# Patient Record
Sex: FEMALE | Race: WHITE | NOT HISPANIC OR LATINO | Employment: OTHER | URBAN - METROPOLITAN AREA
[De-identification: names, ages, dates, MRNs, and addresses within clinical notes are randomized per-mention and may not be internally consistent; named-entity substitution may affect disease eponyms.]

---

## 2017-07-17 ENCOUNTER — HOSPITAL ENCOUNTER (EMERGENCY)
Facility: HOSPITAL | Age: 47
Discharge: HOME | End: 2017-07-17
Attending: SPECIALIST
Payer: MEDICARE

## 2017-07-17 ENCOUNTER — TELEPHONE (OUTPATIENT)
Dept: INTERNAL MEDICINE | Facility: CLINIC | Age: 47
End: 2017-07-17

## 2017-07-17 VITALS
DIASTOLIC BLOOD PRESSURE: 56 MMHG | WEIGHT: 160 LBS | RESPIRATION RATE: 16 BRPM | TEMPERATURE: 98 F | BODY MASS INDEX: 28.34 KG/M2 | OXYGEN SATURATION: 100 % | SYSTOLIC BLOOD PRESSURE: 102 MMHG | HEART RATE: 83 BPM

## 2017-07-17 DIAGNOSIS — F44.5 PSEUDOSEIZURE: Primary | ICD-10-CM

## 2017-07-17 LAB
ALBUMIN SERPL BCP-MCNC: 3.4 G/DL
ALP SERPL-CCNC: 102 U/L
ALT SERPL W/O P-5'-P-CCNC: 67 U/L
AMPHET+METHAMPHET UR QL: NEGATIVE
ANION GAP SERPL CALC-SCNC: 13 MMOL/L
APTT BLDCRRT: 24 SEC
AST SERPL-CCNC: 55 U/L
BARBITURATES UR QL SCN>200 NG/ML: NEGATIVE
BASOPHILS # BLD AUTO: 0.04 K/UL
BASOPHILS NFR BLD: 0.6 %
BENZODIAZ UR QL SCN>200 NG/ML: NORMAL
BILIRUB SERPL-MCNC: 0.7 MG/DL
BILIRUB UR QL STRIP: NEGATIVE
BUN SERPL-MCNC: 15 MG/DL
BZE UR QL SCN: NEGATIVE
CALCIUM SERPL-MCNC: 8.7 MG/DL
CANNABINOIDS UR QL SCN: NEGATIVE
CHLORIDE SERPL-SCNC: 108 MMOL/L
CLARITY UR: CLEAR
CO2 SERPL-SCNC: 21 MMOL/L
COLOR UR: YELLOW
CREAT SERPL-MCNC: 0.8 MG/DL
CREAT UR-MCNC: 39.6 MG/DL
DIFFERENTIAL METHOD: ABNORMAL
EOSINOPHIL # BLD AUTO: 0.3 K/UL
EOSINOPHIL NFR BLD: 5.1 %
ERYTHROCYTE [DISTWIDTH] IN BLOOD BY AUTOMATED COUNT: 15.8 %
EST. GFR  (AFRICAN AMERICAN): >60 ML/MIN/1.73 M^2
EST. GFR  (NON AFRICAN AMERICAN): >60 ML/MIN/1.73 M^2
ETHANOL SERPL-MCNC: <10 MG/DL
GLUCOSE SERPL-MCNC: 146 MG/DL
GLUCOSE UR QL STRIP: NEGATIVE
HCT VFR BLD AUTO: 34.8 %
HGB BLD-MCNC: 11.2 G/DL
HGB UR QL STRIP: NEGATIVE
INR PPP: 1
KETONES UR QL STRIP: NEGATIVE
LEUKOCYTE ESTERASE UR QL STRIP: NEGATIVE
LYMPHOCYTES # BLD AUTO: 1.9 K/UL
LYMPHOCYTES NFR BLD: 29.7 %
MAGNESIUM SERPL-MCNC: 1.4 MG/DL
MCH RBC QN AUTO: 27.7 PG
MCHC RBC AUTO-ENTMCNC: 32.2 %
MCV RBC AUTO: 86 FL
METHADONE UR QL SCN>300 NG/ML: NEGATIVE
MONOCYTES # BLD AUTO: 0.5 K/UL
MONOCYTES NFR BLD: 8 %
NEUTROPHILS # BLD AUTO: 3.7 K/UL
NEUTROPHILS NFR BLD: 56.6 %
NITRITE UR QL STRIP: NEGATIVE
OPIATES UR QL SCN: NORMAL
PCP UR QL SCN>25 NG/ML: NEGATIVE
PH UR STRIP: 6 [PH] (ref 5–8)
PLATELET # BLD AUTO: 243 K/UL
PMV BLD AUTO: 11 FL
POCT GLUCOSE: 148 MG/DL (ref 70–110)
POTASSIUM SERPL-SCNC: 4 MMOL/L
PROT SERPL-MCNC: 7.1 G/DL
PROT UR QL STRIP: NEGATIVE
PROTHROMBIN TIME: 10.2 SEC
RBC # BLD AUTO: 4.04 M/UL
SODIUM SERPL-SCNC: 142 MMOL/L
SP GR UR STRIP: 1.02 (ref 1–1.03)
TOXICOLOGY INFORMATION: NORMAL
URN SPEC COLLECT METH UR: NORMAL
UROBILINOGEN UR STRIP-ACNC: NEGATIVE EU/DL
WBC # BLD AUTO: 6.46 K/UL

## 2017-07-17 PROCEDURE — 80053 COMPREHEN METABOLIC PANEL: CPT

## 2017-07-17 PROCEDURE — 82962 GLUCOSE BLOOD TEST: CPT

## 2017-07-17 PROCEDURE — 85025 COMPLETE CBC W/AUTO DIFF WBC: CPT

## 2017-07-17 PROCEDURE — 25000003 PHARM REV CODE 250: Performed by: SPECIALIST

## 2017-07-17 PROCEDURE — 96375 TX/PRO/DX INJ NEW DRUG ADDON: CPT

## 2017-07-17 PROCEDURE — 95816 EEG AWAKE AND DROWSY: CPT | Mod: 26,,, | Performed by: PSYCHIATRY & NEUROLOGY

## 2017-07-17 PROCEDURE — 80320 DRUG SCREEN QUANTALCOHOLS: CPT

## 2017-07-17 PROCEDURE — 96365 THER/PROPH/DIAG IV INF INIT: CPT

## 2017-07-17 PROCEDURE — 99285 EMERGENCY DEPT VISIT HI MDM: CPT | Mod: 25

## 2017-07-17 PROCEDURE — 85610 PROTHROMBIN TIME: CPT

## 2017-07-17 PROCEDURE — 63600175 PHARM REV CODE 636 W HCPCS: Performed by: SPECIALIST

## 2017-07-17 PROCEDURE — 96361 HYDRATE IV INFUSION ADD-ON: CPT

## 2017-07-17 PROCEDURE — 83735 ASSAY OF MAGNESIUM: CPT

## 2017-07-17 PROCEDURE — 80307 DRUG TEST PRSMV CHEM ANLYZR: CPT

## 2017-07-17 PROCEDURE — 81003 URINALYSIS AUTO W/O SCOPE: CPT

## 2017-07-17 PROCEDURE — 95816 EEG AWAKE AND DROWSY: CPT

## 2017-07-17 PROCEDURE — 80177 DRUG SCRN QUAN LEVETIRACETAM: CPT

## 2017-07-17 PROCEDURE — 99285 EMERGENCY DEPT VISIT HI MDM: CPT | Mod: ,,, | Performed by: PSYCHIATRY & NEUROLOGY

## 2017-07-17 PROCEDURE — 85730 THROMBOPLASTIN TIME PARTIAL: CPT

## 2017-07-17 RX ORDER — ACETAMINOPHEN 10 MG/ML
1000 INJECTION, SOLUTION INTRAVENOUS ONCE
Status: COMPLETED | OUTPATIENT
Start: 2017-07-17 | End: 2017-07-17

## 2017-07-17 RX ORDER — CLONAZEPAM 2 MG/1
TABLET ORAL
Refills: 0 | COMMUNITY
Start: 2017-04-25 | End: 2018-09-24 | Stop reason: SDUPTHER

## 2017-07-17 RX ORDER — DIAZEPAM 10 MG/1
10 TABLET ORAL 3 TIMES DAILY
COMMUNITY
Start: 2017-06-30 | End: 2018-03-21

## 2017-07-17 RX ORDER — NAPROXEN 500 MG/1
500 TABLET ORAL 2 TIMES DAILY PRN
Qty: 10 TABLET | Refills: 0 | Status: SHIPPED | OUTPATIENT
Start: 2017-07-17 | End: 2018-03-21

## 2017-07-17 RX ORDER — LEVETIRACETAM 10 MG/ML
1000 INJECTION INTRAVASCULAR
Status: COMPLETED | OUTPATIENT
Start: 2017-07-17 | End: 2017-07-17

## 2017-07-17 RX ORDER — PREGABALIN 150 MG/1
150 CAPSULE ORAL 2 TIMES DAILY
COMMUNITY
Start: 2017-06-30 | End: 2018-03-21

## 2017-07-17 RX ORDER — DIPHENHYDRAMINE HCL 12.5MG/5ML
12.5 ELIXIR ORAL
Status: COMPLETED | OUTPATIENT
Start: 2017-07-17 | End: 2017-07-17

## 2017-07-17 RX ORDER — BLOOD SUGAR DIAGNOSTIC
STRIP MISCELLANEOUS
COMMUNITY
Start: 2017-05-31 | End: 2018-06-18

## 2017-07-17 RX ORDER — CITALOPRAM 40 MG/1
TABLET, FILM COATED ORAL
COMMUNITY
Start: 2017-06-30 | End: 2018-03-21

## 2017-07-17 RX ORDER — ONDANSETRON 2 MG/ML
4 INJECTION INTRAMUSCULAR; INTRAVENOUS
Status: COMPLETED | OUTPATIENT
Start: 2017-07-17 | End: 2017-07-17

## 2017-07-17 RX ORDER — DIVALPROEX SODIUM 500 MG/1
1000 TABLET, DELAYED RELEASE ORAL EVERY 12 HOURS
Qty: 30 TABLET | Refills: 11 | Status: SHIPPED | OUTPATIENT
Start: 2017-07-17 | End: 2018-03-21

## 2017-07-17 RX ORDER — ISOPROPYL ALCOHOL 0.75 G/1
SWAB TOPICAL 3 TIMES DAILY
COMMUNITY
Start: 2017-06-30

## 2017-07-17 RX ORDER — LEVETIRACETAM 500 MG/1
500 TABLET ORAL 2 TIMES DAILY
COMMUNITY
Start: 2017-07-13 | End: 2017-07-17 | Stop reason: ALTCHOICE

## 2017-07-17 RX ORDER — DIAZEPAM 5 MG/1
TABLET ORAL
COMMUNITY
Start: 2017-05-19 | End: 2018-03-21

## 2017-07-17 RX ORDER — DOXEPIN HYDROCHLORIDE 150 MG/1
150 CAPSULE ORAL NIGHTLY
COMMUNITY
Start: 2017-06-20 | End: 2018-09-24 | Stop reason: SDUPTHER

## 2017-07-17 RX ORDER — OMEPRAZOLE 40 MG/1
40 CAPSULE, DELAYED RELEASE ORAL DAILY
COMMUNITY
Start: 2017-06-13 | End: 2018-03-21

## 2017-07-17 RX ORDER — SITAGLIPTIN AND METFORMIN HYDROCHLORIDE 1000; 50 MG/1; MG/1
TABLET, FILM COATED, EXTENDED RELEASE ORAL
COMMUNITY
Start: 2017-06-08 | End: 2018-06-18

## 2017-07-17 RX ORDER — SITAGLIPTIN AND METFORMIN HYDROCHLORIDE 500; 50 MG/1; MG/1
TABLET, FILM COATED ORAL
COMMUNITY
Start: 2017-06-30 | End: 2018-03-21

## 2017-07-17 RX ADMIN — DEXTROSE 725 MG PE: 50 INJECTION, SOLUTION INTRAVENOUS at 09:07

## 2017-07-17 RX ADMIN — LEVETIRACETAM 1000 MG: 1000 INJECTION, SOLUTION INTRAVENOUS at 10:07

## 2017-07-17 RX ADMIN — SODIUM CHLORIDE 1000 ML: 0.9 INJECTION, SOLUTION INTRAVENOUS at 09:07

## 2017-07-17 RX ADMIN — DIPHENHYDRAMINE HYDROCHLORIDE 12.5 MG: 12.5 SOLUTION ORAL at 01:07

## 2017-07-17 RX ADMIN — ACETAMINOPHEN 1000 MG: 10 INJECTION, SOLUTION INTRAVENOUS at 01:07

## 2017-07-17 RX ADMIN — ONDANSETRON 4 MG: 2 INJECTION INTRAMUSCULAR; INTRAVENOUS at 12:07

## 2017-07-17 NOTE — PROCEDURES
Jadyn Chance is a 46 y.o. female patient.    Temp: 98 °F (36.7 °C) (07/17/17 0839)  Pulse: 83 (07/17/17 1450)  Resp: 16 (07/17/17 1450)  BP: (!) 102/56 (07/17/17 1415)  SpO2: 100 % (07/17/17 1450)  Weight: 72.6 kg (160 lb) (07/17/17 0839)            Referring physician: Dr. Laboy     Technician : Harshad Mcgee     Recording time:               20 minutes    Artifacts:   Eye movements , muscle .    Age:              47                                    Sex: F      History:   Multi[ple seizures     Technique : Electrodes are placed according to International 10-20 system . Bipolar and referential montages are used. Hyperventilation was  Not done   . Photic was not done.      Findings:  This is a multichannel digital EEG recording using the international 10-20 placement     system. The resting record is fairly well organized and symmetric. A dominant posterior     rhythm is seen. It consists of a  9  hertz 20-70 microvolt alpha rhythm. This attenuates     with eye opening. During drowsiness, there is mild attenuation and slowing of the     background rhythm. Stage II sleep was not achieved. Hyperventilation was not     performed. Photic stimulation was not  done .There is no change in the back ground .There is no spikes or spike waves activities in this EEG. No seizure or epileptiform discharge are appreciated.  There is no focal attenuation or side to side variation. No clinical seizure activity was noted by the EEG technician. There were some muscle artifacts .     IMPRESSION:  This is a normal awake and drowsy EEG study. Clinical correlation appreciated.          Procedures    Harsha Laboy  7/17/2017

## 2017-07-17 NOTE — ED NOTES
Pt awake and alert at this time, VSS.  Ppt's  number placed on pt chart, pt's  leaving hospital, took pt's medications, necklace, and wedding rings.

## 2017-07-17 NOTE — ED NOTES
Nurse called to pt bedside for family report of seizure activity.  Pt IV flushed with NS, seizure activity stopped.  Dr. Padgett notified.

## 2017-07-17 NOTE — CONSULTS
Consults   Consult Requested By: Christin Padgett MD  Reason for Consult:  Seizure     SUBJECTIVE:       HPI:   Jadyn Chance is a 46 y.o. female patient who presents to the Emergency Department for seizures. She had 2 seizures last night and 3 seizures this morning, the last while waiting in the ED lobby. Symptoms are episodic and moderate in severity.  states that patient has been having seizures for several years and is currently on Keppra; she was on Klonopin and Depakote in the past. She said that she was doing very good with Depakote 1000 mg bid but her Neurologist has changed it to Keppra Which is causing headache. She is under a lot of stress , does not sleep enough and there is some deaths in family.   She used to have convulsive seizure without tongue biting or incontinence . Usually it lasts 10-15 minutes. She feels when it is coming.    Past Medical History:   Diagnosis Date    Abnormal Pap smear     bx was negative, per pt    Anemia     Anxiety     B12 deficiency     Blood transfusion     multiple     Chronic LBP     Degenerative disc disease     l spine    Diabetes mellitus     Diabetic neuropathy     General anesthetics causing adverse effect in therapeutic use     delayed emergence    Mixed hyperlipidemia 2013    RLS (restless legs syndrome)     Vitamin D deficiency disease      Past Surgical History:   Procedure Laterality Date    ANUS SURGERY      BELT ABDOMINOPLASTY      tummy tuck    BREAST SURGERY      breast augmentation     SECTION      x2    CHOLECYSTECTOMY      mikel      EXCISIONAL HEMORRHOIDECTOMY      GASTRIC BYPASS      HIP FRACTURE SURGERY      left hip ORIF    MOUTH SURGERY      revision of JJ anastomosis  2/27/15    small bowel resection      TUBAL LIGATION       Family History   Problem Relation Age of Onset    Diabetes Mother     Cataracts Mother     Glaucoma Maternal Aunt     Blindness Maternal Aunt     Ovarian  cancer Sister 30    Breast cancer Neg Hx     Colon cancer Neg Hx     Thrombophilia Neg Hx      Social History   Substance Use Topics    Smoking status: Never Smoker    Smokeless tobacco: Never Used    Alcohol use No     Review of patient's allergies indicates:   Allergen Reactions    Penicillins Other (See Comments)     Patient cannot recall specific reaction, reports she has been listing this as an allergy since childhood.    Acetaminophen Itching     Pt states she can tolerate this medication with benadryl.    Ciprofloxacin (bulk)     Codeine Nausea And Vomiting    Dilaudid [hydromorphone (bulk)] Itching     Pt states she can tolerate this medication with benadryl.    Hydrocodone Itching     Pt states she can tolerate this medication with benadryl.    Lortab [hydrocodone-acetaminophen] Nausea And Vomiting       Review of Systems   Constitutional: Negative for fever and weight loss.   HENT: Negative for hearing loss.    Eyes: Negative for blurred vision, double vision, photophobia and pain.   Respiratory: Negative for cough.    Cardiovascular: Negative for chest pain.   Gastrointestinal: Negative for abdominal pain, nausea and vomiting.   Genitourinary: Negative for dysuria, frequency and urgency.   Musculoskeletal: Negative.  Negative for back pain, falls, joint pain, myalgias and neck pain.   Skin: Negative for itching and rash.   Neurological: Positive for seizures. Negative for tingling and headaches.   Psychiatric/Behavioral: Positive for depression. Negative for memory loss. The patient is nervous/anxious and has insomnia.        OBJECTIVE:     Vital Signs (Most Recent)  Temp: 98 °F (36.7 °C) (07/17/17 0839)  Pulse: 80 (07/17/17 1415)  Resp: 15 (07/17/17 1415)  BP: (!) 102/56 (07/17/17 1415)  SpO2: 98 % (07/17/17 1415)    Physical Exam   Constitutional: She is oriented to person, place, and time. She appears well-developed and well-nourished.   HENT:   Head: Normocephalic and atraumatic.   Eyes:  Conjunctivae and EOM are normal. Pupils are equal, round, and reactive to light.   Neck: Normal range of motion. Neck supple. No JVD present. No tracheal deviation present. No thyromegaly present.   Cardiovascular: Normal rate, regular rhythm and normal heart sounds.    Pulmonary/Chest: Effort normal and breath sounds normal.   Abdominal: She exhibits no distension. There is no tenderness.   Musculoskeletal: Normal range of motion. She exhibits no edema or tenderness.   Neurological: She is alert and oriented to person, place, and time. She has normal strength and normal reflexes. She displays normal reflexes. No cranial nerve deficit or sensory deficit. She exhibits normal muscle tone. She displays a negative Romberg sign. Coordination and gait normal.   Reflex Scores:       Tricep reflexes are 2+ on the right side and 2+ on the left side.       Bicep reflexes are 2+ on the right side and 2+ on the left side.       Brachioradialis reflexes are 2+ on the right side and 2+ on the left side.       Patellar reflexes are 2+ on the right side and 2+ on the left side.       Achilles reflexes are 2+ on the right side and 2+ on the left side.  Skin: Skin is warm and dry. No rash noted.   Psychiatric: She has a normal mood and affect. Her behavior is normal. Judgment and thought content normal.       Strength  Deltoids Triceps Biceps Wrist Extension Wrist Flexion Hand    Upper: R 5/5 5/5 5/5 5/5 5/5 5/5    L 5/5 5/5 5/5 5/5 5/5 5/5     Iliopsoas Quadriceps Knee  Flexion Tibialis  anterior Gastro- cnemius EHL   Lower: R 5/5 5/5 5/5 5/5 5/5 5/5    L 5/5 5/5 5/5 5/5 5/5 5/5     Laboratory:  Lab Results   Component Value Date    WBC 6.46 07/17/2017    HGB 11.2 (L) 07/17/2017    HCT 34.8 (L) 07/17/2017     07/17/2017    CHOL 151 08/11/2014    TRIG 118 08/11/2014    HDL 65 08/11/2014    ALT 67 (H) 07/17/2017    AST 55 (H) 07/17/2017     07/17/2017    K 4.0 07/17/2017     07/17/2017    CREATININE 0.8  07/17/2017    BUN 15 07/17/2017    CO2 21 (L) 07/17/2017    TSH 2.163 08/24/2015    INR 1.0 07/17/2017    HGBA1C 5.4 08/24/2015           Imaging Results          CT Head Without Contrast (Final result)  Result time 07/17/17 09:18:26    Final result by Harshad Joseph MD (07/17/17 09:18:26)                 Impression:       No acute abnormality identified.      Electronically signed by: HARSHAD JOSEPH MD  Date:     07/17/17  Time:    09:18              Narrative:    Indication: Seizures.    Axial CT images of the head were obtained without  contrast.    Comparison: 2/16/15    Findings:    Ventricles, sulci, and cisterns are symmetric without evidence of mass effect.  Brain volume and white matter are normal for age.  No intra or extraaxial masses, hemorrhages, abnormal fluid collections or abnormal calcifications. The cranium and extracranial structures are unremarkable.  Visualized sinuses and mastoid air cells are clear.                          EEG : done today and no seizure but a lot of movement artifacts.    ASSESSMENT/PLAN:     Primary Diagnoses:  1. History of seizures .  2. Multiple seizures in a row but no seizure captured in the EEG, non-epileptic seizures.    Patient Active Problem List   Diagnosis    Anxiety    Type II or unspecified type diabetes mellitus without mention of complication, not stated as uncontrolled    Vitamin B12 deficiency    History of Kayleen-en-Y gastric bypass    Orthostatic hypotension    Iron deficiency    Mixed hyperlipidemia    Hypermenorrhea    Dysmenorrhea    Pelvic pain in female    Fibroids    Vitamin D deficiency    Insomnia    Degenerative lumbar spinal stenosis    Thoracic or lumbosacral neuritis or radiculitis, unspecified    Diarrhea    Acute gastroenteritis    Dehydration    Acute renal failure (ARF)    Abdominal pain    Marginal ulcer    Jejunal ulcer        Plan:  Discussed with ED , doctor: for    1. Depakote 1000 mg bid instaed of Keppra.  2. No   Driving for 6 months.   will see in the clinic

## 2017-07-17 NOTE — ED PROVIDER NOTES
SCRIBE #1 NOTE: I, Trista Escobar, am scribing for, and in the presence of, Christin Padgett MD. I have scribed the entire note.      History      Chief Complaint   Patient presents with    Seizures     pt with history of seizures on Keppra had 3 seizures this morning and per family has been having them more frequently       Review of patient's allergies indicates:   Allergen Reactions    Penicillins Other (See Comments)     Patient cannot recall specific reaction, reports she has been listing this as an allergy since childhood.    Ciprofloxacin (bulk)     Dilaudid [hydromorphone (bulk)] Itching     Adverse reactions    Hydrocodone Itching     Adverse reactions        HPI   HPI    7/17/2017, 8:46 AM   History obtained from the  and patient      History of Present Illness: Jadyn Chance is a 46 y.o. female patient who presents to the Emergency Department for seizures.  at bedside states that patient had 2 seizures last night and 3 seizures this morning, the last while waiting in the ED lobby. Symptoms are episodic and moderate in severity.  states that patient has been having seizures for several years and is currently on Keppra; she was on Klonopin and Depakote in the past. Pt reports that she is usually given Dilantin and Ativan in the hospital which works. No exacerbating factors reported.  reports that patient is sometimes able to talk to him while having a seizure. Patient and  deny bladder/bowel incontinence, tongue biting, drooling, falls, dizziness, HA and all other sxs at this time. Pt was last evaluated by Neurology 3 days ago. No further complaints or concerns at this time.     Arrival mode: Personal vehicle    PCP: Mikayla Myles MD       Past Medical History:  Past Medical History:   Diagnosis Date    Abnormal Pap smear 1991    bx was negative, per pt    Anemia     Anxiety     B12 deficiency     Blood transfusion     multiple     Chronic LBP      Degenerative disc disease     l spine    Diabetes mellitus     Diabetic neuropathy     General anesthetics causing adverse effect in therapeutic use     delayed emergence    Mixed hyperlipidemia 2013    RLS (restless legs syndrome)     Vitamin D deficiency disease        Past Surgical History:  Past Surgical History:   Procedure Laterality Date    ANUS SURGERY      BELT ABDOMINOPLASTY      tummy tuck    BREAST SURGERY      breast augmentation     SECTION      x2    CHOLECYSTECTOMY      mikel      EXCISIONAL HEMORRHOIDECTOMY      GASTRIC BYPASS      HIP FRACTURE SURGERY      left hip ORIF    MOUTH SURGERY      revision of JJ anastomosis  2/27/15    small bowel resection      TUBAL LIGATION           Family History:  Family History   Problem Relation Age of Onset    Diabetes Mother     Cataracts Mother     Glaucoma Maternal Aunt     Blindness Maternal Aunt     Ovarian cancer Sister 30    Breast cancer Neg Hx     Colon cancer Neg Hx     Thrombophilia Neg Hx        Social History:  Social History     Social History Main Topics    Smoking status: Never Smoker    Smokeless tobacco: Never Used    Alcohol use No    Drug use: No    Sexual activity: Yes     Partners: Male     Birth control/ protection: Surgical      Comment: BTL; mut monog       ROS   Review of Systems   Constitutional: Negative for chills and fever.        (-) falls   HENT: Negative for drooling and sore throat.         (-) tongue biting   Respiratory: Negative for shortness of breath.    Cardiovascular: Negative for chest pain and palpitations.   Gastrointestinal: Negative for abdominal pain, diarrhea, nausea and vomiting.   Genitourinary: Negative for dysuria.   Musculoskeletal: Negative for back pain.   Skin: Negative for rash.   Neurological: Positive for seizures. Negative for dizziness, weakness, numbness and headaches.        (-) bladder/bowel incontinence   Hematological: Does not bruise/bleed easily.    All other systems reviewed and are negative.      Physical Exam      Initial Vitals [07/17/17 0839]   BP Pulse Resp Temp SpO2   137/85 104 20 98 °F (36.7 °C) 97 %      MAP       102.33          Physical Exam  Nursing Notes and Vital Signs Reviewed.  Constitutional: Patient is in no acute distress. She is tearful. Awake and alert, not in post-ictal state. Well-developed and well-nourished.  Head: Atraumatic. Normocephalic.  Eyes: PERRL. EOM intact. Conjunctivae are not pale. No scleral icterus.  ENT: Mucous membranes are moist. Oropharynx is clear and symmetric.    Neck: Supple. Full ROM.   Cardiovascular: Regular rate. Regular rhythm. No murmurs, rubs, or gallops. Distal pulses are 2+ and symmetric.  Pulmonary/Chest: No respiratory distress. Clear to auscultation bilaterally. No wheezing, rales, or rhonchi.  Abdominal: Soft and non-distended.  There is no tenderness.  No rebound, guarding, or rigidity.  Good bowel sounds.    Musculoskeletal: Moves all extremities. No obvious deformities. No edema. No calf tenderness.  Skin: Warm and dry.  Neurological:  Alert, awake, and appropriate. Is not in post-ictal state. Normal speech. No acute focal neurological deficits are appreciated.  Psychiatric: Anxious. Good eye contact. Appropriate in content.    ED Course    Procedures  ED Vital Signs:  Vitals:    07/17/17 0839 07/17/17 0930 07/17/17 0935 07/17/17 0945   BP: 137/85 107/64 128/79    Pulse: 104 90 107 98   Resp: 20 16 20    Temp: 98 °F (36.7 °C)      TempSrc: Oral      SpO2: 97% 97% 97%    Weight: 72.6 kg (160 lb)       07/17/17 1040 07/17/17 1100 07/17/17 1200 07/17/17 1415   BP: 105/68 108/64 111/70 (!) 102/56   Pulse: 86 82 81 80   Resp: 12 13 11 15   Temp:       TempSrc:       SpO2: 97% 99% 100% 98%   Weight:        07/17/17 1450   BP:    Pulse: 83   Resp: 16   Temp:    TempSrc:    SpO2: 100%   Weight:        Abnormal Lab Results:  Labs Reviewed   CBC W/ AUTO DIFFERENTIAL - Abnormal; Notable for the following:         Result Value    Hemoglobin 11.2 (*)     Hematocrit 34.8 (*)     RDW 15.8 (*)     All other components within normal limits   COMPREHENSIVE METABOLIC PANEL - Abnormal; Notable for the following:     CO2 21 (*)     Glucose 146 (*)     Albumin 3.4 (*)     AST 55 (*)     ALT 67 (*)     All other components within normal limits   MAGNESIUM - Abnormal; Notable for the following:     Magnesium 1.4 (*)     All other components within normal limits   POCT GLUCOSE - Abnormal; Notable for the following:     POCT Glucose 148 (*)     All other components within normal limits   URINALYSIS   DRUG SCREEN PANEL, URINE EMERGENCY   ALCOHOL,MEDICAL (ETHANOL)   PROTIME-INR   APTT   LEVETIRACETAM  (KEPPRA) LEVEL        All Lab Results:  Results for orders placed or performed during the hospital encounter of 07/17/17   CBC auto differential   Result Value Ref Range    WBC 6.46 3.90 - 12.70 K/uL    RBC 4.04 4.00 - 5.40 M/uL    Hemoglobin 11.2 (L) 12.0 - 16.0 g/dL    Hematocrit 34.8 (L) 37.0 - 48.5 %    MCV 86 82 - 98 fL    MCH 27.7 27.0 - 31.0 pg    MCHC 32.2 32.0 - 36.0 %    RDW 15.8 (H) 11.5 - 14.5 %    Platelets 243 150 - 350 K/uL    MPV 11.0 9.2 - 12.9 fL    Gran # 3.7 1.8 - 7.7 K/uL    Lymph # 1.9 1.0 - 4.8 K/uL    Mono # 0.5 0.3 - 1.0 K/uL    Eos # 0.3 0.0 - 0.5 K/uL    Baso # 0.04 0.00 - 0.20 K/uL    Gran% 56.6 38.0 - 73.0 %    Lymph% 29.7 18.0 - 48.0 %    Mono% 8.0 4.0 - 15.0 %    Eosinophil% 5.1 0.0 - 8.0 %    Basophil% 0.6 0.0 - 1.9 %    Differential Method Automated    Comprehensive metabolic panel   Result Value Ref Range    Sodium 142 136 - 145 mmol/L    Potassium 4.0 3.5 - 5.1 mmol/L    Chloride 108 95 - 110 mmol/L    CO2 21 (L) 23 - 29 mmol/L    Glucose 146 (H) 70 - 110 mg/dL    BUN, Bld 15 6 - 20 mg/dL    Creatinine 0.8 0.5 - 1.4 mg/dL    Calcium 8.7 8.7 - 10.5 mg/dL    Total Protein 7.1 6.0 - 8.4 g/dL    Albumin 3.4 (L) 3.5 - 5.2 g/dL    Total Bilirubin 0.7 0.1 - 1.0 mg/dL    Alkaline Phosphatase 102 55 - 135 U/L     AST 55 (H) 10 - 40 U/L    ALT 67 (H) 10 - 44 U/L    Anion Gap 13 8 - 16 mmol/L    eGFR if African American >60 >60 mL/min/1.73 m^2    eGFR if non African American >60 >60 mL/min/1.73 m^2   Magnesium   Result Value Ref Range    Magnesium 1.4 (L) 1.6 - 2.6 mg/dL   Urinalysis   Result Value Ref Range    Specimen UA Urine, Clean Catch     Color, UA Yellow Yellow, Straw, Miriam    Appearance, UA Clear Clear    pH, UA 6.0 5.0 - 8.0    Specific Gravity, UA 1.020 1.005 - 1.030    Protein, UA Negative Negative    Glucose, UA Negative Negative    Ketones, UA Negative Negative    Bilirubin (UA) Negative Negative    Occult Blood UA Negative Negative    Nitrite, UA Negative Negative    Urobilinogen, UA Negative <2.0 EU/dL    Leukocytes, UA Negative Negative   Drug screen panel, emergency   Result Value Ref Range    Benzodiazepines Presumptive Positive     Methadone metabolites Negative     Cocaine (Metab.) Negative     Opiate Scrn, Ur Presumptive Positive     Barbiturate Screen, Ur Negative     Amphetamine Screen, Ur Negative     THC Negative     Phencyclidine Negative     Creatinine, Random Ur 39.6 15.0 - 325.0 mg/dL    Toxicology Information SEE COMMENT    Ethanol   Result Value Ref Range    Alcohol, Medical, Serum <10 <10 mg/dL   Protime-INR   Result Value Ref Range    Prothrombin Time 10.2 9.0 - 12.5 sec    INR 1.0 0.8 - 1.2   APTT   Result Value Ref Range    aPTT 24.0 21.0 - 32.0 sec   POCT glucose   Result Value Ref Range    POCT Glucose 148 (H) 70 - 110 mg/dL     Imaging Results:  Imaging Results          CT Head Without Contrast (Final result)  Result time 07/17/17 09:18:26    Final result by Harshad Joseph MD (07/17/17 09:18:26)                 Impression:       No acute abnormality identified.      Electronically signed by: HARSHAD JOSEPH MD  Date:     07/17/17  Time:    09:18              Narrative:    Indication: Seizures.    Axial CT images of the head were obtained without  contrast.    Comparison:  2/16/15    Findings:    Ventricles, sulci, and cisterns are symmetric without evidence of mass effect.  Brain volume and white matter are normal for age.  No intra or extraaxial masses, hemorrhages, abnormal fluid collections or abnormal calcifications. The cranium and extracranial structures are unremarkable.  Visualized sinuses and mastoid air cells are clear.                               The Emergency Provider reviewed the vital signs and test results, which are outlined above.    ED Discussion     9:12 AM: Pt is currently having seizure-like activity. Dr. Padgett at bedside evaluating patient.    9:33 AM:  reports pt is having another seizure.     9:44 AM: Dr. Padgett discussed the pt's case with Dr. Laboy (Neurology) who recommends ordering an EEG stat. He will be here at noon.    2:32 PM: Per Dr. Laboy, there is no seizure activity on the EEG. Pt is safe for discharge home.    2:32 PM: Reassessed pt at this time. Pt states her condition has improved at this time. Dr. Laboy recommends that patient should discontinue Keppra and get back on Depakote. He states that patient should not drive. Discussed with pt all pertinent ED information and results. Informed patient to f/u with Neurology. All questions and concerns were addressed at this timse. Pt expresses understanding of information and instructions, and is comfortable with plan to discharge. Pt is stable for discharge.    Patient presents with seizure or seizure-like symptoms. I have no suspicion of an intracranial, traumatic, infectious, metabolic, toxic, cardiovascular (specifically arrhythmia), or other emergent medical condition requiring further intervention. Seizure precautions were discussed with the patient and/or caretaker, specifically not to swim unattended, not to operate motor vehicles or other machinery, and to avoid heights or other areas where falls may occur until cleared by primary care physician. Patient is safe for discharge.    ED  Medication(s):  Medications   fosphenytoin (CEREBYX) 725 mg PE in dextrose 5 % 100 mL IVPB (0 mg PE/kg × 72.6 kg Intravenous Stopped 7/17/17 0936)   sodium chloride 0.9% bolus 1,000 mL (0 mLs Intravenous Stopped 7/17/17 1052)   levetiracetam in NaCl (iso-os) IVPB 1,000 mg (0 mg Intravenous Stopped 7/17/17 1118)   ondansetron injection 4 mg (4 mg Intravenous Given 7/17/17 1240)   acetaminophen (10 mg/mL) injection 1,000 mg (0 mg Intravenous Stopped 7/17/17 1336)   diphenhydrAMINE 12.5 mg/5 mL elixir 12.5 mg (12.5 mg Oral Given 7/17/17 1321)       Discharge Medication List as of 7/17/2017  2:44 PM      START taking these medications    Details   divalproex (DEPAKOTE) 500 MG TbEC Take 2 tablets (1,000 mg total) by mouth every 12 (twelve) hours., Starting Mon 7/17/2017, Until Tue 7/17/2018, Print      naproxen (NAPROSYN) 500 MG tablet Take 1 tablet (500 mg total) by mouth 2 (two) times daily as needed (pain)., Starting Mon 7/17/2017, Print             Follow-up Information     Schedule an appointment as soon as possible for a visit  with Mikayla Myles MD.    Specialty:  Internal Medicine  Contact information:  [address]  [place] [address] [phone]             Schedule an appointment as soon as possible for a visit  with Harsha Laboy MD.    Specialty:  Neurology  Contact information:  [address]  [place] [address] [phone]             [facility]    Specialty:  Emergency Medicine  Why:  If symptoms worsen  Contact information:  [address] [facility] [address] [phone]           [facility]                  Medical Decision Making    Medical Decision Making:   Clinical Tests:   Lab Tests: Reviewed and Ordered  Radiological Study: Ordered and Reviewed           Scribe Attestation:   Scribe #1: I performed the above scribed service and the documentation accurately describes the services I performed. I attest to the accuracy of the  note.    Attending:   Physician Attestation Statement for Scribe #1: I, Christin Padgett MD, personally performed the services described in this documentation, as scribed by Trista Escobar, in my presence, and it is both accurate and complete.          Clinical Impression       ICD-10-CM ICD-9-CM   1. 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

## 2017-07-17 NOTE — ED NOTES
Pt c/o nausea, reports vomiting, pt c/o back pain, requesting pain and nausea medication; Dr. Padgett notified, verbal order received.

## 2017-07-17 NOTE — ED NOTES
Pt states she cannot take acetaminophen without benadryl d/t itching, Dr. Padgett notified, verbal order received.

## 2017-07-17 NOTE — TELEPHONE ENCOUNTER
----- Message from Belkis Greenberg sent at 7/17/2017  1:56 PM CDT -----  Contact: pt  Pt states she is in the ER right now and as soon as they release her she will be at her appt.

## 2017-07-19 LAB — LEVETIRACETAM SERPL-MCNC: 37.4 UG/ML (ref 3–60)

## 2018-01-30 ENCOUNTER — PATIENT OUTREACH (OUTPATIENT)
Dept: ADMINISTRATIVE | Facility: HOSPITAL | Age: 48
End: 2018-01-30

## 2018-01-30 NOTE — PROGRESS NOTES
Attempted to schedule diabetic eye exam. Patient also due for influenza vaccination, pap smear, mammogram, urine micro albumin, Hemoglobin A1C, and lipid panel. Patient not available, no answer.

## 2018-03-21 ENCOUNTER — LAB VISIT (OUTPATIENT)
Dept: LAB | Facility: HOSPITAL | Age: 48
End: 2018-03-21
Attending: NURSE PRACTITIONER
Payer: MEDICARE

## 2018-03-21 ENCOUNTER — OFFICE VISIT (OUTPATIENT)
Dept: INTERNAL MEDICINE | Facility: CLINIC | Age: 48
End: 2018-03-21
Payer: MEDICARE

## 2018-03-21 ENCOUNTER — PATIENT MESSAGE (OUTPATIENT)
Dept: INTERNAL MEDICINE | Facility: CLINIC | Age: 48
End: 2018-03-21

## 2018-03-21 VITALS
DIASTOLIC BLOOD PRESSURE: 78 MMHG | WEIGHT: 169.63 LBS | HEIGHT: 63 IN | OXYGEN SATURATION: 96 % | HEART RATE: 90 BPM | BODY MASS INDEX: 30.05 KG/M2 | SYSTOLIC BLOOD PRESSURE: 112 MMHG | TEMPERATURE: 98 F

## 2018-03-21 DIAGNOSIS — E11.8 CONTROLLED TYPE 2 DIABETES MELLITUS WITH COMPLICATION, WITHOUT LONG-TERM CURRENT USE OF INSULIN: ICD-10-CM

## 2018-03-21 DIAGNOSIS — E08.21 DIABETES MELLITUS DUE TO UNDERLYING CONDITION WITH DIABETIC NEPHROPATHY, WITHOUT LONG-TERM CURRENT USE OF INSULIN: ICD-10-CM

## 2018-03-21 DIAGNOSIS — Z00.00 ROUTINE MEDICAL EXAM: Primary | ICD-10-CM

## 2018-03-21 DIAGNOSIS — E08.40 DIABETES MELLITUS DUE TO UNDERLYING CONDITION WITH DIABETIC NEUROPATHY, WITHOUT LONG-TERM CURRENT USE OF INSULIN: ICD-10-CM

## 2018-03-21 DIAGNOSIS — Z12.39 SCREENING FOR BREAST CANCER: ICD-10-CM

## 2018-03-21 DIAGNOSIS — M54.5 CHRONIC LOW BACK PAIN, UNSPECIFIED BACK PAIN LATERALITY, WITH SCIATICA PRESENCE UNSPECIFIED: ICD-10-CM

## 2018-03-21 DIAGNOSIS — E55.9 VITAMIN D DEFICIENCY: ICD-10-CM

## 2018-03-21 DIAGNOSIS — K29.00 ACUTE GASTRITIS WITHOUT HEMORRHAGE, UNSPECIFIED GASTRITIS TYPE: ICD-10-CM

## 2018-03-21 DIAGNOSIS — E61.1 IRON DEFICIENCY: ICD-10-CM

## 2018-03-21 DIAGNOSIS — Z00.00 ROUTINE MEDICAL EXAM: ICD-10-CM

## 2018-03-21 DIAGNOSIS — G89.29 CHRONIC LOW BACK PAIN, UNSPECIFIED BACK PAIN LATERALITY, WITH SCIATICA PRESENCE UNSPECIFIED: ICD-10-CM

## 2018-03-21 LAB
25(OH)D3+25(OH)D2 SERPL-MCNC: 16 NG/ML
ALBUMIN SERPL BCP-MCNC: 3.6 G/DL
ALP SERPL-CCNC: 105 U/L
ALT SERPL W/O P-5'-P-CCNC: 48 U/L
ANION GAP SERPL CALC-SCNC: 11 MMOL/L
AST SERPL-CCNC: 24 U/L
BASOPHILS # BLD AUTO: 0.05 K/UL
BASOPHILS NFR BLD: 0.7 %
BILIRUB SERPL-MCNC: 0.8 MG/DL
BUN SERPL-MCNC: 18 MG/DL
CALCIUM SERPL-MCNC: 8.8 MG/DL
CHLORIDE SERPL-SCNC: 108 MMOL/L
CHOLEST SERPL-MCNC: 148 MG/DL
CHOLEST/HDLC SERPL: 3 {RATIO}
CO2 SERPL-SCNC: 22 MMOL/L
CREAT SERPL-MCNC: 0.7 MG/DL
DIFFERENTIAL METHOD: ABNORMAL
EOSINOPHIL # BLD AUTO: 0.2 K/UL
EOSINOPHIL NFR BLD: 2.5 %
ERYTHROCYTE [DISTWIDTH] IN BLOOD BY AUTOMATED COUNT: 23.9 %
EST. GFR  (AFRICAN AMERICAN): >60 ML/MIN/1.73 M^2
EST. GFR  (NON AFRICAN AMERICAN): >60 ML/MIN/1.73 M^2
ESTIMATED AVG GLUCOSE: 140 MG/DL
GLUCOSE SERPL-MCNC: 115 MG/DL
HBA1C MFR BLD HPLC: 6.5 %
HCT VFR BLD AUTO: 38.2 %
HDLC SERPL-MCNC: 49 MG/DL
HDLC SERPL: 33.1 %
HGB BLD-MCNC: 11.9 G/DL
IMM GRANULOCYTES # BLD AUTO: 0.01 K/UL
IMM GRANULOCYTES NFR BLD AUTO: 0.1 %
IRON SERPL-MCNC: 35 UG/DL
LDLC SERPL CALC-MCNC: 76.6 MG/DL
LYMPHOCYTES # BLD AUTO: 1.7 K/UL
LYMPHOCYTES NFR BLD: 25.2 %
MCH RBC QN AUTO: 26.1 PG
MCHC RBC AUTO-ENTMCNC: 31.2 G/DL
MCV RBC AUTO: 84 FL
MONOCYTES # BLD AUTO: 0.3 K/UL
MONOCYTES NFR BLD: 4.6 %
NEUTROPHILS # BLD AUTO: 4.6 K/UL
NEUTROPHILS NFR BLD: 66.9 %
NONHDLC SERPL-MCNC: 99 MG/DL
NRBC BLD-RTO: 0 /100 WBC
PLATELET # BLD AUTO: 301 K/UL
PMV BLD AUTO: 11.8 FL
POTASSIUM SERPL-SCNC: 4.3 MMOL/L
PROT SERPL-MCNC: 6.7 G/DL
RBC # BLD AUTO: 4.56 M/UL
SATURATED IRON: 9 %
SODIUM SERPL-SCNC: 141 MMOL/L
TOTAL IRON BINDING CAPACITY: 394 UG/DL
TRANSFERRIN SERPL-MCNC: 266 MG/DL
TRIGL SERPL-MCNC: 112 MG/DL
TSH SERPL DL<=0.005 MIU/L-ACNC: 0.93 UIU/ML
WBC # BLD AUTO: 6.91 K/UL

## 2018-03-21 PROCEDURE — 99999 PR PBB SHADOW E&M-EST. PATIENT-LVL V: CPT | Mod: PBBFAC,,, | Performed by: NURSE PRACTITIONER

## 2018-03-21 PROCEDURE — 84443 ASSAY THYROID STIM HORMONE: CPT

## 2018-03-21 PROCEDURE — 83036 HEMOGLOBIN GLYCOSYLATED A1C: CPT

## 2018-03-21 PROCEDURE — 82306 VITAMIN D 25 HYDROXY: CPT

## 2018-03-21 PROCEDURE — 36415 COLL VENOUS BLD VENIPUNCTURE: CPT | Mod: PO

## 2018-03-21 PROCEDURE — 85025 COMPLETE CBC W/AUTO DIFF WBC: CPT

## 2018-03-21 PROCEDURE — 80053 COMPREHEN METABOLIC PANEL: CPT

## 2018-03-21 PROCEDURE — 99396 PREV VISIT EST AGE 40-64: CPT | Mod: S$GLB,,, | Performed by: NURSE PRACTITIONER

## 2018-03-21 PROCEDURE — 83540 ASSAY OF IRON: CPT

## 2018-03-21 PROCEDURE — 80061 LIPID PANEL: CPT

## 2018-03-21 RX ORDER — GABAPENTIN 100 MG/1
100 CAPSULE ORAL 3 TIMES DAILY
COMMUNITY
End: 2018-04-02

## 2018-03-21 NOTE — PROGRESS NOTES
Subjective:       Patient ID: Jadyn Chance is a 47 y.o. female.    Chief Complaint: Back Pain (lower); Headache; and Hip Pain (right)    Patient presents for routine exam and labs.  Reports living in [place] to take care of mother before she passed.  Was getting healthcare there.  Had 3 family deaths in 2015 (mother and two grandchildren).  Has counselor through [facility].  Hx of seizures.  Had not had one since 07/2017.  No longer taking seizure medications.  Has chronic back pain.  Saw pain management in [place] but could not get appropriate medications due to allergies.  Reports having back injections in the past.  Requesting referral to hematology for iron infusion.       Review of Systems   Constitutional: Negative for chills and fatigue.   Respiratory: Negative for cough and shortness of breath.    Cardiovascular: Negative for chest pain and palpitations.   Musculoskeletal: Positive for back pain. Negative for gait problem and joint swelling.   Skin: Negative for color change and rash.   Psychiatric/Behavioral: Positive for dysphoric mood. The patient is nervous/anxious.        Objective:      Physical Exam   Constitutional: She is oriented to person, place, and time. Vital signs are normal. She appears well-developed and well-nourished.   HENT:   Head: Normocephalic and atraumatic.   Neck: Normal range of motion.   Cardiovascular: Normal rate and regular rhythm.    Pulmonary/Chest: Effort normal and breath sounds normal.   Abdominal: Soft. Bowel sounds are normal. She exhibits distension.   Musculoskeletal: Normal range of motion.   Neurological: She is alert and oriented to person, place, and time.   Skin: Skin is warm.   Psychiatric: She has a normal mood and affect. Her behavior is normal.       Assessment:       1. Routine medical exam    2. Chronic low back pain, unspecified back pain laterality, with sciatica presence unspecified    3. Controlled type 2 diabetes mellitus with  complication, without long-term current use of insulin    4. Screening for breast cancer    5. Iron deficiency    6. Vitamin D deficiency    7. Diabetes mellitus due to underlying condition with diabetic nephropathy, without long-term current use of insulin     8. Acute gastritis without hemorrhage, unspecified gastritis type        Plan:         Routine medical exam  -     CBC auto differential; Future; Expected date: 03/21/2018  -     Comprehensive metabolic panel; Future; Expected date: 03/21/2018  -     TSH; Future; Expected date: 03/21/2018  -     Lipid panel; Future; Expected date: 03/21/2018  -     Hemoglobin A1c; Future; Expected date: 03/21/2018  -     MICROALBUMIN / CREATININE RATIO URINE; Future; Expected date: 03/21/2018  -     Iron and TIBC; Future; Expected date: 03/21/2018    Chronic low back pain, unspecified back pain laterality, with sciatica presence unspecified  -     Ambulatory referral to Pain Clinic    Controlled type 2 diabetes mellitus with complication, without long-term current use of insulin  -     CBC auto differential; Future; Expected date: 03/21/2018  -     Comprehensive metabolic panel; Future; Expected date: 03/21/2018  -     TSH; Future; Expected date: 03/21/2018  -     MICROALBUMIN / CREATININE RATIO URINE; Future; Expected date: 03/21/2018  -     Cancel: Ambulatory consult to Diabetic Education    Screening for breast cancer  -     Mammo Digital Screening Bilat with CAD; Future; Expected date: 03/21/2018    Iron deficiency  -     CBC auto differential; Future; Expected date: 03/21/2018  -     Iron and TIBC; Future; Expected date: 03/21/2018  -     Ambulatory referral to Hematology / Oncology    Vitamin D deficiency  -     Vitamin D; Future; Expected date: 03/21/2018    Diabetes mellitus due to underlying condition with diabetic nephropathy, without long-term current use of insulin   -     Hemoglobin A1c; Future; Expected date: 03/21/2018    Acute gastritis without hemorrhage,  unspecified gastritis type  -     Ambulatory referral to Gastroenterology        Labs today.  Will refer to pain medicine for evaluation and develop a treatment plan.  Needs mammogram and appointments with GI and Hematology.  Will inform of lab reports.

## 2018-03-22 DIAGNOSIS — E55.9 VITAMIN D DEFICIENCY: Primary | ICD-10-CM

## 2018-03-22 RX ORDER — ERGOCALCIFEROL 1.25 MG/1
CAPSULE ORAL
Qty: 4 CAPSULE | Refills: 3 | Status: SHIPPED | OUTPATIENT
Start: 2018-03-22 | End: 2018-07-30 | Stop reason: SDUPTHER

## 2018-03-26 ENCOUNTER — OFFICE VISIT (OUTPATIENT)
Dept: PAIN MEDICINE | Facility: CLINIC | Age: 48
End: 2018-03-26
Payer: MEDICARE

## 2018-03-26 ENCOUNTER — TELEPHONE (OUTPATIENT)
Dept: PAIN MEDICINE | Facility: CLINIC | Age: 48
End: 2018-03-26

## 2018-03-26 VITALS
BODY MASS INDEX: 30.09 KG/M2 | HEIGHT: 63 IN | SYSTOLIC BLOOD PRESSURE: 116 MMHG | HEART RATE: 89 BPM | OXYGEN SATURATION: 97 % | WEIGHT: 169.81 LBS | RESPIRATION RATE: 18 BRPM | DIASTOLIC BLOOD PRESSURE: 72 MMHG

## 2018-03-26 DIAGNOSIS — M53.3 SACROILIAC JOINT PAIN: ICD-10-CM

## 2018-03-26 DIAGNOSIS — G89.4 CHRONIC PAIN DISORDER: ICD-10-CM

## 2018-03-26 DIAGNOSIS — M51.36 DDD (DEGENERATIVE DISC DISEASE), LUMBAR: Primary | ICD-10-CM

## 2018-03-26 DIAGNOSIS — M47.897 OTHER OSTEOARTHRITIS OF SPINE, LUMBOSACRAL REGION: ICD-10-CM

## 2018-03-26 DIAGNOSIS — M47.816 LUMBAR SPONDYLOSIS: ICD-10-CM

## 2018-03-26 PROCEDURE — 99999 PR PBB SHADOW E&M-EST. PATIENT-LVL III: CPT | Mod: PBBFAC,,, | Performed by: PAIN MEDICINE

## 2018-03-26 PROCEDURE — 99204 OFFICE O/P NEW MOD 45 MIN: CPT | Mod: S$GLB,,, | Performed by: PAIN MEDICINE

## 2018-03-26 NOTE — PROGRESS NOTES
Subjective:     Patient ID: Jadyn Chance is a 47 y.o. female    Chief Complaint: Low-back Pain (patient experiences chronic low back pain @ L3-L5 & S1- previous steroid injection- history of L hip repair - treatment w/ chiropactic services)      Referred by: Destiney Escobar NP      HPI:    Initial Encounter (3/26/18):  Jadyn Chance is a 47 y.o. female who presents today with chronic bilateral low back pain. This pain has been present for many years. The pain is located in the bilateral lower lumbar region. It radiates to the bilateral posterior thighs, but does not cross the knees. She reports numbness and non-focal weakness. She also reports 3-4 months of difficulty voiding. She has been treated by multiple pain physicians in the past. She has had multiple interventions on the past as well that were ineffective. She has a history of left hip trauma and is s/p surgical repair with revision done in 2015. She reports that she drove since 3:15 this morning to get to my clinic. This pain is described in detail below.    Physical Therapy: Yes. Continues HEP    Non-pharmacologic Treatment: Rest helps         · TENS? Yes    Pain Medications:         · Currently taking: Norco 7.5-325mg PRN, Gabapentin 100mg TID    · Has tried in the past:  NSAIDs (cannot take due GI issues), Tylenol, Oxycodone    · Has not tried: Muscle relaxants, TCAs, SNRIs, anticonvulsants, topical creams    Blood thinners: None    Interventional Therapies:   Previous injections - no further details available     Relevant Surgeries: Left hip surgery    Affecting sleep? Yes    Affecting daily activities? yes    Depressive symptoms? no          · SI/HI? No    Work status: Unemployed    Pain Scores:    Best:      5 /10  Worst:    10 /10  Usually:  7 /10  Today:    9/10    Review of Systems   Constitutional: Negative for activity change, appetite change, chills, fatigue, fever and unexpected weight change.   HENT: Negative for  hearing loss.    Eyes: Negative for visual disturbance.   Respiratory: Negative for chest tightness and shortness of breath.    Cardiovascular: Negative for chest pain.   Gastrointestinal: Negative for abdominal pain, constipation, diarrhea, nausea and vomiting.   Genitourinary: Positive for difficulty urinating.   Musculoskeletal: Positive for arthralgias, back pain, gait problem and myalgias. Negative for neck pain.   Skin: Negative for rash.   Neurological: Positive for weakness and numbness. Negative for dizziness, light-headedness and headaches.   Psychiatric/Behavioral: Positive for sleep disturbance. Negative for hallucinations and suicidal ideas. The patient is not nervous/anxious.        Past Medical History:   Diagnosis Date    Abnormal Pap smear     bx was negative, per pt    Anemia     Anxiety     B12 deficiency     Blood transfusion     multiple     Chronic LBP     Degenerative disc disease     l spine    Diabetes mellitus     Diabetic neuropathy     General anesthetics causing adverse effect in therapeutic use     delayed emergence    Mixed hyperlipidemia 2013    RLS (restless legs syndrome)     Vitamin D deficiency disease        Past Surgical History:   Procedure Laterality Date    ANUS SURGERY      BELT ABDOMINOPLASTY      tummy tuck    BREAST SURGERY      breast augmentation     SECTION      x2    CHOLECYSTECTOMY      mikel      EXCISIONAL HEMORRHOIDECTOMY      GASTRIC BYPASS      HIP FRACTURE SURGERY      left hip ORIF    MOUTH SURGERY      revision of JJ anastomosis  2/27/15    small bowel resection  2015    TUBAL LIGATION         Social History     Social History    Marital status: Significant Other     Spouse name: N/A    Number of children: 4    Years of education: N/A     Occupational History    Not on file.     Social History Main Topics    Smoking status: Never Smoker    Smokeless tobacco: Never Used    Alcohol use No    Drug use: No     Sexual activity: Yes     Partners: Male     Birth control/ protection: Surgical      Comment: BTL; mut monog     Other Topics Concern    Not on file     Social History Narrative    No narrative on file       Review of patient's allergies indicates:   Allergen Reactions    Demerol [meperidine] Hallucinations    Penicillins Other (See Comments)     Patient cannot recall specific reaction, reports she has been listing this as an allergy since childhood.    Acetaminophen Itching     Pt states she can tolerate this medication with benadryl.    Ciprofloxacin (bulk)     Codeine Nausea And Vomiting    Dilaudid [hydromorphone (bulk)] Itching     Pt states she can tolerate this medication with benadryl.    Hydrocodone Itching     Pt states she can tolerate this medication with benadryl.    Lortab [hydrocodone-acetaminophen] Nausea And Vomiting       Current Outpatient Prescriptions on File Prior to Visit   Medication Sig Dispense Refill    ACCU-CHEK FASTCLIX Misc       ACCU-CHEK SMARTVIEW TEST STRIP Strp       BD ALCOHOL SWABS PadM       cholecalciferol, vitamin D3, 2,000 unit Cap Take 1 capsule (2,000 Units total) by mouth once daily. 100 capsule 6    clonazePAM (KLONOPIN) 2 MG Tab TK 1 T PO  TID PRF ANXIETY  0    doxepin (SINEQUAN) 150 MG Cap Take 150 mg by mouth every evening.       ergocalciferol (VITAMIN D2) 50,000 unit Cap TAKE 1 CAPSULE (50,000 UNITS TOTAL) BY MOUTH ONCE A WEEK. 4 capsule 3    JANUMET XR 50-1,000 mg TM24       clonazePAM (KLONOPIN) 1 MG tablet Take 2 tablets (2 mg total) by mouth every evening. 60 tablet 4    gabapentin (NEURONTIN) 100 MG capsule Take 100 mg by mouth 3 (three) times daily.      metformin (GLUCOPHAGE-XR) 500 MG 24 hr tablet TAKE 4 TABLETS BY MOUTH EVERY  tablet 1    [DISCONTINUED] fluoxetine (PROZAC) 40 MG capsule Take 40 mg by mouth once daily.       No current facility-administered medications on file prior to visit.        Objective:      /72    "Pulse 89   Resp 18   Ht 5' 3" (1.6 m)   Wt 77 kg (169 lb 12.8 oz)   SpO2 97%   BMI 30.08 kg/m²     Exam:  GEN:  Well developed, well nourished.  No acute distress. Normal pain behavior.  HEENT:  No trauma.  Mucous membranes moist.  Nares patent bilaterally.  PSYCH: Normal affect. Thought content appropriate.  CHEST:  Breathing symmetric.  No audible wheezing.  ABD: Soft, non-distended.  SKIN:  Warm, pink, dry.  No rash on exposed areas.    EXT:  No cyanosis, clubbing, or edema.  No color change or changes in nail or hair growth.  NEURO/MUSCULOSKELETAL:  Fully alert, oriented, and appropriate. Speech normal camelia. No cranial nerve deficits.   Gait: Antalgic.  No trendelenburg sign bilaterally.   Motor Strength: 5/5 motor strength throughout lower extremities.   Sensory: No sensory deficit in the lower extremities.   Reflexes:  2+ and symmetric throughout.  Downgoing Babinski's bilaterally.  No clonus or spasticity.  L-Spine:  Full ROM with pain on flexion> extension. Positive facet loading bilaterally.  Negative SLR bilaterally.    SI Joint/Hip: Positive KALLIE on the right.  Negative FADIR bilaterally.  Disffusely TTP over lumbar paraspinals, bilateral SI joints  No TTP over hips, piriformis muscles, or GTB.          Imaging:  Narrative     History: Low back pain    Vertebrae are normal in alignment.  Disk spaces are intact.  No evidence of bony pathology.  No disk protrusions.  No central spinal stenosis or neural foraminal narrowing.  There are postsurgical changes of the left sacrum and ilium.   Impression      Negative CT lumbar spine      Electronically signed by: HARITHA VINES MD  Date: 02/16/15  Time: 09:13          Narrative     Technique: Standard noncontrast multiplanar multisequence imaging of the lumbar spine was performed.    Findings: There is prominent magnetic susceptibility artifact related to orthopedic hardware in the left sacroiliac joint.    Spinal alignment is anatomic.  Disk " interspaces are maintained with mild degenerative desiccation at L4-5 and L5-S1.  Vertebral marrow signal pattern and architecture are normal.  Paravertebral soft tissues are symmetric and normal in appearance.  There   is partial visualization of an enlarged fibroid uterus within the pelvis.    L2-3: Unremarkable.    L3-4: Unremarkable.    L4-5: Posterior disk bulge with mild asymmetric left foraminal disk protrusion and bilateral facet enlargement producing moderate to severe left greater than right foraminal narrowing.  No significant canal stenosis.    L5-S1: Posterior disk bulge, asymmetric right foraminal disk protrusion, and right greater than left facet enlargement resulting in moderate to severe right and moderate left foraminal narrowing.  No significant canal stenosis.   Impression         1.  Multilevel lumbar spondylosis and degenerative disk disease at L4-5 and L5-S1.    2.  Fibroid uterus.    3.  Post surgical changes left sacroiliac joint.      Electronically signed by: TATY RINALDI MD  Date: 09/03/14  Time: 09:04          Assessment:       Encounter Diagnoses   Name Primary?    DDD (degenerative disc disease), lumbar Yes    Lumbar spondylosis     Other osteoarthritis of spine, lumbosacral region     Sacroiliac joint pain     Chronic pain disorder          Plan:       Jadyn was seen today for low-back pain.    Diagnoses and all orders for this visit:    DDD (degenerative disc disease), lumbar    Lumbar spondylosis    Other osteoarthritis of spine, lumbosacral region    Sacroiliac joint pain    Chronic pain disorder        Jadyn Chance is a 47 y.o. female with chronic low back pain. Appears to be mainly axial given absence os symptoms below the knees, though imaging from 2014 suggests potential radiculopathy. Likely has pain from facet joints as well as SIJs.    1. Pertinent imaging studies reviewed by me. Imaging results were discussed with patient.  2. CINDI from previous  pain physicians to review previous procedures done and more recent imaging studies.  3. RTC in 2 weeks. At that time we will review records and consider interventional procedures. We did discuss that I do not prescribe opioids for chronic low back pain.

## 2018-03-26 NOTE — TELEPHONE ENCOUNTER
Message left informing patient that I did schedule her an appointment with Dr. Patton in [place] as she requested due to residing in [place].  Encouraged her to contact our clinic should she have any questions or concerns.      CINDI will be faxed to [facility] and Dr. Scotty Funez.  Documents received will be scanned into her chart under Media.

## 2018-03-26 NOTE — LETTER
March 26, 2018      Destiney Escobar, MARIZA  [address] [place] [address]           [facility]  [address]  [place] [address]  Phone: [phone]  Fax: [phone]          Patient: Jadyn Chance   MR Number: [id number]   YOB: 1970   Date of Visit: 3/26/2018       Dear Destiney Escobar:    Thank you for referring Jadyn Chance to me for evaluation. Attached you will find relevant portions of my assessment and plan of care.    If you have questions, please do not hesitate to call me. I look forward to following Jadyn Chance along with you.    Sincerely,    Russell Verdin Jr., MD    Enclosure  CC:  No Recipients    If you would like to receive this communication electronically, please contact [email or web address] or [phone] to request more information on [facility] Link access.    For providers and/or their staff who would like to refer a patient to [facility], please contact us through our one-stop-shop provider referral line, [facility] , at [phone].    If you feel you have received this communication in error or would no longer like to receive these types of communications, please e-mail [email or web address]

## 2018-03-26 NOTE — TELEPHONE ENCOUNTER
Ms. ReaganJadyn called requesting to cancel the appointment scheduled with Dr. Patton.  She has decided to return to the Pain Mngmt provider she has seen in the past.

## 2018-04-02 ENCOUNTER — INITIAL CONSULT (OUTPATIENT)
Dept: GASTROENTEROLOGY | Facility: CLINIC | Age: 48
End: 2018-04-02
Payer: MEDICARE

## 2018-04-02 ENCOUNTER — HOSPITAL ENCOUNTER (OUTPATIENT)
Dept: RADIOLOGY | Facility: HOSPITAL | Age: 48
Discharge: HOME | End: 2018-04-02
Attending: NURSE PRACTITIONER
Payer: MEDICARE

## 2018-04-02 VITALS
SYSTOLIC BLOOD PRESSURE: 126 MMHG | BODY MASS INDEX: 30.2 KG/M2 | WEIGHT: 170.44 LBS | HEIGHT: 63 IN | DIASTOLIC BLOOD PRESSURE: 70 MMHG | HEART RATE: 108 BPM

## 2018-04-02 DIAGNOSIS — R14.0 BLOATING: ICD-10-CM

## 2018-04-02 DIAGNOSIS — R10.9 ABDOMINAL PAIN, UNSPECIFIED ABDOMINAL LOCATION: ICD-10-CM

## 2018-04-02 DIAGNOSIS — R11.0 NAUSEA: ICD-10-CM

## 2018-04-02 DIAGNOSIS — R10.9 ABDOMINAL PAIN, UNSPECIFIED ABDOMINAL LOCATION: Primary | ICD-10-CM

## 2018-04-02 DIAGNOSIS — Z12.39 SCREENING FOR BREAST CANCER: ICD-10-CM

## 2018-04-02 PROCEDURE — 77067 SCR MAMMO BI INCL CAD: CPT | Mod: TC,PO

## 2018-04-02 PROCEDURE — 77067 SCR MAMMO BI INCL CAD: CPT | Mod: 26,,, | Performed by: RADIOLOGY

## 2018-04-02 PROCEDURE — 74019 RADEX ABDOMEN 2 VIEWS: CPT | Mod: TC,FY,PO

## 2018-04-02 PROCEDURE — 99214 OFFICE O/P EST MOD 30 MIN: CPT | Mod: S$GLB,,, | Performed by: NURSE PRACTITIONER

## 2018-04-02 PROCEDURE — 74019 RADEX ABDOMEN 2 VIEWS: CPT | Mod: 26,,, | Performed by: RADIOLOGY

## 2018-04-02 PROCEDURE — 99999 PR PBB SHADOW E&M-EST. PATIENT-LVL III: CPT | Mod: PBBFAC,,, | Performed by: NURSE PRACTITIONER

## 2018-04-02 PROCEDURE — 77063 BREAST TOMOSYNTHESIS BI: CPT | Mod: 26,,, | Performed by: RADIOLOGY

## 2018-04-02 NOTE — LETTER
April 3, 2018      Destiney Escobar, NP  [address] [place] [address]           [facility]  [address]  [place] [address]  Phone: [phone]  Fax: [phone]          Patient: Jadyn Chance   MR Number: [id number]   YOB: 1970   Date of Visit: 4/2/2018       Dear Destiney Escobar:    Thank you for referring Jadyn Chance to me for evaluation. Attached you will find relevant portions of my assessment and plan of care.    If you have questions, please do not hesitate to call me. I look forward to following Jadyn Chance along with you.    Sincerely,    Lauren Escobedo, [facility]    Enclosure  CC:  No Recipients    If you would like to receive this communication electronically, please contact [email or web address] or [phone] to request more information on [employer] Link access.    For providers and/or their staff who would like to refer a patient to [facility], please contact us through our one-stop-shop provider referral line, [facility], at [phone].    If you feel you have received this communication in error or would no longer like to receive these types of communications, please e-mail [email or web address]

## 2018-04-03 NOTE — PROGRESS NOTES
Clinic Consult:  [facility] Consultation Note    Reason for Consult:  The primary encounter diagnosis was Abdominal pain, unspecified abdominal location. Diagnoses of Bloating and Nausea were also pertinent to this visit.    PCP: Mikayla Myles   [address] / [place] [address]    HPI:  This is a 47 y.o. female here for evaluation of the above  Pt states she has had chronic GI complaints, including bloating with nausea and generalized abdominal pain.  She reports that she has had a full GI work up including endoscopy, which was unremarkable per patient.  She was recently told that she may have gastroparesis based on her symptoms.   She denies any early satiety.    She does report alternating diarrhea and constipation, with constipation being dominant.  Her symptoms are all worse with the constipation.    She denies any melena or hematochezia.       Review of Systems   Constitutional: Negative for chills, fever, malaise/fatigue and weight loss.   Respiratory: Negative for cough.    Cardiovascular: Negative for chest pain.   Gastrointestinal:        Per HPI   Musculoskeletal: Negative for myalgias.   Skin: Negative for itching and rash.   Neurological: Negative for headaches.   Psychiatric/Behavioral: The patient is not nervous/anxious.        Medical History:   Past Medical History:   Diagnosis Date    Abnormal Pap smear 1991    bx was negative, per pt    Anemia     Anxiety     B12 deficiency     Blood transfusion     multiple     Chronic LBP     Degenerative disc disease     l spine    Diabetes mellitus     Diabetic neuropathy     General anesthetics causing adverse effect in therapeutic use     delayed emergence    Mixed hyperlipidemia 11/18/2013    RLS (restless legs syndrome)     Vitamin D deficiency disease        Surgical History:  Past Surgical History:   Procedure Laterality Date    ANUS SURGERY      BELT ABDOMINOPLASTY      tummy ck    BREAST SURGERY  2008    breast  "augmentation     SECTION      x2    CHOLECYSTECTOMY      mikel      EXCISIONAL HEMORRHOIDECTOMY      GASTRIC BYPASS      HIP FRACTURE SURGERY      left hip ORIF    MOUTH SURGERY      revision of JJ anastomosis  2/27/15    small bowel resection  2015    TUBAL LIGATION         Family History:   Family History   Problem Relation Age of Onset    Diabetes Mother     Cataracts Mother     Ovarian cancer Sister 30    Glaucoma Maternal Aunt     Blindness Maternal Aunt     Breast cancer Neg Hx     Colon cancer Neg Hx     Thrombophilia Neg Hx        Social History:   Social History   Substance Use Topics    Smoking status: Never Smoker    Smokeless tobacco: Never Used    Alcohol use No       Allergies: Reviewed    Home Medications:   Current Outpatient Prescriptions on File Prior to Visit   Medication Sig Dispense Refill    ACCU-CHEK FASTCLIX Misc       ACCU-CHEK SMARTVIEW TEST STRIP Strp       BD ALCOHOL SWABS PadM       clonazePAM (KLONOPIN) 2 MG Tab TK 1 T PO  TID PRF ANXIETY  0    doxepin (SINEQUAN) 150 MG Cap Take 150 mg by mouth every evening.       ergocalciferol (VITAMIN D2) 50,000 unit Cap TAKE 1 CAPSULE (50,000 UNITS TOTAL) BY MOUTH ONCE A WEEK. 4 capsule 3    JANUMET XR 50-1,000 mg TM24 Taking twice daily      [DISCONTINUED] fluoxetine (PROZAC) 40 MG capsule Take 40 mg by mouth once daily.       No current facility-administered medications on file prior to visit.        Physical Exam:  Vital Signs:  /70   Pulse 108   Ht 5' 3" (1.6 m)   Wt 77.3 kg (170 lb 6.7 oz)   LMP 2014   BMI 30.19 kg/m²   Body mass index is 30.19 kg/m².  Physical Exam   Constitutional: She is oriented to person, place, and time. She appears well-developed and well-nourished.   HENT:   Head: Normocephalic.   Eyes: No scleral icterus.   Neck: Normal range of motion.   Cardiovascular: Normal rate and regular rhythm.    Pulmonary/Chest: Effort normal and breath sounds normal.   Abdominal: " Soft. Bowel sounds are normal. She exhibits no distension. There is no tenderness.   Musculoskeletal: Normal range of motion.   Neurological: She is alert and oriented to person, place, and time.   Skin: Skin is warm and dry.   Psychiatric: She has a normal mood and affect.   Vitals reviewed.      Labs: Pertinent labs reviewed.    Assessment:  1. Abdominal pain, unspecified abdominal location    2. Bloating    3. Nausea         Recommendations:  - Unclear etiology, but I suspect her symptoms are related to uncontrolled constipation  - Pt reports a BM this morning, will get x-ray today to determine stool burden.  - If x-ray shows significant constipation, will plan for a Mag Citrate bowel prep followed by daily Miralax.  Will likely need Amitiza in addition to the Miralax  - Records of previous endoscopy requested.   Abdominal pain, unspecified abdominal location  -     X-Ray Abdomen Flat And Erect; Future; Expected date: 04/02/2018    Bloating  -     X-Ray Abdomen Flat And Erect; Future; Expected date: 04/02/2018    Nausea  -     X-Ray Abdomen Flat And Erect; Future; Expected date: 04/02/2018        Follow up to be determined by results of above.      Thank you so much for allowing me to participate in the care of SUSIE Mccall

## 2018-04-05 ENCOUNTER — OFFICE VISIT (OUTPATIENT)
Dept: HEMATOLOGY/ONCOLOGY | Facility: CLINIC | Age: 48
End: 2018-04-05
Payer: MEDICARE

## 2018-04-05 ENCOUNTER — LAB VISIT (OUTPATIENT)
Dept: LAB | Facility: HOSPITAL | Age: 48
End: 2018-04-05
Attending: INTERNAL MEDICINE
Payer: MEDICARE

## 2018-04-05 ENCOUNTER — DOCUMENTATION ONLY (OUTPATIENT)
Dept: GASTROENTEROLOGY | Facility: CLINIC | Age: 48
End: 2018-04-05

## 2018-04-05 VITALS
TEMPERATURE: 99 F | DIASTOLIC BLOOD PRESSURE: 83 MMHG | HEIGHT: 63 IN | HEART RATE: 88 BPM | BODY MASS INDEX: 29.72 KG/M2 | OXYGEN SATURATION: 97 % | SYSTOLIC BLOOD PRESSURE: 111 MMHG | WEIGHT: 167.75 LBS

## 2018-04-05 DIAGNOSIS — E53.8 VITAMIN B12 DEFICIENCY: ICD-10-CM

## 2018-04-05 DIAGNOSIS — Z98.84 HISTORY OF ROUX-EN-Y GASTRIC BYPASS: ICD-10-CM

## 2018-04-05 DIAGNOSIS — E61.1 IRON DEFICIENCY: ICD-10-CM

## 2018-04-05 DIAGNOSIS — E61.1 IRON DEFICIENCY: Primary | ICD-10-CM

## 2018-04-05 LAB
BASOPHILS # BLD AUTO: 0.03 K/UL
BASOPHILS NFR BLD: 0.4 %
DIFFERENTIAL METHOD: ABNORMAL
EOSINOPHIL # BLD AUTO: 0.1 K/UL
EOSINOPHIL NFR BLD: 1.7 %
ERYTHROCYTE [DISTWIDTH] IN BLOOD BY AUTOMATED COUNT: 21.9 %
FERRITIN SERPL-MCNC: 8 NG/ML
FOLATE SERPL-MCNC: 15 NG/ML
HCT VFR BLD AUTO: 39.7 %
HGB BLD-MCNC: 13.2 G/DL
IRON SERPL-MCNC: 36 UG/DL
LYMPHOCYTES # BLD AUTO: 2.2 K/UL
LYMPHOCYTES NFR BLD: 29.4 %
MCH RBC QN AUTO: 27.5 PG
MCHC RBC AUTO-ENTMCNC: 33.2 G/DL
MCV RBC AUTO: 83 FL
MONOCYTES # BLD AUTO: 0.4 K/UL
MONOCYTES NFR BLD: 5.6 %
NEUTROPHILS # BLD AUTO: 4.8 K/UL
NEUTROPHILS NFR BLD: 62.9 %
PLATELET # BLD AUTO: 338 K/UL
PMV BLD AUTO: 10.6 FL
RBC # BLD AUTO: 4.8 M/UL
SATURATED IRON: 7 %
TOTAL IRON BINDING CAPACITY: 505 UG/DL
TRANSFERRIN SERPL-MCNC: 341 MG/DL
VIT B12 SERPL-MCNC: 453 PG/ML
WBC # BLD AUTO: 7.62 K/UL

## 2018-04-05 PROCEDURE — 99999 PR PBB SHADOW E&M-EST. PATIENT-LVL III: CPT | Mod: PBBFAC,,, | Performed by: INTERNAL MEDICINE

## 2018-04-05 PROCEDURE — 82746 ASSAY OF FOLIC ACID SERUM: CPT

## 2018-04-05 PROCEDURE — 85025 COMPLETE CBC W/AUTO DIFF WBC: CPT

## 2018-04-05 PROCEDURE — 82525 ASSAY OF COPPER: CPT

## 2018-04-05 PROCEDURE — 83540 ASSAY OF IRON: CPT

## 2018-04-05 PROCEDURE — 82607 VITAMIN B-12: CPT

## 2018-04-05 PROCEDURE — 36415 COLL VENOUS BLD VENIPUNCTURE: CPT

## 2018-04-05 PROCEDURE — 99214 OFFICE O/P EST MOD 30 MIN: CPT | Mod: S$GLB,,, | Performed by: INTERNAL MEDICINE

## 2018-04-05 PROCEDURE — 82728 ASSAY OF FERRITIN: CPT

## 2018-04-05 NOTE — PROGRESS NOTES
Records received and reviewed.  Colonoscopy on 5/18/17 normal with the exception of medium sized hemorrhoids.  Prep was fair so repeat in 5 years recommended.   EGD showed irregular Z-line, previous Kayleen-en-Y bypass, normal esophagus.   Pathology negative for metaplasia.  Chronic gastritis.

## 2018-04-05 NOTE — LETTER
April 5, 2018      Destiney Escobar, MARIZA  [address]  [facility] [address]           [facility] Hematology Oncology  [address] [place] [address]  Phone: [phone]  Fax: [phone]          Patient: Jadyn Chance   MR Number: [id number]   YOB: 1970   Date of Visit: 4/5/2018       Dear Destiney Escobar:    Thank you for referring Jadyn Chance to me for evaluation. Attached you will find relevant portions of my assessment and plan of care.    If you have questions, please do not hesitate to call me. I look forward to following Jadyn Chance along with you.    Sincerely,    Raghu Mustafa MD    Enclosure  CC:  No Recipients    If you would like to receive this communication electronically, please contact [email or web address] or [phone] to request more information on [employer] Link access.    For providers and/or their staff who would like to refer a patient to [facility], please contact us through our one-stop-shop provider referral line, [facility], at [phone].    If you feel you have received this communication in error or would no longer like to receive these types of communications, please e-mail [email or web address]
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
## 2018-04-06 ENCOUNTER — TELEPHONE (OUTPATIENT)
Dept: HEMATOLOGY/ONCOLOGY | Facility: CLINIC | Age: 48
End: 2018-04-06

## 2018-04-06 DIAGNOSIS — E61.1 IRON DEFICIENCY: Primary | ICD-10-CM

## 2018-04-06 DIAGNOSIS — Z98.84 HISTORY OF ROUX-EN-Y GASTRIC BYPASS: ICD-10-CM

## 2018-04-06 RX ORDER — METHYLPREDNISOLONE SOD SUCC 125 MG
125 VIAL (EA) INJECTION
Status: CANCELLED
Start: 2018-04-11

## 2018-04-06 RX ORDER — SODIUM CHLORIDE 0.9 % (FLUSH) 0.9 %
10 SYRINGE (ML) INJECTION
Status: CANCELLED | OUTPATIENT
Start: 2018-04-11

## 2018-04-06 RX ORDER — HEPARIN 100 UNIT/ML
500 SYRINGE INTRAVENOUS
Status: CANCELLED | OUTPATIENT
Start: 2018-04-11

## 2018-04-06 RX ORDER — METHYLPREDNISOLONE SOD SUCC 125 MG
125 VIAL (EA) INJECTION
Status: CANCELLED
Start: 2018-04-26

## 2018-04-06 RX ORDER — SODIUM CHLORIDE 0.9 % (FLUSH) 0.9 %
10 SYRINGE (ML) INJECTION
Status: CANCELLED | OUTPATIENT
Start: 2018-04-26

## 2018-04-06 RX ORDER — HEPARIN 100 UNIT/ML
500 SYRINGE INTRAVENOUS
Status: CANCELLED | OUTPATIENT
Start: 2018-04-26

## 2018-04-06 NOTE — TELEPHONE ENCOUNTER
----- Message from Raghu Mustafa MD sent at 4/6/2018  8:52 AM CDT -----  She has iron deficiency.  Please schedule her for 2 weekly doses of IV Injectafer when possible.  I would like to see her back for follow-up in 3 months with CBC, iron studies, CRP, ferritin 3 days before clinic visit.  Thank you.

## 2018-04-10 ENCOUNTER — TELEPHONE (OUTPATIENT)
Dept: HEMATOLOGY/ONCOLOGY | Facility: CLINIC | Age: 48
End: 2018-04-10

## 2018-04-10 DIAGNOSIS — E53.8 VITAMIN B12 DEFICIENCY: Primary | ICD-10-CM

## 2018-04-10 LAB — COPPER SERPL-MCNC: 1262 UG/L (ref 810–1990)

## 2018-04-10 RX ORDER — CYANOCOBALAMIN 1000 UG/ML
1000 INJECTION, SOLUTION INTRAMUSCULAR; SUBCUTANEOUS
Qty: 10 ML | Refills: 11 | Status: SHIPPED | OUTPATIENT
Start: 2018-04-10 | End: 2019-02-28 | Stop reason: SDUPTHER

## 2018-04-10 NOTE — TELEPHONE ENCOUNTER
----- Message from Veronica White sent at 4/10/2018  2:52 PM CDT -----  Contact: patient  Returning your call. Please call patient @ [phone]. Thanks, sabina

## 2018-04-25 ENCOUNTER — INFUSION (OUTPATIENT)
Dept: INFUSION THERAPY | Facility: HOSPITAL | Age: 48
End: 2018-04-25
Attending: INTERNAL MEDICINE
Payer: MEDICARE

## 2018-04-25 VITALS
SYSTOLIC BLOOD PRESSURE: 101 MMHG | DIASTOLIC BLOOD PRESSURE: 63 MMHG | HEART RATE: 87 BPM | TEMPERATURE: 99 F | OXYGEN SATURATION: 96 %

## 2018-04-25 DIAGNOSIS — E61.1 IRON DEFICIENCY: Primary | ICD-10-CM

## 2018-04-25 PROBLEM — M54.50 CHRONIC RIGHT-SIDED LOW BACK PAIN: Status: ACTIVE | Noted: 2018-04-25

## 2018-04-25 PROBLEM — R73.9 HYPERGLYCEMIA: Status: ACTIVE | Noted: 2018-04-25

## 2018-04-25 PROBLEM — G89.29 CHRONIC RIGHT-SIDED LOW BACK PAIN: Status: ACTIVE | Noted: 2018-04-25

## 2018-04-25 PROCEDURE — 25000003 PHARM REV CODE 250: Mod: PO | Performed by: INTERNAL MEDICINE

## 2018-04-25 PROCEDURE — 96365 THER/PROPH/DIAG IV INF INIT: CPT | Mod: PO

## 2018-04-25 PROCEDURE — 63600175 PHARM REV CODE 636 W HCPCS: Mod: PO | Performed by: INTERNAL MEDICINE

## 2018-04-25 PROCEDURE — 96375 TX/PRO/DX INJ NEW DRUG ADDON: CPT | Mod: PO

## 2018-04-25 RX ORDER — METHYLPREDNISOLONE SOD SUCC 125 MG
125 VIAL (EA) INJECTION
Status: COMPLETED | OUTPATIENT
Start: 2018-04-25 | End: 2018-04-25

## 2018-04-25 RX ADMIN — METHYLPREDNISOLONE SODIUM SUCCINATE 125 MG: 125 INJECTION, POWDER, FOR SOLUTION INTRAMUSCULAR; INTRAVENOUS at 09:04

## 2018-04-25 RX ADMIN — FERRIC CARBOXYMALTOSE INJECTION 750 MG: 50 INJECTION, SOLUTION INTRAVENOUS at 09:04

## 2018-04-25 RX ADMIN — SODIUM CHLORIDE: 9 INJECTION, SOLUTION INTRAVENOUS at 09:04

## 2018-04-25 NOTE — PLAN OF CARE
Problem: Patient Care Overview  Goal: Plan of Care Review  Outcome: Ongoing (interventions implemented as appropriate)  Pt. Stated that she has been feeling weak and tired for a while. She also said that she lost multiple family members in the last year, including her mother, daughter and two grandsons.

## 2018-04-25 NOTE — PATIENT INSTRUCTIONS
[facility]  [address]  [address]  [phone] phone     [phone] fax  Hours of Operation: Monday- Friday 8:00am- 5:00pm  After hours phone  [phone]  Hematology / Oncology Physicians on call      Dr. Kevin Webb                        Please call with any concerns regarding your appointment today.  Oncology: Managing Fatigue   Fatigue is a common side effect of chemotherapy and radiation therapy. It can be caused by worry, lack of sleep, and poor appetite. Fatigue can also be a sign of anemia (a shortage of red blood cells). This could require medical treatment. The tips below can help you feel better.      Family members can help with meals and chores around the house.      Conserving Energy   Note the times of day when you are most tired and plan around them. For instance, if you are more tired in the afternoon, try to get tasks done in the morning.   Decide which tasks are most important. Do those first.   Pass tasks along to others when you need to. Ask for help.   Accept help when its offered. Tell people what they can do to help. For instance, you may need someone to fix a meal, fold clothes, or put gas in your car.   Plan rest times. You may want to take a nap each day. Just sitting quietly for a few minutes can make you feel more rested.  What You Can Do to Feel Better   Relax before you try to sleep. Take a bath or read for a while.   Form a sleep pattern. Go to bed at the same time each night and get up at the same time each morning.   Eat well. Choose foods from all of the food groups each day.   Exercise. Take a brisk walk to help increase your energy.   Avoid caffeine and alcohol. Drink plenty of water or fruit juices instead.  Treating Anemia   If you begin to feel more tired than normal, tell your doctor. Fatigue could be a sign of anemia. This problem is fairly common during chemotherapy and radiation  treatments. If your red blood cell count is too low, you are likely to receive a blood transfusion. Most people feel better shortly after the transfusion. In some cases, medication may be prescribed to increase red blood cell production.   Call Your Doctor If You Have:   Shortness of breath or chest pain   A dizzy feeling when you get up from lying or sitting down   Paler skin than normal   Extreme tiredness that is not helped by sleep    © 2000-2011 [facility], [address] [place] [address]. All rights reserved. This information is not intended as a substitute for professional medical care. Always follow your healthcare professional's instructions.  FALL PREVENTION   Falls often occur due to slipping, tripping or losing your balance. Here are ways to reduce your risk of falling again.   Was there anything that caused your fall that can be fixed, removed or replaced?   Make your home safe by keeping walkways clear of objects you may trip over.   Use non-slip pads under rugs.   Do not walk in poorly lit areas.   Do not stand on chairs or wobbly ladders.   Use caution when reaching overhead or looking upward. This position can cause a loss of balance.   Be sure your shoes fit properly, have non-slip bottoms and are in good condition.   Be cautious when going up and down stairs, curbs, and when walking on uneven sidewalks.   If your balance is poor, consider using a cane or walker.   If your fall was related to alcohol use, stop or limit alcohol intake.   If your fall was related to use of sleeping medicines, talk to your doctor about this. You may need to reduce your dosage at bedtime if you awaken during the night to go to the bathroom.   To reduce the need for nighttime bathroom trips:   Avoid drinking fluids for several hours before going to bed   Empty your bladder before going to bed   Men can keep a urinal at the bedside   © 2000-2011 [facility], [address] [place] [address].  All rights reserved. This information is not intended as a substitute for professional medical care. Always follow your healthcare professional's instructions.

## 2018-05-01 ENCOUNTER — OFFICE VISIT (OUTPATIENT)
Dept: OPHTHALMOLOGY | Facility: CLINIC | Age: 48
End: 2018-05-01
Payer: MEDICARE

## 2018-05-01 ENCOUNTER — OFFICE VISIT (OUTPATIENT)
Dept: PULMONOLOGY | Facility: CLINIC | Age: 48
End: 2018-05-01
Payer: MEDICARE

## 2018-05-01 ENCOUNTER — OFFICE VISIT (OUTPATIENT)
Dept: INTERNAL MEDICINE | Facility: CLINIC | Age: 48
End: 2018-05-01
Payer: MEDICARE

## 2018-05-01 ENCOUNTER — TELEPHONE (OUTPATIENT)
Dept: INTERNAL MEDICINE | Facility: CLINIC | Age: 48
End: 2018-05-01

## 2018-05-01 ENCOUNTER — INFUSION (OUTPATIENT)
Dept: INFUSION THERAPY | Facility: HOSPITAL | Age: 48
End: 2018-05-01
Attending: INTERNAL MEDICINE
Payer: MEDICARE

## 2018-05-01 ENCOUNTER — OFFICE VISIT (OUTPATIENT)
Dept: CARDIOLOGY | Facility: CLINIC | Age: 48
End: 2018-05-01
Payer: MEDICARE

## 2018-05-01 VITALS
SYSTOLIC BLOOD PRESSURE: 116 MMHG | DIASTOLIC BLOOD PRESSURE: 74 MMHG | HEIGHT: 64 IN | WEIGHT: 168.19 LBS | SYSTOLIC BLOOD PRESSURE: 114 MMHG | HEART RATE: 91 BPM | TEMPERATURE: 99 F | DIASTOLIC BLOOD PRESSURE: 82 MMHG | RESPIRATION RATE: 16 BRPM | HEART RATE: 88 BPM | OXYGEN SATURATION: 95 % | OXYGEN SATURATION: 95 % | BODY MASS INDEX: 28.71 KG/M2 | RESPIRATION RATE: 19 BRPM

## 2018-05-01 VITALS
WEIGHT: 166 LBS | HEART RATE: 94 BPM | HEIGHT: 64 IN | SYSTOLIC BLOOD PRESSURE: 116 MMHG | DIASTOLIC BLOOD PRESSURE: 72 MMHG | BODY MASS INDEX: 28.34 KG/M2

## 2018-05-01 VITALS
DIASTOLIC BLOOD PRESSURE: 70 MMHG | OXYGEN SATURATION: 96 % | HEIGHT: 64 IN | TEMPERATURE: 100 F | HEART RATE: 97 BPM | WEIGHT: 166.13 LBS | BODY MASS INDEX: 28.36 KG/M2 | SYSTOLIC BLOOD PRESSURE: 118 MMHG

## 2018-05-01 DIAGNOSIS — E78.2 MIXED HYPERLIPIDEMIA: Primary | Chronic | ICD-10-CM

## 2018-05-01 DIAGNOSIS — E11.9 TYPE 2 DIABETES MELLITUS WITHOUT COMPLICATION, WITH LONG-TERM CURRENT USE OF INSULIN: Chronic | ICD-10-CM

## 2018-05-01 DIAGNOSIS — Z79.4 TYPE 2 DIABETES MELLITUS WITHOUT COMPLICATION, WITH LONG-TERM CURRENT USE OF INSULIN: Chronic | ICD-10-CM

## 2018-05-01 DIAGNOSIS — Z13.5 GLAUCOMA SCREENING: ICD-10-CM

## 2018-05-01 DIAGNOSIS — R94.31 ABNORMAL EKG: ICD-10-CM

## 2018-05-01 DIAGNOSIS — E61.1 IRON DEFICIENCY: Primary | ICD-10-CM

## 2018-05-01 DIAGNOSIS — R06.02 SOB (SHORTNESS OF BREATH): ICD-10-CM

## 2018-05-01 DIAGNOSIS — M51.36 DDD (DEGENERATIVE DISC DISEASE), LUMBAR: ICD-10-CM

## 2018-05-01 DIAGNOSIS — R79.81 ABNORMAL ABGS: ICD-10-CM

## 2018-05-01 DIAGNOSIS — R09.02 HYPOXEMIA: Primary | ICD-10-CM

## 2018-05-01 DIAGNOSIS — Z98.84 HISTORY OF ROUX-EN-Y GASTRIC BYPASS: ICD-10-CM

## 2018-05-01 DIAGNOSIS — E11.9 DIABETES MELLITUS TYPE 2 WITHOUT RETINOPATHY: Primary | ICD-10-CM

## 2018-05-01 DIAGNOSIS — H52.7 REFRACTIVE ERROR: ICD-10-CM

## 2018-05-01 DIAGNOSIS — Z09 FOLLOW UP: Primary | ICD-10-CM

## 2018-05-01 PROCEDURE — 63600175 PHARM REV CODE 636 W HCPCS: Mod: JG,PO | Performed by: INTERNAL MEDICINE

## 2018-05-01 PROCEDURE — 96365 THER/PROPH/DIAG IV INF INIT: CPT | Mod: PO

## 2018-05-01 PROCEDURE — 99999 PR PBB SHADOW E&M-EST. PATIENT-LVL III: CPT | Mod: PBBFAC,,, | Performed by: INTERNAL MEDICINE

## 2018-05-01 PROCEDURE — 99204 OFFICE O/P NEW MOD 45 MIN: CPT | Mod: S$GLB,,, | Performed by: INTERNAL MEDICINE

## 2018-05-01 PROCEDURE — 3044F HG A1C LEVEL LT 7.0%: CPT | Mod: CPTII,S$GLB,, | Performed by: NURSE PRACTITIONER

## 2018-05-01 PROCEDURE — 25000003 PHARM REV CODE 250: Mod: PO | Performed by: INTERNAL MEDICINE

## 2018-05-01 PROCEDURE — 92004 COMPRE OPH EXAM NEW PT 1/>: CPT | Mod: S$GLB,,, | Performed by: OPTOMETRIST

## 2018-05-01 PROCEDURE — 99999 PR PBB SHADOW E&M-EST. PATIENT-LVL V: CPT | Mod: PBBFAC,,, | Performed by: NURSE PRACTITIONER

## 2018-05-01 PROCEDURE — 99213 OFFICE O/P EST LOW 20 MIN: CPT | Mod: S$GLB,,, | Performed by: NURSE PRACTITIONER

## 2018-05-01 PROCEDURE — 3044F HG A1C LEVEL LT 7.0%: CPT | Mod: CPTII,S$GLB,, | Performed by: INTERNAL MEDICINE

## 2018-05-01 PROCEDURE — 92015 DETERMINE REFRACTIVE STATE: CPT | Mod: S$GLB,,, | Performed by: OPTOMETRIST

## 2018-05-01 PROCEDURE — 99999 PR PBB SHADOW E&M-EST. PATIENT-LVL I: CPT | Mod: PBBFAC,,, | Performed by: OPTOMETRIST

## 2018-05-01 RX ORDER — DULOXETIN HYDROCHLORIDE 30 MG/1
30 CAPSULE, DELAYED RELEASE ORAL DAILY
COMMUNITY
Start: 2018-04-25 | End: 2018-06-18

## 2018-05-01 RX ADMIN — FERRIC CARBOXYMALTOSE INJECTION 750 MG: 50 INJECTION, SOLUTION INTRAVENOUS at 01:05

## 2018-05-01 NOTE — PATIENT INSTRUCTIONS
[facility]  [address]  [address]  [phone] phone     [phone] fax  Hours of Operation: Monday- Friday 8:00am- 5:00pm  After hours phone  [phone]  Hematology / Oncology Physicians on call      Dr. Kevin Webb                        Please call with any concerns regarding your appointment today.    Ferric carboxymaltose injection  What is this medicine?  FERRIC CARBOXYMALTOSE (ferr-ik car-box-ee-mol-toes) is an iron complex. Iron is used to make healthy red blood cells, which carry oxygen and nutrients throughout the body. This medicine is used to treat anemia in people with chronic kidney disease or people who cannot take iron by mouth.  How should I use this medicine?  This medicine is for infusion into a vein. It is given by a health care professional in a hospital or clinic setting.  Talk to your pediatrician regarding the use of this medicine in children. Special care may be needed.  What side effects may I notice from receiving this medicine?  Side effects that you should report to your doctor or health care professional as soon as possible:  · allergic reactions like skin rash, itching or hives, swelling of the face, lips, or tongue -breathing problems  · changes in blood pressure  · feeling faint or lightheaded, falls  · flushing, sweating, or hot feelings  Side effects that usually do not require medical attention (Report these to your doctor or health care professional if they continue or are bothersome.):  · changes in taste  · constipation  · dizziness  · headache  · nausea  · pain, redness, or irritation at site where injected  · vomiting  What may interact with this medicine?  Do not take this medicine with any of the following medications:  · deferoxamine  · dimercaprol  · other iron products  This medicine may also interact with the following medications:  · chloramphenicol  · deferasirox  What if I  miss a dose?  It is important not to miss your dose. Call your doctor or health care professional if you are unable to keep an appointment.  Where should I keep my medicine?  This drug is given in a hospital or clinic and will not be stored at home.  What should I tell my health care provider before I take this medicine?  They need to know if you have any of these conditions:  · anemia not caused by low iron levels  · high levels of iron in the blood  · liver disease  · an unusual or allergic reaction to iron, other medicines, foods, dyes, or preservatives  · pregnant or trying to get pregnant  · breast-feeding  What should I watch for while using this medicine?  Visit your doctor or health care professional regularly. Tell your doctor if your symptoms do not start to get better or if they get worse. You may need blood work done while you are taking this medicine.  You may need to follow a special diet. Talk to your doctor. Foods that contain iron include: whole grains/cereals, dried fruits, beans, or peas, leafy green vegetables, and organ meats (liver, kidney).  NOTE:This sheet is a summary. It may not cover all possible information. If you have questions about this medicine, talk to your doctor, pharmacist, or health care provider. Copyright© 2017 [employer]

## 2018-05-01 NOTE — NURSING
Pt reports itching has subsided. Pt knows to report to nearest ER or call if there are any more problems. Pt verbalizes understanding.

## 2018-05-01 NOTE — LETTER
May 1, 2018      Destiney Escobar NP  [address] [place] [place] [address]           [facility] - Pulmonary Services  [address] [place] [address]  Phone: [phone]  Fax: [phone]          Patient: Jadyn Chance   MR Number: [id number]   YOB: 1970   Date of Visit: 5/1/2018       Dear Destiney Escobar:    Thank you for referring Jadyn Chance to me for evaluation. Attached you will find relevant portions of my assessment and plan of care.    If you have questions, please do not hesitate to call me. I look forward to following Jadyn Chance along with you.    Sincerely,    Misa Jessica MD    Enclosure  CC:  No Recipients    If you would like to receive this communication electronically, please contact [email or web address] or [phone] to request more information on [employer] Link access.    For providers and/or their staff who would like to refer a patient to [facility], please contact us through our one-stop-shop provider referral line, [facility] , at [phone].    If you feel you have received this communication in error or would no longer like to receive these types of communications, please e-mail [email or web address]

## 2018-05-01 NOTE — TELEPHONE ENCOUNTER
----- Message from Aliza Blanton sent at 5/1/2018  6:52 AM CDT -----  Contact: pt   Pt states that she will be about 15 mins late for appt.    [phone] (home)

## 2018-05-01 NOTE — PROGRESS NOTES
HPI     New Patient  Diabetic/NIDDM   05/01/2018   04/30/2018  BS have been fluctuating patient was on a Steriod  Screening for glaucoma  RE    Last edited by Ezekiel Blanton MA on 5/1/2018  9:08 AM. (History)            Assessment /Plan     For exam results, see Encounter Report.    Diabetes mellitus type 2 without retinopathy    Glaucoma screening    Refractive error      No diabetic retinopathy OU.  OH OK OU.  Spec Rx updated.  RTC one year.

## 2018-05-01 NOTE — PLAN OF CARE
Problem: Patient Care Overview  Goal: Plan of Care Review  Outcome: Ongoing (interventions implemented as appropriate)  I feel ok, but I don't want that steroid today. It made my blood sugar too high.

## 2018-05-01 NOTE — PROGRESS NOTES
Initial Outpatient Pulmonary Evaluation       SUBJECTIVE:     History of Present Illness:    Patient is a 47 y.o. female referred for evaluation of hypoxemia.  This patient has been recently an sent to the emergency room after she experienced a reaction to the iron infusion that she takes for anemia that complicated her weight loss surgery which led to intravenous steroid administration and severe hyperglycemia.  In the emergency room he was short of breath she did have low O2 saturation and low PaO2 on the ABG O2 sat was 93% and PaO2 was 67 mmHg.  She does complain also shortness of breath mainly on exertion for more than a year.  Denies coughing denies wheezing denies chest tightness.  She is a never smoker.  Denies history of blood clots.  She states she has a mother who  of DIC at the age of 0 after an abdominal laparotomy.  She has a son who has Fallot tetralogy.       Chief Complaint   Patient presents with    Abnormal ABG       Review of patient's allergies indicates:   Allergen Reactions    Demerol [meperidine] Hallucinations    Penicillins Other (See Comments)     Patient cannot recall specific reaction, reports she has been listing this as an allergy since childhood.    Acetaminophen Itching     Pt states she can tolerate this medication with benadryl.    Ciprofloxacin (bulk)     Codeine Nausea And Vomiting    Dilaudid [hydromorphone (bulk)] Itching     Pt states she can tolerate this medication with benadryl.    Hydrocodone Itching     Pt states she can tolerate this medication with benadryl.    Lortab [hydrocodone-acetaminophen] Nausea And Vomiting    Oxycodone Nausea And Vomiting    Tramadol      seizures       Current Outpatient Prescriptions   Medication Sig Dispense Refill    ACCU-CHEK FASTCLIX Misc       ACCU-CHEK SMARTVIEW TEST STRIP Strp       BD ALCOHOL SWABS PadM       clonazePAM (KLONOPIN) 2 MG Tab TK 1 T PO  TID PRF ANXIETY  0     "cyanocobalamin 1,000 mcg/mL injection Inject 1 mL (1,000 mcg total) into the skin every 28 days. 10 mL 11    doxepin (SINEQUAN) 150 MG Cap Take 150 mg by mouth every evening.       DULoxetine (CYMBALTA) 30 MG capsule Take 30 mg by mouth once daily.      ergocalciferol (VITAMIN D2) 50,000 unit Cap TAKE 1 CAPSULE (50,000 UNITS TOTAL) BY MOUTH ONCE A WEEK. 4 capsule 3    JANUMET XR 50-1,000 mg TM24 Taking twice daily      syringe with needle (SYRINGE 3CC/25GX1") 3 mL 25 gauge x 1" Syrg For vitamin B12 injection 100 Syringe 1     No current facility-administered medications for this visit.        Past Medical History:   Diagnosis Date    Abnormal Pap smear     bx was negative, per pt    Anemia     Anxiety     B12 deficiency     Blood transfusion     multiple     Chronic LBP     Degenerative disc disease     l spine    Diabetes mellitus     Diabetic neuropathy     General anesthetics causing adverse effect in therapeutic use     delayed emergence    Mixed hyperlipidemia 2013    RLS (restless legs syndrome)     Vitamin D deficiency disease      Past Surgical History:   Procedure Laterality Date    ANUS SURGERY      BELT ABDOMINOPLASTY      tummy tuck    BREAST SURGERY  2008    breast augmentation     SECTION      x2    CHOLECYSTECTOMY      mikel      EXCISIONAL HEMORRHOIDECTOMY      GASTRIC BYPASS      HIP FRACTURE SURGERY      left hip ORIF    MOUTH SURGERY      revision of JJ anastomosis  2/27/15    small bowel resection  2015    TUBAL LIGATION       Family History   Problem Relation Age of Onset    Diabetes Mother     Cataracts Mother     Ovarian cancer Sister 30    Glaucoma Maternal Aunt     Blindness Maternal Aunt     Breast cancer Neg Hx     Colon cancer Neg Hx     Thrombophilia Neg Hx      Social History   Substance Use Topics    Smoking status: Never Smoker    Smokeless tobacco: Never Used    Alcohol use No        Review of Systems:  Review of Systems " "  Constitutional: Positive for fatigue. Negative for chills and fever.   HENT: Negative for trouble swallowing and voice change.    Eyes: Negative for redness and itching.   Respiratory: Negative for cough and shortness of breath.    Cardiovascular: Negative for chest pain and leg swelling.   Gastrointestinal: Positive for diarrhea.   Endocrine:        Vitamin B12 deficiency  Diabetes mellitus   Genitourinary: Positive for pelvic pain. Negative for hematuria.   Musculoskeletal: Positive for arthralgias, back pain and gait problem.   Skin: Negative for color change and rash.   Allergic/Immunologic: Negative for immunocompromised state.   Neurological: Positive for weakness. Negative for syncope.   Hematological: Negative for adenopathy. Bruises/bleeds easily.        Anemia   Psychiatric/Behavioral: Negative for behavioral problems and confusion.       OBJECTIVE:     Vital Signs (Most Recent)  Pulse: 91 (05/01/18 1426)  Resp: 19 (05/01/18 1426)  BP: 114/74 (05/01/18 1426)  SpO2: 95 % (05/01/18 1426)  5' 4" (1.626 m)  76.3 kg (168 lb 3.4 oz)     Physical Exam:  Physical Exam   Constitutional: She is oriented to person, place, and time. She appears well-developed and well-nourished.   HENT:   Head: Normocephalic and atraumatic.   Eyes: EOM are normal.   Neck: Neck supple.   Cardiovascular: Normal rate and regular rhythm.    Pulmonary/Chest: Effort normal and breath sounds normal.   Abdominal: Soft.   Musculoskeletal: She exhibits no edema or deformity.   Neurological: She is alert and oriented to person, place, and time.   Skin: Skin is warm.   Psychiatric: She has a normal mood and affect.     Laboratory    Lab Results   Component Value Date    WBC 12.76 (H) 04/25/2018    HGB 12.7 04/25/2018    HCT 38.7 04/25/2018    MCV 85 04/25/2018     04/25/2018     BMP  Lab Results   Component Value Date     04/25/2018    K 4.5 04/25/2018     04/25/2018    CO2 20 (L) 04/25/2018    BUN 14 04/25/2018    CREATININE " 0.9 04/25/2018    CALCIUM 9.6 04/25/2018    ANIONGAP 15 04/25/2018    ESTGFRAFRICA >60 04/25/2018    EGFRNONAA >60 04/25/2018     BNP  @LABRCNTIP(BNP,BNPTRIAGEBLO)@  Lab Results   Component Value Date    TSH 0.927 03/21/2018     PT/PTT from 2017 were reviewed normal.  Diagnostic Results:  Chest x-ray done on the April 25 was personally reviewed lung fields were clear.      ABGs reviewed CO2 decreased at 34 mmHg , PaO2 decreased at 67 mmHg, O2 sat 93%.  ASSESSMENT/PLAN:     Hypoxemia  -     Complete PFT with bronchodilator; Future  -     D-DIMER, QUANTITATIVE; Future; Expected date: 05/01/2018  -     2D echo with bubble contrast; Future  -     CT Chest With Contrast; Future; Expected date: 05/01/2018    SOB (shortness of breath)  -     Complete PFT with bronchodilator; Future  -     D-DIMER, QUANTITATIVE; Future; Expected date: 05/01/2018  -     2D echo with bubble contrast; Future  -     CT Chest With Contrast; Future; Expected date: 05/01/2018    This patient is never smoker, has not hypercapnic, as a workup of her hypoxemia, I will proceed with a CT chest PE protocol, 2-D echo with bubble contrast, d-dimer, to evaluate for shunt, thromboembolic disease.  The patient has stated that in the emergency room she felt her shortness of breath got suddenly worse.    Follow-up in about 2 weeks (around 5/15/2018).    This note was prepared using voice recognition system and is likely to have sound alike errors that may have been overlooked even after proof reading.  Please call me with any questions    Discussed diagnosis, its evaluation, treatment and usual course. All questions answered.    Thank you for the courtesy of participating in the care of this patient    Misa Jessica MD

## 2018-05-01 NOTE — PROGRESS NOTES
Subjective:   Patient ID:  Jadyn Chance is a 47 y.o. female who presents for evaluation of Abnormal ECG      48 yo female, disabled after gastric bypass.  PMH NIDDM,  HLD, anemia and obesity s/p gastric bypass. And syncope in 2013 due to othostatic hypotension.  Denied heart attack. Stroke and cancer.  She denied chest pain, dyspnea on exertion, palpitation, fainting, PND, orthopnea.  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  No smoking/drinking  Went to ER last week after steroid injection and . EKG showed NSr and compared to prior EKG done in  no change.            Past Medical History:   Diagnosis Date    Abnormal Pap smear     bx was negative, per pt    Anemia     Anxiety     B12 deficiency     Blood transfusion     multiple     Chronic LBP     Degenerative disc disease     l spine    Diabetes mellitus     Diabetic neuropathy     General anesthetics causing adverse effect in therapeutic use     delayed emergence    Mixed hyperlipidemia 2013    RLS (restless legs syndrome)     Vitamin D deficiency disease        Past Surgical History:   Procedure Laterality Date    ANUS SURGERY      BELT ABDOMINOPLASTY      tummy tuck    BREAST SURGERY  2008    breast augmentation     SECTION      x2    CHOLECYSTECTOMY      mikel      EXCISIONAL HEMORRHOIDECTOMY      GASTRIC BYPASS      HIP FRACTURE SURGERY      left hip ORIF    MOUTH SURGERY      revision of JJ anastomosis  2/27/15    small bowel resection  2015    TUBAL LIGATION         Social History   Substance Use Topics    Smoking status: Never Smoker    Smokeless tobacco: Never Used    Alcohol use No       Family History   Problem Relation Age of Onset    Diabetes Mother     Cataracts Mother     Ovarian cancer Sister 30    Glaucoma Maternal Aunt     Blindness Maternal Aunt     Breast cancer Neg Hx     Colon cancer Neg Hx     Thrombophilia Neg Hx        Review of Systems    Constitution: Negative for decreased appetite, diaphoresis, fever, weakness, malaise/fatigue and night sweats.   HENT: Negative for nosebleeds.    Eyes: Negative for blurred vision and double vision.   Cardiovascular: Negative for chest pain, claudication, dyspnea on exertion, irregular heartbeat, leg swelling, near-syncope, orthopnea, palpitations, paroxysmal nocturnal dyspnea and syncope.   Respiratory: Negative for cough, shortness of breath, sleep disturbances due to breathing, snoring, sputum production and wheezing.    Endocrine: Negative for cold intolerance and polyuria.   Hematologic/Lymphatic: Does not bruise/bleed easily.   Skin: Negative for rash.   Musculoskeletal: Negative for back pain, falls, joint pain, joint swelling and neck pain.   Gastrointestinal: Negative for abdominal pain, heartburn, nausea and vomiting.   Genitourinary: Negative for dysuria, frequency and hematuria.   Neurological: Negative for difficulty with concentration, dizziness, focal weakness, headaches, light-headedness, numbness and seizures.   Psychiatric/Behavioral: Negative for depression, memory loss and substance abuse. The patient does not have insomnia.    Allergic/Immunologic: Negative for HIV exposure and hives.       Objective:   Physical Exam   Constitutional: She is oriented to person, place, and time. She appears well-nourished.   HENT:   Head: Normocephalic.   Eyes: Pupils are equal, round, and reactive to light.   Neck: Normal carotid pulses and no JVD present. Carotid bruit is not present. No thyromegaly present.   Cardiovascular: Normal rate, regular rhythm, normal heart sounds and normal pulses.   No extrasystoles are present. PMI is not displaced.  Exam reveals no gallop and no S3.    No murmur heard.  Pulmonary/Chest: Breath sounds normal. No stridor. No respiratory distress.   Abdominal: Soft. Bowel sounds are normal. There is no tenderness. There is no rebound.   Musculoskeletal: Normal range of motion.    Neurological: She is alert and oriented to person, place, and time.   Skin: Skin is intact. No rash noted.   Psychiatric: Her behavior is normal.       Lab Results   Component Value Date    CHOL 148 03/21/2018    CHOL 151 08/11/2014    CHOL 205 (H) 05/02/2013     Lab Results   Component Value Date    HDL 49 03/21/2018    HDL 65 08/11/2014    HDL 63 05/02/2013     Lab Results   Component Value Date    LDLCALC 76.6 03/21/2018    LDLCALC 62.4 (L) 08/11/2014    LDLCALC 95.0 05/02/2013     Lab Results   Component Value Date    TRIG 112 03/21/2018    TRIG 118 08/11/2014    TRIG 234 (H) 05/02/2013     Lab Results   Component Value Date    CHOLHDL 33.1 03/21/2018    CHOLHDL 43.0 08/11/2014    CHOLHDL 30.7 05/02/2013       Chemistry        Component Value Date/Time     04/25/2018 2111    K 4.5 04/25/2018 2111     04/25/2018 2111    CO2 20 (L) 04/25/2018 2111    BUN 14 04/25/2018 2111    CREATININE 0.9 04/25/2018 2111     (H) 04/25/2018 2111        Component Value Date/Time    CALCIUM 9.6 04/25/2018 2111    ALKPHOS 111 04/25/2018 2111    AST 25 04/25/2018 2111    ALT 76 (H) 04/25/2018 2111    BILITOT 1.4 (H) 04/25/2018 2111    ESTGFRAFRICA >60 04/25/2018 2111    EGFRNONAA >60 04/25/2018 2111          Lab Results   Component Value Date    TSH 0.927 03/21/2018     Lab Results   Component Value Date    INR 1.0 07/17/2017    INR 1.1 03/27/2015    INR 1.3 (H) 03/26/2015     Lab Results   Component Value Date    WBC 12.76 (H) 04/25/2018    HGB 12.7 04/25/2018    HCT 38.7 04/25/2018    MCV 85 04/25/2018     04/25/2018     BNP  @LABRCNTIP(BNP,BNPTRIAGEBLO)@  Estimated Creatinine Clearance: 75.9 mL/min (based on SCr of 0.9 mg/dL).     Assessment:      1. Mixed hyperlipidemia    2. History of Kayleen-en-Y gastric bypass    3. Type 2 diabetes mellitus without complication, with long-term current use of insulin        NO ASA and NSAID aftert bypass  BP and LDL wnl  Reviewed EKG with pt in the office    Plan:    Reassure pt  Continue current meds.  Recommend heart-healthy diet, weight control and regular exercise.  Sophy. Risk modification.   RTC as indicated    I have reviewed all pertinent labs and cardiac studies. Plans and recommendations have been formulated under my direct supervision. All questions answered and patient voiced understanding. Patient to continue current medications.

## 2018-05-01 NOTE — PROGRESS NOTES
"Subjective:       Patient ID: Jadyn Chance is a 47 y.o. female.    Chief Complaint: Follow-up (Back lower and leg bilateral pain) and Shortness of Breath    Patient presents for follow up.  Had ER visit due to hyperglycemia.  Received Solumedrol IV at last infusion appointment.  Received IV fluids and insulin in ER.  Needs follow up with cardiology and pulmonary due to abnormal EKG and ABG's.  Reports CBG was 317 last night and 73 this morning at home.  CBG at home fluctuates from 200's and below.  Reports she still feel "under the weather".  Continues to have back pain.  Saw pain management 3/2018.  Requesting another provider.  Will give external referral.       Review of Systems   Constitutional: Negative for chills and fatigue.   Respiratory: Negative for cough and shortness of breath.    Musculoskeletal: Positive for back pain. Negative for gait problem and joint swelling.   Psychiatric/Behavioral: Positive for dysphoric mood (intermittent). Negative for agitation and confusion.       Objective:      Physical Exam   Constitutional: She is oriented to person, place, and time. Vital signs are normal. She appears well-developed and well-nourished.   HENT:   Head: Normocephalic and atraumatic.   Neck: Normal range of motion.   Cardiovascular: Normal rate and regular rhythm.    Pulmonary/Chest: Effort normal and breath sounds normal.   Musculoskeletal: Normal range of motion.   Neurological: She is alert and oriented to person, place, and time.   Skin: Skin is warm.   Psychiatric: She has a normal mood and affect. Her behavior is normal.       Assessment:       1. Follow up    2. DDD (degenerative disc disease), lumbar    3. Type 2 diabetes mellitus without complication, with long-term current use of insulin    4. Abnormal ABGs    5. Abnormal EKG        Plan:         Follow up    DDD (degenerative disc disease), lumbar  -     Ambulatory Referral to Pain Clinic    Type 2 diabetes mellitus without " complication, with long-term current use of insulin  Comments:  Continue to monitor blood glucose over the next week and follow up as discussed    Abnormal ABGs  -     Ambulatory referral to Pulmonology    Abnormal EKG  -     Ambulatory consult to Cardiology        Blood glucose if decreasing but still having abnormal readings.  Instructed to continue to monitor over the next week and follow up if no improvement.  Will get pulmonology and cardiology appointments.  External referral for pain.  Continue to hydrate and rest.

## 2018-05-01 NOTE — NURSING
"Pt refusing IV Solu-Medrol because it increased blood sugar to over 600. Dr. Mustafa aware. MD ok with proceeding with Injectafer.     1330: Pt c/o slight itching, but it's "not that bad". Dr. Mustafa notified. MD advised pt to take own oral Benadryl and will re-assess after 45 minute wait period.  "

## 2018-05-01 NOTE — LETTER
May 1, 2018      Destiney Escobar, MARIZA  [address] [facility] [address] [place] [address]           [facility] - Cardiology  [address] [place] [address]  Phone: [phone]  Fax: [phone]          Patient: Jadyn Chance   MR Number: [id number]   YOB: 1970   Date of Visit: 5/1/2018       Dear Destiney Escobar:    Thank you for referring Jadyn Chance to me for evaluation. Attached you will find relevant portions of my assessment and plan of care.    If you have questions, please do not hesitate to call me. I look forward to following Jadyn Chance along with you.    Sincerely,    Kt Cannon MD    Enclosure  CC:  No Recipients    If you would like to receive this communication electronically, please contact [email or web address] or [phone] to request more information on [employer] access.    For providers and/or their staff who would like to refer a patient to [facility], please contact us through our one-stop-shop provider referral line, [facility] , at [phone].    If you feel you have received this communication in error or would no longer like to receive these types of communications, please e-mail [email or web address]

## 2018-05-03 ENCOUNTER — TELEPHONE (OUTPATIENT)
Dept: RADIOLOGY | Facility: HOSPITAL | Age: 48
End: 2018-05-03

## 2018-05-03 DIAGNOSIS — R09.02 HYPOXEMIA: Primary | ICD-10-CM

## 2018-05-04 ENCOUNTER — HOSPITAL ENCOUNTER (OUTPATIENT)
Dept: RADIOLOGY | Facility: HOSPITAL | Age: 48
Discharge: HOME | End: 2018-05-04
Attending: INTERNAL MEDICINE
Payer: MEDICARE

## 2018-05-04 ENCOUNTER — CLINICAL SUPPORT (OUTPATIENT)
Dept: CARDIOLOGY | Facility: CLINIC | Age: 48
End: 2018-05-04
Attending: INTERNAL MEDICINE
Payer: MEDICARE

## 2018-05-04 ENCOUNTER — DOCUMENTATION ONLY (OUTPATIENT)
Dept: CARDIOLOGY | Facility: CLINIC | Age: 48
End: 2018-05-04

## 2018-05-04 DIAGNOSIS — R09.02 HYPOXEMIA: ICD-10-CM

## 2018-05-04 DIAGNOSIS — R06.02 SOB (SHORTNESS OF BREATH): ICD-10-CM

## 2018-05-04 LAB
DIASTOLIC DYSFUNCTION: NO
ESTIMATED PA SYSTOLIC PRESSURE: 21.53
MITRAL VALVE MOBILITY: NORMAL
RETIRED EF AND QEF - SEE NOTES: 60 (ref 55–65)

## 2018-05-04 PROCEDURE — 71275 CT ANGIOGRAPHY CHEST: CPT | Mod: 26,,, | Performed by: RADIOLOGY

## 2018-05-04 PROCEDURE — 71275 CT ANGIOGRAPHY CHEST: CPT | Mod: TC,PO

## 2018-05-04 PROCEDURE — 25500020 PHARM REV CODE 255: Mod: PO | Performed by: INTERNAL MEDICINE

## 2018-05-04 PROCEDURE — 93307 TTE W/O DOPPLER COMPLETE: CPT | Mod: S$GLB,,, | Performed by: INTERNAL MEDICINE

## 2018-05-04 RX ADMIN — IOHEXOL 100 ML: 350 INJECTION, SOLUTION INTRAVENOUS at 01:05

## 2018-05-04 NOTE — PROGRESS NOTES
Pt presented for an echocardiogram with bubble study per Dr. Jessica.  Procedure was explained to the patient, she verbalized understanding.  IV in left AC (from today's CT) was used.  Patient tolerated the procedure well.  IV discontinued, pressure dressing applied.

## 2018-05-08 ENCOUNTER — TELEPHONE (OUTPATIENT)
Dept: PULMONOLOGY | Facility: CLINIC | Age: 48
End: 2018-05-08

## 2018-05-08 NOTE — TELEPHONE ENCOUNTER
----- Message from Misa Jessica MD sent at 5/4/2018  2:03 PM CDT -----  Please inform patient CT chest was normal no clot in the lungs.  Thank you

## 2018-05-09 ENCOUNTER — TELEPHONE (OUTPATIENT)
Dept: INTERNAL MEDICINE | Facility: CLINIC | Age: 48
End: 2018-05-09

## 2018-05-09 ENCOUNTER — PATIENT MESSAGE (OUTPATIENT)
Dept: INTERNAL MEDICINE | Facility: CLINIC | Age: 48
End: 2018-05-09

## 2018-05-11 ENCOUNTER — TELEPHONE (OUTPATIENT)
Dept: INTERNAL MEDICINE | Facility: CLINIC | Age: 48
End: 2018-05-11

## 2018-05-11 NOTE — TELEPHONE ENCOUNTER
----- Message from Zainab Schwab sent at 5/11/2018 12:45 PM CDT -----  Contact: pt  States she needs to speak to Yecenia regarding a referral. Please call pt at [phone]. Thank you

## 2018-05-11 NOTE — TELEPHONE ENCOUNTER
I returned a call to the pt and she verified with me the fax number for [facility] which is [phone]. I informed that I would fax again since they had not received and make it attention to [place] for Dr. Shakeel Mcgregor. She verbalized understanding.

## 2018-05-25 ENCOUNTER — PROCEDURE VISIT (OUTPATIENT)
Dept: PULMONOLOGY | Facility: CLINIC | Age: 48
End: 2018-05-25
Payer: MEDICARE

## 2018-05-25 ENCOUNTER — OFFICE VISIT (OUTPATIENT)
Dept: PULMONOLOGY | Facility: CLINIC | Age: 48
End: 2018-05-25
Payer: MEDICARE

## 2018-05-25 VITALS
HEIGHT: 64 IN | RESPIRATION RATE: 20 BRPM | HEART RATE: 112 BPM | SYSTOLIC BLOOD PRESSURE: 100 MMHG | OXYGEN SATURATION: 97 % | WEIGHT: 171.63 LBS | DIASTOLIC BLOOD PRESSURE: 60 MMHG | BODY MASS INDEX: 29.3 KG/M2

## 2018-05-25 DIAGNOSIS — R09.02 LAB FINDINGS OF HYPOXEMIA: ICD-10-CM

## 2018-05-25 DIAGNOSIS — R06.02 SOB (SHORTNESS OF BREATH): ICD-10-CM

## 2018-05-25 DIAGNOSIS — R09.02 LAB FINDINGS OF HYPOXEMIA: Primary | ICD-10-CM

## 2018-05-25 DIAGNOSIS — R09.02 HYPOXEMIA: ICD-10-CM

## 2018-05-25 LAB
ALLENS TEST: ABNORMAL
DELSYS: ABNORMAL
FIO2: 21
HCO3 UR-SCNC: 19.9 MMOL/L (ref 24–28)
MODE: ABNORMAL
PCO2 BLDA: 34.8 MMHG (ref 35–45)
PH SMN: 7.37 [PH] (ref 7.35–7.45)
PO2 BLDA: 70 MMHG (ref 80–100)
POC BE: -5 MMOL/L
POC SATURATED O2: 93 % (ref 95–100)
SAMPLE: ABNORMAL
SITE: ABNORMAL

## 2018-05-25 PROCEDURE — 99999 PR PBB SHADOW E&M-EST. PATIENT-LVL III: CPT | Mod: PBBFAC,,, | Performed by: INTERNAL MEDICINE

## 2018-05-25 PROCEDURE — 94060 EVALUATION OF WHEEZING: CPT | Mod: S$GLB,,, | Performed by: INTERNAL MEDICINE

## 2018-05-25 PROCEDURE — 99214 OFFICE O/P EST MOD 30 MIN: CPT | Mod: 25,S$GLB,, | Performed by: INTERNAL MEDICINE

## 2018-05-25 PROCEDURE — 94729 DIFFUSING CAPACITY: CPT | Mod: S$GLB,,, | Performed by: INTERNAL MEDICINE

## 2018-05-25 PROCEDURE — 94726 PLETHYSMOGRAPHY LUNG VOLUMES: CPT | Mod: S$GLB,,, | Performed by: INTERNAL MEDICINE

## 2018-05-25 PROCEDURE — 82803 BLOOD GASES ANY COMBINATION: CPT | Mod: S$GLB,,, | Performed by: INTERNAL MEDICINE

## 2018-05-25 PROCEDURE — 36600 WITHDRAWAL OF ARTERIAL BLOOD: CPT | Mod: 59,S$GLB,, | Performed by: INTERNAL MEDICINE

## 2018-05-25 PROCEDURE — 3008F BODY MASS INDEX DOCD: CPT | Mod: CPTII,S$GLB,, | Performed by: INTERNAL MEDICINE

## 2018-05-25 RX ORDER — GABAPENTIN 300 MG/1
CAPSULE ORAL
COMMUNITY
Start: 2018-05-05 | End: 2018-11-20 | Stop reason: SDUPTHER

## 2018-05-25 NOTE — PROGRESS NOTES
Pulmonary Outpatient Follow Up Visit     Subjective:       Patient ID: Jadyn Chance is a 47 y.o. female.    Chief Complaint: Hypoxemia      HPI  47-year-old female patient presenting for follow-up.     She was initially have referred in early May 2018 for evaluation of hypoxemia that has happened after a reaction to intravenous iron infusion in the clinic setting.  The patient had an ABG which showed or low O2 sat 93% and the PaO2 of 67 mm of mercury.  Patient complained of shortness of breath on exertion, denies cough and denies wheezing denies chest pain.  She has a chronic pain in the back and she attributes her tachycardia today to possibly being in pain.  Her heart rate was repeated and it was 108.  Extensive workup for her hypoxemia has been negative so far including a CT scan PE protocol, 2D echo with bubble study, and a pulmonary function test done today within normal.  Review of Systems   Constitutional: Positive for fatigue.        History of weight loss surgery   HENT: Negative for nosebleeds.    Eyes: Negative for itching.   Respiratory: Negative for cough and shortness of breath.    Cardiovascular: Negative for chest pain.   Genitourinary: Negative for hematuria.   Endocrine: Vitamin B12 deficiency  Diabetes mellitus   Musculoskeletal: Positive for back pain and gait problem.   Gastrointestinal: Positive for vomiting. Negative for abdominal pain.        Chronic diarrhea   Neurological: Positive for dizziness, syncope and weakness.        Said has 2 episode of syncope/near syncope   Hematological: Bleeds easily and excessive bruising.        Anemia   Psychiatric/Behavioral: Negative for confusion.       hiatric/Behavioral: Negative for behavioral problems and confusion.      Outpatient Encounter Prescriptions as of 5/25/2018   Medication Sig Dispense Refill    ACCU-CHEK FASTCLIX Misc       ACCU-CHEK SMARTVIEW TEST STRIP Strp       BD ALCOHOL SWABS  "PadM       clonazePAM (KLONOPIN) 2 MG Tab TK 1 T PO  TID PRF ANXIETY  0    cyanocobalamin 1,000 mcg/mL injection Inject 1 mL (1,000 mcg total) into the skin every 28 days. 10 mL 11    doxepin (SINEQUAN) 150 MG Cap Take 150 mg by mouth every evening.       DULoxetine (CYMBALTA) 30 MG capsule Take 30 mg by mouth once daily.      ergocalciferol (VITAMIN D2) 50,000 unit Cap TAKE 1 CAPSULE (50,000 UNITS TOTAL) BY MOUTH ONCE A WEEK. 4 capsule 3    gabapentin (NEURONTIN) 300 MG capsule       JANUMET XR 50-1,000 mg TM24 Taking twice daily      syringe with needle (SYRINGE 3CC/25GX1") 3 mL 25 gauge x 1" Syrg For vitamin B12 injection 100 Syringe 1    [DISCONTINUED] fluoxetine (PROZAC) 40 MG capsule Take 40 mg by mouth once daily.      [] omnipaque 350 iohexol 100 mL        No facility-administered encounter medications on file as of 2018.        Objective:          Physical Exam   Constitutional: She is oriented to person, place, and time. She appears well-developed and well-nourished.   HENT:   Head: Normocephalic.   Neck: Neck supple.   Cardiovascular: Normal rate and regular rhythm.    Pulmonary/Chest: Normal expansion and effort normal.   Abdominal: Soft. She exhibits no distension. There is no tenderness.   Musculoskeletal: She exhibits no edema.   Lymphadenopathy:     She has no cervical adenopathy.   Neurological: She is alert and oriented to person, place, and time.   Skin: Skin is warm.   Psychiatric: She has a normal mood and affect.     Laboratory    Lab Results   Component Value Date    WBC 12.76 (H) 2018    HGB 12.7 2018    HCT 38.7 2018    MCV 85 2018     2018     BMP  Lab Results   Component Value Date     2018    K 4.5 2018     2018    CO2 20 (L) 2018    BUN 14 2018    CREATININE 0.9 2018    CALCIUM 9.6 2018    ANIONGAP 15 2018    ESTGFRAFRICA >60 2018    EGFRNONAA >60 2018 "     BNP  @LABRCNTIP(BNP,BNPTRIAGEBLO)@  Lab Results   Component Value Date    TSH 0.927 03/21/2018     The D-dimer level normal.    Diagnostic Results:  CT chest PE protocol showed no active findings.  No pulmonary embolism.  2D echo with bubble study showed a sys PA pressure and the 20s mm Hg.  No shunt.  PFT done today personally reviewed no evidence of significant obstruction, possible hyperinflation with a total lung capacity of 123%, DLCO within normal.  Assessment/Plan:   Lab findings of hypoxemia  -     PULM - Arterial Blood Gases--in addition to PFT only; Future  -     Stress test, pulmonary; Future; Expected date: 08/25/2018  -     Pulse oximetry overnight; Future; Expected date: 05/25/2018    SOB (shortness of breath)  -     PULM - Arterial Blood Gases--in addition to PFT only; Future  -     Stress test, pulmonary; Future; Expected date: 08/25/2018  -     Pulse oximetry overnight; Future; Expected date: 05/25/2018     I advised patient to follow up with Cardiology regarding tachycardia.    Follow-up in about 3 months (around 8/25/2018).    This note was prepared using voice recognition system and is likely to have sound alike errors that may have been overlooked even after proof reading.  Please call me with any questions    Discussed diagnosis, its evaluation, treatment and usual course. All questions answered.    Thank you for the courtesy of participating in the care of this patient    Misa Jessica MD

## 2018-05-31 LAB
BRPFT: ABNORMAL
DLCO ADJ PRE: 22.25 ML/(MIN*MMHG) (ref 18.14–29.61)
DLCO PRE: 22.12 ML/(MIN*MMHG) (ref 18.14–29.61)
DLCO SINGLE BREATH LLN: 18.14
DLCO SINGLE BREATH PRE REF: 92.6 %
DLCO SINGLE BREATH REF: 23.88
DLCOC SBVA LLN: 3.42
DLCOC SBVA PRE REF: 95.8 %
DLCOC SBVA REF: 5.01
DLCOC SINGLE BREATH LLN: 18.14
DLCOC SINGLE BREATH PRE REF: 93.2 %
DLCOC SINGLE BREATH REF: 23.88
DLCOVA LLN: 3.42
DLCOVA PRE REF: 95.2 %
DLCOVA PRE: 4.76 ML/(MIN*MMHG*L) (ref 3.42–6.59)
DLCOVA REF: 5.01
DLVAADJ PRE: 4.79 ML/(MIN*MMHG*L) (ref 3.42–6.59)
ERV LLN: 0.97
ERV PRE REF: 47.8 %
ERV PRE: 0.46 L (ref 0.97–0.97)
ERV REF: 0.97
ERVN2 LLN: 0.97
ERVN2 REF: 0.97
FEF 25 75 CHG: -3.9 %
FEF 25 75 LLN: 1.55
FEF 25 75 POST REF: 114.9 %
FEF 25 75 POST: 3.13 L/S (ref 1.55–3.9)
FEF 25 75 PRE REF: 119.5 %
FEF 25 75 PRE: 3.26 L/S (ref 1.55–3.9)
FEF 25 75 REF: 2.73
FET100 CHG: -18.8 %
FET100 POST: 7.62 SEC
FET100 PRE: 9.39 SEC
FEV1 CHG: -1.5 %
FEV1 FVC CHG: 0.8 %
FEV1 FVC LLN: 70
FEV1 FVC POST REF: 102.6 %
FEV1 FVC POST: 82.92 % (ref 69.74–91.95)
FEV1 FVC PRE REF: 101.7 %
FEV1 FVC PRE: 82.23 % (ref 69.74–91.95)
FEV1 FVC REF: 81
FEV1 LLN: 2.12
FEV1 POST REF: 108.9 %
FEV1 POST: 2.95 L (ref 2.12–3.29)
FEV1 PRE REF: 110.6 %
FEV1 PRE: 2.99 L (ref 2.12–3.29)
FEV1 REF: 2.7
FEV6 CHG: -1 %
FEV6 LLN: 2.71
FEV6 POST REF: 104.4 %
FEV6 POST: 3.52 L (ref 2.71–4.03)
FEV6 PRE REF: 105.5 %
FEV6 PRE: 3.56 L (ref 2.71–4.03)
FEV6 REF: 3.37
FRCN2 LLN: 1.81
FRCN2 REF: 2.63
FRCPL PRE: 2.75 L
FRCPLETH LLN: 1.81
FRCPLETH PREREF: 104.6 %
FRCPLETH REF: 2.63
FVC CHG: -2.3 %
FVC LLN: 2.64
FVC POST REF: 105.7 %
FVC POST: 3.55 L (ref 2.64–4.08)
FVC PRE REF: 108.2 %
FVC PRE: 3.64 L (ref 2.64–4.08)
FVC REF: 3.36
IVC PRE: 3.23 L (ref 2.64–4.08)
IVC SINGLE BREATH LLN: 2.64
IVC SINGLE BREATH PRE REF: 96.2 %
IVC SINGLE BREATH REF: 3.36
MVV LLN: 86
MVV REF: 101
PEF CHG: -9.2 %
PEF LLN: 4.99
PEF POST REF: 113.3 %
PEF POST: 7.53 L/S (ref 4.99–8.3)
PEF PRE REF: 124.9 %
PEF PRE: 8.3 L/S (ref 4.99–8.3)
PEF REF: 6.64
RAW LLN: 3.06
RAW PRE REF: 93.9 %
RAW PRE: 2.87 CMH2O*S/L (ref 3.06–3.06)
RAW REF: 3.06
RV LLN: 1.09
RV PRE REF: 136.2 %
RV PRE: 2.27 L (ref 1.09–2.24)
RV REF: 1.66
RVN2 LLN: 1.09
RVN2 REF: 1.66
RVN2TLCN2 LLN: 26
RVN2TLCN2 REF: 35
RVTLC LLN: 26
RVTLC PRE REF: 108.8 %
RVTLC PRE: 38.39 % (ref 25.69–44.87)
RVTLC REF: 35
TLC LLN: 3.78
TLC PRE REF: 123.7 %
TLC PRE: 5.9 L (ref 3.78–5.76)
TLC REF: 4.77
TLCN2 LLN: 3.78
TLCN2 REF: 4.77
VA PRE: 4.64 L (ref 3.79–6)
VA SINGLE BREATH LLN: 3.79
VA SINGLE BREATH PRE REF: 94.9 %
VA SINGLE BREATH REF: 4.9
VC LLN: 2.64
VC PRE REF: 108.2 %
VC PRE: 3.64 L (ref 2.64–4.08)
VC REF: 3.36
VCMAXN2 LLN: 2.64
VCMAXN2 REF: 3.36
VTGRAWPRE: 2.54 L

## 2018-06-05 ENCOUNTER — PROCEDURE VISIT (OUTPATIENT)
Dept: PULMONOLOGY | Facility: CLINIC | Age: 48
End: 2018-06-05
Payer: MEDICARE

## 2018-06-05 DIAGNOSIS — R09.02 LAB FINDINGS OF HYPOXEMIA: ICD-10-CM

## 2018-06-05 DIAGNOSIS — R06.02 SOB (SHORTNESS OF BREATH): ICD-10-CM

## 2018-06-05 PROCEDURE — 94762 N-INVAS EAR/PLS OXIMTRY CONT: CPT | Mod: S$GLB,,, | Performed by: INTERNAL MEDICINE

## 2018-06-06 NOTE — PROCEDURES
[facility]  [address] * [place] [address]  Telephone: [phone]  Test date: 18 Start: 18 11:38:18 Jadyn Chance  Doctor: Dr Jessica End: 18 04:05:58 [id number]  Oximetry: Summary Report  Comments: Overnight study breathing room air.  Recording time: 16:27:40 Highest pulse: 99 Highest SpO2: 94%  Excluded sampling: 10:51:52 Lowest pulse: 56 Lowest SpO2: 85%  Total valid samplin:35:48 Mean pulse: 69 Mean SpO2: 91.3%  1 S.D.: 4.5 1 S.D.: 1.1  Time with SpO2<90: 0:12:48, 3.8%  Time with SpO2<80: 0:00:00, 0.0%  Time with SpO2<70: 0:00:00, 0.0%  Time with SpO2<60: 0:00:00, 0.0%  Time with SpO2<88: 0:00:32, 0.2%  Time with SpO2 =>90: 5:23:00, 96.2%  Time with SpO2=>80 & <90: 0:12:48, 3.8%  Time with SpO2=>70 & <80: 0:00:00, 0.0%  Time with SpO2=>60 & <70: 0:00:00, 0.0%  The longest continuous time with saturation <=88 was 00:00:20, which started at  18 11:38:46.  A desaturation event was defined as a decrease of saturation by 4 or more.  No events were excluded due to artifact.  There were 3 desaturation events over 3 minutes duration.  There were no desaturation events of less than 3 minutes duration.

## 2018-06-18 ENCOUNTER — OFFICE VISIT (OUTPATIENT)
Dept: INTERNAL MEDICINE | Facility: CLINIC | Age: 48
End: 2018-06-18
Payer: MEDICARE

## 2018-06-18 ENCOUNTER — PATIENT MESSAGE (OUTPATIENT)
Dept: PULMONOLOGY | Facility: CLINIC | Age: 48
End: 2018-06-18

## 2018-06-18 VITALS
TEMPERATURE: 98 F | DIASTOLIC BLOOD PRESSURE: 84 MMHG | SYSTOLIC BLOOD PRESSURE: 128 MMHG | HEIGHT: 64 IN | BODY MASS INDEX: 29.06 KG/M2 | WEIGHT: 170.19 LBS

## 2018-06-18 DIAGNOSIS — M48.061 DEGENERATIVE LUMBAR SPINAL STENOSIS: ICD-10-CM

## 2018-06-18 DIAGNOSIS — Z79.4 TYPE 2 DIABETES MELLITUS WITHOUT COMPLICATION, WITH LONG-TERM CURRENT USE OF INSULIN: Chronic | ICD-10-CM

## 2018-06-18 DIAGNOSIS — Z09 FOLLOW UP: Primary | ICD-10-CM

## 2018-06-18 DIAGNOSIS — E11.9 TYPE 2 DIABETES MELLITUS WITHOUT COMPLICATION, WITH LONG-TERM CURRENT USE OF INSULIN: Chronic | ICD-10-CM

## 2018-06-18 PROCEDURE — 99214 OFFICE O/P EST MOD 30 MIN: CPT | Mod: S$GLB,,, | Performed by: NURSE PRACTITIONER

## 2018-06-18 PROCEDURE — 3044F HG A1C LEVEL LT 7.0%: CPT | Mod: CPTII,S$GLB,, | Performed by: NURSE PRACTITIONER

## 2018-06-18 PROCEDURE — 3008F BODY MASS INDEX DOCD: CPT | Mod: CPTII,S$GLB,, | Performed by: NURSE PRACTITIONER

## 2018-06-18 PROCEDURE — 99999 PR PBB SHADOW E&M-EST. PATIENT-LVL III: CPT | Mod: PBBFAC,,, | Performed by: NURSE PRACTITIONER

## 2018-06-18 RX ORDER — LANCETS
EACH MISCELLANEOUS
Qty: 100 EACH | Refills: 11 | Status: SHIPPED | OUTPATIENT
Start: 2018-06-18 | End: 2020-02-10

## 2018-06-18 RX ORDER — PEN NEEDLE, DIABETIC 30 GX3/16"
1 NEEDLE, DISPOSABLE MISCELLANEOUS DAILY
Qty: 30 EACH | Refills: 5 | Status: SHIPPED | OUTPATIENT
Start: 2018-06-18 | End: 2018-11-20 | Stop reason: SDUPTHER

## 2018-06-18 RX ORDER — HUMAN INSULIN 100 [IU]/ML
INJECTION, SOLUTION SUBCUTANEOUS
COMMUNITY
Start: 2018-06-04 | End: 2020-09-23

## 2018-06-18 RX ORDER — INSULIN PUMP SYRINGE, 3 ML
EACH MISCELLANEOUS
Qty: 1 EACH | Refills: 0 | Status: SHIPPED | OUTPATIENT
Start: 2018-06-18 | End: 2018-09-24 | Stop reason: SDUPTHER

## 2018-06-18 NOTE — PROGRESS NOTES
Subjective:       Patient ID: Jadyn Chance is a 47 y.o. female.    Chief Complaint: No chief complaint on file.    Patient presents for follow up.  Had recent ER visits due to syncopal episodes and decrease oxygen saturations.  ER doctor recommend that she stop the Janumet and start Levemir at bedtime.  Has Regular insulin if needed.  Reports symptoms are much better.  CBG .  Following diabetic diet.  Doing exercises.  No more syncopal episodes.  Currently seeing [facility].  Back pain is improving.        Review of Systems   Constitutional: Negative for chills and fatigue.   Respiratory: Negative for cough and shortness of breath.    Musculoskeletal: Positive for back pain. Negative for gait problem and joint swelling.   Skin: Negative for color change and wound.   Neurological: Negative for syncope.   Psychiatric/Behavioral: Negative for agitation and confusion.       Objective:      Physical Exam   Constitutional: She is oriented to person, place, and time. Vital signs are normal. She appears well-developed and well-nourished.   HENT:   Head: Normocephalic and atraumatic.   Neck: Normal range of motion.   Cardiovascular: Normal rate and regular rhythm.    Pulmonary/Chest: Effort normal and breath sounds normal.   Musculoskeletal: Normal range of motion.   Neurological: She is alert and oriented to person, place, and time.   Skin: Skin is warm.   Psychiatric: She has a normal mood and affect. Her behavior is normal.       Assessment:       1. Follow up    2. Type 2 diabetes mellitus without complication, with long-term current use of insulin    3. Degenerative lumbar spinal stenosis        Plan:         Follow up    Type 2 diabetes mellitus without complication, with long-term current use of insulin  -     insulin detemir U-100 (LEVEMIR FLEXTOUCH) 100 unit/mL (3 mL) SubQ InPn pen; Inject 30 Units into the skin every evening.  Dispense: 1 Box; Refill: 5  -     pen needle,  "diabetic 32 gauge x 5/32" Ndle; 1 each by Misc.(Non-Drug; Combo Route) route once daily.  Dispense: 30 each; Refill: 5  -     blood-glucose meter kit; To check BG 3 times daily, to use with insurance preferred meter/Accu-check  Dispense: 1 each; Refill: 0  -     lancets Misc; To check BG 3 times daily, to use with insurance preferred meter/Accu-check  Dispense: 100 each; Refill: 11  -     blood sugar diagnostic Strp; To check BG 3 times daily, to use with insurance preferred meter/Accu-check  Dispense: 100 each; Refill: 11    Degenerative lumbar spinal stenosis  Comments:  Continue treatment plan from [facility]      Will continue Levemir as directed.  Continue treatment plan with [facility].  Continue diet and exercise.  Keep follow up with pulmonary.  Follow up sooner if needed.   "

## 2018-07-25 ENCOUNTER — LAB VISIT (OUTPATIENT)
Dept: LAB | Facility: HOSPITAL | Age: 48
End: 2018-07-25
Attending: INTERNAL MEDICINE
Payer: MEDICARE

## 2018-07-25 DIAGNOSIS — Z98.84 HISTORY OF ROUX-EN-Y GASTRIC BYPASS: ICD-10-CM

## 2018-07-25 DIAGNOSIS — E61.1 IRON DEFICIENCY: ICD-10-CM

## 2018-07-25 LAB
BASOPHILS # BLD AUTO: 0.02 K/UL
BASOPHILS NFR BLD: 0.3 %
CRP SERPL-MCNC: 2.6 MG/L
DIFFERENTIAL METHOD: ABNORMAL
EOSINOPHIL # BLD AUTO: 0.2 K/UL
EOSINOPHIL NFR BLD: 2.2 %
ERYTHROCYTE [DISTWIDTH] IN BLOOD BY AUTOMATED COUNT: 12.6 %
FERRITIN SERPL-MCNC: 285 NG/ML
HCT VFR BLD AUTO: 41 %
HGB BLD-MCNC: 14 G/DL
IRON SERPL-MCNC: 81 UG/DL
LYMPHOCYTES # BLD AUTO: 1.2 K/UL
LYMPHOCYTES NFR BLD: 17.9 %
MCH RBC QN AUTO: 32.6 PG
MCHC RBC AUTO-ENTMCNC: 34.1 G/DL
MCV RBC AUTO: 96 FL
MONOCYTES # BLD AUTO: 0.3 K/UL
MONOCYTES NFR BLD: 4.8 %
NEUTROPHILS # BLD AUTO: 5.1 K/UL
NEUTROPHILS NFR BLD: 74.8 %
PLATELET # BLD AUTO: 260 K/UL
PMV BLD AUTO: 11 FL
RBC # BLD AUTO: 4.29 M/UL
SATURATED IRON: 24 %
TOTAL IRON BINDING CAPACITY: 332 UG/DL
TRANSFERRIN SERPL-MCNC: 224 MG/DL
WBC # BLD AUTO: 6.86 K/UL

## 2018-07-25 PROCEDURE — 86140 C-REACTIVE PROTEIN: CPT

## 2018-07-25 PROCEDURE — 82728 ASSAY OF FERRITIN: CPT

## 2018-07-25 PROCEDURE — 36415 COLL VENOUS BLD VENIPUNCTURE: CPT | Mod: PO

## 2018-07-25 PROCEDURE — 85025 COMPLETE CBC W/AUTO DIFF WBC: CPT | Mod: PO

## 2018-07-25 PROCEDURE — 83540 ASSAY OF IRON: CPT

## 2018-07-30 DIAGNOSIS — E55.9 VITAMIN D DEFICIENCY: ICD-10-CM

## 2018-07-30 RX ORDER — ERGOCALCIFEROL 1.25 MG/1
CAPSULE ORAL
Qty: 4 CAPSULE | Refills: 3 | Status: SHIPPED | OUTPATIENT
Start: 2018-07-30 | End: 2020-02-10 | Stop reason: SDUPTHER

## 2018-08-01 ENCOUNTER — OFFICE VISIT (OUTPATIENT)
Dept: HEMATOLOGY/ONCOLOGY | Facility: CLINIC | Age: 48
End: 2018-08-01
Payer: MEDICARE

## 2018-08-01 VITALS
WEIGHT: 171.5 LBS | SYSTOLIC BLOOD PRESSURE: 100 MMHG | HEIGHT: 64 IN | TEMPERATURE: 98 F | OXYGEN SATURATION: 96 % | DIASTOLIC BLOOD PRESSURE: 70 MMHG | HEART RATE: 88 BPM | RESPIRATION RATE: 18 BRPM | BODY MASS INDEX: 29.28 KG/M2

## 2018-08-01 DIAGNOSIS — Z98.84 HISTORY OF ROUX-EN-Y GASTRIC BYPASS: ICD-10-CM

## 2018-08-01 DIAGNOSIS — E53.8 VITAMIN B12 DEFICIENCY: Primary | ICD-10-CM

## 2018-08-01 DIAGNOSIS — Z86.39 HISTORY OF IRON DEFICIENCY: ICD-10-CM

## 2018-08-01 PROCEDURE — 99999 PR PBB SHADOW E&M-EST. PATIENT-LVL IV: CPT | Mod: PBBFAC,,, | Performed by: INTERNAL MEDICINE

## 2018-08-01 PROCEDURE — 3008F BODY MASS INDEX DOCD: CPT | Mod: CPTII,S$GLB,, | Performed by: INTERNAL MEDICINE

## 2018-08-01 PROCEDURE — 99214 OFFICE O/P EST MOD 30 MIN: CPT | Mod: S$GLB,,, | Performed by: INTERNAL MEDICINE
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
## 2018-08-29 ENCOUNTER — TELEPHONE (OUTPATIENT)
Dept: INTERNAL MEDICINE | Facility: CLINIC | Age: 48
End: 2018-08-29

## 2018-08-29 DIAGNOSIS — Z79.4 TYPE 2 DIABETES MELLITUS WITHOUT COMPLICATION, WITH LONG-TERM CURRENT USE OF INSULIN: Primary | Chronic | ICD-10-CM

## 2018-08-29 DIAGNOSIS — E11.9 TYPE 2 DIABETES MELLITUS WITHOUT COMPLICATION, WITH LONG-TERM CURRENT USE OF INSULIN: Primary | Chronic | ICD-10-CM

## 2018-08-29 NOTE — TELEPHONE ENCOUNTER
Pt is in the office requesting an order to Podiatry for annual diabetic exam. Dr. Yates said you may place the order if you don't mind .

## 2018-08-29 NOTE — TELEPHONE ENCOUNTER
I contacted the pt and was able to schedule her Annual diabetic foot exam she verbalized understanding.

## 2018-08-30 ENCOUNTER — PATIENT MESSAGE (OUTPATIENT)
Dept: INTERNAL MEDICINE | Facility: CLINIC | Age: 48
End: 2018-08-30

## 2018-08-31 ENCOUNTER — PATIENT MESSAGE (OUTPATIENT)
Dept: INTERNAL MEDICINE | Facility: CLINIC | Age: 48
End: 2018-08-31

## 2018-08-31 RX ORDER — CLONAZEPAM 2 MG/1
TABLET ORAL
Refills: 0 | OUTPATIENT
Start: 2018-08-31

## 2018-08-31 RX ORDER — DOXEPIN HYDROCHLORIDE 150 MG/1
150 CAPSULE ORAL NIGHTLY
OUTPATIENT
Start: 2018-08-31

## 2018-08-31 RX ORDER — GABAPENTIN 300 MG/1
CAPSULE ORAL
OUTPATIENT
Start: 2018-08-31

## 2018-09-24 ENCOUNTER — OFFICE VISIT (OUTPATIENT)
Dept: INTERNAL MEDICINE | Facility: CLINIC | Age: 48
End: 2018-09-24
Payer: MEDICARE

## 2018-09-24 ENCOUNTER — LAB VISIT (OUTPATIENT)
Dept: LAB | Facility: HOSPITAL | Age: 48
End: 2018-09-24
Attending: NURSE PRACTITIONER
Payer: MEDICARE

## 2018-09-24 VITALS
HEIGHT: 64 IN | TEMPERATURE: 99 F | OXYGEN SATURATION: 97 % | WEIGHT: 164.56 LBS | SYSTOLIC BLOOD PRESSURE: 104 MMHG | BODY MASS INDEX: 28.09 KG/M2 | DIASTOLIC BLOOD PRESSURE: 64 MMHG | HEART RATE: 74 BPM

## 2018-09-24 DIAGNOSIS — E11.9 TYPE 2 DIABETES MELLITUS WITHOUT COMPLICATION, WITH LONG-TERM CURRENT USE OF INSULIN: Chronic | ICD-10-CM

## 2018-09-24 DIAGNOSIS — Z79.4 TYPE 2 DIABETES MELLITUS WITHOUT COMPLICATION, WITH LONG-TERM CURRENT USE OF INSULIN: Chronic | ICD-10-CM

## 2018-09-24 DIAGNOSIS — Z09 FOLLOW UP: Primary | ICD-10-CM

## 2018-09-24 DIAGNOSIS — F41.9 ANXIETY: ICD-10-CM

## 2018-09-24 PROCEDURE — 99999 PR PBB SHADOW E&M-EST. PATIENT-LVL V: CPT | Mod: PBBFAC,,, | Performed by: NURSE PRACTITIONER

## 2018-09-24 PROCEDURE — 3008F BODY MASS INDEX DOCD: CPT | Mod: CPTII,,, | Performed by: NURSE PRACTITIONER

## 2018-09-24 PROCEDURE — 99214 OFFICE O/P EST MOD 30 MIN: CPT | Mod: S$PBB,,, | Performed by: NURSE PRACTITIONER

## 2018-09-24 PROCEDURE — 36415 COLL VENOUS BLD VENIPUNCTURE: CPT | Mod: PO

## 2018-09-24 PROCEDURE — 3044F HG A1C LEVEL LT 7.0%: CPT | Mod: CPTII,,, | Performed by: NURSE PRACTITIONER

## 2018-09-24 PROCEDURE — 83036 HEMOGLOBIN GLYCOSYLATED A1C: CPT

## 2018-09-24 PROCEDURE — 99215 OFFICE O/P EST HI 40 MIN: CPT | Mod: PBBFAC,PO | Performed by: NURSE PRACTITIONER

## 2018-09-24 RX ORDER — CLONAZEPAM 2 MG/1
2 TABLET ORAL 2 TIMES DAILY PRN
Qty: 15 TABLET | Refills: 1 | Status: SHIPPED | OUTPATIENT
Start: 2018-09-24 | End: 2018-11-20 | Stop reason: SDUPTHER

## 2018-09-24 RX ORDER — DOXEPIN HYDROCHLORIDE 150 MG/1
150 CAPSULE ORAL NIGHTLY
Qty: 30 CAPSULE | Refills: 1 | Status: SHIPPED | OUTPATIENT
Start: 2018-09-24 | End: 2018-11-20 | Stop reason: SDUPTHER

## 2018-09-24 NOTE — PROGRESS NOTES
Subjective:       Patient ID: Jadyn Chance is a 48 y.o. female.    Chief Complaint: Insomnia and Anxiety    Patient presents for a follow up.   Due for HgA1C.  Has not been seeing psych in [place] due to high balance.   had a stroke about 6 weeks ago.  Has been out of Klonopin and Sinequan.  Has been very anxious.  No excessive crying spells.  Still seeing [facility] (Dr. Mcgregor) every month.        Diabetes   She has type 2 diabetes mellitus. MedicAlert identification noted. The initial diagnosis of diabetes was made 30 years ago. Hypoglycemia symptoms include confusion, dizziness, mood changes, nervousness/anxiousness and sweats. Pertinent negatives for hypoglycemia include no hunger, pallor, seizures or speech difficulty. Associated symptoms include blurred vision, foot paresthesias, polyuria and weakness. Pertinent negatives for diabetes include no chest pain, no fatigue, no foot ulcerations, no polyphagia and no weight loss. Hypoglycemia complications include required assistance. Pertinent negatives for hypoglycemia complications include no blackouts, no hospitalization and no required glucagon injection. Symptoms are worsening. Pertinent negatives for diabetic complications include no autonomic neuropathy, CVA, heart disease, nephropathy, peripheral neuropathy or retinopathy. Risk factors for coronary artery disease include stress. Current diabetic treatment includes insulin injections and oral agent (monotherapy). She is compliant with treatment most of the time. She is currently taking insulin at bedtime. Insulin injections are given by patient and significant other. Rotation sites for injection include the abdominal wall and arms. Her weight is stable. She is following a diabetic diet. Meal planning includes ADA exchanges. She has not had a previous visit with a dietitian. She participates in exercise intermittently. She monitors blood glucose at home 3-4 x per  day. She monitors urine at home <1 x per month. Blood glucose monitoring compliance is excellent. Her home blood glucose trend is fluctuating minimally. She does not see a podiatrist.Eye exam is current.     Review of Systems   Constitutional: Negative for chills, fatigue and weight loss.   Eyes: Positive for blurred vision.   Respiratory: Negative for cough and shortness of breath.    Cardiovascular: Negative for chest pain.   Endocrine: Positive for polyuria. Negative for polyphagia.   Skin: Negative for pallor.   Neurological: Positive for dizziness and weakness. Negative for seizures and speech difficulty.   Psychiatric/Behavioral: Positive for confusion and dysphoric mood. The patient is nervous/anxious.        Objective:      Physical Exam   Constitutional: She is oriented to person, place, and time. Vital signs are normal. She appears well-developed and well-nourished.   HENT:   Head: Normocephalic and atraumatic.   Neck: Normal range of motion.   Cardiovascular: Normal rate and regular rhythm.   Pulmonary/Chest: Effort normal and breath sounds normal.   Musculoskeletal: Normal range of motion.   Neurological: She is alert and oriented to person, place, and time.   Skin: Skin is warm.   Psychiatric: She has a normal mood and affect. Her behavior is normal.       Assessment:       1. Follow up    2. Type 2 diabetes mellitus without complication, with long-term current use of insulin    3. Anxiety        Plan:          checked.  Will refill medications.  Advised to see new psych provider for medication management.      Follow up    Type 2 diabetes mellitus without complication, with long-term current use of insulin  -     Hemoglobin A1c; Future; Expected date: 09/24/2018    Anxiety  Comments:  Schedule appointment with psychiatrist at [facility] for additional refills.    Orders:  -     Ambulatory referral to Psychiatry  -     doxepin (SINEQUAN) 150 MG Cap; Take 1 capsule (150 mg total) by mouth every evening.   Dispense: 30 capsule; Refill: 1  -     clonazePAM (KLONOPIN) 2 MG Tab; Take 1 tablet (2 mg total) by mouth 2 (two) times daily as needed.  Dispense: 15 tablet; Refill: 1      Information given to schedule psych appointment.  A1C today.  Follow up with PCP in 6 months.

## 2018-09-25 DIAGNOSIS — Z79.4 TYPE 2 DIABETES MELLITUS WITHOUT COMPLICATION, WITH LONG-TERM CURRENT USE OF INSULIN: Primary | Chronic | ICD-10-CM

## 2018-09-25 DIAGNOSIS — E11.9 TYPE 2 DIABETES MELLITUS WITHOUT COMPLICATION, WITH LONG-TERM CURRENT USE OF INSULIN: Primary | Chronic | ICD-10-CM

## 2018-09-25 LAB
ESTIMATED AVG GLUCOSE: 157 MG/DL
HBA1C MFR BLD HPLC: 7.1 %

## 2018-10-12 ENCOUNTER — TELEPHONE (OUTPATIENT)
Dept: PSYCHIATRY | Facility: CLINIC | Age: 48
End: 2018-10-12

## 2018-10-12 NOTE — TELEPHONE ENCOUNTER
----- Message from Belkis Worthington sent at 10/12/2018  9:19 AM CDT -----  Contact: [facility] request  Message     ----- Message from [facility], System Message sent at 10/10/2018  5:27 PM CDT -----    Appointment Request From: Jadyn Chance    With Provider: Jf gillette    Preferred Date Range: Any    Preferred Times: Any time    Reason for visit: Referral    Comments:  I'm being referred to your department for grief counseling and anxiety

## 2018-10-12 NOTE — TELEPHONE ENCOUNTER
Spoke with Pt and informed of New Patient Consult with [facility] on 12/10 @2p.  Advised pt she was on wait list  And appt time was also available to view on her portal

## 2018-10-25 ENCOUNTER — OFFICE VISIT (OUTPATIENT)
Dept: PSYCHIATRY | Facility: CLINIC | Age: 48
End: 2018-10-25
Payer: MEDICARE

## 2018-10-25 ENCOUNTER — TELEPHONE (OUTPATIENT)
Dept: INTERNAL MEDICINE | Facility: CLINIC | Age: 48
End: 2018-10-25

## 2018-10-25 DIAGNOSIS — F33.1 MDD (MAJOR DEPRESSIVE DISORDER), RECURRENT EPISODE, MODERATE: Primary | ICD-10-CM

## 2018-10-25 DIAGNOSIS — F41.0 PANIC ATTACKS: ICD-10-CM

## 2018-10-25 PROCEDURE — 90791 PSYCH DIAGNOSTIC EVALUATION: CPT | Mod: S$GLB,,, | Performed by: PSYCHOLOGIST
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
## 2018-10-29 DIAGNOSIS — E55.9 VITAMIN D DEFICIENCY: ICD-10-CM

## 2018-10-29 RX ORDER — ERGOCALCIFEROL 1.25 MG/1
CAPSULE ORAL
Qty: 4 CAPSULE | Refills: 0 | OUTPATIENT
Start: 2018-10-29

## 2018-11-12 ENCOUNTER — PATIENT MESSAGE (OUTPATIENT)
Dept: INTERNAL MEDICINE | Facility: CLINIC | Age: 48
End: 2018-11-12

## 2018-11-13 ENCOUNTER — PATIENT MESSAGE (OUTPATIENT)
Dept: INTERNAL MEDICINE | Facility: CLINIC | Age: 48
End: 2018-11-13

## 2018-11-14 DIAGNOSIS — F41.9 ANXIETY: ICD-10-CM

## 2018-11-14 RX ORDER — DOXEPIN HYDROCHLORIDE 150 MG/1
150 CAPSULE ORAL NIGHTLY
Qty: 30 CAPSULE | Refills: 1 | OUTPATIENT
Start: 2018-11-14

## 2018-11-14 RX ORDER — CLONAZEPAM 2 MG/1
2 TABLET ORAL 2 TIMES DAILY PRN
Qty: 15 TABLET | Refills: 1 | OUTPATIENT
Start: 2018-11-14

## 2018-11-14 NOTE — TELEPHONE ENCOUNTER
Pts last visit on 09/24/18 w/ Destiney Escobar NP, she will be here in [place] 11/20/18 so she would like to  from our pharmacy on  [address] . Thanks//kah

## 2018-11-20 ENCOUNTER — OFFICE VISIT (OUTPATIENT)
Dept: INTERNAL MEDICINE | Facility: CLINIC | Age: 48
End: 2018-11-20
Payer: MEDICARE

## 2018-11-20 ENCOUNTER — TELEPHONE (OUTPATIENT)
Dept: INTERNAL MEDICINE | Facility: CLINIC | Age: 48
End: 2018-11-20

## 2018-11-20 VITALS
OXYGEN SATURATION: 95 % | HEART RATE: 123 BPM | TEMPERATURE: 99 F | BODY MASS INDEX: 28 KG/M2 | HEIGHT: 64 IN | DIASTOLIC BLOOD PRESSURE: 62 MMHG | WEIGHT: 164 LBS | SYSTOLIC BLOOD PRESSURE: 128 MMHG

## 2018-11-20 DIAGNOSIS — Z29.9 PREVENTIVE MEASURE: ICD-10-CM

## 2018-11-20 DIAGNOSIS — E55.9 VITAMIN D DEFICIENCY: ICD-10-CM

## 2018-11-20 DIAGNOSIS — E11.9 TYPE 2 DIABETES MELLITUS WITHOUT COMPLICATION, WITH LONG-TERM CURRENT USE OF INSULIN: Chronic | ICD-10-CM

## 2018-11-20 DIAGNOSIS — F41.9 ANXIETY: ICD-10-CM

## 2018-11-20 DIAGNOSIS — E78.2 MIXED HYPERLIPIDEMIA: Chronic | ICD-10-CM

## 2018-11-20 DIAGNOSIS — G47.00 INSOMNIA, UNSPECIFIED TYPE: Primary | ICD-10-CM

## 2018-11-20 DIAGNOSIS — Z79.4 TYPE 2 DIABETES MELLITUS WITHOUT COMPLICATION, WITH LONG-TERM CURRENT USE OF INSULIN: Chronic | ICD-10-CM

## 2018-11-20 PROCEDURE — 3008F BODY MASS INDEX DOCD: CPT | Mod: CPTII,S$GLB,, | Performed by: PHYSICIAN ASSISTANT

## 2018-11-20 PROCEDURE — 3045F PR MOST RECENT HEMOGLOBIN A1C LEVEL 7.0-9.0%: CPT | Mod: CPTII,S$GLB,, | Performed by: PHYSICIAN ASSISTANT

## 2018-11-20 PROCEDURE — 99999 PR PBB SHADOW E&M-EST. PATIENT-LVL V: CPT | Mod: PBBFAC,,, | Performed by: PHYSICIAN ASSISTANT

## 2018-11-20 PROCEDURE — 99213 OFFICE O/P EST LOW 20 MIN: CPT | Mod: S$GLB,,, | Performed by: PHYSICIAN ASSISTANT

## 2018-11-20 RX ORDER — GABAPENTIN 300 MG/1
300 CAPSULE ORAL DAILY
Qty: 30 CAPSULE | Refills: 1 | Status: SHIPPED | OUTPATIENT
Start: 2018-11-20 | End: 2019-01-02 | Stop reason: SDUPTHER

## 2018-11-20 RX ORDER — DOXEPIN HYDROCHLORIDE 150 MG/1
150 CAPSULE ORAL NIGHTLY
Qty: 30 CAPSULE | Refills: 1 | Status: SHIPPED | OUTPATIENT
Start: 2018-11-20 | End: 2019-01-02 | Stop reason: SDUPTHER

## 2018-11-20 RX ORDER — PEN NEEDLE, DIABETIC 30 GX3/16"
1 NEEDLE, DISPOSABLE MISCELLANEOUS DAILY
Qty: 30 EACH | Refills: 5 | Status: SHIPPED | OUTPATIENT
Start: 2018-11-20 | End: 2019-07-05 | Stop reason: SDUPTHER

## 2018-11-20 RX ORDER — CLONAZEPAM 2 MG/1
2 TABLET ORAL DAILY
Qty: 40 TABLET | Refills: 0 | Status: SHIPPED | OUTPATIENT
Start: 2018-11-20 | End: 2019-01-07

## 2018-11-20 NOTE — PROGRESS NOTES
Subjective:       Patient ID: Jadyn Chance is a 48 y.o. female.    Chief Complaint: Medication Refill    48 year old female presents to clinic for refill of doxepin, gabapentin, and Klonopin. I have not seen pt in over 3 years and she has not seen her past PCP Dr. Yates since 6/2015. She is scheduled for f/u with PCP in 2 months. Gabapentin QD controls diabetic neuropathy sxs. She reports being on these medications for at least 2 years and does well with them. She has lost several family members and medications control her anxiety and insomnia well. She is scheduled for counseling as well as to see psyc Dr. Carlos for medication management but not until Jan 2019 due to availability. She requests refill of Rx until she can see pysc. She lives in [place] and is planning to possibly move to  in the next couple of months. She reports she was having Rx refilled by [place] provider but now she is at [facility]. She reports the only outside doctor is pain management. She sees outside pain management Dr. Mcgregor at [facility] and will be seeing him today for a f/u visit. She reports glucose at home has been 70s-90s AC breakfast. Pt is interested in DM clinic. Also due for GYN. Pt reports no CP, SOB, SI/HI, or other medical complaints.    PCP: Dr. Yates    Patient Active Problem List:     Anxiety     Type 2 diabetes mellitus without complication, with long-term current use of insulin     Vitamin B12 deficiency     History of Kayleen-en-Y gastric bypass     Orthostatic hypotension     History of iron deficiency     Mixed hyperlipidemia     Hypermenorrhea     Dysmenorrhea     Pelvic pain in female     Fibroids     Vitamin D deficiency     Insomnia     Degenerative lumbar spinal stenosis     Thoracic or lumbosacral neuritis or radiculitis, unspecified     Diarrhea     Dehydration     Acute renal failure (ARF)     Abdominal pain     Marginal ulcer     Jejunal ulcer      "Pseudoseizure     Chronic right-sided low back pain     Hyperglycemia      Review of Systems   Constitutional: Negative for activity change, chills, fever and unexpected weight change.   HENT: Negative for congestion, hearing loss, rhinorrhea, sore throat and trouble swallowing.    Eyes: Negative for discharge and visual disturbance.   Respiratory: Negative for cough, chest tightness, shortness of breath and wheezing.    Cardiovascular: Negative for chest pain, palpitations and leg swelling.   Gastrointestinal: Negative for abdominal pain, blood in stool, constipation, diarrhea, nausea and vomiting.   Endocrine: Negative for polydipsia and polyuria.   Genitourinary: Negative for difficulty urinating, dysuria, hematuria and menstrual problem.   Musculoskeletal: Negative for joint swelling and neck pain.   Skin: Negative for rash.   Neurological: Negative for dizziness, weakness, numbness and headaches.   Psychiatric/Behavioral: Negative for confusion and suicidal ideas.       Objective:       Vitals:    11/20/18 1019   BP: 128/62   BP Location: Right arm   Patient Position: Sitting   BP Method: Large (Manual)   Pulse: (!) 123   Temp: 99 °F (37.2 °C)   TempSrc: Tympanic   SpO2: 95%   Weight: 74.4 kg (164 lb 0.4 oz)   Height: 5' 4" (1.626 m)     Physical Exam   Constitutional: She is oriented to person, place, and time. She appears well-developed and well-nourished. No distress.   HENT:   Head: Normocephalic and atraumatic.   Mouth/Throat: Oropharynx is clear and moist.   Eyes: EOM are normal. No scleral icterus.   Neck: Neck supple.   Cardiovascular: Normal rate and regular rhythm.   Pulmonary/Chest: Effort normal and breath sounds normal. No respiratory distress. She has no decreased breath sounds. She has no wheezes. She has no rhonchi. She has no rales.   Musculoskeletal: Normal range of motion.   Neurological: She is alert and oriented to person, place, and time. No cranial nerve deficit.   Skin: Skin is warm and " dry. No rash noted.   Psychiatric: She has a normal mood and affect. Her speech is normal and behavior is normal. Thought content normal.       Component      Latest Ref Rng & Units 9/24/2018 7/25/2018 4/25/2018 3/21/2018   WBC      3.90 - 12.70 K/uL  6.86     RBC      4.00 - 5.40 M/uL  4.29     Hemoglobin      12.0 - 16.0 g/dL  14.0     Hematocrit      37.0 - 48.5 %  41.0     MCV      82 - 98 fL  96     MCH      27.0 - 31.0 pg  32.6 (H)     MCHC      32.0 - 36.0 g/dL  34.1     RDW      11.5 - 14.5 %  12.6     Platelets      150 - 350 K/uL  260     MPV      9.2 - 12.9 fL  11.0     Gran # (ANC)      1.8 - 7.7 K/uL  5.1     Lymph #      1.0 - 4.8 K/uL  1.2     Mono #      0.3 - 1.0 K/uL  0.3     Eos #      0.0 - 0.5 K/uL  0.2     Baso #      0.00 - 0.20 K/uL  0.02     Gran%      38.0 - 73.0 %  74.8 (H)     Lymph%      18.0 - 48.0 %  17.9 (L)     Mono%      4.0 - 15.0 %  4.8     Eosinophil%      0.0 - 8.0 %  2.2     Basophil%      0.0 - 1.9 %  0.3     Differential Method        Automated     Sodium      136 - 145 mmol/L   137    Potassium      3.5 - 5.1 mmol/L   4.5    Chloride      95 - 110 mmol/L   102    CO2      23 - 29 mmol/L   20 (L)    Glucose      70 - 110 mg/dL   413 (H)    BUN, Bld      6 - 20 mg/dL   14    Creatinine      0.5 - 1.4 mg/dL   0.9    Calcium      8.7 - 10.5 mg/dL   9.6    Total Protein      6.0 - 8.4 g/dL   7.9    Albumin      3.5 - 5.2 g/dL   4.1    Total Bilirubin      0.1 - 1.0 mg/dL   1.4 (H)    Alkaline Phosphatase      55 - 135 U/L   111    AST      10 - 40 U/L   25    ALT      10 - 44 U/L   76 (H)    Anion Gap      8 - 16 mmol/L   15    eGFR if African American      >60 mL/min/1.73 m:2   >60    eGFR if non African American      >60 mL/min/1.73 m:2   >60    Cholesterol      120 - 199 mg/dL    148   Triglycerides      30 - 150 mg/dL    112   HDL      40 - 75 mg/dL    49   LDL Cholesterol      63.0 - 159.0 mg/dL    76.6   HDL/Chol Ratio      20.0 - 50.0 %    33.1   Total Cholesterol/HDL  Ratio      2.0 - 5.0    3.0   Non-HDL Cholesterol      mg/dL    99   Hemoglobin A1C      4.0 - 5.6 % 7.1 (H)      Estimated Avg Glucose      68 - 131 mg/dL 157 (H)      TSH      0.400 - 4.000 uIU/mL    0.927   Vit D, 25-Hydroxy      30 - 96 ng/mL    16 (L)     Assessment:       1. Insomnia, unspecified type    2. Anxiety    3. Preventive measure    4. Type 2 diabetes mellitus without complication, with long-term current use of insulin    5. Mixed hyperlipidemia    6. Vitamin D deficiency        Plan:         Jadyn was seen today for medication refill.    Diagnoses and all orders for this visit:    Insomnia, Anxiety  -     clonazePAM (KLONOPIN) 2 MG Tab; Take 1 tablet (2 mg total) by mouth once daily.  -     doxepin (SINEQUAN) 150 MG Cap; Take 1 capsule (150 mg total) by mouth every evening.  Rx refilled today per pt request. Pt understands to f/u with psychiatrist as scheduled 1/2019 or sooner for further management and refills.    Preventive measure  -     Ambulatory referral to Gynecology    Type 2 diabetes mellitus without complication, with long-term current use of insulin  -     Ambulatory consult to Diabetic Education    Mixed hyperlipidemia    Vitamin D deficiency    Other orders  -     gabapentin (NEURONTIN) 300 MG capsule; Take 1 capsule (300 mg total) by mouth once daily.    Follow-up with your PCP as scheduled in 2 months for health management.

## 2018-12-19 NOTE — PROGRESS NOTES
"Subjective:      Patient ID: Jadyn Chance is a 48 y.o. female.    Chief Complaint: Establish Care      HPI  Patient is here to reestablish care, and for preventive exam.  Gets iron infusions, due to hx gastric bypass.  Feeling poor energy.  AC breakfast [id number].  Walking 1mile per day.  No f/c/sw/cough.  No cp/sob/palp.  Getting ENZO and rhizo for LBP, Dr. Mcgregor.  Sleeps well with doxepin plus klonopin.  Was on cymbalta until this was stopped a few months ago.    BMs normal.  Urine normal.  Taking B12 but missed this past month.  Taking vit D weekly.    HM:  Ref fluvax, 1/19 today [id number], 8/14 [id number], 12/10 TDaP, 4/18 [facility], Pain Dr. Mcgregor.    Review of Systems   Constitutional: Negative for appetite change, chills, diaphoresis and fever.   HENT: Negative for congestion, ear pain, rhinorrhea, sinus pressure and sore throat.    Respiratory: Negative for cough, chest tightness and shortness of breath.    Cardiovascular: Negative for chest pain and palpitations.   Gastrointestinal: Negative for blood in stool, constipation, diarrhea, nausea and vomiting.   Genitourinary: Negative for dysuria, frequency, hematuria, menstrual problem, urgency and vaginal discharge.   Musculoskeletal: Negative for arthralgias.   Skin: Negative for rash.   Neurological: Negative for dizziness and headaches.   Psychiatric/Behavioral: Negative for sleep disturbance. The patient is not nervous/anxious.          Objective:   BP 96/64 (BP Location: Right arm, Patient Position: Sitting, BP Method: Medium (Manual))   Temp 98.2 °F (36.8 °C) (Tympanic)   Ht 5' 4" (1.626 m)   Wt 74.8 kg (164 lb 14.5 oz)   LMP 12/04/2014   BMI 28.31 kg/m²     Physical Exam   Constitutional: She is oriented to person, place, and time. She appears well-developed and well-nourished.   HENT:   Right Ear: External ear normal. Tympanic membrane is not injected.   Left Ear: External ear normal. Tympanic membrane is not injected.   Mouth/Throat: " Oropharynx is clear and moist.   Eyes: Conjunctivae are normal.   Neck: Normal range of motion. Neck supple. No thyromegaly present.   Cardiovascular: Normal rate, regular rhythm and intact distal pulses. Exam reveals no gallop and no friction rub.   No murmur heard.  Pulses:       Dorsalis pedis pulses are 2+ on the right side, and 2+ on the left side.        Posterior tibial pulses are 2+ on the right side, and 2+ on the left side.   Pulmonary/Chest: Effort normal and breath sounds normal. She has no wheezes. She has no rales.   Abdominal: Soft. Bowel sounds are normal. She exhibits no mass. There is no tenderness.   Musculoskeletal: She exhibits no edema.   Feet:   Right Foot:   Protective Sensation: 10 sites tested. 10 sites sensed.   Skin Integrity: Negative for ulcer, blister, skin breakdown, erythema, warmth, callus or dry skin.   Left Foot:   Protective Sensation: 10 sites tested. 10 sites sensed.   Skin Integrity: Negative for ulcer, blister, skin breakdown, erythema, warmth, callus or dry skin.   Lymphadenopathy:     She has no cervical adenopathy.   Neurological: She is alert and oriented to person, place, and time.   Skin: Skin is warm. No rash noted.   Psychiatric: She has a normal mood and affect.           Assessment:       1. Encounter for preventive health examination    2. Vitamin D deficiency    3. Vitamin B12 deficiency    4. Type 2 diabetes mellitus without complication, with long-term current use of insulin    5. Pseudoseizure    6. Mixed hyperlipidemia    7. Anxiety    8. Elevated liver enzymes    9. Iron deficiency anemia, unspecified iron deficiency anemia type    10. Anxiety    11. Insomnia, unspecified type    12. Chronic pain syndrome          Plan:     Encounter for preventive health examination- [id number] now.  Lab with [facility].  -     Pneumococcal Conjugate Vaccine (13 Valent) (IM)  -     CBC auto differential; Future; Expected date: 01/02/2019  -     Comprehensive metabolic panel; Future;  Expected date: 01/02/2019  -     Lipid panel; Future; Expected date: 01/02/2019  -     TSH; Future; Expected date: 01/02/2019  -     Vitamin D; Future  -     Hemoglobin A1c; Future; Expected date: 01/02/2019  -     Microalbumin/creatinine urine ratio; Future; Expected date: 01/02/2019    Vitamin D deficiency  -     Vitamin D; Future    Vitamin B12 deficiency  -     Vitamin B12; Future; Expected date: 01/02/2019    Type 2 diabetes mellitus without complication, with long-term current use of insulin- check lab now.  -     Hemoglobin A1c; Future; Expected date: 01/02/2019  -     Microalbumin/creatinine urine ratio; Future; Expected date: 01/02/2019    Pseudoseizure    Mixed hyperlipidemia    Elevated liver enzymes- recheck now.    Chronic pain on oxycodone.  Incr christiano to 300mg tid.  -     gabapentin (NEURONTIN) 300 MG capsule; Take 1 capsule (300 mg total) by mouth 3 (three) times daily.  Dispense: 270 capsule; Refill: 1      Iron deficiency anemia, unspecified iron deficiency anemia type  -     Iron and TIBC; Future; Expected date: 01/02/2019  -     Ferritin; Future; Expected date: 01/02/2019    Anxiety and chronic pain- restart SNRI.  -     DULoxetine (CYMBALTA) 30 MG capsule; Take 1 capsule (30 mg total) by mouth once daily.  Dispense: 90 capsule; Refill: 3  -     doxepin (SINEQUAN) 150 MG Cap; Take 1 capsule (150 mg total) by mouth every evening.  Dispense: 90 capsule; Refill: 1    Insomnia, unspecified type  -     doxepin (SINEQUAN) 150 MG Cap; Take 1 capsule (150 mg total) by mouth every evening.  Dispense: 90 capsule; Refill: 1  -     clonazePAM (KLONOPIN) 1 MG tablet; Take 1-2 tablets (1-2 mg total) by mouth every evening.  Dispense: 60 tablet; Refill: 4

## 2019-01-02 ENCOUNTER — OFFICE VISIT (OUTPATIENT)
Dept: INTERNAL MEDICINE | Facility: CLINIC | Age: 49
End: 2019-01-02
Payer: MEDICARE

## 2019-01-02 ENCOUNTER — LAB VISIT (OUTPATIENT)
Dept: LAB | Facility: HOSPITAL | Age: 49
End: 2019-01-02
Attending: INTERNAL MEDICINE
Payer: MEDICARE

## 2019-01-02 VITALS
TEMPERATURE: 98 F | DIASTOLIC BLOOD PRESSURE: 64 MMHG | SYSTOLIC BLOOD PRESSURE: 96 MMHG | WEIGHT: 164.88 LBS | HEIGHT: 64 IN | BODY MASS INDEX: 28.15 KG/M2

## 2019-01-02 DIAGNOSIS — F41.9 ANXIETY: ICD-10-CM

## 2019-01-02 DIAGNOSIS — E11.9 TYPE 2 DIABETES MELLITUS WITHOUT COMPLICATION, WITH LONG-TERM CURRENT USE OF INSULIN: Chronic | ICD-10-CM

## 2019-01-02 DIAGNOSIS — E55.9 VITAMIN D DEFICIENCY: ICD-10-CM

## 2019-01-02 DIAGNOSIS — Z79.4 TYPE 2 DIABETES MELLITUS WITHOUT COMPLICATION, WITH LONG-TERM CURRENT USE OF INSULIN: Chronic | ICD-10-CM

## 2019-01-02 DIAGNOSIS — G89.4 CHRONIC PAIN SYNDROME: ICD-10-CM

## 2019-01-02 DIAGNOSIS — E78.2 MIXED HYPERLIPIDEMIA: Chronic | ICD-10-CM

## 2019-01-02 DIAGNOSIS — G47.00 INSOMNIA, UNSPECIFIED TYPE: ICD-10-CM

## 2019-01-02 DIAGNOSIS — F44.5 PSEUDOSEIZURE: ICD-10-CM

## 2019-01-02 DIAGNOSIS — D50.9 IRON DEFICIENCY ANEMIA, UNSPECIFIED IRON DEFICIENCY ANEMIA TYPE: ICD-10-CM

## 2019-01-02 DIAGNOSIS — R74.8 ELEVATED LIVER ENZYMES: ICD-10-CM

## 2019-01-02 DIAGNOSIS — Z00.00 ENCOUNTER FOR PREVENTIVE HEALTH EXAMINATION: Primary | ICD-10-CM

## 2019-01-02 DIAGNOSIS — E53.8 VITAMIN B12 DEFICIENCY: ICD-10-CM

## 2019-01-02 DIAGNOSIS — Z00.00 ENCOUNTER FOR PREVENTIVE HEALTH EXAMINATION: ICD-10-CM

## 2019-01-02 LAB
25(OH)D3+25(OH)D2 SERPL-MCNC: 12 NG/ML
ALBUMIN SERPL BCP-MCNC: 3.6 G/DL
ALP SERPL-CCNC: 134 U/L
ALT SERPL W/O P-5'-P-CCNC: 52 U/L
ANION GAP SERPL CALC-SCNC: 8 MMOL/L
AST SERPL-CCNC: 42 U/L
BASOPHILS # BLD AUTO: 0.05 K/UL
BASOPHILS NFR BLD: 0.7 %
BILIRUB SERPL-MCNC: 1.1 MG/DL
BUN SERPL-MCNC: 16 MG/DL
CALCIUM SERPL-MCNC: 9.1 MG/DL
CHLORIDE SERPL-SCNC: 106 MMOL/L
CHOLEST SERPL-MCNC: 153 MG/DL
CHOLEST/HDLC SERPL: 3.5 {RATIO}
CO2 SERPL-SCNC: 26 MMOL/L
CREAT SERPL-MCNC: 0.8 MG/DL
DIFFERENTIAL METHOD: ABNORMAL
EOSINOPHIL # BLD AUTO: 0.2 K/UL
EOSINOPHIL NFR BLD: 2.7 %
ERYTHROCYTE [DISTWIDTH] IN BLOOD BY AUTOMATED COUNT: 12.2 %
EST. GFR  (AFRICAN AMERICAN): >60 ML/MIN/1.73 M^2
EST. GFR  (NON AFRICAN AMERICAN): >60 ML/MIN/1.73 M^2
ESTIMATED AVG GLUCOSE: 146 MG/DL
FERRITIN SERPL-MCNC: 316 NG/ML
GLUCOSE SERPL-MCNC: 173 MG/DL
HBA1C MFR BLD HPLC: 6.7 %
HCT VFR BLD AUTO: 43.8 %
HDLC SERPL-MCNC: 44 MG/DL
HDLC SERPL: 28.8 %
HGB BLD-MCNC: 14.4 G/DL
IMM GRANULOCYTES # BLD AUTO: 0.01 K/UL
IMM GRANULOCYTES NFR BLD AUTO: 0.1 %
IRON SERPL-MCNC: 89 UG/DL
LDLC SERPL CALC-MCNC: 80.4 MG/DL
LYMPHOCYTES # BLD AUTO: 2.4 K/UL
LYMPHOCYTES NFR BLD: 34.1 %
MCH RBC QN AUTO: 32.4 PG
MCHC RBC AUTO-ENTMCNC: 32.9 G/DL
MCV RBC AUTO: 98 FL
MONOCYTES # BLD AUTO: 0.4 K/UL
MONOCYTES NFR BLD: 6 %
NEUTROPHILS # BLD AUTO: 3.9 K/UL
NEUTROPHILS NFR BLD: 56.4 %
NONHDLC SERPL-MCNC: 109 MG/DL
NRBC BLD-RTO: 0 /100 WBC
PLATELET # BLD AUTO: 272 K/UL
PMV BLD AUTO: 11.7 FL
POTASSIUM SERPL-SCNC: 4.2 MMOL/L
PROT SERPL-MCNC: 7.3 G/DL
RBC # BLD AUTO: 4.45 M/UL
SATURATED IRON: 27 %
SODIUM SERPL-SCNC: 140 MMOL/L
TOTAL IRON BINDING CAPACITY: 326 UG/DL
TRANSFERRIN SERPL-MCNC: 220 MG/DL
TRIGL SERPL-MCNC: 143 MG/DL
TSH SERPL DL<=0.005 MIU/L-ACNC: 2.12 UIU/ML
VIT B12 SERPL-MCNC: 475 PG/ML
WBC # BLD AUTO: 6.98 K/UL

## 2019-01-02 PROCEDURE — 99999 PR PBB SHADOW E&M-EST. PATIENT-LVL III: CPT | Mod: PBBFAC,,, | Performed by: INTERNAL MEDICINE

## 2019-01-02 PROCEDURE — 90670 PCV13 VACCINE IM: CPT | Mod: S$GLB,,, | Performed by: INTERNAL MEDICINE

## 2019-01-02 PROCEDURE — 3045F PR MOST RECENT HEMOGLOBIN A1C LEVEL 7.0-9.0%: CPT | Mod: CPTII,S$GLB,, | Performed by: INTERNAL MEDICINE

## 2019-01-02 PROCEDURE — 90670 PNEUMOCOCCAL CONJUGATE VACCINE 13-VALENT LESS THAN 5YO & GREATER THAN: ICD-10-PCS | Mod: S$GLB,,, | Performed by: INTERNAL MEDICINE

## 2019-01-02 PROCEDURE — 85025 COMPLETE CBC W/AUTO DIFF WBC: CPT

## 2019-01-02 PROCEDURE — 82306 VITAMIN D 25 HYDROXY: CPT

## 2019-01-02 PROCEDURE — G0009 PNEUMOCOCCAL CONJUGATE VACCINE 13-VALENT LESS THAN 5YO & GREATER THAN: ICD-10-PCS | Mod: S$GLB,,, | Performed by: INTERNAL MEDICINE

## 2019-01-02 PROCEDURE — 80061 LIPID PANEL: CPT

## 2019-01-02 PROCEDURE — 99396 PR PREVENTIVE VISIT,EST,40-64: ICD-10-PCS | Mod: 25,S$GLB,, | Performed by: INTERNAL MEDICINE

## 2019-01-02 PROCEDURE — 36415 COLL VENOUS BLD VENIPUNCTURE: CPT | Mod: PO

## 2019-01-02 PROCEDURE — 82607 VITAMIN B-12: CPT

## 2019-01-02 PROCEDURE — 84443 ASSAY THYROID STIM HORMONE: CPT

## 2019-01-02 PROCEDURE — G0009 ADMIN PNEUMOCOCCAL VACCINE: HCPCS | Mod: S$GLB,,, | Performed by: INTERNAL MEDICINE

## 2019-01-02 PROCEDURE — 83036 HEMOGLOBIN GLYCOSYLATED A1C: CPT

## 2019-01-02 PROCEDURE — 99396 PREV VISIT EST AGE 40-64: CPT | Mod: 25,S$GLB,, | Performed by: INTERNAL MEDICINE

## 2019-01-02 PROCEDURE — 3045F PR MOST RECENT HEMOGLOBIN A1C LEVEL 7.0-9.0%: ICD-10-PCS | Mod: CPTII,S$GLB,, | Performed by: INTERNAL MEDICINE

## 2019-01-02 PROCEDURE — 99999 PR PBB SHADOW E&M-EST. PATIENT-LVL III: ICD-10-PCS | Mod: PBBFAC,,, | Performed by: INTERNAL MEDICINE

## 2019-01-02 PROCEDURE — 83540 ASSAY OF IRON: CPT

## 2019-01-02 PROCEDURE — 82728 ASSAY OF FERRITIN: CPT

## 2019-01-02 PROCEDURE — 80053 COMPREHEN METABOLIC PANEL: CPT

## 2019-01-02 RX ORDER — DOXEPIN HYDROCHLORIDE 150 MG/1
150 CAPSULE ORAL NIGHTLY
Qty: 90 CAPSULE | Refills: 1 | Status: SHIPPED | OUTPATIENT
Start: 2019-01-02 | End: 2019-01-07

## 2019-01-02 RX ORDER — CLONAZEPAM 1 MG/1
1-2 TABLET ORAL NIGHTLY
Qty: 60 TABLET | Refills: 4 | Status: SHIPPED | OUTPATIENT
Start: 2019-01-02 | End: 2019-04-18 | Stop reason: SDUPTHER

## 2019-01-02 RX ORDER — GABAPENTIN 300 MG/1
300 CAPSULE ORAL 3 TIMES DAILY
Qty: 270 CAPSULE | Refills: 1 | Status: SHIPPED | OUTPATIENT
Start: 2019-01-02 | End: 2019-07-02

## 2019-01-02 RX ORDER — DULOXETIN HYDROCHLORIDE 30 MG/1
30 CAPSULE, DELAYED RELEASE ORAL DAILY
Qty: 90 CAPSULE | Refills: 3 | Status: SHIPPED | OUTPATIENT
Start: 2019-01-02 | End: 2019-01-07

## 2019-01-07 ENCOUNTER — OFFICE VISIT (OUTPATIENT)
Dept: PSYCHIATRY | Facility: CLINIC | Age: 49
End: 2019-01-07
Payer: MEDICARE

## 2019-01-07 DIAGNOSIS — F51.05 INSOMNIA DUE TO OTHER MENTAL DISORDER: ICD-10-CM

## 2019-01-07 DIAGNOSIS — F33.42 RECURRENT MAJOR DEPRESSIVE DISORDER, IN FULL REMISSION: Primary | ICD-10-CM

## 2019-01-07 DIAGNOSIS — F41.1 GAD (GENERALIZED ANXIETY DISORDER): ICD-10-CM

## 2019-01-07 DIAGNOSIS — F99 INSOMNIA DUE TO OTHER MENTAL DISORDER: ICD-10-CM

## 2019-01-07 PROCEDURE — 90792 PSYCH DIAG EVAL W/MED SRVCS: CPT | Mod: S$GLB,,, | Performed by: PSYCHIATRY & NEUROLOGY

## 2019-01-07 PROCEDURE — 90792 PR PSYCHIATRIC DIAGNOSTIC EVALUATION W/MEDICAL SERVICES: ICD-10-PCS | Mod: S$GLB,,, | Performed by: PSYCHIATRY & NEUROLOGY

## 2019-01-07 RX ORDER — DULOXETIN HYDROCHLORIDE 30 MG/1
30 CAPSULE, DELAYED RELEASE ORAL 2 TIMES DAILY
Qty: 60 CAPSULE | Refills: 11 | Status: SHIPPED | OUTPATIENT
Start: 2019-01-07 | End: 2020-02-11 | Stop reason: SDUPTHER

## 2019-01-07 RX ORDER — DOXEPIN HYDROCHLORIDE 100 MG/1
100 CAPSULE ORAL NIGHTLY
Qty: 30 CAPSULE | Refills: 11 | Status: SHIPPED | OUTPATIENT
Start: 2019-01-07 | End: 2019-04-18
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
## 2019-01-07 NOTE — PATIENT INSTRUCTIONS
Understanding Anxiety Disorders  Almost everyone gets nervous now and then. Its normal to have knots in your stomach before a test, or for your heart to race on a first date. But an anxiety disorder is much more than a case of nerves. In fact, its symptoms may be overwhelming. But treatment can relieve many of these symptoms. Talking to your healthcare provider is the first step.    What are anxiety disorders?  An anxiety disorder causes intense feelings of panic and fear. These feelings may arise for no apparent reason. And they tend to recur again and again. They may prevent you from coping with life and cause you great distress. As a result, you may avoid anything that triggers your fear. In extreme cases, you may never leave the house. Anxiety disorders may cause other symptoms, such as:  · Obsessive thoughts you cant control  · Constant nightmares or painful thoughts of the past  · Nausea, sweating, and muscle tension  · Trouble sleeping or concentrating  What causes anxiety disorders?  Anxiety disorders tend to run in families. For some people, childhood abuse or neglect may play a role. For others, stressful life events or trauma may trigger anxiety disorders. Anxiety can trigger low self-esteem and poor coping skills.  Common anxiety disorders  · Panic disorder. This causes an intense fear of being in danger.  · Phobias. These are extreme fears of certain objects, places, or events.  · Obsessive-compulsive disorder. This causes you to have unwanted thoughts and urges. You also may perform certain actions over and over.  · Posttraumatic stress disorder. This occurs in people who have survived a terrible ordeal. It can cause nightmares and flashbacks about the event.  · Generalized anxiety disorder. This causes constant worry that can greatly disrupt your life.   Getting better  You may believe that nothing can help you. Or, you might fear what others may think. But most anxiety symptoms can be eased.  Having an anxiety disorder is nothing to be ashamed of. Most people do best with treatment that combines medicine and therapy. These arent cures. But they can help you live a healthier life.  Date Last Reviewed: 2/1/2017 © 2000-2017 [employer]. [address] [place] [address], [place] [address]. All rights reserved. This information is not intended as a substitute for professional medical care. Always follow your healthcare professional's instructions.          Depression Affects Your Mind and Body  Everyone feels sad or blue from time to time for a few days or weeks. Depression is when these feelings don't go away and they interfere with daily life.  Depression is a real illness that can develop at any age. It is one of the most common mental health problems in the [place] Depression makes you feel sad, helpless, and hopeless. It gets in the way of your life and relationships. It inhibits your ability to think and act. But, with help, you can feel better again.     When I was depressed, I felt awful. I was so tired all the time I could hardly think, but at night I couldnt fall asleep. My head hurt. My stomach hurt. I didnt know what was wrong with me.   Depression affects your whole body  Brain chemicals affect your body as well as your mood. So depression may do more than just make you feel low. You may also feel bad physically. Depression can:  · Cause trouble with mental tasks such as remembering, concentrating, or making decisions  · Make you feel nervous and jumpy  · Cause trouble sleeping. Or you may sleep too much  · Change your appetite  · Cause headaches, stomachaches, or other aches and pains  · Drain your body of energy  Depression and other illness  It is common for people who have chronic health problems to also have depression. It can often be hard to tell which one caused the other. A person might become depressed after finding out they have a health problem. But some studies suggest being  depressed may make certain health problems more likely. And some depressed people stop taking care of themselves. This may make them more likely to get sick.  Date Last Reviewed: 1/1/2017 © 2000-2017 [employer]. [address] [place] [address], [place] [address]. All rights reserved. This information is not intended as a substitute for professional medical care. Always follow your healthcare professional's instructions.          Doxepin capsules  What is this medicine?  DOXEPIN (DOX e pin) is used to treat depression and anxiety.  How should I use this medicine?  Take this medicine by mouth with a glass of water. Follow the directions on the prescription label. Take your doses at regular intervals. Do not take your medicine more often than directed. Do not stop taking this medicine suddenly except upon the advice of your doctor. Stopping this medicine too quickly may cause serious side effects or your condition may worsen.  A special MedGuide will be given to you by the pharmacist with each prescription and refill. Be sure to read this information carefully each time.  Talk to your pediatrician regarding the use of this medicine in children. While this drug may be prescribed for children as young as 12 years for selected conditions, precautions do apply.  What side effects may I notice from receiving this medicine?  Side effects that you should report to your doctor or health care professional as soon as possible:  · abnormal production of milk in females  · allergic reactions like skin rash, itching or hives, swelling of the face, lips, or tongue  · breast enlargement in both males and females  · breathing problems  · confusion, hallucinations  · excessive thirst and/or hunger  · fast, irregular or pounding heartbeat  · fever with sweating  · muscle stiffness, or spasms  · passing urine more times in a day  · seizures  · suicidal thoughts or other mood changes  · swelling of the testicles  · tingling, pain,  or numbness in the feet or hands  · trouble passing urine or change in the amount of urine  · yellowing of the eyes or skin  Side effects that usually do not require medical attention (report to your doctor or health care professional if they continue or are bothersome):  · change in sex drive or performance  · constipation, or diarrhea  · nausea, vomiting  · weight gain or loss  What may interact with this medicine?  Do not take this medicine with any of the following medications:  · arsenic trioxide  · certain medicines used to regulate abnormal heartbeat or to treat other heart conditions  · cisapride  · halofantrine  · levomethadyl  · linezolid  · MAOIs like Carbex, Eldepryl, Marplan, Nardil, and Parnate  · methylene blue  · other medicines for mental depression  · phenothiazines like perphenazine, thioridazine and chlorpromazine  · pimozide  · procarbazine  · sparfloxacin  · Alexa's Wort  · ziprasidone  This medicine may also interact with the following medications:  · cimetidine  · tolazamide  What if I miss a dose?  If you miss a dose, take it as soon as you can. If it is almost time for your next dose, take only that dose. Do not take double or extra doses.  Where should I keep my medicine?  Keep out of the reach of children.  Store at room temperature between 15 and 30 degrees C (59 and 86 degrees F). Throw away any unused medicine after the expiration date.  What should I tell my health care provider before I take this medicine?  They need to know if you have any of these conditions:  · bipolar disorder  · difficulty passing urine  · glaucoma  · heart disease  · if you frequently drink alcohol containing drinks  · liver disease  · lung or breathing disease, like asthma or sleep apnea  · prostate trouble  · schizophrenia  · seizures  · suicidal thoughts, plans, or attempt; a previous suicide attempt by you or a family member  · an unusual or allergic reaction to doxepin, other medicines, foods, dyes, or  preservatives  · pregnant or trying to get pregnant  · breast-feeding  What should I watch for while using this medicine?  Visit your doctor or health care professional for regular checks on your progress. It can take several days before you feel the full effect of this medicine. If you have been taking this medicine regularly for some time, do not suddenly stop taking it. You must gradually reduce the dose or you may get severe side effects. Ask your doctor or health care professional for advice. Even after you stop taking this medicine it can still affect your body for several days.  Patients and their families should watch out for new or worsening thoughts of suicide or depression. Also watch out for sudden changes in feelings such as feeling anxious, agitated, panicky, irritable, hostile, aggressive, impulsive, severely restless, overly excited and hyperactive, or not being able to sleep. If this happens, especially at the beginning of treatment or after a change in dose, call your health care professional.  You may get drowsy or dizzy. Do not drive, use machinery, or do anything that needs mental alertness until you know how this medicine affects you. Do not stand or sit up quickly, especially if you are an older patient. This reduces the risk of dizzy or fainting spells. Alcohol may increase dizziness and drowsiness. Avoid alcoholic drinks.  Do not treat yourself for coughs, colds, or allergies without asking your doctor or health care professional for advice. Some ingredients can increase possible side effects.  Your mouth may get dry. Chewing sugarless gum or sucking hard candy, and drinking plenty of water may help. Contact your doctor if the problem does not go away or is severe.  This medicine may cause dry eyes and blurred vision. If you wear contact lenses you may feel some discomfort. Lubricating drops may help. See your eye doctor if the problem does not go away or is severe.  This medicine can make  you more sensitive to the sun. Keep out of the sun. If you cannot avoid being in the sun, wear protective clothing and use sunscreen. Do not use sun lamps or tanning beds/booths.  NOTE:This sheet is a summary. It may not cover all possible information. If you have questions about this medicine, talk to your   Clonazepam tablets  What is this medicine?  CLONAZEPAM (kloe NA ze sabina) is a benzodiazepine. It is used to treat certain types of seizures. It is also used to treat panic disorder.  How should I use this medicine?  Take this medicine by mouth with a glass of water. Follow the directions on the prescription label. If it upsets your stomach, take it with food or milk. Take your medicine at regular intervals. Do not take it more often than directed. Do not stop taking or change the dose except on the advice of your doctor or health care professional.  A special MedGuide will be given to you by the pharmacist with each prescription and refill. Be sure to read this information carefully each time.  Talk to your pediatrician regarding the use of this medicine in children. Special care may be needed.  What side effects may I notice from receiving this medicine?  Side effects that you should report to your doctor or health care professional as soon as possible:  · allergic reactions like skin rash, itching or hives, swelling of the face, lips, or tongue  · breathing problems  · confusion  · loss of balance or coordination  · signs and symptoms of low blood pressure like dizziness; feeling faint or lightheaded, falls; unusually weak or tired  · suicidal thoughts or mood changes  Side effects that usually do not require medical attention (report to your doctor or health care professional if they continue or are bothersome):  · dizziness  · headache  · tiredness  · upset stomach  What may interact with this medicine?  Do not take this medication with any of the following medicines:  · narcotic medicines for  cough  · sodium oxybate  This medicine may also interact with the following medications:  · alcohol  · antihistamines for allergy, cough and cold  · antiviral medicines for HIV or AIDS  · certain medicines for anxiety or sleep  · certain medicines for depression, like amitriptyline, fluoxetine, sertraline  · certain medicines for fungal infections like ketoconazole and itraconazole  · certain medicines for seizures like carbamazepine, phenobarbital, phenytoin, primidone  · general anesthetics like halothane, isoflurane, methoxyflurane, propofol  · local anesthetics like lidocaine, pramoxine, tetracaine  · medicines that relax muscles for surgery  · narcotic medicines for pain  · phenothiazines like chlorpromazine, mesoridazine, prochlorperazine, thioridazine  What if I miss a dose?  If you miss a dose, take it as soon as you can. If it is almost time for your next dose, take only that dose. Do not take double or extra doses.  Where should I keep my medicine?  Keep out of the reach of children. This medicine can be abused. Keep your medicine in a safe place to protect it from theft. Do not share this medicine with anyone. Selling or giving away this medicine is dangerous and against the law.  This medicine may cause accidental overdose and death if taken by other adults, children, or pets. Mix any unused medicine with a substance like cat litter or coffee grounds. Then throw the medicine away in a sealed container like a sealed bag or a coffee can with a lid. Do not use the medicine after the expiration date.   Store at room temperature between 15 and 30 degrees C (59 and 86 degrees F). Protect from light. Keep container tightly closed.  What should I tell my health care provider before I take this medicine?  They need to know if you have any of these conditions:  · an alcohol or drug abuse problem  · bipolar disorder, depression, psychosis or other mental health condition  · glaucoma  · kidney or liver  disease  · lung or breathing disease  · myasthenia gravis  · Parkinson's disease  · porphyria  · seizures or a history of seizures  · suicidal thoughts  · an unusual or allergic reaction to clonazepam, other benzodiazepines, foods, dyes, or preservatives  · pregnant or trying to get pregnant  · breast-feeding  What should I watch for while using this medicine?  Tell your doctor or health care professional if your symptoms do not start to get better or if they get worse.  Do not stop taking except on your doctor's advice. You may develop a severe reaction. Your doctor will tell you how much medicine to take.  You may get drowsy or dizzy. Do not drive, use machinery, or do anything that needs mental alertness until you know how this medicine affects you. To reduce the risk of dizzy and fainting spells, do not stand or sit up quickly, especially if you are an older patient. Alcohol may increase dizziness and drowsiness. Avoid alcoholic drinks.  If you are taking another medicine that also causes drowsiness, you may have more side effects. Give your health care provider a list of all medicines you use. Your doctor will tell you how much medicine to take. Do not take more medicine than directed. Call emergency for help if you have problems breathing or unusual sleepiness.  NOTE:This sheet is a summary. It may not cover all possible information. If you have questions about this medicine, talk to your doctor, pharmacist, or health care provider. Copyright© 2017 [employer]        Duloxetine delayed-release capsules  What is this medicine?  DULOXETINE (doo LOX e teen) is used to treat depression, anxiety, and different types of chronic pain.  How should I use this medicine?  Take this medicine by mouth with a glass of water. Follow the directions on the prescription label. Do not cut, crush or chew this medicine. You can take this medicine with or without food. Take your medicine at regular intervals. Do not take your  medicine more often than directed. Do not stop taking this medicine suddenly except upon the advice of your doctor. Stopping this medicine too quickly may cause serious side effects or your condition may worsen.  A special MedGuide will be given to you by the pharmacist with each prescription and refill. Be sure to read this information carefully each time.  Talk to your pediatrician regarding the use of this medicine in children. While this drug may be prescribed for children as young as 7 years of age for selected conditions, precautions do apply.  What side effects may I notice from receiving this medicine?  Side effects that you should report to your doctor or health care professional as soon as possible:  · allergic reactions like skin rash, itching or hives, swelling of the face, lips, or tongue  · changes in blood pressure  · confusion  · dark urine  · dizziness  · fast talking and excited feelings or actions that are out of control  · fast, irregular heartbeat  · fever  · general ill feeling or flu-like symptoms  · hallucination, loss of contact with reality  · light-colored stools  · loss of balance or coordination  · redness, blistering, peeling or loosening of the skin, including inside the mouth  · right upper belly pain  · seizures  · suicidal thoughts or other mood changes  · trouble concentrating  · trouble passing urine or change in the amount of urine  · unusual bleeding or bruising  · unusually weak or tired  · yellowing of the eyes or skin  Side effects that usually do not require medical attention (report to your doctor or health care professional if they continue or are bothersome):  · blurred vision  · change in appetite  · change in sex drive or performance  · headache  · increased sweating  · nausea  What may interact with this medicine?  Do not take this medicine with any of the following medications:  · certain diet drugs like dexfenfluramine,  fenfluramine  · desvenlafaxine  · linezolid  · MAOIs like Azilect, Carbex, Eldepryl, Marplan, Nardil, and Parnate  · methylene blue (intravenous)  · milnacipran  · thioridazine  · venlafaxine  This medicine may also interact with the following medications:  · alcohol  · aspirin and aspirin-like medicines  · certain antibiotics like ciprofloxacin and enoxacin  · certain medicines for blood pressure, heart disease, irregular heart beat  · certain medicines for depression, anxiety, or psychotic disturbances  · certain medicines for migraine headache like almotriptan, eletriptan, frovatriptan, naratriptan, rizatriptan, sumatriptan, zolmitriptan  · certain medicines that treat or prevent blood clots like warfarin, enoxaparin, and dalteparin  · cimetidine  · fentanyl  · lithium  · NSAIDS, medicines for pain and inflammation, like ibuprofen or naproxen  · phentermine  · procarbazine  · sibutramine  · Alexa's wort  · theophylline  · tramadol  · tryptophan  What if I miss a dose?  If you miss a dose, take it as soon as you can. If it is almost time for your next dose, take only that dose. Do not take double or extra doses.  Where should I keep my medicine?  Keep out of the reach of children.  Store at room temperature between 20 and 25 degrees C (68 to 77 degrees F). Throw away any unused medicine after the expiration date.  What should I tell my health care provider before I take this medicine?  They need to know if you have any of these conditions:  · bipolar disorder or a family history of bipolar disorder  · glaucoma  · kidney disease  · liver disease  · suicidal thoughts or a previous suicide attempt  · taken medicines called MAOIs like Carbex, Eldepryl, Marplan, Nardil, and Parnate within 14 days  · an unusual reaction to duloxetine, other medicines, foods, dyes, or preservatives  · pregnant or trying to get pregnant  · breast-feeding  What should I watch for while using this medicine?  Tell your doctor if your  symptoms do not get better or if they get worse. Visit your doctor or health care professional for regular checks on your progress. Because it may take several weeks to see the full effects of this medicine, it is important to continue your treatment as prescribed by your doctor.  Patients and their families should watch out for new or worsening thoughts of suicide or depression. Also watch out for sudden changes in feelings such as feeling anxious, agitated, panicky, irritable, hostile, aggressive, impulsive, severely restless, overly excited and hyperactive, or not being able to sleep. If this happens, especially at the beginning of treatment or after a change in dose, call your health care professional.  You may get drowsy or dizzy. Do not drive, use machinery, or do anything that needs mental alertness until you know how this medicine affects you. Do not stand or sit up quickly, especially if you are an older patient. This reduces the risk of dizzy or fainting spells. Alcohol may interfere with the effect of this medicine. Avoid alcoholic drinks.  This medicine can cause an increase in blood pressure. This medicine can also cause a sudden drop in your blood pressure, which may make you feel faint and increase the chance of a fall. These effects are most common when you first start the medicine or when the dose is increased, or during use of other medicines that can cause a sudden drop in blood pressure. Check with your doctor for instructions on monitoring your blood pressure while taking this medicine.  Your mouth may get dry. Chewing sugarless gum or sucking hard candy, and drinking plenty of water may help. Contact your doctor if the problem does not go away or is severe.  NOTE:This sheet is a summary. It may not cover all possible information. If you have questions about this medicine, talk to your doctor, pharmacist, or health care provider. Copyright© 2017 [employer]      doctor, pharmacist, or health  "care provider. Copyright© 2017 [employer]    Patient education: Prescription drug misuse (The Basics)   Written by the doctors and editors at [facility]   What is prescription drug misuse? -- "Prescription drug misuse" is a term for when people use prescription medicines in ways that are different from how they were meant to be taken. People sometimes also call this "prescription drug abuse."  Prescription drug misuse can have a bad impact on important parts of a person's life. For example, it might cause the person to miss work or school, or have problems getting along with friends or family.  People who misuse prescription drugs might:  ?Take drugs that are not prescribed to them  ?Take more of the drug than what the label says  ?Crush pills and inhale them, or inject them into a vein instead of swallowing them as directed  What are the most commonly misused prescription drugs? -- The types of prescription drugs that people misuse most often are (table 1):  ?Certain drugs to treat severe pain (called "opioids")  ?Drugs that make you feel alert and focused (called "stimulants")  ?Drugs that make you feel calm, relaxed, or possibly sleepy (called "anxiolytics")  What are common signs that a person might be misusing prescription drugs? -- Warning signs of prescription drug misuse include:  ?Sudden changes in mood or behavior  ?Being more irritable than normal  ?Being more sleepy than normal  People who misuse prescription drugs might tell their doctor they need more medicine than they actually do. That way, they can get more of the drug they are misusing. They might also try to get the same prescription medicine from more than one doctor. Some people order drugs on the internet, too.  But most people who misuse prescription drugs get them from a friend or relative, not a doctor.  Prescription drug misuse is common among teenagers. Often, teens take drugs from their parents' medicine cabinet. Other times, they get " "the drugs from other teens.  Should I see a doctor or nurse? -- If you are worried that you have a problem with drugs, talk to your doctor or nurse, or to a mental health counselor. They can recommend treatments to help you overcome your problem.  If you think someone close to you is misusing prescription drugs, ask them if they are taking medicines differently from how they are meant to be taken. If they are, encourage them to speak to the doctor who prescribed the drugs. You can also ask your own doctor or counselor for advice.  If you think your child is misusing prescription drugs, talk to his or her doctor.  How is prescription drug misuse treated? -- A major treatment for prescription drug misuse is counseling. In counseling, you can talk with a doctor or other specialist about how to stop misusing drugs. There are medicines that can help treat addiction to some prescription drugs.  Other treatments can include:  ?Prescription medicines that make it easier to stop misusing drugs. Medicines like these are available only for some types of drug misuse.  ?Support groups, such as Narcotics Anonymous. In support groups, people talk about their drug use and share advice on how to quit.  What is withdrawal? -- When people take drugs for a long time and suddenly stop or reduce the dose sharply, they often get symptoms. These symptoms are called "withdrawal," and might include:  ?Feeling anxious or restless  ?Trouble sleeping  ?Vomiting  ?Diarrhea  If you have any of these symptoms after stopping a drug, talk with your doctor or nurse. He or she can prescribe medicines to treat these symptoms or suggest ways to help you cope. Medicines can prevent more severe symptoms, such as seizures.  What can I do to prevent someone from dying of a drug overdose? -- If you think someone might be having a drug overdose, call for an ambulance (in the [place] and [place], dial 9-1-1).  There is a medicine called "naloxone" that can treat " people who overdose on opioids. (Opioids include heroin, morphine, and certain prescription pain medicines.) Signs of an opioid overdose include extreme sleepiness, slow breathing or no breathing, a slow heartbeat, and very small pupils. If you or someone in your house misuses opioids or is trying to stop using them, you might want to keep naloxone at home. Naloxone is available by prescription in the [place] and some other countries. It comes as a nasal spray (brand name: Narcan nasal spray) or a shot made simple so that anyone can give it (brand name: Evzio).  Naloxone only works for opioid overdose. It will not help a person who has overdosed on a different drug.  Can prescription drug misuse be prevented? -- To reduce the chances that you will misuse drugs, you should:  ?Tell your doctor about all the medicines you take, including over-the-counter medicines.  ?Take medicine only as prescribed.  ?Read the instructions from the pharmacist before taking your medicine.  ?Ask your doctor or pharmacist about your medicine if you are unsure about how it will affect you.  ?Once your health problem is better, get rid of any leftover pills that were prescribed to treat the problem. This might involve flushing them down the toilet, or mixing them with something like dirt or cat litter before putting the mixture in the trash. Some police stations and pharmacies also take unused or leftover medicines.

## 2019-01-09 ENCOUNTER — HOSPITAL ENCOUNTER (EMERGENCY)
Facility: HOSPITAL | Age: 49
Discharge: HOME | End: 2019-01-09
Attending: FAMILY MEDICINE
Payer: MEDICARE

## 2019-01-09 VITALS
RESPIRATION RATE: 17 BRPM | OXYGEN SATURATION: 96 % | HEART RATE: 69 BPM | WEIGHT: 167.63 LBS | TEMPERATURE: 99 F | BODY MASS INDEX: 29.7 KG/M2 | DIASTOLIC BLOOD PRESSURE: 60 MMHG | HEIGHT: 63 IN | SYSTOLIC BLOOD PRESSURE: 114 MMHG

## 2019-01-09 DIAGNOSIS — S00.531A CONTUSION OF LIP, INITIAL ENCOUNTER: ICD-10-CM

## 2019-01-09 DIAGNOSIS — T14.90XA TRAUMA: ICD-10-CM

## 2019-01-09 DIAGNOSIS — R55 SYNCOPE, UNSPECIFIED SYNCOPE TYPE: ICD-10-CM
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
## 2019-01-09 PROCEDURE — 99284 EMERGENCY DEPT VISIT MOD MDM: CPT | Mod: 25

## 2019-01-09 PROCEDURE — 25000003 PHARM REV CODE 250: Performed by: FAMILY MEDICINE

## 2019-01-09 RX ORDER — HYDROCODONE BITARTRATE AND ACETAMINOPHEN 5; 325 MG/1; MG/1
1 TABLET ORAL
Status: COMPLETED | OUTPATIENT
Start: 2019-01-09 | End: 2019-01-09

## 2019-01-09 RX ORDER — ONDANSETRON 4 MG/1
4 TABLET, ORALLY DISINTEGRATING ORAL
Status: COMPLETED | OUTPATIENT
Start: 2019-01-09 | End: 2019-01-09

## 2019-01-09 RX ORDER — DEXTROMETHORPHAN HYDROBROMIDE, GUAIFENESIN 5; 100 MG/5ML; MG/5ML
650 LIQUID ORAL EVERY 8 HOURS
Qty: 15 TABLET | Refills: 0 | Status: SHIPPED | OUTPATIENT
Start: 2019-01-09 | End: 2019-01-14

## 2019-01-09 RX ORDER — BUTALBITAL, ACETAMINOPHEN AND CAFFEINE 50; 325; 40 MG/1; MG/1; MG/1
1 TABLET ORAL
Status: COMPLETED | OUTPATIENT
Start: 2019-01-09 | End: 2019-01-09

## 2019-01-09 RX ADMIN — HYDROCODONE BITARTRATE AND ACETAMINOPHEN 1 TABLET: 5; 325 TABLET ORAL at 11:01

## 2019-01-09 RX ADMIN — ONDANSETRON 4 MG: 4 TABLET, ORALLY DISINTEGRATING ORAL at 11:01

## 2019-01-09 RX ADMIN — BUTALBITAL, ACETAMINOPHEN, AND CAFFEINE 1 TABLET: 50; 325; 40 TABLET ORAL at 01:01
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
## 2019-01-30 DIAGNOSIS — E11.9 TYPE 2 DIABETES MELLITUS WITHOUT COMPLICATION, WITH LONG-TERM CURRENT USE OF INSULIN: Chronic | ICD-10-CM

## 2019-01-30 DIAGNOSIS — Z79.4 TYPE 2 DIABETES MELLITUS WITHOUT COMPLICATION, WITH LONG-TERM CURRENT USE OF INSULIN: Chronic | ICD-10-CM

## 2019-01-31 ENCOUNTER — PATIENT OUTREACH (OUTPATIENT)
Dept: ADMINISTRATIVE | Facility: HOSPITAL | Age: 49
End: 2019-01-31

## 2019-01-31 ENCOUNTER — OFFICE VISIT (OUTPATIENT)
Dept: INTERNAL MEDICINE | Facility: CLINIC | Age: 49
End: 2019-01-31
Payer: MEDICARE

## 2019-01-31 ENCOUNTER — LAB VISIT (OUTPATIENT)
Dept: LAB | Facility: HOSPITAL | Age: 49
End: 2019-01-31
Attending: NURSE PRACTITIONER
Payer: MEDICARE

## 2019-01-31 VITALS
TEMPERATURE: 99 F | HEART RATE: 91 BPM | HEIGHT: 63 IN | WEIGHT: 162.69 LBS | DIASTOLIC BLOOD PRESSURE: 72 MMHG | BODY MASS INDEX: 28.82 KG/M2 | OXYGEN SATURATION: 100 % | SYSTOLIC BLOOD PRESSURE: 128 MMHG

## 2019-01-31 DIAGNOSIS — N39.0 URINARY TRACT INFECTION WITHOUT HEMATURIA, SITE UNSPECIFIED: ICD-10-CM

## 2019-01-31 DIAGNOSIS — N39.0 URINARY TRACT INFECTION WITHOUT HEMATURIA, SITE UNSPECIFIED: Primary | ICD-10-CM

## 2019-01-31 LAB
BACTERIA #/AREA URNS HPF: ABNORMAL /HPF
BILIRUB UR QL STRIP: ABNORMAL
CLARITY UR: ABNORMAL
COLOR UR: ABNORMAL
GLUCOSE UR QL STRIP: ABNORMAL
HGB UR QL STRIP: ABNORMAL
KETONES UR QL STRIP: ABNORMAL
LEUKOCYTE ESTERASE UR QL STRIP: ABNORMAL
MICROSCOPIC COMMENT: ABNORMAL
NITRITE UR QL STRIP: ABNORMAL
PH UR STRIP: ABNORMAL [PH] (ref 5–8)
PROT UR QL STRIP: ABNORMAL
SP GR UR STRIP: ABNORMAL (ref 1–1.03)
SQUAMOUS #/AREA URNS HPF: 6 /HPF
URN SPEC COLLECT METH UR: ABNORMAL
UROBILINOGEN UR STRIP-ACNC: ABNORMAL MG/DL
WBC #/AREA URNS HPF: 2 /HPF (ref 0–5)
YEAST URNS QL MICRO: ABNORMAL

## 2019-01-31 PROCEDURE — 81000 URINALYSIS NONAUTO W/SCOPE: CPT

## 2019-01-31 PROCEDURE — 99213 PR OFFICE/OUTPT VISIT, EST, LEVL III, 20-29 MIN: ICD-10-PCS | Mod: S$GLB,,, | Performed by: NURSE PRACTITIONER

## 2019-01-31 PROCEDURE — 99999 PR PBB SHADOW E&M-EST. PATIENT-LVL IV: ICD-10-PCS | Mod: PBBFAC,,, | Performed by: NURSE PRACTITIONER

## 2019-01-31 PROCEDURE — 3008F PR BODY MASS INDEX (BMI) DOCUMENTED: ICD-10-PCS | Mod: CPTII,S$GLB,, | Performed by: NURSE PRACTITIONER

## 2019-01-31 PROCEDURE — 3008F BODY MASS INDEX DOCD: CPT | Mod: CPTII,S$GLB,, | Performed by: NURSE PRACTITIONER

## 2019-01-31 PROCEDURE — 99999 PR PBB SHADOW E&M-EST. PATIENT-LVL IV: CPT | Mod: PBBFAC,,, | Performed by: NURSE PRACTITIONER

## 2019-01-31 PROCEDURE — 99213 OFFICE O/P EST LOW 20 MIN: CPT | Mod: S$GLB,,, | Performed by: NURSE PRACTITIONER

## 2019-01-31 RX ORDER — DOXYCYCLINE 100 MG/1
100 CAPSULE ORAL EVERY 12 HOURS
Qty: 14 CAPSULE | Refills: 0 | Status: SHIPPED | OUTPATIENT
Start: 2019-01-31 | End: 2019-02-07

## 2019-01-31 NOTE — LETTER
Dear Jadyn Chance    In addition to helping you feel better when you are sick, Mikayla Myles MD would like to ensure you are receiving the recommended preventive health screenings.  Your [facility] chart indicates you may be overdue for a Cervical Cancer Screening.    If you completed a pap smear outside of [facility], your PCP would like to update your health record.  To receive a copy of the completed test, a signed consent and the specific Physician who completed the exam is required.  Please contact me by patient portal or phone to discuss how we may obtain the needed signed release of information to complete your chart    If you have any issues or would like assistance in scheduling your annual well woman exam, please do not hesitate to call the number below.     Sincerely,     Kenzie BERMUDEZ LPN-Care Coordinator  [facility]  [phone]

## 2019-01-31 NOTE — PROGRESS NOTES
Subjective:       Patient ID: Jadyn Chance is a 48 y.o. female.    Chief Complaint: Urinary Tract Infection (possible)    Urinary Tract Infection    This is a new problem. The current episode started in the past 7 days. The quality of the pain is described as burning, aching and shooting. The pain is at a severity of 7/10. The pain is moderate. There has been no fever. She is sexually active. There is no history of pyelonephritis. Associated symptoms include frequency and urgency. Pertinent negatives include no chills, flank pain, hematuria, nausea, vomiting or constipation. She has tried increased fluids for the symptoms. The treatment provided mild relief. Her past medical history is significant for hypertension.           Past Medical History:   Diagnosis Date    Abnormal Pap smear     bx was negative, per pt    Anemia     Anxiety     B12 deficiency     Blood transfusion     multiple     Chronic LBP     Degenerative disc disease     l spine    Diabetes mellitus     Diabetic neuropathy     General anesthetics causing adverse effect in therapeutic use     delayed emergence    Mixed hyperlipidemia 2013    RLS (restless legs syndrome)     Seizures     Vitamin D deficiency disease      Past Surgical History:   Procedure Laterality Date    ANUS SURGERY      BELT ABDOMINOPLASTY      tummy tuck    BREAST SURGERY      breast augmentation     SECTION      x2    CHOLECYSTECTOMY      COLONOSCOPY N/A 2013    [facility]    EGD (ESOPHAGOGASTRODUODENOSCOPY) N/A 2013    [facility]    mikel      ESOPHAGOGASTRODUODENOSCOPY (EGD) N/A 2015    [facility] (4TH FLR)    ESOPHAGOGASTRODUODENOSCOPY (EGD) N/A 3/27/2015    Performed by [facility] at [facility] ENDO (2ND FLR)    EXCISIONAL HEMORRHOIDECTOMY      GASTRIC BYPASS      HEMORRHOIDECTOMY N/A  10/2/2013    Per[facility]ler [facility] at [facility] OR    HIP FRACTURE SURGERY      left hip ORIF    JEJUNOSTOMY N/A 2/27/2015    [facility] 2ND FLR    MOUTH SURGERY      RESECTION - SMALL BOWEL  2/27/2015    Performed by [facility] at [facility] OR 2ND FLR    revision of JJ anastomosis  2/27/15    small bowel resection  02/27/2015    SPHINCTEROTOMY, ANAL N/A 10/2/2013    Per[facility] by [facility]ler [facility] at [facility] OR    TUBAL LIGATION       Social History     Socioeconomic History    Marital status: Significant Other     Spouse name: Not on file    Number of children: 4    Years of education: Not on file    Highest education level: Not on file   Social Needs    Financial resource strain: Not on file    Food insecurity - worry: Not on file    Food insecurity - inability: Not on file    Transportation needs - medical: Not on file    Transportation needs - non-medical: Not on file   Occupational History    Not on file   Tobacco Use    Smoking status: Never Smoker    Smokeless tobacco: Never Used   Substance and Sexual Activity    Alcohol use: No    Drug use: No    Sexual activity: Yes     Partners: Male     Birth control/protection: Surgical     Comment: BTL; mut monog   Other Topics Concern    Not on file   Social History Narrative    Not on file     Review of patient's allergies indicates:   Allergen Reactions    Demerol [meperidine] Hallucinations    Penicillins Other (See Comments)     Patient cannot recall specific reaction, reports she has been listing this as an allergy since childhood.    Bactrim [sulfamethoxazole-trimethoprim]     Ciprofloxacin (bulk)     Codeine Nausea And Vomiting    Toradol [ketorolac]     Tramadol      seizures     Current Outpatient Medications   Medication Sig    ACCU-CHEK GEORGETTE Misc     BD ALCOHOL SWABS PadM     BIOTIN ORAL Take 3,000 Units by mouth.    blood sugar diagnostic Strp To check BG 3 times  "daily, to use with insurance preferred meter/Accu-check    clonazePAM (KLONOPIN) 1 MG tablet Take 1 to 2 tablets (1-2 mg total) by mouth every evening.    cyanocobalamin 1,000 mcg/mL injection Inject 1 mL (1,000 mcg total) into the skin every 28 days.    doxepin (SINEQUAN) 100 MG capsule Take 1 capsule (100 mg total) by mouth every evening.    DULoxetine (CYMBALTA) 30 MG capsule Take 1 capsule (30 mg total) by mouth 2 (two) times daily.    ergocalciferol (VITAMIN D2) 50,000 unit Cap TAKE 1 CAPSULE (50,000 UNITS TOTAL) BY MOUTH ONCE A WEEK.    gabapentin (NEURONTIN) 300 MG capsule Take 1 capsule (300 mg total) by mouth 3 (three) times daily.    insulin detemir U-100 (LEVEMIR FLEXTOUCH) 100 unit/mL (3 mL) SubQ InPn pen Inject 30 Units into the skin every evening.    lancets Misc To check BG 3 times daily, to use with insurance preferred meter/Accu-check    NOVOLIN R REGULAR U-100 INSULN 100 unit/mL injection     oxyCODONE-acetaminophen (PERCOCET)  mg per tablet Take one tablet by mouth every 8 hours as needed    pen needle, diabetic 32 gauge x 5/32" Ndle 1 each by Misc.(Non-Drug; Combo Route) route once daily.    syringe with needle (SYRINGE 3CC/25GX1") 3 mL 25 gauge x 1" Syrg For vitamin B12 injection    doxycycline (MONODOX) 100 MG capsule Take 1 capsule (100 mg total) by mouth every 12 (twelve) hours. for 7 days     No current facility-administered medications for this visit.            Review of Systems   Constitutional: Negative for activity change, appetite change, chills, diaphoresis, fatigue, fever and unexpected weight change.   HENT: Negative for congestion, ear pain, postnasal drip, rhinorrhea, sinus pressure, sinus pain, sneezing, sore throat, tinnitus, trouble swallowing and voice change.    Eyes: Negative for photophobia, pain and visual disturbance.   Respiratory: Negative for cough, chest tightness, shortness of breath and wheezing.    Cardiovascular: Negative for chest pain, " palpitations and leg swelling.   Gastrointestinal: Positive for abdominal pain. Negative for abdominal distention, constipation, diarrhea, nausea and vomiting.   Genitourinary: Positive for frequency and urgency. Negative for decreased urine volume, difficulty urinating, dysuria, flank pain and hematuria.   Musculoskeletal: Negative for arthralgias, back pain, joint swelling, neck pain and neck stiffness.   Allergic/Immunologic: Negative for immunocompromised state.   Neurological: Negative for dizziness, tremors, seizures, syncope, facial asymmetry, speech difficulty, weakness, light-headedness, numbness and headaches.   Hematological: Negative for adenopathy. Does not bruise/bleed easily.   Psychiatric/Behavioral: Negative for confusion and sleep disturbance.       Objective:      Physical Exam   Constitutional: She appears well-developed and well-nourished.   Abdominal: There is tenderness in the suprapubic area.   Musculoskeletal:        Lumbar back: She exhibits tenderness.   Skin: Skin is warm and dry.   Psychiatric: She has a normal mood and affect.       Assessment:     Vitals:    01/31/19 1555   BP: 128/72   Pulse: 91   Temp: 98.6 °F (37 °C)         1. Urinary tract infection without hematuria, site unspecified        Plan:   Urinary tract infection without hematuria, site unspecified  -     doxycycline (MONODOX) 100 MG capsule; Take 1 capsule (100 mg total) by mouth every 12 (twelve) hours. for 7 days  Dispense: 14 capsule; Refill: 0  -     Urine culture; Future; Expected date: 01/31/2019        Pt took Azo, urine discolored with treat based on symptoms and do urine culture

## 2019-02-28 DIAGNOSIS — E53.8 VITAMIN B12 DEFICIENCY: ICD-10-CM

## 2019-02-28 RX ORDER — CYANOCOBALAMIN 1000 UG/ML
1000 INJECTION, SOLUTION INTRAMUSCULAR; SUBCUTANEOUS
Qty: 10 ML | Refills: 11 | Status: SHIPPED | OUTPATIENT
Start: 2019-02-28 | End: 2020-10-05 | Stop reason: SDUPTHER

## 2019-03-01 ENCOUNTER — OFFICE VISIT (OUTPATIENT)
Dept: HEMATOLOGY/ONCOLOGY | Facility: CLINIC | Age: 49
End: 2019-03-01
Payer: MEDICARE

## 2019-03-01 ENCOUNTER — LAB VISIT (OUTPATIENT)
Dept: LAB | Facility: HOSPITAL | Age: 49
End: 2019-03-01
Payer: MEDICARE

## 2019-03-01 VITALS
OXYGEN SATURATION: 96 % | HEIGHT: 63 IN | DIASTOLIC BLOOD PRESSURE: 62 MMHG | BODY MASS INDEX: 27.62 KG/M2 | TEMPERATURE: 99 F | SYSTOLIC BLOOD PRESSURE: 122 MMHG | WEIGHT: 155.88 LBS | HEART RATE: 98 BPM

## 2019-03-01 DIAGNOSIS — Z98.84 HISTORY OF ROUX-EN-Y GASTRIC BYPASS: ICD-10-CM

## 2019-03-01 DIAGNOSIS — Z86.39 HISTORY OF IRON DEFICIENCY: Primary | ICD-10-CM

## 2019-03-01 DIAGNOSIS — E53.8 VITAMIN B12 DEFICIENCY: ICD-10-CM

## 2019-03-01 DIAGNOSIS — Z86.39 HISTORY OF IRON DEFICIENCY: ICD-10-CM

## 2019-03-01 LAB
ALBUMIN SERPL BCP-MCNC: 3.8 G/DL
ALP SERPL-CCNC: 154 U/L
ALT SERPL W/O P-5'-P-CCNC: 52 U/L
ANION GAP SERPL CALC-SCNC: 9 MMOL/L
AST SERPL-CCNC: 35 U/L
BASOPHILS # BLD AUTO: 0.06 K/UL
BASOPHILS NFR BLD: 1.1 %
BILIRUB SERPL-MCNC: 1.5 MG/DL
BUN SERPL-MCNC: 16 MG/DL
CALCIUM SERPL-MCNC: 9 MG/DL
CHLORIDE SERPL-SCNC: 105 MMOL/L
CO2 SERPL-SCNC: 24 MMOL/L
CREAT SERPL-MCNC: 0.9 MG/DL
CRP SERPL-MCNC: 1.4 MG/L
DIFFERENTIAL METHOD: ABNORMAL
EOSINOPHIL # BLD AUTO: 0.1 K/UL
EOSINOPHIL NFR BLD: 2.4 %
ERYTHROCYTE [DISTWIDTH] IN BLOOD BY AUTOMATED COUNT: 13.6 %
EST. GFR  (AFRICAN AMERICAN): >60 ML/MIN/1.73 M^2
EST. GFR  (NON AFRICAN AMERICAN): >60 ML/MIN/1.73 M^2
FERRITIN SERPL-MCNC: 396 NG/ML
FOLATE SERPL-MCNC: 10.1 NG/ML
GLUCOSE SERPL-MCNC: 289 MG/DL
HCT VFR BLD AUTO: 42.3 %
HGB BLD-MCNC: 13.7 G/DL
IMM GRANULOCYTES # BLD AUTO: 0.02 K/UL
IMM GRANULOCYTES NFR BLD AUTO: 0.4 %
IRON SERPL-MCNC: 88 UG/DL
LYMPHOCYTES # BLD AUTO: 1 K/UL
LYMPHOCYTES NFR BLD: 19.1 %
MCH RBC QN AUTO: 32.8 PG
MCHC RBC AUTO-ENTMCNC: 32.4 G/DL
MCV RBC AUTO: 101 FL
MONOCYTES # BLD AUTO: 0.4 K/UL
MONOCYTES NFR BLD: 6.8 %
NEUTROPHILS # BLD AUTO: 3.8 K/UL
NEUTROPHILS NFR BLD: 70.6 %
NRBC BLD-RTO: 0 /100 WBC
PLATELET # BLD AUTO: 322 K/UL
PMV BLD AUTO: 11.2 FL
POTASSIUM SERPL-SCNC: 3.9 MMOL/L
PROT SERPL-MCNC: 7.2 G/DL
RBC # BLD AUTO: 4.18 M/UL
SATURATED IRON: 28 %
SODIUM SERPL-SCNC: 138 MMOL/L
TOTAL IRON BINDING CAPACITY: 309 UG/DL
TRANSFERRIN SERPL-MCNC: 209 MG/DL
VIT B12 SERPL-MCNC: 500 PG/ML
WBC # BLD AUTO: 5.33 K/UL

## 2019-03-01 PROCEDURE — 82746 ASSAY OF FOLIC ACID SERUM: CPT

## 2019-03-01 PROCEDURE — 82607 VITAMIN B-12: CPT

## 2019-03-01 PROCEDURE — 82728 ASSAY OF FERRITIN: CPT

## 2019-03-01 PROCEDURE — 80053 COMPREHEN METABOLIC PANEL: CPT

## 2019-03-01 PROCEDURE — 99999 PR PBB SHADOW E&M-EST. PATIENT-LVL IV: ICD-10-PCS | Mod: PBBFAC,,, | Performed by: INTERNAL MEDICINE

## 2019-03-01 PROCEDURE — 99999 PR PBB SHADOW E&M-EST. PATIENT-LVL IV: CPT | Mod: PBBFAC,,, | Performed by: INTERNAL MEDICINE

## 2019-03-01 PROCEDURE — 3008F BODY MASS INDEX DOCD: CPT | Mod: CPTII,S$GLB,, | Performed by: INTERNAL MEDICINE

## 2019-03-01 PROCEDURE — 99214 OFFICE O/P EST MOD 30 MIN: CPT | Mod: S$GLB,,, | Performed by: INTERNAL MEDICINE

## 2019-03-01 PROCEDURE — 83540 ASSAY OF IRON: CPT

## 2019-03-01 PROCEDURE — 36415 COLL VENOUS BLD VENIPUNCTURE: CPT

## 2019-03-01 PROCEDURE — 3008F PR BODY MASS INDEX (BMI) DOCUMENTED: ICD-10-PCS | Mod: CPTII,S$GLB,, | Performed by: INTERNAL MEDICINE

## 2019-03-01 PROCEDURE — 99214 PR OFFICE/OUTPT VISIT, EST, LEVL IV, 30-39 MIN: ICD-10-PCS | Mod: S$GLB,,, | Performed by: INTERNAL MEDICINE

## 2019-03-01 PROCEDURE — 86140 C-REACTIVE PROTEIN: CPT

## 2019-03-01 PROCEDURE — 85025 COMPLETE CBC W/AUTO DIFF WBC: CPT
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
## 2019-03-20 NOTE — PROGRESS NOTES
"Subjective:      Patient ID: Jadyn Chance is a 48 y.o. female.    Chief Complaint: Follow-up (3 month follow up )      HPI  Here for follow up of medical problems.  Exercising well now, 8-10mi biking daily.  AC breakfast sugars 89-120s.  Taking christiano, back pain doing better.  Anxiety and pain improved on cymbalta.  Sleeping well on lower dose doxepin.  BMs normal.  Urine normal.  Still on oxycodone, but now less than daily.    Updated/ annual due 1/20:  HM:  Ref fluvax, 1/19 [id number], 8/14 [id number], 12/10 TDaP, 4/18 [facility], Pain Dr. Mcgregor.     Review of Systems   Constitutional: Negative for chills, diaphoresis and fever.   Respiratory: Negative for cough and shortness of breath.    Cardiovascular: Negative for chest pain, palpitations and leg swelling.   Gastrointestinal: Negative for blood in stool, constipation, diarrhea, nausea and vomiting.   Genitourinary: Negative for dysuria, frequency and hematuria.   Psychiatric/Behavioral: The patient is not nervous/anxious.          Objective:   /76 (BP Location: Left arm, Patient Position: Sitting, BP Method: Medium (Manual))   Pulse 104   Temp 98.3 °F (36.8 °C) (Oral)   Ht 5' 3" (1.6 m)   Wt 72 kg (158 lb 11.7 oz)   LMP 12/04/2014   SpO2 97%   BMI 28.12 kg/m²     Physical Exam   Constitutional: She is oriented to person, place, and time. She appears well-developed.   HENT:   Mouth/Throat: Oropharynx is clear and moist.   Neck: Neck supple. Carotid bruit is not present. No thyroid mass present.   Cardiovascular: Normal rate, regular rhythm and intact distal pulses. Exam reveals no gallop and no friction rub.   No murmur heard.  Pulmonary/Chest: Effort normal and breath sounds normal. She has no wheezes. She has no rales.   Abdominal: Soft. Bowel sounds are normal. She exhibits no mass. There is no hepatosplenomegaly. There is no tenderness.   Musculoskeletal: She exhibits no edema.   Lymphadenopathy:     She has no cervical adenopathy. "   Neurological: She is alert and oriented to person, place, and time.   Psychiatric: She has a normal mood and affect.       Results for orders placed or performed in visit on 03/01/19   CBC auto differential   Result Value Ref Range    WBC 5.33 3.90 - 12.70 K/uL    RBC 4.18 4.00 - 5.40 M/uL    Hemoglobin 13.7 12.0 - 16.0 g/dL    Hematocrit 42.3 37.0 - 48.5 %     (H) 82 - 98 fL    MCH 32.8 (H) 27.0 - 31.0 pg    MCHC 32.4 32.0 - 36.0 g/dL    RDW 13.6 11.5 - 14.5 %    Platelets 322 150 - 350 K/uL    MPV 11.2 9.2 - 12.9 fL    Immature Granulocytes 0.4 0.0 - 0.5 %    Gran # (ANC) 3.8 1.8 - 7.7 K/uL    Immature Grans (Abs) 0.02 0.00 - 0.04 K/uL    Lymph # 1.0 1.0 - 4.8 K/uL    Mono # 0.4 0.3 - 1.0 K/uL    Eos # 0.1 0.0 - 0.5 K/uL    Baso # 0.06 0.00 - 0.20 K/uL    nRBC 0 0 /100 WBC    Gran% 70.6 38.0 - 73.0 %    Lymph% 19.1 18.0 - 48.0 %    Mono% 6.8 4.0 - 15.0 %    Eosinophil% 2.4 0.0 - 8.0 %    Basophil% 1.1 0.0 - 1.9 %    Differential Method Automated    CMP   Result Value Ref Range    Sodium 138 136 - 145 mmol/L    Potassium 3.9 3.5 - 5.1 mmol/L    Chloride 105 95 - 110 mmol/L    CO2 24 23 - 29 mmol/L    Glucose 289 (H) 70 - 110 mg/dL    BUN, Bld 16 6 - 20 mg/dL    Creatinine 0.9 0.5 - 1.4 mg/dL    Calcium 9.0 8.7 - 10.5 mg/dL    Total Protein 7.2 6.0 - 8.4 g/dL    Albumin 3.8 3.5 - 5.2 g/dL    Total Bilirubin 1.5 (H) 0.1 - 1.0 mg/dL    Alkaline Phosphatase 154 (H) 55 - 135 U/L    AST 35 10 - 40 U/L    ALT 52 (H) 10 - 44 U/L    Anion Gap 9 8 - 16 mmol/L    eGFR if African American >60 >60 mL/min/1.73 m^2    eGFR if non African American >60 >60 mL/min/1.73 m^2   Iron and TIBC   Result Value Ref Range    Iron 88 30 - 160 ug/dL    Transferrin 209 200 - 375 mg/dL    TIBC 309 250 - 450 ug/dL    Saturated Iron 28 20 - 50 %   Ferritin   Result Value Ref Range    Ferritin 396 (H) 20.0 - 300.0 ng/mL   C-REACTIVE PROTEIN   Result Value Ref Range    CRP 1.4 0.0 - 8.2 mg/L   Vitamin B12   Result Value Ref Range    Vitamin  B-12 500 210 - 950 pg/mL   Folate   Result Value Ref Range    Folate 10.1 4.0 - 24.0 ng/mL         Assessment:       1. Elevated liver enzymes    2. Type 2 diabetes mellitus without complication, with long-term current use of insulin    3. Vitamin B12 deficiency    4. Vitamin D deficiency    5. Chronic pain syndrome    6. Anxiety          Plan:     Elevated liver enzymes for years- normal u/s 2014-  -     Hepatitis C antibody; Future; Expected date: 04/03/2019  -     Hepatitis B Surface Antibody, Qual/Quant; Future; Expected date: 04/03/2019  -     Hepatic function panel; Future; Expected date: 04/03/2019  HAV IgG    Type 2 diabetes mellitus without complication, with long-term current use of insulin- doing well.    Vitamin B12 deficiency- cont injections.    Vitamin D deficiency- check lab.  -     Vitamin D; Future    Chronic pain syndrome, Anxiety- doing well now.    RTC 4mo.

## 2019-04-03 ENCOUNTER — OFFICE VISIT (OUTPATIENT)
Dept: INTERNAL MEDICINE | Facility: CLINIC | Age: 49
End: 2019-04-03
Payer: MEDICARE

## 2019-04-03 ENCOUNTER — LAB VISIT (OUTPATIENT)
Dept: LAB | Facility: HOSPITAL | Age: 49
End: 2019-04-03
Attending: INTERNAL MEDICINE
Payer: MEDICARE

## 2019-04-03 ENCOUNTER — PATIENT MESSAGE (OUTPATIENT)
Dept: PSYCHIATRY | Facility: CLINIC | Age: 49
End: 2019-04-03

## 2019-04-03 ENCOUNTER — OFFICE VISIT (OUTPATIENT)
Dept: OPHTHALMOLOGY | Facility: CLINIC | Age: 49
End: 2019-04-03
Payer: MEDICARE

## 2019-04-03 VITALS
HEIGHT: 63 IN | SYSTOLIC BLOOD PRESSURE: 110 MMHG | BODY MASS INDEX: 28.13 KG/M2 | OXYGEN SATURATION: 97 % | WEIGHT: 158.75 LBS | DIASTOLIC BLOOD PRESSURE: 76 MMHG | TEMPERATURE: 98 F | HEART RATE: 104 BPM

## 2019-04-03 DIAGNOSIS — E11.9 DIABETES MELLITUS TYPE 2 WITHOUT RETINOPATHY: Primary | ICD-10-CM

## 2019-04-03 DIAGNOSIS — Z79.4 TYPE 2 DIABETES MELLITUS WITHOUT COMPLICATION, WITH LONG-TERM CURRENT USE OF INSULIN: Chronic | ICD-10-CM

## 2019-04-03 DIAGNOSIS — E11.9 TYPE 2 DIABETES MELLITUS WITHOUT COMPLICATION, WITH LONG-TERM CURRENT USE OF INSULIN: Chronic | ICD-10-CM

## 2019-04-03 DIAGNOSIS — E53.8 VITAMIN B12 DEFICIENCY: ICD-10-CM

## 2019-04-03 DIAGNOSIS — Z13.5 GLAUCOMA SCREENING: ICD-10-CM

## 2019-04-03 DIAGNOSIS — R74.8 ELEVATED LIVER ENZYMES: ICD-10-CM

## 2019-04-03 DIAGNOSIS — R74.8 ELEVATED LIVER ENZYMES: Primary | ICD-10-CM

## 2019-04-03 DIAGNOSIS — H52.223 REGULAR ASTIGMATISM OF BOTH EYES: ICD-10-CM

## 2019-04-03 DIAGNOSIS — E55.9 VITAMIN D DEFICIENCY: ICD-10-CM

## 2019-04-03 DIAGNOSIS — G89.4 CHRONIC PAIN SYNDROME: ICD-10-CM

## 2019-04-03 DIAGNOSIS — F41.9 ANXIETY: ICD-10-CM

## 2019-04-03 DIAGNOSIS — H52.4 PRESBYOPIA: ICD-10-CM

## 2019-04-03 LAB
25(OH)D3+25(OH)D2 SERPL-MCNC: 12 NG/ML (ref 30–96)
ALBUMIN SERPL BCP-MCNC: 3.8 G/DL (ref 3.5–5.2)
ALP SERPL-CCNC: 141 U/L (ref 55–135)
ALT SERPL W/O P-5'-P-CCNC: 56 U/L (ref 10–44)
AST SERPL-CCNC: 33 U/L (ref 10–40)
BILIRUB DIRECT SERPL-MCNC: 0.6 MG/DL (ref 0.1–0.3)
BILIRUB SERPL-MCNC: 1.4 MG/DL (ref 0.1–1)
PROT SERPL-MCNC: 7.1 G/DL (ref 6–8.4)

## 2019-04-03 PROCEDURE — 99999 PR PBB SHADOW E&M-EST. PATIENT-LVL IV: CPT | Mod: PBBFAC,,, | Performed by: INTERNAL MEDICINE

## 2019-04-03 PROCEDURE — 92015 DETERMINE REFRACTIVE STATE: CPT | Mod: S$GLB,,, | Performed by: OPTOMETRIST

## 2019-04-03 PROCEDURE — 99499 UNLISTED E&M SERVICE: CPT | Mod: S$GLB,,, | Performed by: INTERNAL MEDICINE

## 2019-04-03 PROCEDURE — 3044F PR MOST RECENT HEMOGLOBIN A1C LEVEL <7.0%: ICD-10-PCS | Mod: CPTII,S$GLB,, | Performed by: INTERNAL MEDICINE

## 2019-04-03 PROCEDURE — 80076 HEPATIC FUNCTION PANEL: CPT

## 2019-04-03 PROCEDURE — 3044F HG A1C LEVEL LT 7.0%: CPT | Mod: CPTII,S$GLB,, | Performed by: INTERNAL MEDICINE

## 2019-04-03 PROCEDURE — 99214 OFFICE O/P EST MOD 30 MIN: CPT | Mod: S$GLB,,, | Performed by: INTERNAL MEDICINE

## 2019-04-03 PROCEDURE — 3008F BODY MASS INDEX DOCD: CPT | Mod: CPTII,S$GLB,, | Performed by: INTERNAL MEDICINE

## 2019-04-03 PROCEDURE — 92014 PR EYE EXAM, EST PATIENT,COMPREHESV: ICD-10-PCS | Mod: S$GLB,,, | Performed by: OPTOMETRIST

## 2019-04-03 PROCEDURE — 99999 PR PBB SHADOW E&M-EST. PATIENT-LVL II: ICD-10-PCS | Mod: PBBFAC,,, | Performed by: OPTOMETRIST

## 2019-04-03 PROCEDURE — 99999 PR PBB SHADOW E&M-EST. PATIENT-LVL IV: ICD-10-PCS | Mod: PBBFAC,,, | Performed by: INTERNAL MEDICINE

## 2019-04-03 PROCEDURE — 92014 COMPRE OPH EXAM EST PT 1/>: CPT | Mod: S$GLB,,, | Performed by: OPTOMETRIST

## 2019-04-03 PROCEDURE — 86803 HEPATITIS C AB TEST: CPT

## 2019-04-03 PROCEDURE — 86790 VIRUS ANTIBODY NOS: CPT

## 2019-04-03 PROCEDURE — 99214 PR OFFICE/OUTPT VISIT, EST, LEVL IV, 30-39 MIN: ICD-10-PCS | Mod: S$GLB,,, | Performed by: INTERNAL MEDICINE

## 2019-04-03 PROCEDURE — 99499 RISK ADDL DX/OHS AUDIT: ICD-10-PCS | Mod: S$GLB,,, | Performed by: INTERNAL MEDICINE

## 2019-04-03 PROCEDURE — 99999 PR PBB SHADOW E&M-EST. PATIENT-LVL II: CPT | Mod: PBBFAC,,, | Performed by: OPTOMETRIST

## 2019-04-03 PROCEDURE — 82306 VITAMIN D 25 HYDROXY: CPT

## 2019-04-03 PROCEDURE — 92015 PR REFRACTION: ICD-10-PCS | Mod: S$GLB,,, | Performed by: OPTOMETRIST

## 2019-04-03 PROCEDURE — 86706 HEP B SURFACE ANTIBODY: CPT

## 2019-04-03 PROCEDURE — 3008F PR BODY MASS INDEX (BMI) DOCUMENTED: ICD-10-PCS | Mod: CPTII,S$GLB,, | Performed by: INTERNAL MEDICINE

## 2019-04-03 NOTE — PROGRESS NOTES
HPI     Last [facility] exam 05/01/2018  Diabetic/IDDM   04/03/2019  Screening for glaucoma  RE    Last edited by Ezekiel Blanton MA on 4/3/2019  9:12 AM. (History)            Assessment /Plan     For exam results, see Encounter Report.    Diabetes mellitus type 2 without retinopathy    Glaucoma screening    Regular astigmatism of both eyes    Presbyopia      No diabetic retinopathy OU.  OH OK OU.  Spec Rx updated.  RTC one year.

## 2019-04-04 LAB
HCV AB SERPL QL IA: NEGATIVE
HEPATITIS A ANTIBODY, IGG: NEGATIVE

## 2019-04-08 ENCOUNTER — PATIENT MESSAGE (OUTPATIENT)
Dept: INTERNAL MEDICINE | Facility: CLINIC | Age: 49
End: 2019-04-08

## 2019-04-08 LAB
HBV SURFACE AB SER QL IA: NEGATIVE
HBV SURFACE AB SERPL IA-ACNC: <3 MIU/ML

## 2019-04-08 RX ORDER — DOXYCYCLINE 100 MG/1
100 CAPSULE ORAL 2 TIMES DAILY
Qty: 14 CAPSULE | Refills: 0 | Status: SHIPPED | OUTPATIENT
Start: 2019-04-08 | End: 2019-04-15

## 2019-04-18 ENCOUNTER — OFFICE VISIT (OUTPATIENT)
Dept: PSYCHIATRY | Facility: CLINIC | Age: 49
End: 2019-04-18
Payer: MEDICARE

## 2019-04-18 VITALS
SYSTOLIC BLOOD PRESSURE: 129 MMHG | WEIGHT: 162.94 LBS | DIASTOLIC BLOOD PRESSURE: 86 MMHG | HEART RATE: 95 BPM | BODY MASS INDEX: 28.86 KG/M2

## 2019-04-18 DIAGNOSIS — F33.42 RECURRENT MAJOR DEPRESSIVE DISORDER, IN FULL REMISSION: ICD-10-CM

## 2019-04-18 DIAGNOSIS — F41.1 GAD (GENERALIZED ANXIETY DISORDER): ICD-10-CM

## 2019-04-18 PROCEDURE — 99999 PR PBB SHADOW E&M-EST. PATIENT-LVL II: CPT | Mod: PBBFAC,,, | Performed by: PSYCHIATRY & NEUROLOGY

## 2019-04-18 PROCEDURE — 3008F PR BODY MASS INDEX (BMI) DOCUMENTED: ICD-10-PCS | Mod: CPTII,S$GLB,, | Performed by: PSYCHIATRY & NEUROLOGY

## 2019-04-18 PROCEDURE — 99999 PR PBB SHADOW E&M-EST. PATIENT-LVL II: ICD-10-PCS | Mod: PBBFAC,,, | Performed by: PSYCHIATRY & NEUROLOGY

## 2019-04-18 PROCEDURE — 99213 OFFICE O/P EST LOW 20 MIN: CPT | Mod: S$GLB,,, | Performed by: PSYCHIATRY & NEUROLOGY

## 2019-04-18 PROCEDURE — 3008F BODY MASS INDEX DOCD: CPT | Mod: CPTII,S$GLB,, | Performed by: PSYCHIATRY & NEUROLOGY

## 2019-04-18 PROCEDURE — 99499 RISK ADDL DX/OHS AUDIT: ICD-10-PCS | Mod: S$GLB,,, | Performed by: PSYCHIATRY & NEUROLOGY

## 2019-04-18 PROCEDURE — 99499 UNLISTED E&M SERVICE: CPT | Mod: S$GLB,,, | Performed by: PSYCHIATRY & NEUROLOGY

## 2019-04-18 PROCEDURE — 99213 PR OFFICE/OUTPT VISIT, EST, LEVL III, 20-29 MIN: ICD-10-PCS | Mod: S$GLB,,, | Performed by: PSYCHIATRY & NEUROLOGY

## 2019-04-18 RX ORDER — CLONAZEPAM 1 MG/1
1-2 TABLET ORAL NIGHTLY
Qty: 60 TABLET | Refills: 4 | Status: SHIPPED | OUTPATIENT
Start: 2019-04-18 | End: 2019-09-24 | Stop reason: SDUPTHER

## 2019-04-18 RX ORDER — DOXEPIN HYDROCHLORIDE 75 MG/1
75 CAPSULE ORAL NIGHTLY
Qty: 30 CAPSULE | Refills: 2 | Status: SHIPPED | OUTPATIENT
Start: 2019-04-18 | End: 2019-07-11 | Stop reason: SDUPTHER

## 2019-04-18 RX ORDER — CLONAZEPAM 1 MG/1
1-2 TABLET ORAL NIGHTLY
Qty: 60 TABLET | Refills: 4 | Status: SHIPPED | OUTPATIENT
Start: 2019-04-18 | End: 2019-04-18 | Stop reason: SDUPTHER
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
## 2019-05-14 ENCOUNTER — INITIAL CONSULT (OUTPATIENT)
Dept: PSYCHIATRY | Facility: CLINIC | Age: 49
End: 2019-05-14
Payer: MEDICARE

## 2019-05-14 DIAGNOSIS — F41.1 GAD (GENERALIZED ANXIETY DISORDER): ICD-10-CM

## 2019-05-14 DIAGNOSIS — F33.42 RECURRENT MAJOR DEPRESSIVE DISORDER, IN FULL REMISSION: Primary | ICD-10-CM

## 2019-05-14 DIAGNOSIS — F51.05 INSOMNIA DUE TO OTHER MENTAL DISORDER: ICD-10-CM

## 2019-05-14 DIAGNOSIS — F99 INSOMNIA DUE TO OTHER MENTAL DISORDER: ICD-10-CM

## 2019-05-14 PROCEDURE — 90791 PR PSYCHIATRIC DIAGNOSTIC EVALUATION: ICD-10-PCS | Mod: S$GLB,,, | Performed by: SOCIAL WORKER

## 2019-05-14 PROCEDURE — 90791 PSYCH DIAGNOSTIC EVALUATION: CPT | Mod: S$GLB,,, | Performed by: SOCIAL WORKER
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
## 2019-05-18 ENCOUNTER — TELEPHONE (OUTPATIENT)
Dept: URGENT CARE | Facility: CLINIC | Age: 49
End: 2019-05-18

## 2019-05-18 NOTE — TELEPHONE ENCOUNTER
Pts pharmacy requesting a refill on Accu-chek fastclix lancets drum(102) checking blood glucose three times daily. Accu -chek smartview test strips (50) checking blood glucose three times daily. //kah

## 2019-05-21 RX ORDER — INSULIN PUMP SYRINGE, 3 ML
EACH MISCELLANEOUS
Qty: 1 EACH | Refills: 0 | Status: SHIPPED | OUTPATIENT
Start: 2019-05-21 | End: 2020-02-10

## 2019-05-21 RX ORDER — LANCETS
EACH MISCELLANEOUS
Qty: 300 EACH | Refills: 6 | Status: SHIPPED | OUTPATIENT
Start: 2019-05-21 | End: 2020-02-10

## 2019-05-30 ENCOUNTER — OFFICE VISIT (OUTPATIENT)
Dept: PSYCHIATRY | Facility: CLINIC | Age: 49
End: 2019-05-30
Payer: MEDICARE

## 2019-05-30 ENCOUNTER — PATIENT MESSAGE (OUTPATIENT)
Dept: PSYCHIATRY | Facility: CLINIC | Age: 49
End: 2019-05-30

## 2019-05-30 VITALS
BODY MASS INDEX: 28.66 KG/M2 | HEART RATE: 90 BPM | SYSTOLIC BLOOD PRESSURE: 112 MMHG | DIASTOLIC BLOOD PRESSURE: 76 MMHG | WEIGHT: 161.81 LBS

## 2019-05-30 DIAGNOSIS — F43.21 ADJUSTMENT DISORDER WITH DEPRESSED MOOD: ICD-10-CM

## 2019-05-30 DIAGNOSIS — F41.1 GAD (GENERALIZED ANXIETY DISORDER): ICD-10-CM

## 2019-05-30 DIAGNOSIS — F41.9 ANXIETY: Primary | ICD-10-CM

## 2019-05-30 PROCEDURE — 99213 OFFICE O/P EST LOW 20 MIN: CPT | Mod: S$GLB,,, | Performed by: PSYCHIATRY & NEUROLOGY

## 2019-05-30 PROCEDURE — 99213 PR OFFICE/OUTPT VISIT, EST, LEVL III, 20-29 MIN: ICD-10-PCS | Mod: S$GLB,,, | Performed by: PSYCHIATRY & NEUROLOGY

## 2019-05-30 PROCEDURE — 99499 RISK ADDL DX/OHS AUDIT: ICD-10-PCS | Mod: S$GLB,,, | Performed by: PSYCHIATRY & NEUROLOGY

## 2019-05-30 PROCEDURE — 99999 PR PBB SHADOW E&M-EST. PATIENT-LVL I: CPT | Mod: PBBFAC,,, | Performed by: PSYCHIATRY & NEUROLOGY

## 2019-05-30 PROCEDURE — 3008F PR BODY MASS INDEX (BMI) DOCUMENTED: ICD-10-PCS | Mod: CPTII,S$GLB,, | Performed by: PSYCHIATRY & NEUROLOGY

## 2019-05-30 PROCEDURE — 99999 PR PBB SHADOW E&M-EST. PATIENT-LVL I: ICD-10-PCS | Mod: PBBFAC,,, | Performed by: PSYCHIATRY & NEUROLOGY

## 2019-05-30 PROCEDURE — 99499 UNLISTED E&M SERVICE: CPT | Mod: S$GLB,,, | Performed by: PSYCHIATRY & NEUROLOGY

## 2019-05-30 PROCEDURE — 3008F BODY MASS INDEX DOCD: CPT | Mod: CPTII,S$GLB,, | Performed by: PSYCHIATRY & NEUROLOGY

## 2019-05-30 NOTE — LETTER
[facility]  [address] [address]  Phone: [phone]  Fax: [phone]     6/11/19    To Whom It May Concern:      Jadyn Chance has been under my care since 1/7/19. I have been working on managing her medications for the following conditions: PTSD, Major Depressive Disorder and Generalized Anxiety Disorder. If you require any more information please feel free to contact me at the above phone or fax number.      Sincerely,           Dori Cortez MD
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
## 2019-06-03 ENCOUNTER — OFFICE VISIT (OUTPATIENT)
Dept: PSYCHIATRY | Facility: CLINIC | Age: 49
End: 2019-06-03
Payer: MEDICARE

## 2019-06-03 DIAGNOSIS — F41.1 GAD (GENERALIZED ANXIETY DISORDER): ICD-10-CM

## 2019-06-03 DIAGNOSIS — F33.42 RECURRENT MAJOR DEPRESSIVE DISORDER, IN FULL REMISSION: Primary | ICD-10-CM

## 2019-06-03 PROCEDURE — 90834 PR PSYCHOTHERAPY W/PATIENT, 45 MIN: ICD-10-PCS | Mod: S$GLB,,, | Performed by: SOCIAL WORKER

## 2019-06-03 PROCEDURE — 99499 UNLISTED E&M SERVICE: CPT | Mod: S$GLB,,, | Performed by: SOCIAL WORKER

## 2019-06-03 PROCEDURE — 90834 PSYTX W PT 45 MINUTES: CPT | Mod: S$GLB,,, | Performed by: SOCIAL WORKER

## 2019-06-03 PROCEDURE — 99499 RISK ADDL DX/OHS AUDIT: ICD-10-PCS | Mod: S$GLB,,, | Performed by: SOCIAL WORKER
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
## 2019-06-11 ENCOUNTER — PATIENT MESSAGE (OUTPATIENT)
Dept: OPHTHALMOLOGY | Facility: CLINIC | Age: 49
End: 2019-06-11

## 2019-07-02 RX ORDER — GABAPENTIN 300 MG/1
300 CAPSULE ORAL 3 TIMES DAILY
Qty: 270 CAPSULE | Refills: 1 | Status: SHIPPED | OUTPATIENT
Start: 2019-07-02 | End: 2020-02-11

## 2019-07-04 ENCOUNTER — PATIENT MESSAGE (OUTPATIENT)
Dept: FAMILY MEDICINE | Facility: CLINIC | Age: 49
End: 2019-07-04

## 2019-07-04 DIAGNOSIS — E11.9 TYPE 2 DIABETES MELLITUS WITHOUT COMPLICATION, WITH LONG-TERM CURRENT USE OF INSULIN: Chronic | ICD-10-CM

## 2019-07-04 DIAGNOSIS — Z79.4 TYPE 2 DIABETES MELLITUS WITHOUT COMPLICATION, WITH LONG-TERM CURRENT USE OF INSULIN: Chronic | ICD-10-CM

## 2019-07-05 RX ORDER — DEXTROSE 4 G
TABLET,CHEWABLE ORAL
Qty: 1 EACH | Refills: 0 | Status: SHIPPED | OUTPATIENT
Start: 2019-07-05 | End: 2020-02-10

## 2019-07-05 RX ORDER — PEN NEEDLE, DIABETIC 30 GX3/16"
1 NEEDLE, DISPOSABLE MISCELLANEOUS DAILY
Qty: 100 EACH | Refills: 3 | Status: SHIPPED | OUTPATIENT
Start: 2019-07-05 | End: 2022-11-23 | Stop reason: SDUPTHER

## 2019-07-05 RX ORDER — BLOOD-GLUCOSE CONTROL, NORMAL
EACH MISCELLANEOUS
Qty: 300 EACH | Refills: 3 | Status: SHIPPED | OUTPATIENT
Start: 2019-07-05 | End: 2020-02-10

## 2019-07-05 NOTE — TELEPHONE ENCOUNTER
Pt has a 4mo f/u & would like to know if she needs labs prior.  No labs were ordered at last visit.  Please advise./rpr

## 2019-07-08 ENCOUNTER — PATIENT MESSAGE (OUTPATIENT)
Dept: FAMILY MEDICINE | Facility: CLINIC | Age: 49
End: 2019-07-08

## 2019-07-08 DIAGNOSIS — E55.9 VITAMIN D DEFICIENCY: ICD-10-CM

## 2019-07-08 DIAGNOSIS — E11.9 TYPE 2 DIABETES MELLITUS WITHOUT COMPLICATION, WITH LONG-TERM CURRENT USE OF INSULIN: Primary | ICD-10-CM

## 2019-07-08 DIAGNOSIS — Z79.4 TYPE 2 DIABETES MELLITUS WITHOUT COMPLICATION, WITH LONG-TERM CURRENT USE OF INSULIN: Primary | ICD-10-CM

## 2019-07-10 ENCOUNTER — PATIENT MESSAGE (OUTPATIENT)
Dept: FAMILY MEDICINE | Facility: CLINIC | Age: 49
End: 2019-07-10

## 2019-07-10 DIAGNOSIS — E11.9 TYPE 2 DIABETES MELLITUS WITHOUT COMPLICATION, WITH LONG-TERM CURRENT USE OF INSULIN: Primary | ICD-10-CM

## 2019-07-10 DIAGNOSIS — Z79.4 TYPE 2 DIABETES MELLITUS WITHOUT COMPLICATION, WITH LONG-TERM CURRENT USE OF INSULIN: Primary | ICD-10-CM

## 2019-07-11 ENCOUNTER — OFFICE VISIT (OUTPATIENT)
Dept: PSYCHIATRY | Facility: CLINIC | Age: 49
End: 2019-07-11
Payer: MEDICARE

## 2019-07-11 VITALS
DIASTOLIC BLOOD PRESSURE: 68 MMHG | WEIGHT: 163.81 LBS | SYSTOLIC BLOOD PRESSURE: 107 MMHG | BODY MASS INDEX: 29.02 KG/M2 | HEART RATE: 82 BPM

## 2019-07-11 DIAGNOSIS — F41.1 GAD (GENERALIZED ANXIETY DISORDER): ICD-10-CM

## 2019-07-11 DIAGNOSIS — F33.42 RECURRENT MAJOR DEPRESSIVE DISORDER, IN FULL REMISSION: ICD-10-CM

## 2019-07-11 DIAGNOSIS — F33.42 RECURRENT MAJOR DEPRESSIVE DISORDER, IN FULL REMISSION: Primary | ICD-10-CM

## 2019-07-11 PROCEDURE — 99212 PR OFFICE/OUTPT VISIT, EST, LEVL II, 10-19 MIN: ICD-10-PCS | Mod: S$GLB,,, | Performed by: PSYCHIATRY & NEUROLOGY

## 2019-07-11 PROCEDURE — 99999 PR PBB SHADOW E&M-EST. PATIENT-LVL I: ICD-10-PCS | Mod: PBBFAC,,, | Performed by: PSYCHIATRY & NEUROLOGY

## 2019-07-11 PROCEDURE — 90834 PR PSYCHOTHERAPY W/PATIENT, 45 MIN: ICD-10-PCS | Mod: S$GLB,,, | Performed by: SOCIAL WORKER

## 2019-07-11 PROCEDURE — 3008F BODY MASS INDEX DOCD: CPT | Mod: CPTII,S$GLB,, | Performed by: PSYCHIATRY & NEUROLOGY

## 2019-07-11 PROCEDURE — 3008F PR BODY MASS INDEX (BMI) DOCUMENTED: ICD-10-PCS | Mod: CPTII,S$GLB,, | Performed by: PSYCHIATRY & NEUROLOGY

## 2019-07-11 PROCEDURE — 99999 PR PBB SHADOW E&M-EST. PATIENT-LVL I: CPT | Mod: PBBFAC,,, | Performed by: PSYCHIATRY & NEUROLOGY

## 2019-07-11 PROCEDURE — 99212 OFFICE O/P EST SF 10 MIN: CPT | Mod: S$GLB,,, | Performed by: PSYCHIATRY & NEUROLOGY

## 2019-07-11 PROCEDURE — 90834 PSYTX W PT 45 MINUTES: CPT | Mod: S$GLB,,, | Performed by: SOCIAL WORKER

## 2019-07-11 RX ORDER — DOXEPIN HYDROCHLORIDE 75 MG/1
75 CAPSULE ORAL NIGHTLY
Qty: 30 CAPSULE | Refills: 2 | Status: SHIPPED | OUTPATIENT
Start: 2019-07-11 | End: 2019-09-24 | Stop reason: SDUPTHER
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
## 2019-07-24 ENCOUNTER — PATIENT OUTREACH (OUTPATIENT)
Dept: ADMINISTRATIVE | Facility: HOSPITAL | Age: 49
End: 2019-07-24

## 2019-07-24 DIAGNOSIS — Z12.39 SCREENING FOR MALIGNANT NEOPLASM OF BREAST: Primary | ICD-10-CM

## 2019-07-24 NOTE — PROGRESS NOTES
Contacted patient and scheduled overdue Mammogram.Health maintenance reviewed. Immunizations reviewed and reconciled.

## 2019-07-24 NOTE — PROGRESS NOTES
Attempted to contact patient regarding scheduling for overdue HM  No answer. Left a detailed voicemail message including call back number.  Portal Message to patient regarding same.

## 2019-07-29 NOTE — PROGRESS NOTES
Subjective:      Patient ID: Jadyn Chance is a 49 y.o. female.    Chief Complaint: No chief complaint on file.      HPI  Here for follow up of medical problems.  Pt did not make it to the exam room for appt.  See below.    Past Medical History:   Diagnosis Date    Abnormal Pap smear     bx was negative, per pt    Anemia     Anxiety     B12 deficiency     Blood transfusion     multiple     Chronic LBP     Degenerative disc disease     l spine    Diabetes mellitus     Diabetic neuropathy     General anesthetics causing adverse effect in therapeutic use     delayed emergence    Mixed hyperlipidemia 2013    RLS (restless legs syndrome)     Seizures     Vitamin D deficiency disease        Past Surgical History:   Procedure Laterality Date    ANUS SURGERY      BELT ABDOMINOPLASTY      tummy [facility]    BREAST SURGERY      breast augmentation     SECTION      x2    CHOLECYSTECTOMY      COLONOSCOPY N/A 2013    [facility] ENDO    EGD (ESOPHAGOGASTRODUODENOSCOPY) N/A 2013    [facility] at [facility] ENDO    mikel      ESOPHAGOGASTRODUODENOSCOPY (EGD) N/A 2015    [facility] (4TH FLR)    ESOPHAGOGASTRODUODENOSCOPY (EGD) N/A 3/27/2015    Performed by Scot[facility] at [facility] ENDO (2ND FLR)    EXCISIONAL HEMORRHOIDECTOMY      GASTRIC BYPASS      HEMORRHOIDECTOMY N/A 10/2/2013    [facility]ler [facility] at [facility] OR    HIP FRACTURE SURGERY      left hip ORIF    JEJUNOSTOMY N/A 2015    Performed [facility] Tyler [facility] at [facility] OR 2ND FLR    MOUTH SURGERY      RESECTION - SMALL BOWEL  2015    Per[facility] at [facility] OR 2ND FLR    revision of JJ anastomosis  2/27/15    small bowel resection  2015    SPHINCTEROTOMY, ANAL N/A 10/2/2013    [facility]ler [facility] at [facility] OR    TUBAL LIGATION             Updated/  annual due 1/20:  HM:  Ref fluvax, 1/19 [id number], 8/14 [id number], 12/10 TDaP, 4/18 [facility], Pain Dr. Mcgregor.     Review of Systems      Objective:   LMP 12/04/2014     Physical Exam        Assessment:       1. Seizure          Plan:   Diagnoses and all orders for this visit:    Seizure vs Pseudoseizure (pt has that dx also in history)    Pt with distant history of seizures, on depakote in the past, now on no medicatioin.  Had recent seizure, seen in [place], where she lives.  Hasn't seen Neurology in years.  Was put on medication, unknown name.  Came into clinic this morning and while walking in started with tonic-clonic seizure.  After pt's seizure passed, she had another.  EMS arrived and gave 5mg IV valium.    Refer to ER for evaluation, hope Neuro consult.

## 2019-07-31 ENCOUNTER — LAB VISIT (OUTPATIENT)
Dept: LAB | Facility: HOSPITAL | Age: 49
End: 2019-07-31
Attending: INTERNAL MEDICINE
Payer: MEDICARE

## 2019-07-31 DIAGNOSIS — E11.9 TYPE 2 DIABETES MELLITUS WITHOUT COMPLICATION, WITH LONG-TERM CURRENT USE OF INSULIN: ICD-10-CM

## 2019-07-31 DIAGNOSIS — Z79.4 TYPE 2 DIABETES MELLITUS WITHOUT COMPLICATION, WITH LONG-TERM CURRENT USE OF INSULIN: ICD-10-CM

## 2019-07-31 DIAGNOSIS — E55.9 VITAMIN D DEFICIENCY: ICD-10-CM

## 2019-07-31 LAB
25(OH)D3+25(OH)D2 SERPL-MCNC: 17 NG/ML (ref 30–96)
ANION GAP SERPL CALC-SCNC: 8 MMOL/L (ref 8–16)
BUN SERPL-MCNC: 19 MG/DL (ref 6–20)
CALCIUM SERPL-MCNC: 9.3 MG/DL (ref 8.7–10.5)
CHLORIDE SERPL-SCNC: 106 MMOL/L (ref 95–110)
CO2 SERPL-SCNC: 28 MMOL/L (ref 23–29)
CREAT SERPL-MCNC: 0.8 MG/DL (ref 0.5–1.4)
EST. GFR  (AFRICAN AMERICAN): >60 ML/MIN/1.73 M^2
EST. GFR  (NON AFRICAN AMERICAN): >60 ML/MIN/1.73 M^2
ESTIMATED AVG GLUCOSE: 183 MG/DL (ref 68–131)
GLUCOSE SERPL-MCNC: 195 MG/DL (ref 70–110)
HBA1C MFR BLD HPLC: 8 % (ref 4–5.6)
POTASSIUM SERPL-SCNC: 4.4 MMOL/L (ref 3.5–5.1)
SODIUM SERPL-SCNC: 142 MMOL/L (ref 136–145)

## 2019-07-31 PROCEDURE — 82306 VITAMIN D 25 HYDROXY: CPT

## 2019-07-31 PROCEDURE — 80048 BASIC METABOLIC PNL TOTAL CA: CPT

## 2019-07-31 PROCEDURE — 36415 COLL VENOUS BLD VENIPUNCTURE: CPT

## 2019-07-31 PROCEDURE — 83036 HEMOGLOBIN GLYCOSYLATED A1C: CPT

## 2019-08-07 ENCOUNTER — OFFICE VISIT (OUTPATIENT)
Dept: FAMILY MEDICINE | Facility: CLINIC | Age: 49
End: 2019-08-07
Payer: MEDICARE

## 2019-08-07 ENCOUNTER — HOSPITAL ENCOUNTER (EMERGENCY)
Facility: HOSPITAL | Age: 49
Discharge: HOME | End: 2019-08-07
Attending: EMERGENCY MEDICINE
Payer: MEDICARE

## 2019-08-07 VITALS
OXYGEN SATURATION: 98 % | TEMPERATURE: 98 F | SYSTOLIC BLOOD PRESSURE: 114 MMHG | DIASTOLIC BLOOD PRESSURE: 58 MMHG | WEIGHT: 162.94 LBS | HEART RATE: 68 BPM | HEIGHT: 63 IN | BODY MASS INDEX: 28.87 KG/M2 | RESPIRATION RATE: 18 BRPM

## 2019-08-07 DIAGNOSIS — R56.9 SEIZURE: Primary | ICD-10-CM

## 2019-08-07 DIAGNOSIS — R56.9 SEIZURE: ICD-10-CM

## 2019-08-07 DIAGNOSIS — R56.9 CONVULSIONS, UNSPECIFIED CONVULSION TYPE: Primary | ICD-10-CM

## 2019-08-07 LAB
ALBUMIN SERPL BCP-MCNC: 3.3 G/DL (ref 3.5–5.2)
ALP SERPL-CCNC: 129 U/L (ref 55–135)
ALT SERPL W/O P-5'-P-CCNC: 57 U/L (ref 10–44)
ANION GAP SERPL CALC-SCNC: 10 MMOL/L (ref 8–16)
AST SERPL-CCNC: 36 U/L (ref 10–40)
BACTERIA #/AREA URNS HPF: NORMAL /HPF
BASOPHILS # BLD AUTO: 0.02 K/UL (ref 0–0.2)
BASOPHILS NFR BLD: 0.3 % (ref 0–1.9)
BILIRUB SERPL-MCNC: 1 MG/DL (ref 0.1–1)
BILIRUB UR QL STRIP: NEGATIVE
BUN SERPL-MCNC: 12 MG/DL (ref 6–20)
CALCIUM SERPL-MCNC: 8.4 MG/DL (ref 8.7–10.5)
CHLORIDE SERPL-SCNC: 109 MMOL/L (ref 95–110)
CLARITY UR: CLEAR
CO2 SERPL-SCNC: 23 MMOL/L (ref 23–29)
COLOR UR: YELLOW
CREAT SERPL-MCNC: 1 MG/DL (ref 0.5–1.4)
DIFFERENTIAL METHOD: ABNORMAL
EOSINOPHIL # BLD AUTO: 0.2 K/UL (ref 0–0.5)
EOSINOPHIL NFR BLD: 2.3 % (ref 0–8)
ERYTHROCYTE [DISTWIDTH] IN BLOOD BY AUTOMATED COUNT: 13.7 % (ref 11.5–14.5)
EST. GFR  (AFRICAN AMERICAN): >60 ML/MIN/1.73 M^2
EST. GFR  (NON AFRICAN AMERICAN): >60 ML/MIN/1.73 M^2
GLUCOSE SERPL-MCNC: 388 MG/DL (ref 70–110)
GLUCOSE UR QL STRIP: ABNORMAL
HCT VFR BLD AUTO: 39.7 % (ref 37–48.5)
HGB BLD-MCNC: 13.2 G/DL (ref 12–16)
HGB UR QL STRIP: NEGATIVE
KETONES UR QL STRIP: NEGATIVE
LEUKOCYTE ESTERASE UR QL STRIP: NEGATIVE
LYMPHOCYTES # BLD AUTO: 1 K/UL (ref 1–4.8)
LYMPHOCYTES NFR BLD: 14.4 % (ref 18–48)
MCH RBC QN AUTO: 31.1 PG (ref 27–31)
MCHC RBC AUTO-ENTMCNC: 33.2 G/DL (ref 32–36)
MCV RBC AUTO: 94 FL (ref 82–98)
MICROSCOPIC COMMENT: NORMAL
MONOCYTES # BLD AUTO: 0.4 K/UL (ref 0.3–1)
MONOCYTES NFR BLD: 6.3 % (ref 4–15)
NEUTROPHILS # BLD AUTO: 5.1 K/UL (ref 1.8–7.7)
NEUTROPHILS NFR BLD: 76.9 % (ref 38–73)
NITRITE UR QL STRIP: NEGATIVE
PH UR STRIP: 6 [PH] (ref 5–8)
PLATELET # BLD AUTO: 225 K/UL (ref 150–350)
PMV BLD AUTO: 12.3 FL (ref 9.2–12.9)
POTASSIUM SERPL-SCNC: 4.2 MMOL/L (ref 3.5–5.1)
PROT SERPL-MCNC: 6.5 G/DL (ref 6–8.4)
PROT UR QL STRIP: NEGATIVE
RBC # BLD AUTO: 4.24 M/UL (ref 4–5.4)
SODIUM SERPL-SCNC: 142 MMOL/L (ref 136–145)
SP GR UR STRIP: 1.02 (ref 1–1.03)
TROPONIN I SERPL DL<=0.01 NG/ML-MCNC: <0.006 NG/ML (ref 0–0.03)
URN SPEC COLLECT METH UR: ABNORMAL
UROBILINOGEN UR STRIP-ACNC: NEGATIVE EU/DL
VALPROATE SERPL-MCNC: 19.6 UG/ML (ref 50–100)
WBC # BLD AUTO: 6.62 K/UL (ref 3.9–12.7)
YEAST URNS QL MICRO: NORMAL

## 2019-08-07 PROCEDURE — 96374 THER/PROPH/DIAG INJ IV PUSH: CPT

## 2019-08-07 PROCEDURE — 85025 COMPLETE CBC W/AUTO DIFF WBC: CPT

## 2019-08-07 PROCEDURE — 84484 ASSAY OF TROPONIN QUANT: CPT

## 2019-08-07 PROCEDURE — 93005 ELECTROCARDIOGRAM TRACING: CPT

## 2019-08-07 PROCEDURE — 99213 OFFICE O/P EST LOW 20 MIN: CPT | Mod: S$GLB,,, | Performed by: INTERNAL MEDICINE

## 2019-08-07 PROCEDURE — 63600175 PHARM REV CODE 636 W HCPCS: Performed by: EMERGENCY MEDICINE

## 2019-08-07 PROCEDURE — 99499 UNLISTED E&M SERVICE: CPT | Mod: S$GLB,,, | Performed by: INTERNAL MEDICINE

## 2019-08-07 PROCEDURE — 80053 COMPREHEN METABOLIC PANEL: CPT

## 2019-08-07 PROCEDURE — 99285 EMERGENCY DEPT VISIT HI MDM: CPT | Mod: 25

## 2019-08-07 PROCEDURE — 80164 ASSAY DIPROPYLACETIC ACD TOT: CPT

## 2019-08-07 PROCEDURE — 93010 ELECTROCARDIOGRAM REPORT: CPT | Mod: ,,, | Performed by: INTERNAL MEDICINE

## 2019-08-07 PROCEDURE — 99213 PR OFFICE/OUTPT VISIT, EST, LEVL III, 20-29 MIN: ICD-10-PCS | Mod: S$GLB,,, | Performed by: INTERNAL MEDICINE

## 2019-08-07 PROCEDURE — 81000 URINALYSIS NONAUTO W/SCOPE: CPT

## 2019-08-07 PROCEDURE — 99499 RISK ADDL DX/OHS AUDIT: ICD-10-PCS | Mod: S$GLB,,, | Performed by: INTERNAL MEDICINE

## 2019-08-07 PROCEDURE — 93010 EKG 12-LEAD: ICD-10-PCS | Mod: ,,, | Performed by: INTERNAL MEDICINE

## 2019-08-07 RX ORDER — TRAMADOL HYDROCHLORIDE 50 MG/1
50 TABLET ORAL
COMMUNITY
Start: 2016-07-22 | End: 2019-08-28

## 2019-08-07 RX ORDER — TRIAMCINOLONE ACETONIDE 1 MG/G
PASTE DENTAL
Refills: 2 | COMMUNITY
Start: 2019-07-18 | End: 2019-08-28

## 2019-08-07 RX ORDER — DIVALPROEX SODIUM 250 MG/1
250 TABLET, DELAYED RELEASE ORAL 2 TIMES DAILY
Refills: 0 | COMMUNITY
Start: 2019-08-04 | End: 2019-08-27 | Stop reason: SDUPTHER

## 2019-08-07 RX ORDER — DIAZEPAM 5 MG/1
5 TABLET ORAL 3 TIMES DAILY
Refills: 0 | COMMUNITY
Start: 2019-08-04 | End: 2019-08-27 | Stop reason: SDUPTHER

## 2019-08-07 RX ORDER — DOCUSATE SODIUM 100 MG/1
100 CAPSULE, LIQUID FILLED ORAL
COMMUNITY
Start: 2019-02-11 | End: 2019-09-23

## 2019-08-07 RX ORDER — HYDROXYZINE PAMOATE 25 MG/1
25 CAPSULE ORAL
COMMUNITY
Start: 2016-03-14 | End: 2019-08-28

## 2019-08-07 RX ORDER — CYCLOBENZAPRINE HCL 5 MG
5 TABLET ORAL 3 TIMES DAILY PRN
Refills: 0 | COMMUNITY
Start: 2019-07-29 | End: 2020-06-03

## 2019-08-07 RX ADMIN — SODIUM CHLORIDE 1000 ML: 0.9 INJECTION, SOLUTION INTRAVENOUS at 09:08

## 2019-08-07 RX ADMIN — LORAZEPAM 2 MG: 2 INJECTION INTRAMUSCULAR; INTRAVENOUS at 09:08

## 2019-08-07 NOTE — ED NOTES
Pt lying in bed comfortably. AAO x 4. Resp even and unlabored with equal chest rise and fall. Skin warm and dry. 20G PIV noted to R hand. Flushes well, drg CDI. Side rails up x 2. Call light within reach. No distress noted. Pt denies any needs or assist at this time.

## 2019-08-07 NOTE — ED TRIAGE NOTES
Pt. Arrives to ER via EMS, reports hx of seizures x3 episodes at doctors office and x3 with EMS. Pt. Has had 1 episode while in ER. Dr. Harris at bedside 2mg of ativan administered. Seizure pads in place, pt. On cardiac monitor.

## 2019-08-07 NOTE — ED PROVIDER NOTES
SCRIBE #1 NOTE: IIfrah am scribing for, and in the presence of, Grayson Harris MD. I have scribed the HPI, ROS, and PEx.     SCRIBE #2 NOTE: I, Ирина Maurice, am scribing for, and in the presence of,  Grayson Harris MD. I have scribed the remaining portions of the note not scribed by Scribe #1.      History     Chief Complaint   Patient presents with    Seizures     Arrives to ER via EMS, reports seizures has doctors office. x4 episodes at MDs office x3 episodes with EMS. Hx of seizures has not had an episode in 3 years.      Review of patient's allergies indicates:   Allergen Reactions    Demerol [meperidine] Hallucinations    Penicillins Other (See Comments)     Patient cannot recall specific reaction, reports she has been listing this as an allergy since childhood.    Bactrim [sulfamethoxazole-trimethoprim]     Ciprofloxacin (bulk)     Codeine Nausea And Vomiting    Levetiracetam     Toradol [ketorolac]     Tramadol      seizures         History of Present Illness     HPI    8/7/2019, 9:42 AM  History obtained from the EMS and patient       History of Present Illness: Jadyn Chance is a 49 y.o. female patient with a PMHx of seizures and DM who presents to the Emergency Department for evaluation of seizures which onset suddenly today. She went to her PCP's office today when she had 4 episodes in the waiting room and EMS was called. Patient had 3 episodes with EMS and another upon arrival to ED. She has not had a seizure in 3 years. Symptoms are episodic and moderate in severity. No mitigating or exacerbating factors reported. No associated sxs. Patient denies any head injury/trauma, tongue biting, n/v, HA, confusion, dizziness, CP, SOB, fever, chills, and all other sxs at this time. No prior Tx. No further complaints or concerns at this time.         Arrival mode: EMS    PCP: Mikayla Myles MD        Past Medical History:  Past Medical History:   Diagnosis Date     Abnormal Pap smear     bx was negative, per pt    Anemia     Anxiety     B12 deficiency     Blood transfusion     multiple     Chronic LBP     Degenerative disc disease     l spine    Diabetes mellitus     Diabetic neuropathy     General anesthetics causing adverse effect in therapeutic use     delayed emergence    Mixed hyperlipidemia 2013    RLS (restless legs syndrome)     Seizures     Vitamin D deficiency disease        Past Surgical History:  Past Surgical History:   Procedure Laterality Date    ANUS SURGERY      BELT ABDOMINOPLASTY      tummy tuck    BREAST SURGERY  2008    breast augmentation     SECTION      x2    CHOLECYSTECTOMY      COLONOSCOPY N/A 2013    [facility]formed [facility]    EGD (ESOPHAGOGASTRODUODENOSCOPY) N/A 2013    [facility] by [facility] END[facility]    mikel      ESOPHAGOGASTRODUODENOSCOPY (EGD) N/A 2015    [facility] (4TH FLR)    ESOPHAGOGASTRODUODENOSCOPY (EGD) N/A 3/27/2015    [facility] by [facility] at [facility] ENDO (2ND FLR)    EXCISIONAL HEMORRHOIDECTOMY      GASTRIC BYPASS      HEMORRHOIDECTOMY N/A 10/2/2013    [facility]formed by Epi [facility]essler [facility] at [facility] OR    HIP FRACTURE SURGERY      left hip ORIF    JEJUNOSTOMY N/A 2015    [facility] at [facility] OR 2ND FLR    MOUTH SURGERY      RESECTION - SMALL BOWEL  2015    [facility] OR 2ND FLR    revision of JJ anastomosis  2/27/15    small bowel resection  2015    SPHINCTEROTOMY, ANAL N/A 10/2/2013    Performed by [facility]pi [facility]ler [facility] OR    TUBAL LIGATION           Family History:  Family History   Problem Relation Age of Onset    Diabetes Mother     Cataracts Mother     Ovarian cancer Sister 30    Glaucoma Maternal Aunt     Blindness Maternal Aunt     Breast cancer Neg Hx     Colon cancer Neg Hx     Thrombophilia  Neg Hx        Social History:  Social History     Tobacco Use    Smoking status: Never Smoker    Smokeless tobacco: Never Used   Substance and Sexual Activity    Alcohol use: No    Drug use: No    Sexual activity: Yes     Partners: Male     Birth control/protection: Surgical     Comment: BTL; mut monog        Review of Systems     Review of Systems   Constitutional: Negative for activity change, appetite change, chills, diaphoresis, fatigue and fever.   HENT: Negative for congestion, ear pain, nosebleeds, rhinorrhea, sinus pain, sore throat and trouble swallowing.         (-) tongue biting   Eyes: Negative for pain and discharge.   Respiratory: Negative for cough, chest tightness, shortness of breath, wheezing and stridor.    Cardiovascular: Negative for chest pain, palpitations and leg swelling.   Gastrointestinal: Negative for abdominal distention, abdominal pain, blood in stool, constipation, diarrhea, nausea and vomiting.   Genitourinary: Negative for difficulty urinating, dysuria, flank pain, frequency, hematuria and urgency.   Musculoskeletal: Negative for arthralgias, back pain, myalgias and neck pain.   Skin: Negative for pallor, rash and wound.   Neurological: Positive for seizures. Negative for dizziness, syncope, weakness, light-headedness, numbness and headaches.        (-) head injury/trauma   Hematological: Does not bruise/bleed easily.   Psychiatric/Behavioral: Negative for confusion and self-injury.   All other systems reviewed and are negative.     Physical Exam     Initial Vitals [08/07/19 0947]   BP Pulse Resp Temp SpO2   (!) 143/88 106 18 98.7 °F (37.1 °C) 97 %      MAP       --          Physical Exam  Nursing Notes and Vital Signs Reviewed.  Constitutional: Patient is in no acute distress. Well-developed and well-nourished.  Head: Atraumatic. Normocephalic.  Eyes: PERRL. EOM intact. Conjunctivae are not pale. No scleral icterus.  ENT: Mucous membranes are moist. Oropharynx is clear and  "symmetric.    Neck: Supple. Full ROM. No lymphadenopathy.  Cardiovascular: Regular rate. Regular rhythm. No murmurs, rubs, or gallops. Distal pulses are 2+ and symmetric.  Pulmonary/Chest: No respiratory distress. Clear to auscultation bilaterally. No wheezing or rales.  Abdominal: Soft and non-distended. There is no tenderness. No rebound, guarding, or rigidity.   Musculoskeletal: No obvious deformities. No edema.  Skin: Warm and dry.  Neurological: Patient is alert, awake, and oriented. Answering questions. Communicative. Seizure-like activity noted.  Psychiatric: Normal affect. Good eye contact. Appropriate in content.     ED Course   Procedures  ED Vital Signs:  Vitals:    08/07/19 0947 08/07/19 1011 08/07/19 1017 08/07/19 1102   BP: (!) 143/88  136/79 112/63   Pulse: 106 98 90 83   Resp: 18  16 18   Temp: 98.7 °F (37.1 °C)      TempSrc: Oral      SpO2: 97%  98% 95%   Weight: 73.9 kg (162 lb 14.7 oz)      Height: 5' 3" (1.6 m)          Abnormal Lab Results:  Labs Reviewed   VALPROIC ACID - Abnormal; Notable for the following components:       Result Value    Valproic Acid Level 19.6 (*)     All other components within normal limits   CBC W/ AUTO DIFFERENTIAL - Abnormal; Notable for the following components:    Mean Corpuscular Hemoglobin 31.1 (*)     Gran% 76.9 (*)     Lymph% 14.4 (*)     All other components within normal limits   COMPREHENSIVE METABOLIC PANEL - Abnormal; Notable for the following components:    Glucose 388 (*)     Calcium 8.4 (*)     Albumin 3.3 (*)     ALT 57 (*)     All other components within normal limits   URINALYSIS, REFLEX TO URINE CULTURE - Abnormal; Notable for the following components:    Glucose, UA 3+ (*)     All other components within normal limits    Narrative:     Preferred Collection Type->Urine, Clean Catch   TROPONIN I   URINALYSIS MICROSCOPIC    Narrative:     Preferred Collection Type->Urine, Clean Catch        All Lab Results:  Results for orders placed or performed " during the hospital encounter of 08/07/19   Valproic Acid   Result Value Ref Range    Valproic Acid Level 19.6 (L) 50.0 - 100.0 ug/mL   CBC auto differential   Result Value Ref Range    WBC 6.62 3.90 - 12.70 K/uL    RBC 4.24 4.00 - 5.40 M/uL    Hemoglobin 13.2 12.0 - 16.0 g/dL    Hematocrit 39.7 37.0 - 48.5 %    Mean Corpuscular Volume 94 82 - 98 fL    Mean Corpuscular Hemoglobin 31.1 (H) 27.0 - 31.0 pg    Mean Corpuscular Hemoglobin Conc 33.2 32.0 - 36.0 g/dL    RDW 13.7 11.5 - 14.5 %    Platelets 225 150 - 350 K/uL    MPV 12.3 9.2 - 12.9 fL    Gran # (ANC) 5.1 1.8 - 7.7 K/uL    Lymph # 1.0 1.0 - 4.8 K/uL    Mono # 0.4 0.3 - 1.0 K/uL    Eos # 0.2 0.0 - 0.5 K/uL    Baso # 0.02 0.00 - 0.20 K/uL    Gran% 76.9 (H) 38.0 - 73.0 %    Lymph% 14.4 (L) 18.0 - 48.0 %    Mono% 6.3 4.0 - 15.0 %    Eosinophil% 2.3 0.0 - 8.0 %    Basophil% 0.3 0.0 - 1.9 %    Differential Method Automated    Comprehensive metabolic panel   Result Value Ref Range    Sodium 142 136 - 145 mmol/L    Potassium 4.2 3.5 - 5.1 mmol/L    Chloride 109 95 - 110 mmol/L    CO2 23 23 - 29 mmol/L    Glucose 388 (H) 70 - 110 mg/dL    BUN, Bld 12 6 - 20 mg/dL    Creatinine 1.0 0.5 - 1.4 mg/dL    Calcium 8.4 (L) 8.7 - 10.5 mg/dL    Total Protein 6.5 6.0 - 8.4 g/dL    Albumin 3.3 (L) 3.5 - 5.2 g/dL    Total Bilirubin 1.0 0.1 - 1.0 mg/dL    Alkaline Phosphatase 129 55 - 135 U/L    AST 36 10 - 40 U/L    ALT 57 (H) 10 - 44 U/L    Anion Gap 10 8 - 16 mmol/L    eGFR if African American >60 >60 mL/min/1.73 m^2    eGFR if non African American >60 >60 mL/min/1.73 m^2   Troponin I   Result Value Ref Range    Troponin I <0.006 0.000 - 0.026 ng/mL   Urinalysis, Reflex to Urine Culture Urine, Clean Catch   Result Value Ref Range    Specimen UA Urine, Clean Catch     Color, UA Yellow Yellow, Straw, Miriam    Appearance, UA Clear Clear    pH, UA 6.0 5.0 - 8.0    Specific Gravity, UA 1.020 1.005 - 1.030    Protein, UA Negative Negative    Glucose, UA 3+ (A) Negative    Ketones, UA  Negative Negative    Bilirubin (UA) Negative Negative    Occult Blood UA Negative Negative    Nitrite, UA Negative Negative    Urobilinogen, UA Negative <2.0 EU/dL    Leukocytes, UA Negative Negative   Urinalysis Microscopic   Result Value Ref Range    Bacteria Occasional None-Occ /hpf    Yeast, UA None None    Microscopic Comment SEE COMMENT          Imaging Results:  Imaging Results          CT Head Without Contrast (Final result)  Result time 08/07/19 10:59:52    Final result by Jono Keys Jr., MD (08/07/19 10:59:52)                 Impression:      No acute or significant CT abnormality.  No significant change.    All CT scans at this facility use dose modulation, iterative reconstruction, and/or weight base dosing when appropriate to reduce radiation dose to as low as reasonably achievable.      Electronically signed by: Jono Keys Jr., MD  Date:    08/07/2019  Time:    10:59             Narrative:    EXAMINATION:  CT HEAD WITHOUT CONTRAST    CLINICAL HISTORY:  Unspecified convulsionsSeizures new or progressive;    TECHNIQUE:  Contiguous axial images were obtained from the skull base through the vertex without intravenous contrast.    COMPARISON:  Prior CT from January 9, 2019.    FINDINGS:  No intracranial hemorrhage. No mass effect or midline shift. Normal parenchymal attenuation. The ventricles and sulci are normal in size and configuration. The paranasal sinuses and mastoid air cells are clear.  No concerning osseous findings.                               X-Ray Chest 1 View (Final result)  Result time 08/07/19 10:25:48    Final result by Jono Keys Jr., MD (08/07/19 10:25:48)                 Impression:      No acute abnormality.      Electronically signed by: Jono Keys Jr., MD  Date:    08/07/2019  Time:    10:25             Narrative:    EXAMINATION:  XR CHEST 1 VIEW    CLINICAL HISTORY:  Unspecified convulsions    TECHNIQUE:  Single frontal view of the chest was  performed.    COMPARISON:  None    FINDINGS:  The lungs are clear, with normal appearance of pulmonary vasculature and no pleural effusion or pneumothorax.    The cardiac silhouette is normal in size. The hilar and mediastinal contours are unremarkable.    Bones are intact.                                 The EKG was ordered, reviewed, and independently interpreted by the ED provider.  Interpretation time: 0951  Rate: 103 BPM  Rhythm: sinus tachycardia  Interpretation: No acute ST changes. No STEMI.         The Emergency Provider reviewed the vital signs and test results, which are outlined above.     ED Discussion     11:51 AM: Reassessed pt at this time. Pt reports she has been dx with pseudoseizures and has close f/u. She reports she was really stressed this AM because a recent passing of a family member. She states she feels better and is ready to go home at this time. Discussed with pt all pertinent ED information and results. Discussed pt dx and plan of tx. Gave pt all f/u and return to the ED instructions. All questions and concerns were addressed at this time. Pt expresses understanding of information and instructions, and is comfortable with plan to discharge. Pt is stable for discharge.    I discussed with patient and/or family/caretaker that evaluation in the ED does not suggest any emergent or life threatening medical conditions requiring immediate intervention beyond what was provided in the ED, and I believe patient is safe for discharge.  Regardless, an unremarkable evaluation in the ED does not preclude the development or presence of a serious of life threatening condition. As such, patient was instructed to return immediately for any worsening or change in current symptoms.    Patient presents with seizure or seizure-like symptoms. I have no suspicion of an intracranial, traumatic, infectious, metabolic, toxic, cardiovascular (specifically arrhythmia), or other emergent medical condition requiring  further intervention. Seizure precautions were discussed with the patient and/or caretaker, specifically not to swim unattended, not to operate motor vehicles or other machinery, and to avoid heights or other areas where falls may occur until cleared by primary care physician. Patient is safe for discharge.      ED Medication(s):  Medications   lorazepam (ATIVAN) injection 2 mg (2 mg Intravenous Given 8/7/19 0942)   sodium chloride 0.9% bolus 1,000 mL (1,000 mLs Intravenous New Bag 8/7/19 5757)       New Prescriptions    No medications on file       Follow-up Information     Mikayla Myles MD. Call in 1 day.    Specialty:  Internal Medicine  Contact information:  [address] [facility] [address] [phone]             [facility]    Specialty:  Emergency Medicine  Why:  If symptoms worsen  Contact information:  [address]  [facility] [address] [phone]           Neurology. Call in 2 days.    Why:  As needed                       Medical Decision Making:   Clinical Tests:   Lab Tests: Ordered and Reviewed  Radiological Study: Ordered and Reviewed  Medical Tests: Ordered and Reviewed             Scribe Attestation:   Scribe #1: I performed the above scribed service and the documentation accurately describes the services I performed. I attest to the accuracy of the note.     Attending:   Physician Attestation Statement for Scribe #1: I, Grayson Harris MD, personally performed the services described in this documentation, as scribed by Ifrah Salas, in my presence, and it is both accurate and complete.       Scribe Attestation:   Scribe #2: I performed the above scribed service and the documentation accurately describes the services I performed. I attest to the accuracy of the note.    Attending Attestation:           Physician Attestation for Scribe:    Physician Attestation Statement for Scribe #2: I, Grayson Harris MD, reviewed  documentation, as scribed by Ирина Maurice in my presence, and it is both accurate and complete. I also acknowledge and confirm the content of the note done by Scribe #1.           Clinical Impression       ICD-10-CM ICD-9-CM   1. Convulsions, unspecified convulsion type R56.9 780.39   2. Seizure R56.9 780.39       Disposition:   Disposition: Discharged  Condition: Stable         Grayson Harris MD  08/07/19 2813

## 2019-08-12 ENCOUNTER — OFFICE VISIT (OUTPATIENT)
Dept: PSYCHIATRY | Facility: CLINIC | Age: 49
End: 2019-08-12
Payer: MEDICARE

## 2019-08-12 DIAGNOSIS — F33.42 RECURRENT MAJOR DEPRESSIVE DISORDER, IN FULL REMISSION: Primary | ICD-10-CM

## 2019-08-12 DIAGNOSIS — F41.1 GAD (GENERALIZED ANXIETY DISORDER): ICD-10-CM

## 2019-08-12 PROCEDURE — 90834 PR PSYCHOTHERAPY W/PATIENT, 45 MIN: ICD-10-PCS | Mod: S$GLB,,, | Performed by: SOCIAL WORKER

## 2019-08-12 PROCEDURE — 90834 PSYTX W PT 45 MINUTES: CPT | Mod: S$GLB,,, | Performed by: SOCIAL WORKER

## 2019-08-20 NOTE — PROGRESS NOTES
Individual Psychotherapy (PhD/LCSW)    8/12/2019    Site:  [place]         Therapeutic Intervention: Met with patient.  Outpatient - Insight oriented psychotherapy 45 min - CPT code 23947    Chief complaint/reason for encounter: depression and anxiety     Interval history and content of current session: Pt presents to to session with flat affect. She appears sedated and has difficulty keeping her eyes open; speech is slurred. She reports that she had seizures last week and went to the ED. Records indicate she had pseudoseizures. She reports that she took medications right before this session, which she says accounts for her sedated presentation. She required frequent redirection and was difficult to follow for the first half of the session. We were then able to discuss recent conflict that involved a court appearance. She denies feeling depressed today.    Treatment plan:  · Target symptoms: depression, anxiety   · Why chosen therapy is appropriate versus another modality: relevant to diagnosis, evidence based practice  · Outcome monitoring methods: self-report, observation  · Therapeutic intervention type: insight oriented psychotherapy, supportive psychotherapy    Risk parameters:  Patient reports no suicidal ideation  Patient reports no homicidal ideation  Patient reports no self-injurious behavior  Patient reports no violent behavior    Verbal deficits: None    Patient's response to intervention:  The patient's response to intervention is accepting.    Progress toward goals and other mental status changes:  The patient's progress toward goals is good.    Diagnosis:     ICD-10-CM ICD-9-CM   1. Recurrent major depressive disorder, in full remission F33.42 296.36   2. CINDY (generalized anxiety disorder) F41.1 300.02       Plan:  individual psychotherapy and medication management by physician    Return to clinic: 2 weeks    Length of Service (minutes): 45

## 2019-08-26 ENCOUNTER — TELEPHONE (OUTPATIENT)
Dept: FAMILY MEDICINE | Facility: CLINIC | Age: 49
End: 2019-08-26

## 2019-08-26 DIAGNOSIS — R56.9 SEIZURE: Primary | ICD-10-CM

## 2019-08-26 NOTE — TELEPHONE ENCOUNTER
----- Message from Roxanne Hwang MA sent at 8/26/2019  4:12 PM CDT -----  Contact: pt      ----- Message -----  From: Edith Hartman  Sent: 8/26/2019   4:00 PM  To: Milan OLIVEIRA Staff    The pt states she needs to be referred to a Neurologist, the pt next appt here is in January, the pt wants to be advised and can be reached at [phone]///thxMW

## 2019-08-26 NOTE — TELEPHONE ENCOUNTER
I will refer to Neurology, but ask if there is a Neurologist where she lives who she can see for evaluation.  SM

## 2019-08-27 NOTE — TELEPHONE ENCOUNTER
----- Message from Alyssa Olivier sent at 8/27/2019  2:25 PM CDT -----  Contact: pt  .Type:  Patient Returning Call    Who Called: pt  Who Left Message for Patient: Ken   Does the patient know what this is regarding?:    Would the patient rather a call back or a response via [employer]?  Call back   Best Call Back Number: [phone] (home)   Additional Information:

## 2019-08-27 NOTE — TELEPHONE ENCOUNTER
----- Message from Irma aBrraza sent at 8/27/2019  3:47 PM CDT -----  .Type:  Patient Returning Call    Who Called:self  Who Left Message for Patient: tina  Does the patient know what this is regarding?:seizure medication  Would the patient rather a call back or a response via [facility]? call  Best Call Back Number:.[phone] (home)   Additional Information:

## 2019-08-27 NOTE — TELEPHONE ENCOUNTER
S/w pt regarding pt cannot get a sooner eric w/ Neuro until 10/28. Pt States that she has seizures and would like a refill on Valium 5 mg and Depakote 250 mg that the ER prescribed her. Please advise -JACLYN-    CLYDE 8/7/19

## 2019-08-27 NOTE — TELEPHONE ENCOUNTER
S/w pt regarding getting an Neuro eric. Informed pt to contact insurance company to see who's she is covered with and we will send over referral. -KT-

## 2019-08-28 ENCOUNTER — OFFICE VISIT (OUTPATIENT)
Dept: OBSTETRICS AND GYNECOLOGY | Facility: CLINIC | Age: 49
End: 2019-08-28
Payer: MEDICARE

## 2019-08-28 ENCOUNTER — HOSPITAL ENCOUNTER (OUTPATIENT)
Dept: RADIOLOGY | Facility: HOSPITAL | Age: 49
Discharge: HOME | End: 2019-08-28
Attending: INTERNAL MEDICINE
Payer: MEDICARE

## 2019-08-28 VITALS
BODY MASS INDEX: 29.06 KG/M2 | DIASTOLIC BLOOD PRESSURE: 86 MMHG | WEIGHT: 164 LBS | SYSTOLIC BLOOD PRESSURE: 132 MMHG | HEIGHT: 63 IN

## 2019-08-28 VITALS — WEIGHT: 162 LBS | BODY MASS INDEX: 28.7 KG/M2 | HEIGHT: 63 IN

## 2019-08-28 DIAGNOSIS — Z12.39 SCREENING FOR MALIGNANT NEOPLASM OF BREAST: ICD-10-CM

## 2019-08-28 DIAGNOSIS — Z01.419 WELL WOMAN EXAM WITH ROUTINE GYNECOLOGICAL EXAM: Primary | ICD-10-CM

## 2019-08-28 PROCEDURE — 99999 PR PBB SHADOW E&M-EST. PATIENT-LVL II: ICD-10-PCS | Mod: PBBFAC,,, | Performed by: OBSTETRICS & GYNECOLOGY

## 2019-08-28 PROCEDURE — 99999 PR PBB SHADOW E&M-EST. PATIENT-LVL II: CPT | Mod: PBBFAC,,, | Performed by: OBSTETRICS & GYNECOLOGY

## 2019-08-28 PROCEDURE — G0101 PR CA SCREEN;PELVIC/BREAST EXAM: ICD-10-PCS | Mod: S$GLB,,, | Performed by: OBSTETRICS & GYNECOLOGY

## 2019-08-28 PROCEDURE — 77067 SCR MAMMO BI INCL CAD: CPT | Mod: 26,,, | Performed by: RADIOLOGY

## 2019-08-28 PROCEDURE — G0101 CA SCREEN;PELVIC/BREAST EXAM: HCPCS | Mod: S$GLB,,, | Performed by: OBSTETRICS & GYNECOLOGY

## 2019-08-28 PROCEDURE — 77067 SCR MAMMO BI INCL CAD: CPT | Mod: TC

## 2019-08-28 PROCEDURE — 77063 BREAST TOMOSYNTHESIS BI: CPT | Mod: 26,,, | Performed by: RADIOLOGY

## 2019-08-28 PROCEDURE — 88142 CYTOPATH C/V THIN LAYER: CPT

## 2019-08-28 PROCEDURE — 87624 HPV HI-RISK TYP POOLED RSLT: CPT

## 2019-08-28 PROCEDURE — 77067 MAMMO DIGITAL SCREENING BILAT WITH TOMOSYNTHESIS_CAD: ICD-10-PCS | Mod: 26,,, | Performed by: RADIOLOGY

## 2019-08-28 PROCEDURE — 77063 MAMMO DIGITAL SCREENING BILAT WITH TOMOSYNTHESIS_CAD: ICD-10-PCS | Mod: 26,,, | Performed by: RADIOLOGY

## 2019-08-28 RX ORDER — DIAZEPAM 5 MG/1
5 TABLET ORAL EVERY 8 HOURS PRN
Qty: 20 TABLET | Refills: 0 | Status: SHIPPED | OUTPATIENT
Start: 2019-08-28 | End: 2019-10-11 | Stop reason: SDUPTHER

## 2019-08-28 RX ORDER — DIVALPROEX SODIUM 250 MG/1
250 TABLET, DELAYED RELEASE ORAL 2 TIMES DAILY
Qty: 60 TABLET | Refills: 2 | Status: SHIPPED | OUTPATIENT
Start: 2019-08-28 | End: 2020-09-23

## 2019-08-28 NOTE — PROGRESS NOTES
CHIEF COMPLAINT:   Gynecologic Exam  Chief Complaint   Patient presents with    Annual Exam       HISTORY OF PRESENT ILLNESS  Jadyn Chance   presents for annual exam. The patient has no complaints today.     Patient's last menstrual period was 2014.  Postmenopausal     GYN screening history: last Pap: was normal. Never had any abnormal Pap smears in past.     Reports no bowel movement irregularities from baseline, bloating, or weight loss.  HRT:   None       Health Maintenance   Topic Date Due    Low Dose Statin  1990    Pap Smear  2018    Mammogram  2019    Foot Exam  2020    Lipid Panel  2020    Hemoglobin A1c  2020    Eye Exam  2020    Urine Microalbumin  2020    Colonoscopy  2022    TETANUS VACCINE  2023    Pneumococcal Vaccine (Medium Risk)  Completed       HISTORY  Patient Active Problem List   Diagnosis    Anxiety    Type 2 diabetes mellitus without complication, with long-term current use of insulin    Vitamin B12 deficiency    History of Kayleen-en-Y gastric bypass    Orthostatic hypotension    History of iron deficiency    Mixed hyperlipidemia    Hypermenorrhea    Dysmenorrhea    Pelvic pain in female    Fibroids    Vitamin D deficiency    Insomnia    Degenerative lumbar spinal stenosis    Thoracic or lumbosacral neuritis or radiculitis, unspecified    Dehydration    Acute renal failure (ARF)    Abdominal pain    Marginal ulcer    Jejunal ulcer    Pseudoseizure    Chronic right-sided low back pain    Hyperglycemia    Elevated liver enzymes    Chronic pain syndrome    Recurrent major depressive disorder, in full remission    CINDY (generalized anxiety disorder)       Past Medical History:   Diagnosis Date    Abnormal Pap smear     bx was negative, per pt    Anemia     Anxiety     B12 deficiency     Blood transfusion     multiple     Chronic LBP     Degenerative disc disease      l spine    Diabetes mellitus     Diabetic neuropathy     General anesthetics causing adverse effect in therapeutic use     delayed emergence    Mixed hyperlipidemia 2013    RLS (restless legs syndrome)     Seizures     Vitamin D deficiency disease        Past Surgical History:   Procedure Laterality Date    ANUS SURGERY      AUGMENTATION OF BREAST  2008    saline    BELT ABDOMINOPLASTY      tummy [facility]    BREAST SURGERY  2008    breast augmentation     SECTION      x2    CHOLECYSTECTOMY      COLONOSCOPY N/A 2013    [facility]    EGD (ESOPHAGOGASTRODUODENOSCOPY) N/A 2013    [facility] END[facility]    mikel      ESOPHAGOGASTRODUODENOSCOPY (EGD) N/A 2015    [facility] (4[facility] FLR)    ESOPHAGOGASTRODUODENOSCOPY (EGD) N/A 3/27/2015    [facility] END[facility] (2ND FLR)    EXCISIONAL HEMORRHOIDECTOMY      GASTRIC BYPASS      HEMORRHOIDECTOMY N/A 10/2/2013    [facility] at [facility] OR    HIP FRACTURE SURGERY      left hip ORIF    JEJUNOSTOMY N/A 2015    [facility] 2ND FLR    MOUTH SURGERY      RESECTION - SMALL BOWEL  2015    [facility] 2[facility]LR    revision of JJ anastomosis  2/27/15    small bowel resection  2015    SPHINCTEROTOMY, ANAL N/A 10/2/2013    [facility] OR    TUBAL LIGATION         Family History   Problem Relation Age of Onset    Diabetes Mother     Cataracts Mother     Glaucoma Maternal Aunt     Blindness Maternal Aunt     Breast cancer Neg Hx     Colon cancer Neg Hx     Thrombophilia Neg Hx        Social History     Socioeconomic History    Marital status: Significant Other     Spouse name: Not on file    Number of children: 4    Years of education: Not on file    Highest education level:  Not on file   Occupational History    Not on file   Social Needs    Financial resource strain: Not on file    Food insecurity:     Worry: Not on file     Inability: Not on file    Transportation needs:     Medical: Not on file     Non-medical: Not on file   Tobacco Use    Smoking status: Never Smoker    Smokeless tobacco: Never Used   Substance and Sexual Activity    Alcohol use: No    Drug use: No    Sexual activity: Yes     Partners: Male     Birth control/protection: Surgical     Comment: BTL; mut monog   Lifestyle    Physical activity:     Days per week: Not on file     Minutes per session: Not on file    Stress: Not on file   Relationships    Social connections:     Talks on phone: Not on file     Gets together: Not on file     Attends Voodoo service: Not on file     Active member of club or organization: Not on file     Attends meetings of clubs or organizations: Not on file     Relationship status: Not on file   Other Topics Concern    Not on file   Social History Narrative    Not on file       Current Outpatient Medications   Medication Sig Dispense Refill    ACCU-CHEK GEORGETTE Misc       BD ALCOHOL SWABS PadM       BIOTIN ORAL Take 3,000 Units by mouth.      blood sugar diagnostic Strp To check BG 3 times daily, to use with insurance preferred meter/Accu-check 100 each 11    blood sugar diagnostic Strp To check BG 3 times daily, to use with insurance preferred meter 300 each 6    blood sugar diagnostic Strp To check BG 3 times daily, to use with insurance preferred meter  Dx:  E11.9 300 strip 3    blood-glucose meter kit To check BG 3 times daily, to use with insurance preferred meter 1 each 0    blood-glucose meter Misc To check blood glucose 3 times daily 1 each 0    clonazePAM (KLONOPIN) 1 MG tablet Take 1 to 2 tablets (1-2 mg total) by mouth every evening. 60 tablet 4    cyanocobalamin 1,000 mcg/mL injection Inject 1 mL (1,000 mcg total) into the skin every 28 days. 10 mL 11     "cyclobenzaprine (FLEXERIL) 5 MG tablet Take 5 mg by mouth 3 (three) times daily as needed.  0    diazePAM (VALIUM) 5 MG tablet Take 1 tablet (5 mg total) by mouth every 8 (eight) hours as needed (as needed for seizure activity). 20 tablet 0    divalproex (DEPAKOTE) 250 MG EC tablet Take 1 tablet (250 mg total) by mouth 2 (two) times daily. 60 tablet 2    docusate sodium (COLACE) 100 MG capsule 100 mg.      doxepin (SINEQUAN) 75 MG capsule Take 1 capsule (75 mg total) by mouth every evening. 30 capsule 2    DULoxetine (CYMBALTA) 30 MG capsule Take 1 capsule (30 mg total) by mouth 2 (two) times daily. 60 capsule 11    ergocalciferol (VITAMIN D2) 50,000 unit Cap TAKE 1 CAPSULE (50,000 UNITS TOTAL) BY MOUTH ONCE A WEEK. 4 capsule 3    gabapentin (NEURONTIN) 300 MG capsule Take 1 capsule (300 mg total) by mouth 3 (three) times daily. 270 capsule 1    insulin detemir U-100 (LEVEMIR FLEXTOUCH) 100 unit/mL (3 mL) SubQ InPn pen Inject 30 Units into the skin every evening. 1 Box 5    lancets 30 gauge Misc Use to check blood glucose 3 times daily DX E11.9 300 each 3    lancets Misc To check BG 3 times daily, to use with insurance preferred meter/Accu-check 100 each 11    lancets Misc To check BG 3 times daily, to use with insurance preferred meter 300 each 6    NOVOLIN R REGULAR U-100 INSULN 100 unit/mL injection       oxyCODONE-acetaminophen (PERCOCET)  mg per tablet take one tablet by mouth every 8 hours as needed for pain 90 tablet 0    pen needle, diabetic 32 gauge x 5/32" Ndle Use 1 needle once daily. 100 each 3    syringe with needle (SYRINGE 3CC/25GX1") 3 mL 25 gauge x 1" Syrg For vitamin B12 injection 100 Syringe 1     No current facility-administered medications for this visit.        Review of patient's allergies indicates:   Allergen Reactions    Demerol [meperidine] Hallucinations    Penicillins Other (See Comments)     Patient cannot recall specific reaction, reports she has been listing " this as an allergy since childhood.    Bactrim [sulfamethoxazole-trimethoprim]     Ciprofloxacin (bulk)     Codeine Nausea And Vomiting    Levetiracetam     Toradol [ketorolac]     Tramadol      seizures           PHYSICAL EXAM     Vitals:    08/28/19 1013   BP: 132/86       PAIN SCALE: 0/10 None    ROS:  GENERAL: No fever, chills, fatigability or weight loss.  ABDOMEN: Appetite fine. No weight loss. Denies diarrhea, abdominal pain, hematemesis or blood in stool.  No change in bowel movement pattern.  URINARY: No flank pain, dysuria or hematuria.  REPRODUCTIVE: No abnormal vaginal bleeding.  BREASTS: Breasts symmetric, nontender and no lumps detected.    PE:   APPEARANCE: Well nourished, well developed, in no acute distress.  NECK: Neck symmetric without masses or thyromegaly.   NODES: No inguinal lymph node enlargement.  ABDOMEN: Soft. No tenderness or masses. No hepatosplenomegaly. No hernias.  BREASTS: Symmetrical, no skin changes or visible lesions. No palpable masses, nipple discharge or adenopathy bilaterally.    PELVIC:   VULVA: No lesions. Normal female genitalia.  URETHRAL MEATUS: Normal size and location, no lesions, no prolapse.  URETHRA: No masses, tenderness, prolapse or scarring.  VAGINA: dry but rugated, no discharge, no significant cystocele or rectocele.  CERVIX: No lesions, normal diameter, no stenosis, no cervical motion tenderness.  UTERUS: 8 week size, regular shape, mobile, non-tender, normal position, good support.  ADNEXA: No masses, tenderness or CDS nodularity.  ANUS PERINEUM: No lesions, no relaxation, external hemorrhoids noted.        DIAGNOSIS:   1. Normal gyn exam  2. Screening pap smear      PLAN:   Mammogram    Colonoscopy  dexa     MEDICATIONS PRESCRIBED:   Calcium vitamin D and weight bearing exercise    COUNSELING:  Patient was counseled today on A.C.S. Pap guidelines and recommendations for yearly pelvic exams, mammograms and monthly self breast exams; to see her PCP for  other health maintenance.   Pt counseled on signs and symptoms of cancer of the pelvic organs including ovarian and uterine, and to alert myself and my office if pt has any vaginal bleeding, pelvic/abdominal pain, persistent bloating, unexplained constipation, unexplained appetite and/or weight loss.     FOLLOW-UP: With me in 1 year. Pap smear every 3 years.

## 2019-09-03 LAB
HPV HR 12 DNA CVX QL NAA+PROBE: NEGATIVE
HPV16 AG SPEC QL: NEGATIVE
HPV18 DNA SPEC QL NAA+PROBE: NEGATIVE

## 2019-09-05 ENCOUNTER — PATIENT OUTREACH (OUTPATIENT)
Dept: ADMINISTRATIVE | Facility: HOSPITAL | Age: 49
End: 2019-09-05

## 2019-09-05 NOTE — PROGRESS NOTES
Call pt to schedule repeat A1c lab in November No answer       Valeri HAHN LPN Care Coordinator  Care Coordination Department  [facility]  [phone]

## 2019-09-21 ENCOUNTER — PATIENT MESSAGE (OUTPATIENT)
Dept: PSYCHIATRY | Facility: CLINIC | Age: 49
End: 2019-09-21

## 2019-09-23 ENCOUNTER — PATIENT MESSAGE (OUTPATIENT)
Dept: HEMATOLOGY/ONCOLOGY | Facility: CLINIC | Age: 49
End: 2019-09-23

## 2019-09-24 RX ORDER — CLONAZEPAM 1 MG/1
1-2 TABLET ORAL NIGHTLY
Qty: 60 TABLET | Refills: 4 | Status: SHIPPED | OUTPATIENT
Start: 2019-09-24 | End: 2019-10-11

## 2019-09-24 RX ORDER — DOXEPIN HYDROCHLORIDE 75 MG/1
75 CAPSULE ORAL NIGHTLY
Qty: 30 CAPSULE | Refills: 4 | Status: SHIPPED | OUTPATIENT
Start: 2019-09-24 | End: 2019-10-11

## 2019-10-07 ENCOUNTER — LAB VISIT (OUTPATIENT)
Dept: LAB | Facility: HOSPITAL | Age: 49
End: 2019-10-07
Attending: INTERNAL MEDICINE
Payer: MEDICARE

## 2019-10-07 ENCOUNTER — OFFICE VISIT (OUTPATIENT)
Dept: HEMATOLOGY/ONCOLOGY | Facility: CLINIC | Age: 49
End: 2019-10-07
Payer: MEDICARE

## 2019-10-07 VITALS
WEIGHT: 169.06 LBS | HEIGHT: 63 IN | TEMPERATURE: 98 F | SYSTOLIC BLOOD PRESSURE: 108 MMHG | BODY MASS INDEX: 29.95 KG/M2 | DIASTOLIC BLOOD PRESSURE: 73 MMHG | HEART RATE: 79 BPM

## 2019-10-07 DIAGNOSIS — Z98.84 HISTORY OF ROUX-EN-Y GASTRIC BYPASS: ICD-10-CM

## 2019-10-07 DIAGNOSIS — Z86.39 HISTORY OF IRON DEFICIENCY: ICD-10-CM

## 2019-10-07 DIAGNOSIS — Z86.39 HISTORY OF IRON DEFICIENCY: Primary | ICD-10-CM

## 2019-10-07 DIAGNOSIS — E53.8 VITAMIN B12 DEFICIENCY: ICD-10-CM

## 2019-10-07 LAB
ALBUMIN SERPL BCP-MCNC: 3.5 G/DL (ref 3.5–5.2)
ALP SERPL-CCNC: 106 U/L (ref 55–135)
ALT SERPL W/O P-5'-P-CCNC: 35 U/L (ref 10–44)
ANION GAP SERPL CALC-SCNC: 9 MMOL/L (ref 8–16)
AST SERPL-CCNC: 24 U/L (ref 10–40)
BASOPHILS # BLD AUTO: 0.02 K/UL (ref 0–0.2)
BASOPHILS NFR BLD: 0.4 % (ref 0–1.9)
BILIRUB SERPL-MCNC: 1 MG/DL (ref 0.1–1)
BUN SERPL-MCNC: 21 MG/DL (ref 6–20)
CALCIUM SERPL-MCNC: 8.8 MG/DL (ref 8.7–10.5)
CHLORIDE SERPL-SCNC: 103 MMOL/L (ref 95–110)
CO2 SERPL-SCNC: 28 MMOL/L (ref 23–29)
CREAT SERPL-MCNC: 0.8 MG/DL (ref 0.5–1.4)
DIFFERENTIAL METHOD: ABNORMAL
EOSINOPHIL # BLD AUTO: 0.3 K/UL (ref 0–0.5)
EOSINOPHIL NFR BLD: 5.1 % (ref 0–8)
ERYTHROCYTE [DISTWIDTH] IN BLOOD BY AUTOMATED COUNT: 12.6 % (ref 11.5–14.5)
EST. GFR  (AFRICAN AMERICAN): >60 ML/MIN/1.73 M^2
EST. GFR  (NON AFRICAN AMERICAN): >60 ML/MIN/1.73 M^2
GLUCOSE SERPL-MCNC: 107 MG/DL (ref 70–110)
HCT VFR BLD AUTO: 42.2 % (ref 37–48.5)
HGB BLD-MCNC: 13.6 G/DL (ref 12–16)
IMM GRANULOCYTES # BLD AUTO: 0.01 K/UL (ref 0–0.04)
IMM GRANULOCYTES NFR BLD AUTO: 0.2 % (ref 0–0.5)
LYMPHOCYTES # BLD AUTO: 1.7 K/UL (ref 1–4.8)
LYMPHOCYTES NFR BLD: 31.3 % (ref 18–48)
MCH RBC QN AUTO: 31.4 PG (ref 27–31)
MCHC RBC AUTO-ENTMCNC: 32.2 G/DL (ref 32–36)
MCV RBC AUTO: 98 FL (ref 82–98)
MONOCYTES # BLD AUTO: 0.4 K/UL (ref 0.3–1)
MONOCYTES NFR BLD: 7.6 % (ref 4–15)
NEUTROPHILS # BLD AUTO: 2.9 K/UL (ref 1.8–7.7)
NEUTROPHILS NFR BLD: 55.6 % (ref 38–73)
NRBC BLD-RTO: 0 /100 WBC
PLATELET # BLD AUTO: 191 K/UL (ref 150–350)
PMV BLD AUTO: 11.3 FL (ref 9.2–12.9)
POTASSIUM SERPL-SCNC: 4.4 MMOL/L (ref 3.5–5.1)
PROT SERPL-MCNC: 6.7 G/DL (ref 6–8.4)
RBC # BLD AUTO: 4.33 M/UL (ref 4–5.4)
SODIUM SERPL-SCNC: 140 MMOL/L (ref 136–145)
WBC # BLD AUTO: 5.27 K/UL (ref 3.9–12.7)

## 2019-10-07 PROCEDURE — 99214 PR OFFICE/OUTPT VISIT, EST, LEVL IV, 30-39 MIN: ICD-10-PCS | Mod: 25,S$GLB,, | Performed by: INTERNAL MEDICINE

## 2019-10-07 PROCEDURE — 3008F BODY MASS INDEX DOCD: CPT | Mod: CPTII,S$GLB,, | Performed by: INTERNAL MEDICINE

## 2019-10-07 PROCEDURE — 99214 OFFICE O/P EST MOD 30 MIN: CPT | Mod: 25,S$GLB,, | Performed by: INTERNAL MEDICINE

## 2019-10-07 PROCEDURE — 36415 COLL VENOUS BLD VENIPUNCTURE: CPT

## 2019-10-07 PROCEDURE — 3008F PR BODY MASS INDEX (BMI) DOCUMENTED: ICD-10-PCS | Mod: CPTII,S$GLB,, | Performed by: INTERNAL MEDICINE

## 2019-10-07 PROCEDURE — 80053 COMPREHEN METABOLIC PANEL: CPT

## 2019-10-07 PROCEDURE — 90686 FLU VACCINE (QUAD) GREATER THAN OR EQUAL TO 3YO PRESERVATIVE FREE IM: ICD-10-PCS | Mod: S$GLB,,, | Performed by: INTERNAL MEDICINE

## 2019-10-07 PROCEDURE — 99999 PR PBB SHADOW E&M-EST. PATIENT-LVL IV: ICD-10-PCS | Mod: PBBFAC,,, | Performed by: INTERNAL MEDICINE

## 2019-10-07 PROCEDURE — G0008 ADMIN INFLUENZA VIRUS VAC: HCPCS | Mod: S$GLB,,, | Performed by: INTERNAL MEDICINE

## 2019-10-07 PROCEDURE — 90686 IIV4 VACC NO PRSV 0.5 ML IM: CPT | Mod: S$GLB,,, | Performed by: INTERNAL MEDICINE

## 2019-10-07 PROCEDURE — 85025 COMPLETE CBC W/AUTO DIFF WBC: CPT

## 2019-10-07 PROCEDURE — G0008 FLU VACCINE (QUAD) GREATER THAN OR EQUAL TO 3YO PRESERVATIVE FREE IM: ICD-10-PCS | Mod: S$GLB,,, | Performed by: INTERNAL MEDICINE

## 2019-10-07 PROCEDURE — 99999 PR PBB SHADOW E&M-EST. PATIENT-LVL IV: CPT | Mod: PBBFAC,,, | Performed by: INTERNAL MEDICINE
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
## 2019-10-11 ENCOUNTER — OFFICE VISIT (OUTPATIENT)
Dept: PSYCHIATRY | Facility: CLINIC | Age: 49
End: 2019-10-11
Payer: MEDICARE

## 2019-10-11 VITALS
DIASTOLIC BLOOD PRESSURE: 57 MMHG | WEIGHT: 170.19 LBS | SYSTOLIC BLOOD PRESSURE: 97 MMHG | HEART RATE: 89 BPM | BODY MASS INDEX: 30.15 KG/M2

## 2019-10-11 DIAGNOSIS — F41.1 GAD (GENERALIZED ANXIETY DISORDER): Primary | ICD-10-CM

## 2019-10-11 DIAGNOSIS — R56.9 SEIZURES: ICD-10-CM

## 2019-10-11 DIAGNOSIS — F33.42 RECURRENT MAJOR DEPRESSIVE DISORDER, IN FULL REMISSION: ICD-10-CM

## 2019-10-11 PROCEDURE — 3008F PR BODY MASS INDEX (BMI) DOCUMENTED: ICD-10-PCS | Mod: CPTII,S$GLB,, | Performed by: PSYCHIATRY & NEUROLOGY

## 2019-10-11 PROCEDURE — 99214 PR OFFICE/OUTPT VISIT, EST, LEVL IV, 30-39 MIN: ICD-10-PCS | Mod: S$GLB,,, | Performed by: PSYCHIATRY & NEUROLOGY

## 2019-10-11 PROCEDURE — 99999 PR PBB SHADOW E&M-EST. PATIENT-LVL II: ICD-10-PCS | Mod: PBBFAC,,, | Performed by: PSYCHIATRY & NEUROLOGY

## 2019-10-11 PROCEDURE — 3008F BODY MASS INDEX DOCD: CPT | Mod: CPTII,S$GLB,, | Performed by: PSYCHIATRY & NEUROLOGY

## 2019-10-11 PROCEDURE — 99214 OFFICE O/P EST MOD 30 MIN: CPT | Mod: S$GLB,,, | Performed by: PSYCHIATRY & NEUROLOGY

## 2019-10-11 PROCEDURE — 90834 PSYTX W PT 45 MINUTES: CPT | Mod: S$GLB,,, | Performed by: SOCIAL WORKER

## 2019-10-11 PROCEDURE — 99999 PR PBB SHADOW E&M-EST. PATIENT-LVL II: CPT | Mod: PBBFAC,,, | Performed by: PSYCHIATRY & NEUROLOGY

## 2019-10-11 PROCEDURE — 90834 PR PSYCHOTHERAPY W/PATIENT, 45 MIN: ICD-10-PCS | Mod: S$GLB,,, | Performed by: SOCIAL WORKER

## 2019-10-11 RX ORDER — DIAZEPAM 5 MG/1
5 TABLET ORAL 3 TIMES DAILY
Qty: 90 TABLET | Refills: 1 | Status: SHIPPED | OUTPATIENT
Start: 2019-10-11 | End: 2019-10-11 | Stop reason: SDUPTHER

## 2019-10-11 RX ORDER — DOXEPIN HYDROCHLORIDE 50 MG/1
50 CAPSULE ORAL NIGHTLY
Qty: 30 CAPSULE | Refills: 5 | Status: SHIPPED | OUTPATIENT
Start: 2019-10-11 | End: 2020-02-11

## 2019-10-11 RX ORDER — DIAZEPAM 5 MG/1
5 TABLET ORAL 3 TIMES DAILY
Qty: 90 TABLET | Refills: 2 | Status: SHIPPED | OUTPATIENT
Start: 2019-10-11 | End: 2020-01-07

## 2019-10-11 NOTE — PATIENT INSTRUCTIONS
Depression Affects Your Mind and Body  Everyone feels sad or blue from time to time for a few days or weeks. Depression is when these feelings don't go away and they interfere with daily life.  Depression is a real illness that can develop at any age. It is one of the most common mental health problems in the [place] Depression makes you feel sad, helpless, and hopeless. It gets in the way of your life and relationships. It inhibits your ability to think and act. But, with help, you can feel better again.     When I was depressed, I felt awful. I was so tired all the time I could hardly think, but at night I couldnt fall asleep. My head hurt. My stomach hurt. I didnt know what was wrong with me.   Depression affects your whole body  Brain chemicals affect your body as well as your mood. So depression may do more than just make you feel low. You may also feel bad physically. Depression can:  · Cause trouble with mental tasks such as remembering, concentrating, or making decisions  · Make you feel nervous and jumpy  · Cause trouble sleeping. Or you may sleep too much  · Change your appetite  · Cause headaches, stomachaches, or other aches and pains  · Drain your body of energy  Depression and other illness  It is common for people who have chronic health problems to also have depression. It can often be hard to tell which one caused the other. A person might become depressed after finding out they have a health problem. But some studies suggest being depressed may make certain health problems more likely. And some depressed people stop taking care of themselves. This may make them more likely to get sick.  Date Last Reviewed: 1/1/2017 © 2000-2017 [employer]. [address] [place] [address], [place] [address]. All rights reserved. This information is not intended as a substitute for professional medical care. Always follow your healthcare professional's instructions.          Understanding Anxiety  Disorders  Almost everyone gets nervous now and then. Its normal to have knots in your stomach before a test, or for your heart to race on a first date. But an anxiety disorder is much more than a case of nerves. In fact, its symptoms may be overwhelming. But treatment can relieve many of these symptoms. Talking to your healthcare provider is the first step.    What are anxiety disorders?  An anxiety disorder causes intense feelings of panic and fear. These feelings may arise for no apparent reason. And they tend to recur again and again. They may prevent you from coping with life and cause you great distress. As a result, you may avoid anything that triggers your fear. In extreme cases, you may never leave the house. Anxiety disorders may cause other symptoms, such as:  · Obsessive thoughts you cant control  · Constant nightmares or painful thoughts of the past  · Nausea, sweating, and muscle tension  · Trouble sleeping or concentrating  What causes anxiety disorders?  Anxiety disorders tend to run in families. For some people, childhood abuse or neglect may play a role. For others, stressful life events or trauma may trigger anxiety disorders. Anxiety can trigger low self-esteem and poor coping skills.  Common anxiety disorders  · Panic disorder. This causes an intense fear of being in danger.  · Phobias. These are extreme fears of certain objects, places, or events.  · Obsessive-compulsive disorder. This causes you to have unwanted thoughts and urges. You also may perform certain actions over and over.  · Posttraumatic stress disorder. This occurs in people who have survived a terrible ordeal. It can cause nightmares and flashbacks about the event.  · Generalized anxiety disorder. This causes constant worry that can greatly disrupt your life.   Getting better  You may believe that nothing can help you. Or, you might fear what others may think. But most anxiety symptoms can be eased. Having an anxiety disorder  is nothing to be ashamed of. Most people do best with treatment that combines medicine and therapy. These arent cures. But they can help you live a healthier life.  Date Last Reviewed: 2/1/2017  © 5688-1622 [employer]. [address] [place] [address], [place] [address]. All rights reserved. This information is not intended as a substitute for professional medical care. Always follow your healthcare professional's instructions.
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
## 2019-10-24 ENCOUNTER — PATIENT OUTREACH (OUTPATIENT)
Dept: ADMINISTRATIVE | Facility: HOSPITAL | Age: 49
End: 2019-10-24

## 2019-10-24 DIAGNOSIS — Z79.4 TYPE 2 DIABETES MELLITUS WITHOUT COMPLICATION, WITH LONG-TERM CURRENT USE OF INSULIN: Chronic | ICD-10-CM

## 2019-10-24 DIAGNOSIS — E11.9 TYPE 2 DIABETES MELLITUS WITHOUT COMPLICATION, WITH LONG-TERM CURRENT USE OF INSULIN: Chronic | ICD-10-CM

## 2019-10-24 RX ORDER — LANCETS
EACH MISCELLANEOUS
Qty: 100 EACH | Refills: 11 | OUTPATIENT
Start: 2019-10-24
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
## 2019-12-03 ENCOUNTER — PATIENT OUTREACH (OUTPATIENT)
Dept: ADMINISTRATIVE | Facility: HOSPITAL | Age: 49
End: 2019-12-03

## 2019-12-03 DIAGNOSIS — E11.9 TYPE 2 DIABETES MELLITUS WITHOUT COMPLICATION, WITH LONG-TERM CURRENT USE OF INSULIN: Primary | Chronic | ICD-10-CM

## 2019-12-03 DIAGNOSIS — Z79.4 TYPE 2 DIABETES MELLITUS WITHOUT COMPLICATION, WITH LONG-TERM CURRENT USE OF INSULIN: Primary | Chronic | ICD-10-CM

## 2019-12-04 NOTE — PROGRESS NOTES
Pt Scheduled 12/9/2019 for A1c lab       Valeri HAHN LPN Care Coordinator  Care Coordination Department  [facility]  [phone]

## 2019-12-17 ENCOUNTER — PATIENT OUTREACH (OUTPATIENT)
Dept: ADMINISTRATIVE | Facility: HOSPITAL | Age: 49
End: 2019-12-17

## 2019-12-17 NOTE — PROGRESS NOTES
L/M for pt to call office       Valeri HAHN LPN Care Coordinator  Care Coordination Department  [facility]  [phone]

## 2020-01-03 DIAGNOSIS — E11.9 TYPE 2 DIABETES MELLITUS WITHOUT COMPLICATION: ICD-10-CM

## 2020-01-07 ENCOUNTER — OFFICE VISIT (OUTPATIENT)
Dept: PSYCHIATRY | Facility: CLINIC | Age: 50
End: 2020-01-07
Payer: MEDICARE

## 2020-01-07 VITALS
DIASTOLIC BLOOD PRESSURE: 68 MMHG | HEART RATE: 78 BPM | BODY MASS INDEX: 29.6 KG/M2 | WEIGHT: 167.13 LBS | SYSTOLIC BLOOD PRESSURE: 103 MMHG

## 2020-01-07 DIAGNOSIS — F41.1 GAD (GENERALIZED ANXIETY DISORDER): Primary | ICD-10-CM

## 2020-01-07 DIAGNOSIS — F44.5 PSEUDOSEIZURE: ICD-10-CM

## 2020-01-07 DIAGNOSIS — F33.1 MODERATE EPISODE OF RECURRENT MAJOR DEPRESSIVE DISORDER: ICD-10-CM

## 2020-01-07 DIAGNOSIS — F33.42 RECURRENT MAJOR DEPRESSIVE DISORDER, IN FULL REMISSION: ICD-10-CM

## 2020-01-07 PROCEDURE — 90834 PR PSYCHOTHERAPY W/PATIENT, 45 MIN: ICD-10-PCS | Mod: S$GLB,,, | Performed by: SOCIAL WORKER

## 2020-01-07 PROCEDURE — 3008F BODY MASS INDEX DOCD: CPT | Mod: CPTII,S$GLB,, | Performed by: PSYCHIATRY & NEUROLOGY

## 2020-01-07 PROCEDURE — 99999 PR PBB SHADOW E&M-EST. PATIENT-LVL II: CPT | Mod: PBBFAC,,, | Performed by: PSYCHIATRY & NEUROLOGY

## 2020-01-07 PROCEDURE — 3008F PR BODY MASS INDEX (BMI) DOCUMENTED: ICD-10-PCS | Mod: CPTII,S$GLB,, | Performed by: PSYCHIATRY & NEUROLOGY

## 2020-01-07 PROCEDURE — 99214 PR OFFICE/OUTPT VISIT, EST, LEVL IV, 30-39 MIN: ICD-10-PCS | Mod: S$GLB,,, | Performed by: PSYCHIATRY & NEUROLOGY

## 2020-01-07 PROCEDURE — 99999 PR PBB SHADOW E&M-EST. PATIENT-LVL II: ICD-10-PCS | Mod: PBBFAC,,, | Performed by: PSYCHIATRY & NEUROLOGY

## 2020-01-07 PROCEDURE — 90834 PSYTX W PT 45 MINUTES: CPT | Mod: S$GLB,,, | Performed by: SOCIAL WORKER

## 2020-01-07 PROCEDURE — 99214 OFFICE O/P EST MOD 30 MIN: CPT | Mod: S$GLB,,, | Performed by: PSYCHIATRY & NEUROLOGY

## 2020-01-07 RX ORDER — LAMOTRIGINE 25 MG/1
TABLET ORAL
Qty: 63 TABLET | Refills: 0 | Status: SHIPPED | OUTPATIENT
Start: 2020-01-07 | End: 2020-02-11

## 2020-01-07 RX ORDER — CLONAZEPAM 1 MG/1
TABLET ORAL
Qty: 90 TABLET | Refills: 3 | Status: SHIPPED | OUTPATIENT
Start: 2020-01-07 | End: 2020-02-12

## 2020-01-07 NOTE — PATIENT INSTRUCTIONS
Understanding Anxiety Disorders  Almost everyone gets nervous now and then. Its normal to have knots in your stomach before a test, or for your heart to race on a first date. But an anxiety disorder is much more than a case of nerves. In fact, its symptoms may be overwhelming. But treatment can relieve many of these symptoms. Talking to your healthcare provider is the first step.    What are anxiety disorders?  An anxiety disorder causes intense feelings of panic and fear. These feelings may arise for no apparent reason. And they tend to recur again and again. They may prevent you from coping with life and cause you great distress. As a result, you may avoid anything that triggers your fear. In extreme cases, you may never leave the house. Anxiety disorders may cause other symptoms, such as:  · Obsessive thoughts you cant control  · Constant nightmares or painful thoughts of the past  · Nausea, sweating, and muscle tension  · Trouble sleeping or concentrating  What causes anxiety disorders?  Anxiety disorders tend to run in families. For some people, childhood abuse or neglect may play a role. For others, stressful life events or trauma may trigger anxiety disorders. Anxiety can trigger low self-esteem and poor coping skills.  Common anxiety disorders  · Panic disorder. This causes an intense fear of being in danger.  · Phobias. These are extreme fears of certain objects, places, or events.  · Obsessive-compulsive disorder. This causes you to have unwanted thoughts and urges. You also may perform certain actions over and over.  · Posttraumatic stress disorder. This occurs in people who have survived a terrible ordeal. It can cause nightmares and flashbacks about the event.  · Generalized anxiety disorder. This causes constant worry that can greatly disrupt your life.   Getting better  You may believe that nothing can help you. Or, you might fear what others may think. But most anxiety symptoms can be eased.  Having an anxiety disorder is nothing to be ashamed of. Most people do best with treatment that combines medicine and therapy. These arent cures. But they can help you live a healthier life.  Date Last Reviewed: 2/1/2017 © 2000-2017 [employer]. [address] [place] [address]. All rights reserved. This information is not intended as a substitute for professional medical care. Always follow your healthcare professional's instructions.          Lamotrigine tablets  What is this medicine?  LAMOTRIGINE (la SHLOMO tri jeen) is used to control seizures in adults and children with epilepsy and Lennox-Gastaut syndrome. It is also used in adults to treat bipolar disorder.  How should I use this medicine?  Take this medicine by mouth with a glass of water. Follow the directions on the prescription label. Do not chew these tablets. If this medicine upsets your stomach, take it with food or milk. Take your doses at regular intervals. Do not take your medicine more often than directed.  A special MedGuide will be given to you by the pharmacist with each new prescription and refill. Be sure to read this information carefully each time.  Talk to your pediatrician regarding the use of this medicine in children. While this drug may be prescribed for children as young as 2 years for selected conditions, precautions do apply.  What side effects may I notice from receiving this medicine?  Side effects you should report to your doctor or health care professional as soon as possible:  · allergic reactions like skin rash, itching or hives, swelling of the face, lips, or tongue  · blurred or double vision  · difficulty walking or controlling muscle movements  · fever  · headache, stiff neck, and sensitivity to light  · painful sores in the mouth, eyes, or nose  · redness, blistering, peeling or loosening of the skin, including inside the mouth  · severe muscle pain  · swollen lymph glands  · uncontrollable eye  movements  · unusual bruising or bleeding  · unusually weak or tired  · vomiting  · worsening of mood, thoughts or actions of suicide or dying  · yellowing of the eyes or skin  Side effects that usually do not require medical attention (report to your doctor or health care professional if they continue or are bothersome):  · diarrhea or constipation  · difficulty sleeping  · nausea  · tremors  What may interact with this medicine?  · carbamazepine  · female hormones, including contraceptive or birth control pills  · methotrexate  · phenobarbital  · phenytoin  · primidone  · pyrimethamine  · rifampin  · trimethoprim  · valproic acid  What if I miss a dose?  If you miss a dose, take it as soon as you can. If it is almost time for your next dose, take only that dose. Do not take double or extra doses.  Where should I keep my medicine?  Keep out of reach of children.  Store at room temperature between 15 and 30 degrees C (59 and 86 degrees F). Throw away any unused medicine after the expiration date.  What should I tell my health care provider before I take this medicine?  They need to know if you have any of these conditions:  · a history of depression or bipolar disorder  · aseptic meningitis during prior use of lamotrigine  · folate deficiency  · kidney disease  · liver disease  · suicidal thoughts, plans, or attempt; a previous suicide attempt by you or a family member  · an unusual or allergic reaction to lamotrigine or other seizure medications, other medicines, foods, dyes, or preservatives  · pregnant or trying to get pregnant  · breast-feeding  What should I watch for while using this medicine?  Visit your doctor or health care professional for regular checks on your progress. If you take this medicine for seizures, wear a Medic Alert bracelet or necklace. Carry an identification card with information about your condition, medicines, and doctor or health care professional.  It is important to take this medicine  exactly as directed. When first starting treatment, your dose will need to be adjusted slowly. It may take weeks or months before your dose is stable. You should contact your doctor or health care professional if your seizures get worse or if you have any new types of seizures. Do not stop taking this medicine unless instructed by your doctor or health care professional. Stopping your medicine suddenly can increase your seizures or their severity.  Contact your doctor or health care professional right away if you develop a rash while taking this medicine. Rashes may be very severe and sometimes require treatment in the hospital. Deaths from rashes have occurred. Serious rashes occur more often in children than adults taking this medicine. It is more common for these serious rashes to occur during the first 2 months of treatment, but a rash can occur at any time.  You may get drowsy, dizzy, or have blurred vision. Do not drive, use machinery, or do anything that needs mental alertness until you know how this medicine affects you. To reduce dizzy or fainting spells, do not sit or stand up quickly, especially if you are an older patient. Alcohol can increase drowsiness and dizziness. Avoid alcoholic drinks.  If you are taking this medicine for bipolar disorder, it is important to report any changes in your mood to your doctor or health care professional. If your condition gets worse, you get mentally depressed, feel very hyperactive or manic, have difficulty sleeping, or have thoughts of hurting yourself or committing suicide, you need to get help from your health care professional right away. If you are a caregiver for someone taking this medicine for bipolar disorder, you should also report these behavioral changes right away. The use of this medicine may increase the chance of suicidal thoughts or actions. Pay special attention to how you are responding while on this medicine.  Your mouth may get dry. Chewing  sugarless gum or sucking hard candy, and drinking plenty of water may help. Contact your doctor if the problem does not go away or is severe.  Women who become pregnant while using this medicine may enroll in the [place] Antiepileptic Drug Pregnancy Registry by calling 1-674.935.2166. This registry collects information about the safety of antiepileptic drug use during pregnancy.  NOTE:This sheet is a summary. It may not cover all possible information. If you have questions about this medicine, talk to your doctor, pharmacist, or health care provider. Copyright© 2017 [employer]

## 2020-01-07 NOTE — PROGRESS NOTES
Outpatient Psychiatry Follow-Up Visit (MD/NP)    1/7/2020    Clinical Status of Patient:  Outpatient (Ambulatory)    Chief Complaint:  Jadyn Chance is a 49 y.o. female who presents today for follow-up of depression and anxiety.  Met with patient.      Interval History and Content of Current Session:  Interim Events/Subjective Report/Content of Current Session:   Has had a return of her seizures. Neurologist working up: patient understands that these may be stress induced seizures. Valium 15 mg not working   She has been tapered off valium and depakote  She has a lot of stressors due to family life.  Problems with her daughter:  Her daughter is upset that she outed her new wife  This was not planned  She has had increased irritability  Increased sensitivity  Increased tearfulness  Since her friend has passed away from AIDS and Hep C, she started to have problems with increased stress and depression   No suicidal ideation   No homicidal ideation      Review of Systems   · PSYCHIATRIC: Pertinant items are noted in the narrative.    Past Medical, Family and Social History: The patient's past medical, family and social history have been reviewed and updated as appropriate within the electronic medical record - see encounter notes.    Compliance: yes    Side effects: None    Risk Parameters:  Patient reports no suicidal ideation  Patient reports no homicidal ideation  Patient reports no self-injurious behavior  Patient reports no violent behavior    Exam (detailed: at least 9 elements; comprehensive: all 15 elements)   Constitutional  Vitals:  Most recent vital signs, dated less than 90 days prior to this appointment, were reviewed.   Vitals:    01/07/20 1105   BP: 103/68   Pulse: 78   Weight: 75.8 kg (167 lb 1.7 oz)        General:  unremarkable, age appropriate     Musculoskeletal  Muscle Strength/Tone:  not examined   Gait & Station:  non-ataxic     Psychiatric  Speech:  no latency; no press   Mood &  Affect:  Depressed, Affect: Tearful    Thought Process:  normal and logical   Associations:  intact   Thought Content:  suicidal thoughts: (active-no), homicidal thoughts: (active-no), depressed mood, anxiety    Insight:  intact   Judgement: behavior is adequate to circumstances   Orientation:  grossly intact   Memory: intact for content of interview   Language: grossly intact   Attention Span & Concentration:  able to focus   Fund of Knowledge:  intact and appropriate to age and level of education     Assessment and Diagnosis   Status/Progress: Based on the examination today, the patient's problem(s) is/are improved.  New problems have been presented today.   Co-morbidities are complicating management of the primary condition.  There are no active rule-out diagnoses for this patient at this time.     General Impression:   Ms. Salamanca is a 48 yo female with a history of Depression and CINDY disorder, Seizures vs. Pseudoseizures      ICD-10-CM ICD-9-CM   1. CINDY (generalized anxiety disorder) F41.1 300.02   2. Moderate episode of recurrent major depressive disorder F33.1 296.32   3. Pseudoseizure F44.5 300.11       Intervention/Counseling/Treatment Plan   · Continue Doxepin 50 mg   · Continue Cymbalta at 60 mg daily instead of 30 mg twice daily  · Will restart Clonazepam 1 mg in the morning (mid morning) and 2 mg at bedtime  · Will start lamictal 25 mg for three weeks and then increase to 50 mg after three weeks for mood stabilization     Orders Placed This Encounter    lamoTRIgine (LAMICTAL) 25 MG tablet    clonazePAM (KLONOPIN) 1 MG tablet     Return to Clinic: 1 month

## 2020-01-07 NOTE — PROGRESS NOTES
"  Yue Cadena, MSW, LCSW  Outpatient Psychiatry  [facility]  [address], [place] [address]  [phone]            Individual Psychotherapy Progress Note (PhD/LCSW)     Outpatient - Insight oriented psychotherapy 45 min - CPT code 58645    2020  MRN: [id number]  Primary Care Provider: Mikayla Myles MD    Jadyn Chance is a 49 y.o. female who presents today for follow-up of depression and anxiety. Met with patient.        Subjective:       Patient report: Recent conflict with her dtr, who sent an angry text, calling her a bitch and claiming pt had the grandmother raise them so she could work. Dtr is luther, which conflicts with pt's Yarsanism beliefs. Pt states she has started having seizures and panic attacks again. Her neurologist in [place], Dr Cueto, has prescribed Keppra. Last seizure was 2019. A few relatives and a friend have all  recently. She was triggered when hsb said "'til death do us part" and worries she will die before resolving conflict with her family.      Current symptoms:   · Depression: depressed mood, fatigue and tearfulness  · Anxiety: excessive anxiety/worry, restlessness/keyed up, irritability and panic attacks  · Substance abuse: denied  · Cognitive functioning: denied  · Alison: none noted  · Psychosis: none noted    Psychosocial stressors and topics discussed: identifying feelings, symptom recognition/mgmt, self care, treatment progress, goals, coping strategies, interpersonal issues, parenting issues, physical health issues, past trauma and managing anxiety/panic/stress      Objective:       Mental Status Evaluation  Appearance: unremarkable, age appropriate  Behavior: normal, cooperative  Speech: normal tone, normal pitch, normal volume, slurred  Mood: anxious, depressed, sad  Affect: congruent and appropriate, blunted  Thought Process: circumstantial  Thought Content: normal, no suicidality, no " homicidality, delusions, or paranoia  Sensorium: grossly intact  Cognition: grossly intact  Insight: fair  Judgment: adequate to circumstances    Risk parameters:  Patient reports no suicidal ideation  Patient reports no homicidal ideation  Patient reports no self-injurious behavior  Patient reports no violent behavior      Assessment & Plan:       Therapeutic interventions used: Assigned pt to practice relaxation on a daily basis  Encouraged pt to share feelings of depression to clarify them and gain insight into their causes  Assessed pt's level of insight toward the presenting problems  Monitored the effectiveness and side effects of antidepressant medication prescribed by physician/NP/MP  Discussed cognitive, behavioral, interpersonal and other factors that contribute to depression  Using role-playing, modeling and behavioral rehearsal, pt was taught implementation of conflict resolution skills  Encouraged pt to commit time and effort to activities that are consistent with personally meaningful values  Taught pt the possible connection between unexpressed feelings of anger and helplessness and the current depressed state    The patient's response to the interventions is accepting    The patient's progress toward treatment goals is good    Homework assigned: daily journaling and practice relaxation skills daily     Treatment plan:   A. Target symptoms: Depression, Anxiety and Poor Coping Skills   B. Therapeutic modalities: insight oriented psychotherapy, supportive psychotherapy  C. Why chosen therapy is appropriate versus another modality: relevant to diagnosis, evidence based practice   D. Outcome monitoring methods: self-report, observation, feedback from clinical staff     Visit Diagnosis:   1. CINDY (generalized anxiety disorder)    2. Recurrent major depressive disorder, in full remission        Follow-up: individual psychotherapy and medication management by physician    Return to Clinic: 3 weeks    Length  of Service (minutes): 45

## 2020-02-05 ENCOUNTER — PATIENT OUTREACH (OUTPATIENT)
Dept: ADMINISTRATIVE | Facility: HOSPITAL | Age: 50
End: 2020-02-05

## 2020-02-05 NOTE — PROGRESS NOTES
Portal Message sent         Valeri HAHN LPN Care Coordinator  Care Coordination Department  [facility]  [phone]

## 2020-02-10 ENCOUNTER — PATIENT MESSAGE (OUTPATIENT)
Dept: FAMILY MEDICINE | Facility: CLINIC | Age: 50
End: 2020-02-10

## 2020-02-10 DIAGNOSIS — E55.9 VITAMIN D DEFICIENCY: ICD-10-CM

## 2020-02-10 DIAGNOSIS — Z79.4 TYPE 2 DIABETES MELLITUS WITHOUT COMPLICATION, WITH LONG-TERM CURRENT USE OF INSULIN: Primary | ICD-10-CM

## 2020-02-10 DIAGNOSIS — E11.9 TYPE 2 DIABETES MELLITUS WITHOUT COMPLICATION, WITH LONG-TERM CURRENT USE OF INSULIN: Primary | ICD-10-CM

## 2020-02-10 RX ORDER — BLOOD-GLUCOSE CONTROL, NORMAL
EACH MISCELLANEOUS
Qty: 200 EACH | Refills: 3 | Status: SHIPPED | OUTPATIENT
Start: 2020-02-10

## 2020-02-10 RX ORDER — ERGOCALCIFEROL 1.25 MG/1
CAPSULE ORAL
Qty: 12 CAPSULE | Refills: 3 | Status: SHIPPED | OUTPATIENT
Start: 2020-02-10 | End: 2021-01-11 | Stop reason: SDUPTHER

## 2020-02-10 RX ORDER — DEXTROSE 4 G
TABLET,CHEWABLE ORAL
Qty: 1 EACH | Refills: 0 | Status: SHIPPED | OUTPATIENT
Start: 2020-02-10

## 2020-02-11 ENCOUNTER — OFFICE VISIT (OUTPATIENT)
Dept: PSYCHIATRY | Facility: CLINIC | Age: 50
End: 2020-02-11
Payer: MEDICARE

## 2020-02-11 VITALS
SYSTOLIC BLOOD PRESSURE: 103 MMHG | HEART RATE: 86 BPM | WEIGHT: 166.88 LBS | DIASTOLIC BLOOD PRESSURE: 72 MMHG | BODY MASS INDEX: 29.56 KG/M2

## 2020-02-11 DIAGNOSIS — F33.42 RECURRENT MAJOR DEPRESSIVE DISORDER, IN FULL REMISSION: Primary | ICD-10-CM

## 2020-02-11 DIAGNOSIS — F44.5 PSEUDOSEIZURE: ICD-10-CM

## 2020-02-11 DIAGNOSIS — F41.1 GAD (GENERALIZED ANXIETY DISORDER): ICD-10-CM

## 2020-02-11 PROBLEM — E78.5 HYPERLIPIDEMIA ASSOCIATED WITH TYPE 2 DIABETES MELLITUS: Status: ACTIVE | Noted: 2020-02-11

## 2020-02-11 PROBLEM — E11.69 HYPERLIPIDEMIA ASSOCIATED WITH TYPE 2 DIABETES MELLITUS: Status: ACTIVE | Noted: 2020-02-11

## 2020-02-11 PROCEDURE — 99214 PR OFFICE/OUTPT VISIT, EST, LEVL IV, 30-39 MIN: ICD-10-PCS | Mod: S$GLB,,, | Performed by: PSYCHIATRY & NEUROLOGY

## 2020-02-11 PROCEDURE — 3008F BODY MASS INDEX DOCD: CPT | Mod: CPTII,S$GLB,, | Performed by: PSYCHIATRY & NEUROLOGY

## 2020-02-11 PROCEDURE — 99999 PR PBB SHADOW E&M-EST. PATIENT-LVL I: ICD-10-PCS | Mod: PBBFAC,,, | Performed by: PSYCHIATRY & NEUROLOGY

## 2020-02-11 PROCEDURE — 3008F PR BODY MASS INDEX (BMI) DOCUMENTED: ICD-10-PCS | Mod: CPTII,S$GLB,, | Performed by: PSYCHIATRY & NEUROLOGY

## 2020-02-11 PROCEDURE — 99999 PR PBB SHADOW E&M-EST. PATIENT-LVL I: CPT | Mod: PBBFAC,,, | Performed by: PSYCHIATRY & NEUROLOGY

## 2020-02-11 PROCEDURE — 99214 OFFICE O/P EST MOD 30 MIN: CPT | Mod: S$GLB,,, | Performed by: PSYCHIATRY & NEUROLOGY

## 2020-02-11 RX ORDER — LAMOTRIGINE 25 MG/1
50 TABLET ORAL DAILY
Qty: 60 TABLET | Refills: 11 | Status: SHIPPED | OUTPATIENT
Start: 2020-02-11 | End: 2020-08-19

## 2020-02-11 RX ORDER — DULOXETIN HYDROCHLORIDE 30 MG/1
30 CAPSULE, DELAYED RELEASE ORAL 2 TIMES DAILY
Qty: 60 CAPSULE | Refills: 11 | Status: SHIPPED | OUTPATIENT
Start: 2020-02-11 | End: 2021-07-12

## 2020-02-11 RX ORDER — DOXEPIN HYDROCHLORIDE 25 MG/1
25 CAPSULE ORAL NIGHTLY
Qty: 30 CAPSULE | Refills: 11 | Status: SHIPPED | OUTPATIENT
Start: 2020-02-11 | End: 2020-09-23 | Stop reason: SDUPTHER

## 2020-02-11 NOTE — PROGRESS NOTES
Outpatient Psychiatry Follow-Up Visit (MD/NP)    2/11/2020    Clinical Status of Patient:  Outpatient (Ambulatory)    Chief Complaint:  Jadyn Chance is a 49 y.o. female who presents today for follow-up of depression and anxiety.  Met with patient.      Interval History and Content of Current Session:  Interim Events/Subjective Report/Content of Current Session:   Feels more even  She has been less emotional and less reactive  Currently on Depakote 250 mg three times daily  Has been on Lamictal 50 mg daily  Clonazepam once daily and twice and nightly  Has been letting things go   With her children she has let to not interfere with their lives  No suicidal ideation  No homicidal ideation  Future oriented  No Hopelessness and No worthlessness  She has been cleaning up   She has had more energy   Not overwhelmed   She is working on pain medication management        Review of Systems   · PSYCHIATRIC: Pertinant items are noted in the narrative.    Past Medical, Family and Social History: The patient's past medical, family and social history have been reviewed and updated as appropriate within the electronic medical record - see encounter notes.    Compliance: yes    Side effects: None    Risk Parameters:  Patient reports no suicidal ideation  Patient reports no homicidal ideation  Patient reports no self-injurious behavior  Patient reports no violent behavior    Exam (detailed: at least 9 elements; comprehensive: all 15 elements)   Constitutional  Vitals:  Most recent vital signs, dated less than 90 days prior to this appointment, were reviewed.   Vitals:    02/11/20 0901   BP: 103/72   Pulse: 86   Weight: 75.7 kg (166 lb 14.2 oz)        General:  unremarkable, age appropriate     Musculoskeletal  Muscle Strength/Tone:  not examined   Gait & Station:  non-ataxic     Psychiatric  Speech:  no latency; no press   Mood & Affect:  Good affect bright   Thought Process:  normal and logical   Associations:  intact    Thought Content:  suicidal thoughts: (active-no), homicidal thoughts: (active-no), depressed mood, anxiety    Insight:  intact   Judgement: behavior is adequate to circumstances   Orientation:  grossly intact   Memory: intact for content of interview   Language: grossly intact   Attention Span & Concentration:  able to focus   Fund of Knowledge:  intact and appropriate to age and level of education     Assessment and Diagnosis   Status/Progress: Based on the examination today, the patient's problem(s) is/are improved.  New problems have been presented today.   Co-morbidities are complicating management of the primary condition.  There are no active rule-out diagnoses for this patient at this time.     General Impression:   Ms. Salamanca is a 48 yo female with a history of Depression and CINDY disorder, Seizures vs. Pseudoseizures      ICD-10-CM ICD-9-CM   1. Recurrent major depressive disorder, in full remission F33.42 296.36   2. CINDY (generalized anxiety disorder) F41.1 300.02   3. Pseudoseizure F44.5 300.11       Intervention/Counseling/Treatment Plan   · Continue Doxepin 50 mg   · Continue Cymbalta at 60 mg daily  · Will restart Clonazepam 1 mg in the morning (mid morning) and 2 mg at bedtime  · Will start lamictal 25 mg for three weeks and then increase to 50 mg after three weeks for mood stabilization   · D/c gabapentin not taking it for 6 weeks, better since she is already on clonazepam   · I will continue taking care of the clonazepam   · Reviewed  with patient  · Is working with pain management.     Orders Placed This Encounter    lamoTRIgine (LAMICTAL) 25 MG tablet    DULoxetine (CYMBALTA) 30 MG capsule    doxepin (SINEQUAN) 25 MG capsule     Return to Clinic: 1 month

## 2020-02-11 NOTE — PROGRESS NOTES
"Subjective:      Patient ID: Jadyn Chance is a 49 y.o. female.    Chief Complaint: Follow-up; Headache; Nausea; Numbness; sugar level out of control; Tinnitus; and Dizziness      HPI  Here for f/u medical problems and preventive exam.  AC breakfast sugars .  PC dinner 250-475.  No current f/c/n/v.  Loose stool since gastric bypass.  Energy is low, in past month.  No cp/sob/palp.  No black or blood in BMs.  On depakote, outside Neurologist.  And clonazepam bid, per Neuro and Psych. And cymbalta and lamictal.  On iron infusions in past due to gastric bypass iron def.  Taking D and B12.  Taking varying dose of Levemir "depending on her sugar."  Took 25u last night, this AM sugar was 40.  Off oxycodone, now on MS.    HM:  1/20 fluvax, 1/19 cpqnfc11, 8/14 swopyr11, 12/10 TDaP, 8/19 MMG, Pain Dr. Mcgregor, 6/13 Cscope rep 10y.         Review of Systems   Constitutional: Negative for appetite change, chills, diaphoresis and fever.   HENT: Negative for congestion, ear pain, rhinorrhea, sinus pressure and sore throat.    Respiratory: Negative for cough, chest tightness and shortness of breath.    Cardiovascular: Negative for chest pain and palpitations.   Gastrointestinal: Negative for blood in stool, constipation, diarrhea, nausea and vomiting.   Genitourinary: Negative for dysuria, frequency, hematuria, menstrual problem, urgency and vaginal discharge.   Musculoskeletal: Negative for arthralgias.   Skin: Negative for rash.   Neurological: Negative for dizziness and headaches.   Psychiatric/Behavioral: Negative for sleep disturbance. The patient is not nervous/anxious.          Objective:   /72 (BP Location: Left arm, Patient Position: Sitting, BP Method: Medium (Manual))   Pulse 96   Temp 98.1 °F (36.7 °C) (Temporal)   Ht 5' 3" (1.6 m)   Wt 75.5 kg (166 lb 7.2 oz)   LMP 12/04/2014   SpO2 (!) 94%   BMI 29.48 kg/m²     Physical Exam   Constitutional: She is oriented to person, place, and time. " She appears well-developed and well-nourished.   HENT:   Right Ear: External ear normal. Tympanic membrane is not injected.   Left Ear: External ear normal. Tympanic membrane is not injected.   Mouth/Throat: Oropharynx is clear and moist.   Eyes: Conjunctivae are normal.   Neck: Normal range of motion. Neck supple. No thyromegaly present.   Cardiovascular: Normal rate, regular rhythm and intact distal pulses. Exam reveals no gallop and no friction rub.   No murmur heard.  Pulses:       Dorsalis pedis pulses are 2+ on the right side, and 2+ on the left side.        Posterior tibial pulses are 2+ on the right side, and 2+ on the left side.   Pulmonary/Chest: Effort normal and breath sounds normal. She has no wheezes. She has no rales.   Abdominal: Soft. Bowel sounds are normal. She exhibits no mass. There is no tenderness.   Musculoskeletal: She exhibits no edema.   Feet:   Right Foot:   Protective Sensation: 10 sites tested. 10 sites sensed.   Skin Integrity: Negative for ulcer, blister, skin breakdown, erythema, warmth, callus or dry skin.   Left Foot:   Protective Sensation: 10 sites tested. 10 sites sensed.   Skin Integrity: Negative for ulcer, blister, skin breakdown, erythema, warmth, callus or dry skin.   Lymphadenopathy:     She has no cervical adenopathy.   Neurological: She is alert and oriented to person, place, and time.   Skin: Skin is warm. No rash noted.   Psychiatric: She has a normal mood and affect.           Assessment:       1. Encounter for preventive health examination    2. Vitamin B12 deficiency    3. Type 2 diabetes mellitus without complication, with long-term current use of insulin    4. Vitamin D deficiency    5. Recurrent major depressive disorder, in full remission    6. Pseudoseizure    7. Hyperlipidemia associated with type 2 diabetes mellitus    8. Degenerative lumbar spinal stenosis    9. Chronic pain syndrome    10. Elevated liver enzymes    11. History of Kayleen-en-Y gastric bypass           Plan:     Encounter for preventive health examination- lab now.  -     CBC auto differential; Future; Expected date: 02/12/2020  -     Comprehensive metabolic panel; Future; Expected date: 02/12/2020  -     Lipid panel; Future; Expected date: 02/12/2020  -     TSH; Future; Expected date: 02/12/2020  -     Vitamin D; Future  -     Hemoglobin A1c; Future; Expected date: 02/12/2020  -     Microalbumin/creatinine urine ratio; Future; Expected date: 02/12/2020    Vitamin B12 deficiency  -     Vitamin B12; Future; Expected date: 02/12/2020    Type 2 diabetes mellitus without complication, with long-term current use of insulin- check lab.  -     Hemoglobin A1c; Future; Expected date: 02/12/2020  -     Microalbumin/creatinine urine ratio; Future; Expected date: 02/12/2020    Vitamin D deficiency  -     Vitamin D; Future    Recurrent major depressive disorder, in full remission    Pseudoseizure, now on depakote.    Hyperlipidemia associated with type 2 diabetes mellitus- check lab.    Degenerative lumbar spinal stenosis, Chronic pain syndrome    Elevated liver enzymes    History of Kayleen-en-Y gastric bypass    RTC 3mo.

## 2020-02-12 ENCOUNTER — OFFICE VISIT (OUTPATIENT)
Dept: FAMILY MEDICINE | Facility: CLINIC | Age: 50
End: 2020-02-12
Payer: MEDICARE

## 2020-02-12 VITALS
HEART RATE: 96 BPM | SYSTOLIC BLOOD PRESSURE: 112 MMHG | HEIGHT: 63 IN | TEMPERATURE: 98 F | WEIGHT: 166.44 LBS | BODY MASS INDEX: 29.49 KG/M2 | DIASTOLIC BLOOD PRESSURE: 72 MMHG | OXYGEN SATURATION: 94 %

## 2020-02-12 DIAGNOSIS — Z79.4 TYPE 2 DIABETES MELLITUS WITHOUT COMPLICATION, WITH LONG-TERM CURRENT USE OF INSULIN: Chronic | ICD-10-CM

## 2020-02-12 DIAGNOSIS — F33.42 RECURRENT MAJOR DEPRESSIVE DISORDER, IN FULL REMISSION: ICD-10-CM

## 2020-02-12 DIAGNOSIS — E78.5 HYPERLIPIDEMIA ASSOCIATED WITH TYPE 2 DIABETES MELLITUS: ICD-10-CM

## 2020-02-12 DIAGNOSIS — Z98.84 HISTORY OF ROUX-EN-Y GASTRIC BYPASS: ICD-10-CM

## 2020-02-12 DIAGNOSIS — G89.4 CHRONIC PAIN SYNDROME: ICD-10-CM

## 2020-02-12 DIAGNOSIS — E55.9 VITAMIN D DEFICIENCY: ICD-10-CM

## 2020-02-12 DIAGNOSIS — Z00.00 ENCOUNTER FOR PREVENTIVE HEALTH EXAMINATION: Primary | ICD-10-CM

## 2020-02-12 DIAGNOSIS — M48.061 DEGENERATIVE LUMBAR SPINAL STENOSIS: ICD-10-CM

## 2020-02-12 DIAGNOSIS — E11.9 TYPE 2 DIABETES MELLITUS WITHOUT COMPLICATION, WITH LONG-TERM CURRENT USE OF INSULIN: Chronic | ICD-10-CM

## 2020-02-12 DIAGNOSIS — E53.8 VITAMIN B12 DEFICIENCY: ICD-10-CM

## 2020-02-12 DIAGNOSIS — E11.69 HYPERLIPIDEMIA ASSOCIATED WITH TYPE 2 DIABETES MELLITUS: ICD-10-CM

## 2020-02-12 DIAGNOSIS — F44.5 PSEUDOSEIZURE: ICD-10-CM

## 2020-02-12 DIAGNOSIS — R74.8 ELEVATED LIVER ENZYMES: ICD-10-CM

## 2020-02-12 PROCEDURE — 99396 PR PREVENTIVE VISIT,EST,40-64: ICD-10-PCS | Mod: S$GLB,,, | Performed by: INTERNAL MEDICINE

## 2020-02-12 PROCEDURE — 99999 PR PBB SHADOW E&M-EST. PATIENT-LVL III: CPT | Mod: PBBFAC,,, | Performed by: INTERNAL MEDICINE

## 2020-02-12 PROCEDURE — 3052F HG A1C>EQUAL 8.0%<EQUAL 9.0%: CPT | Mod: CPTII,S$GLB,, | Performed by: INTERNAL MEDICINE

## 2020-02-12 PROCEDURE — 3052F PR MOST RECENT HEMOGLOBIN A1C LEVEL 8.0 - < 9.0%: ICD-10-PCS | Mod: CPTII,S$GLB,, | Performed by: INTERNAL MEDICINE

## 2020-02-12 PROCEDURE — 99396 PREV VISIT EST AGE 40-64: CPT | Mod: S$GLB,,, | Performed by: INTERNAL MEDICINE

## 2020-02-12 PROCEDURE — 99999 PR PBB SHADOW E&M-EST. PATIENT-LVL III: ICD-10-PCS | Mod: PBBFAC,,, | Performed by: INTERNAL MEDICINE

## 2020-02-12 RX ORDER — CLONAZEPAM 2 MG/1
TABLET ORAL
COMMUNITY
Start: 2020-02-03 | End: 2020-09-23

## 2020-02-26 ENCOUNTER — HOSPITAL ENCOUNTER (EMERGENCY)
Facility: HOSPITAL | Age: 50
Discharge: HOME | End: 2020-02-26
Attending: EMERGENCY MEDICINE
Payer: MEDICARE

## 2020-02-26 VITALS
BODY MASS INDEX: 28.43 KG/M2 | SYSTOLIC BLOOD PRESSURE: 133 MMHG | TEMPERATURE: 99 F | WEIGHT: 160.5 LBS | DIASTOLIC BLOOD PRESSURE: 65 MMHG | RESPIRATION RATE: 18 BRPM | HEART RATE: 86 BPM | OXYGEN SATURATION: 100 %

## 2020-02-26 DIAGNOSIS — F44.5 PSEUDOSEIZURES: ICD-10-CM

## 2020-02-26 DIAGNOSIS — S90.822A BLISTER OF LEFT HEEL, INITIAL ENCOUNTER: Primary | ICD-10-CM

## 2020-02-26 PROCEDURE — 99283 EMERGENCY DEPT VISIT LOW MDM: CPT

## 2020-02-26 PROCEDURE — 25000003 PHARM REV CODE 250: Performed by: EMERGENCY MEDICINE

## 2020-02-26 RX ADMIN — BACITRACIN ZINC, POLYMYXIN B SULFATE, NEOMYCIN SULFATE 1 EACH: 400; 5000; 3.5 OINTMENT TOPICAL at 03:02

## 2020-02-26 NOTE — ED PROVIDER NOTES
SCRIBE #1 NOTE: I, Christine Dorado, am scribing for, and in the presence of, Harshad Mayes MD. I have scribed the entire note.       History     Chief Complaint   Patient presents with    Loss of Consciousness     Pt reports syncopal episode several days ago. Hx of diabetic and seizures. unsure if she had seizure. Reports feeling weakness     Skin Problem     spot on left heel that doctor wanted checked out     Review of patient's allergies indicates:   Allergen Reactions    Demerol [meperidine] Hallucinations    Penicillins Other (See Comments)     Patient cannot recall specific reaction, reports she has been listing this as an allergy since childhood.    Bactrim [sulfamethoxazole-trimethoprim]     Ciprofloxacin (bulk)     Codeine Nausea And Vomiting    Levetiracetam     Toradol [ketorolac]     Tramadol      seizures         History of Present Illness     HPI    2/26/2020, 3:40 PM  History obtained from the patient      History of Present Illness: Jadyn Chance is a 49 y.o. female patient with a h/o pseudoseizures, anemia, blood transfusion, diabetic neuropathy, RLS, who presents to the Emergency Department for evaluation of skin problem which onset 1.5 weeks ago. Pt states she had a pseudoseizure episode on the 16th and hit her L heel. Pt states the blister popped ad she wanted to get it checked out.  Symptoms are constant and moderate in severity. No mitigating or exacerbating factors reported. Associated sxs include generalized weakness and a LOC episode. Patient denies any fever, sore throat, SOB, CP, n/v/d, dysuria, back pain, HA, rash, bruises/blds easily, and all other sxs at this time. No prior Tx reported. No further complaints or concerns at this time.       Arrival mode: Personal transportation    PCP: Mikayla Myles MD      Past Medical History:  Past Medical History:   Diagnosis Date    Abnormal Pap smear 1991    bx was negative, per pt    Anemia     Anxiety     B12  deficiency     Blood transfusion     multiple     Chronic LBP     Degenerative disc disease     l spine    Diabetes mellitus     Diabetic neuropathy     General anesthetics causing adverse effect in therapeutic use     delayed emergence    Mixed hyperlipidemia 2013    RLS (restless legs syndrome)     Seizures     Vitamin D deficiency disease        Past Surgical History:  Past Surgical History:   Procedure Laterality Date    ANUS SURGERY      AUGMENTATION OF BREAST  2008    saline    BELT ABDOMINOPLASTY      tummy tuck    BREAST SURGERY  2008    breast augmentation     SECTION      x2    CHOLECYSTECTOMY      mikel      EXCISIONAL HEMORRHOIDECTOMY      GASTRIC BYPASS      HIP FRACTURE SURGERY      left hip ORIF    MOUTH SURGERY      revision of JJ anastomosis  2/27/15    small bowel resection  2015    TUBAL LIGATION           Family History:  Family History   Problem Relation Age of Onset    Diabetes Mother     Cataracts Mother     Glaucoma Maternal Aunt     Blindness Maternal Aunt     Breast cancer Neg Hx     Colon cancer Neg Hx     Thrombophilia Neg Hx        Social History:   Social History     Tobacco Use    Smoking status: Never Smoker    Smokeless tobacco: Never Used   Substance and Sexual Activity    Alcohol use: No    Drug use: No    Sexual activity: Yes     Partners: Male     Birth control/protection: Surgical     Comment: BTL; mut monog        Review of Systems     Review of Systems   Constitutional: Negative for fever.   HENT: Negative for sore throat.    Respiratory: Negative for shortness of breath.    Cardiovascular: Negative for chest pain.   Gastrointestinal: Negative for diarrhea, nausea and vomiting.   Genitourinary: Negative for dysuria.   Musculoskeletal: Negative for back pain.   Skin: Negative for rash.        (+) skin problem to L heel   Neurological: Positive for weakness (generalized). Negative for headaches.        (+) LOC episode    Hematological: Does not bruise/bleed easily.   All other systems reviewed and are negative.       Physical Exam     Initial Vitals [02/26/20 1455]   BP Pulse Resp Temp SpO2   (!) 122/58 95 18 98.8 °F (37.1 °C) 98 %      MAP       --          Physical Exam  Nursing Notes and Vital Signs Reviewed.  Constitutional: Well-developed and well-nourished.   Head: Atraumatic. Normocephalic.  Eyes: EOM intact. No scleral icterus.  ENT: Mucous membranes are moist. Oropharynx is clear and symmetric.    Neck: Supple. Full ROM. No lymphadenopathy.  Cardiovascular: Regular rate. Regular rhythm. No murmurs, rubs, or gallops. Distal pulses are 2+ and symmetric.  Pulmonary/Chest: No respiratory distress. Clear to auscultation bilaterally. No wheezing or rales.  Abdominal: Soft and non-distended.  There is no tenderness.  No rebound, guarding, or rigidity. Good bowel sounds.  Genitourinary: No CVA tenderness  Musculoskeletal: Moves all extremities. No obvious deformities. No calf tenderness.  Skin: Small uninfected wound to L posterior. No surrounding erythema.  Neurological:  Alert, awake, and appropriate.  Normal speech.  No acute focal neurological deficits are appreciated.  Psychiatric: Normal affect. Good eye contact. Appropriate in content.     ED Course   Procedures  ED Vital Signs:  Vitals:    02/26/20 1455 02/26/20 1544   BP: (!) 122/58 133/65   Pulse: 95 86   Resp: 18 18   Temp: 98.8 °F (37.1 °C)    TempSrc: Oral    SpO2: 98% 100%   Weight: 72.8 kg (160 lb 7.9 oz)        Abnormal Lab Results:  Labs Reviewed - No data to display         The Emergency Provider reviewed the vital signs and test results, which are outlined above.     ED Discussion       3:49 PM: Reassessed pt at this time.  Pt states her condition has improved at this time. Discussed with pt all pertinent ED information and results. Discussed pt dx and plan of tx. Gave pt all f/u and return to the ED instructions. All questions and concerns were addressed at  this time. Pt expresses understanding of information and instructions, and is comfortable with plan to discharge. Pt is stable for discharge.    I discussed with patient and/or family/caretaker that evaluation in the ED does not suggest any emergent or life threatening medical conditions requiring immediate intervention beyond what was provided in the ED, and I believe patient is safe for discharge.  Regardless, an unremarkable evaluation in the ED does not preclude the development or presence of a serious of life threatening condition. As such, patient was instructed to return immediately for any worsening or change in current symptoms.       MDM        Medical Decision Making:   Patient reportedly bumped her heel during a pseudo-seizure episode 1 week ago.  She had a blister on the heel until it just recently popped.  She states she has a history of diabetes so she wanted it to get evaluated.  Hell shows no signs of infection.  Educated patient about signs of infection to look for.  Advised Neosporin and bandage follow-up with primary care physician           ED Medication(s):  Medications   neomycin-bacitracin-polymyxin ointment (has no administration in time range)       New Prescriptions    No medications on file       Follow-up Information     Call  Mikayla Myles MD.    Specialty:  Internal Medicine  Contact information:  [address] [facility] [address] [phone]             [facility] .    Specialty:  Emergency Medicine  Why:  As needed, If symptoms worsen  Contact information:  [address] [facility] [address] [phone]                     Scribe Attestation:   Scribe #1: I performed the above scribed service and the documentation accurately describes the services I performed. I attest to the accuracy of the note.     Attending:   Physician Attestation Statement for Scribe #1: I, Harshad Mayes MD, personally performed the services described  in this documentation, as scribed by Christine Dorado, in my presence, and it is both accurate and complete.           Clinical Impression       ICD-10-CM ICD-9-CM   1. Blister of left heel, initial encounter S90.822A 917.2   2. Pseudoseizures F44.5 780.39       Disposition:   Disposition: Discharged  Condition: Stable         Harshad Mayes MD  02/28/20 0640

## 2020-03-10 ENCOUNTER — PATIENT MESSAGE (OUTPATIENT)
Dept: FAMILY MEDICINE | Facility: CLINIC | Age: 50
End: 2020-03-10

## 2020-03-10 RX ORDER — CLONAZEPAM 1 MG/1
TABLET ORAL
Qty: 90 TABLET | Refills: 3 | Status: SHIPPED | OUTPATIENT
Start: 2020-03-10 | End: 2020-07-25 | Stop reason: SDUPTHER

## 2020-04-22 ENCOUNTER — PATIENT MESSAGE (OUTPATIENT)
Dept: PSYCHIATRY | Facility: CLINIC | Age: 50
End: 2020-04-22

## 2020-04-28 ENCOUNTER — PATIENT MESSAGE (OUTPATIENT)
Dept: PSYCHIATRY | Facility: CLINIC | Age: 50
End: 2020-04-28

## 2020-06-01 NOTE — PROGRESS NOTES
"Subjective:      Patient ID: Jadyn Chance is a 49 y.o. female.    Chief Complaint: Follow-up and Cough (for 3 days pt states has sinus issues)      HPI  Here for follow up of medical problems.  Sugars AC breakfast 110-114.  Sinus drainage, taking benadryl and robitussin.  No f/c.  Nausea x 1mo, some vomiting.  No diarrhea.  No significant cough.  No cp/sob/palp.  Started lamictal for mood, gave face rash.  On depakote and clonazepam for pseudosz.    Updated/ annual due 2/21:  HM:  1/20 fluvax, 1/19 [id number], 8/14 [id number], 12/10 TDaP, 8/19 MMG, Pain Dr. Mcgregor, 6/13 Cscope rep 10y.      Review of Systems   Constitutional: Positive for fatigue. Negative for chills, diaphoresis and fever.   Respiratory: Negative for cough and shortness of breath.    Cardiovascular: Negative for chest pain, palpitations and leg swelling.   Gastrointestinal: Negative for blood in stool, constipation, diarrhea, nausea and vomiting.   Genitourinary: Negative for dysuria, frequency and hematuria.   Skin: Negative for pallor.   Neurological: Positive for dizziness, tremors, seizures and weakness. Negative for speech difficulty.   Psychiatric/Behavioral: Positive for confusion. The patient is nervous/anxious.          Objective:   /84   Pulse 102   Temp 99.1 °F (37.3 °C) (Oral)   Ht 5' 3" (1.6 m)   Wt 67.2 kg (148 lb 2.4 oz)   LMP 12/04/2014   SpO2 98%   BMI 26.24 kg/m²     Physical Exam   Constitutional: She is oriented to person, place, and time. She appears well-developed.   HENT:   Right Ear: External ear normal. Tympanic membrane is not injected.   Left Ear: External ear normal. Tympanic membrane is not injected.   Mouth/Throat: Oropharynx is clear and moist.   Eyes: Conjunctivae are normal.   Neck: Neck supple. Carotid bruit is not present. No thyroid mass and no thyromegaly present.   Cardiovascular: Normal rate, regular rhythm and intact distal pulses. Exam reveals no gallop and no friction rub.   No murmur " heard.  Pulmonary/Chest: Effort normal and breath sounds normal. She has no wheezes. She has no rales.   Abdominal: Soft. Bowel sounds are normal. She exhibits no mass. There is no hepatosplenomegaly. There is no tenderness.   Musculoskeletal: She exhibits no edema.   Lymphadenopathy:     She has no cervical adenopathy.   Neurological: She is alert and oriented to person, place, and time.   Psychiatric: She has a normal mood and affect.       Results for ADRIANNA ANGELES (MRN [id number]) as of 6/3/2020 15:16   Ref. Range 4/3/2019 11:40   Hep. B Surf Ab, Qual Unknown Negative   Hep. B Surf Ab, Quant. Latest Units: mIU/mL <3   Hepatitis C Ab Unknown Negative       Assessment:       1. Type 2 diabetes mellitus without complication, with long-term current use of insulin    2. Recurrent major depressive disorder, in full remission    3. Pseudoseizure    4. Hyperlipidemia associated with type 2 diabetes mellitus    5. CINDY (generalized anxiety disorder)    6. Elevated liver enzymes    7. Vitamin B12 deficiency    8. Vitamin D deficiency    9. Seasonal allergic rhinitis due to pollen          Plan:     Type 2 diabetes mellitus without complication, with long-term current use of insulin- check lab now.  -     Hemoglobin A1C; Future; Expected date: 06/03/2020    Recurrent major depressive disorder, CINDY (generalized anxiety disorder)- per Psych.    Pseudoseizure- on meds, per Neuro.    Hyperlipidemia associated with type 2 diabetes mellitus    Elevated liver enzymes  -     Hepatic function panel; Future; Expected date: 06/03/2020    Vitamin B12 deficiency- good level.    Vitamin D deficiency- recheck now.  -     Vitamin D; Future    Seasonal allergic rhinitis due to pollen  -     fluticasone propionate (FLONASE) 50 mcg/actuation nasal spray; Instill 2 sprays (100 mcg total) in each nostril once daily.  Dispense: 16 g; Refill: 6

## 2020-06-03 ENCOUNTER — OFFICE VISIT (OUTPATIENT)
Dept: FAMILY MEDICINE | Facility: CLINIC | Age: 50
End: 2020-06-03
Payer: MEDICARE

## 2020-06-03 ENCOUNTER — LAB VISIT (OUTPATIENT)
Dept: LAB | Facility: HOSPITAL | Age: 50
End: 2020-06-03
Attending: INTERNAL MEDICINE
Payer: MEDICARE

## 2020-06-03 VITALS
BODY MASS INDEX: 26.25 KG/M2 | SYSTOLIC BLOOD PRESSURE: 122 MMHG | WEIGHT: 148.13 LBS | TEMPERATURE: 99 F | HEART RATE: 102 BPM | DIASTOLIC BLOOD PRESSURE: 84 MMHG | OXYGEN SATURATION: 98 % | HEIGHT: 63 IN

## 2020-06-03 DIAGNOSIS — R74.8 ELEVATED LIVER ENZYMES: ICD-10-CM

## 2020-06-03 DIAGNOSIS — F44.5 PSEUDOSEIZURE: ICD-10-CM

## 2020-06-03 DIAGNOSIS — E11.9 TYPE 2 DIABETES MELLITUS WITHOUT COMPLICATION, WITH LONG-TERM CURRENT USE OF INSULIN: Chronic | ICD-10-CM

## 2020-06-03 DIAGNOSIS — E11.69 HYPERLIPIDEMIA ASSOCIATED WITH TYPE 2 DIABETES MELLITUS: ICD-10-CM

## 2020-06-03 DIAGNOSIS — E11.9 TYPE 2 DIABETES MELLITUS WITHOUT COMPLICATION, WITH LONG-TERM CURRENT USE OF INSULIN: Primary | Chronic | ICD-10-CM

## 2020-06-03 DIAGNOSIS — Z79.4 TYPE 2 DIABETES MELLITUS WITHOUT COMPLICATION, WITH LONG-TERM CURRENT USE OF INSULIN: Chronic | ICD-10-CM

## 2020-06-03 DIAGNOSIS — Z79.4 TYPE 2 DIABETES MELLITUS WITHOUT COMPLICATION, WITH LONG-TERM CURRENT USE OF INSULIN: Primary | Chronic | ICD-10-CM

## 2020-06-03 DIAGNOSIS — E55.9 VITAMIN D DEFICIENCY: ICD-10-CM

## 2020-06-03 DIAGNOSIS — E78.5 HYPERLIPIDEMIA ASSOCIATED WITH TYPE 2 DIABETES MELLITUS: ICD-10-CM

## 2020-06-03 DIAGNOSIS — E53.8 VITAMIN B12 DEFICIENCY: ICD-10-CM

## 2020-06-03 DIAGNOSIS — J30.1 SEASONAL ALLERGIC RHINITIS DUE TO POLLEN: ICD-10-CM

## 2020-06-03 DIAGNOSIS — F33.42 RECURRENT MAJOR DEPRESSIVE DISORDER, IN FULL REMISSION: ICD-10-CM

## 2020-06-03 DIAGNOSIS — F41.1 GAD (GENERALIZED ANXIETY DISORDER): ICD-10-CM

## 2020-06-03 PROCEDURE — 36415 COLL VENOUS BLD VENIPUNCTURE: CPT | Mod: HCNC,PO

## 2020-06-03 PROCEDURE — 3008F PR BODY MASS INDEX (BMI) DOCUMENTED: ICD-10-PCS | Mod: HCNC,CPTII,S$GLB, | Performed by: INTERNAL MEDICINE

## 2020-06-03 PROCEDURE — 3051F HG A1C>EQUAL 7.0%<8.0%: CPT | Mod: HCNC,CPTII,S$GLB, | Performed by: INTERNAL MEDICINE

## 2020-06-03 PROCEDURE — 3051F PR MOST RECENT HEMOGLOBIN A1C LEVEL 7.0 - < 8.0%: ICD-10-PCS | Mod: HCNC,CPTII,S$GLB, | Performed by: INTERNAL MEDICINE

## 2020-06-03 PROCEDURE — 99214 PR OFFICE/OUTPT VISIT, EST, LEVL IV, 30-39 MIN: ICD-10-PCS | Mod: HCNC,S$GLB,, | Performed by: INTERNAL MEDICINE

## 2020-06-03 PROCEDURE — 99999 PR PBB SHADOW E&M-EST. PATIENT-LVL III: ICD-10-PCS | Mod: PBBFAC,HCNC,, | Performed by: INTERNAL MEDICINE

## 2020-06-03 PROCEDURE — 82306 VITAMIN D 25 HYDROXY: CPT | Mod: HCNC

## 2020-06-03 PROCEDURE — 83036 HEMOGLOBIN GLYCOSYLATED A1C: CPT | Mod: HCNC

## 2020-06-03 PROCEDURE — 3008F BODY MASS INDEX DOCD: CPT | Mod: HCNC,CPTII,S$GLB, | Performed by: INTERNAL MEDICINE

## 2020-06-03 PROCEDURE — 99499 UNLISTED E&M SERVICE: CPT | Mod: HCNC,S$GLB,, | Performed by: INTERNAL MEDICINE

## 2020-06-03 PROCEDURE — 99999 PR PBB SHADOW E&M-EST. PATIENT-LVL III: CPT | Mod: PBBFAC,HCNC,, | Performed by: INTERNAL MEDICINE

## 2020-06-03 PROCEDURE — 99499 RISK ADDL DX/OHS AUDIT: ICD-10-PCS | Mod: HCNC,S$GLB,, | Performed by: INTERNAL MEDICINE

## 2020-06-03 PROCEDURE — 80076 HEPATIC FUNCTION PANEL: CPT | Mod: HCNC

## 2020-06-03 PROCEDURE — 99214 OFFICE O/P EST MOD 30 MIN: CPT | Mod: HCNC,S$GLB,, | Performed by: INTERNAL MEDICINE

## 2020-06-03 RX ORDER — FLUTICASONE PROPIONATE 50 MCG
2 SPRAY, SUSPENSION (ML) NASAL DAILY
Qty: 16 G | Refills: 6 | Status: SHIPPED | OUTPATIENT
Start: 2020-06-03 | End: 2021-11-01

## 2020-06-04 ENCOUNTER — PATIENT MESSAGE (OUTPATIENT)
Dept: FAMILY MEDICINE | Facility: CLINIC | Age: 50
End: 2020-06-04

## 2020-06-04 LAB
25(OH)D3+25(OH)D2 SERPL-MCNC: 23 NG/ML (ref 30–96)
ALBUMIN SERPL BCP-MCNC: 3.8 G/DL (ref 3.5–5.2)
ALP SERPL-CCNC: 152 U/L (ref 55–135)
ALT SERPL W/O P-5'-P-CCNC: 28 U/L (ref 10–44)
AST SERPL-CCNC: 20 U/L (ref 10–40)
BILIRUB DIRECT SERPL-MCNC: 0.7 MG/DL (ref 0.1–0.3)
BILIRUB SERPL-MCNC: 1.7 MG/DL (ref 0.1–1)
ESTIMATED AVG GLUCOSE: 160 MG/DL (ref 68–131)
HBA1C MFR BLD HPLC: 7.2 % (ref 4–5.6)
PROT SERPL-MCNC: 7.4 G/DL (ref 6–8.4)

## 2020-07-01 ENCOUNTER — PATIENT MESSAGE (OUTPATIENT)
Dept: PSYCHIATRY | Facility: CLINIC | Age: 50
End: 2020-07-01

## 2020-07-02 ENCOUNTER — OFFICE VISIT (OUTPATIENT)
Dept: PSYCHIATRY | Facility: CLINIC | Age: 50
End: 2020-07-02
Payer: MEDICARE

## 2020-07-02 DIAGNOSIS — Z63.4 BEREAVEMENT: ICD-10-CM

## 2020-07-02 DIAGNOSIS — F41.1 GAD (GENERALIZED ANXIETY DISORDER): ICD-10-CM

## 2020-07-02 DIAGNOSIS — F33.1 MODERATE EPISODE OF RECURRENT MAJOR DEPRESSIVE DISORDER: Primary | ICD-10-CM

## 2020-07-02 PROCEDURE — 90834 PSYTX W PT 45 MINUTES: CPT | Mod: HCNC,95,, | Performed by: SOCIAL WORKER

## 2020-07-02 PROCEDURE — 90834 PR PSYCHOTHERAPY W/PATIENT, 45 MIN: ICD-10-PCS | Mod: HCNC,95,, | Performed by: SOCIAL WORKER

## 2020-07-02 SDOH — SOCIAL DETERMINANTS OF HEALTH (SDOH): DISSAPEARANCE AND DEATH OF FAMILY MEMBER: Z63.4
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
## 2020-07-17 ENCOUNTER — OFFICE VISIT (OUTPATIENT)
Dept: PSYCHIATRY | Facility: CLINIC | Age: 50
End: 2020-07-17
Payer: MEDICARE

## 2020-07-17 DIAGNOSIS — F41.1 GAD (GENERALIZED ANXIETY DISORDER): Primary | ICD-10-CM

## 2020-07-17 DIAGNOSIS — F33.42 RECURRENT MAJOR DEPRESSIVE DISORDER, IN FULL REMISSION: ICD-10-CM

## 2020-07-17 DIAGNOSIS — F41.0 PANIC ATTACKS: ICD-10-CM

## 2020-07-17 PROCEDURE — 99214 PR OFFICE/OUTPT VISIT, EST, LEVL IV, 30-39 MIN: ICD-10-PCS | Mod: HCNC,95,, | Performed by: PSYCHIATRY & NEUROLOGY

## 2020-07-17 PROCEDURE — 99214 OFFICE O/P EST MOD 30 MIN: CPT | Mod: HCNC,95,, | Performed by: PSYCHIATRY & NEUROLOGY

## 2020-07-23 DIAGNOSIS — E11.9 TYPE 2 DIABETES MELLITUS: ICD-10-CM

## 2020-07-24 ENCOUNTER — TELEPHONE (OUTPATIENT)
Dept: PSYCHIATRY | Facility: CLINIC | Age: 50
End: 2020-07-24

## 2020-07-27 RX ORDER — CLONAZEPAM 1 MG/1
TABLET ORAL
Qty: 90 TABLET | Refills: 3 | Status: SHIPPED | OUTPATIENT
Start: 2020-07-27 | End: 2020-09-23

## 2020-08-14 NOTE — PROGRESS NOTES
Outpatient Psychiatry Follow-Up Visit (MD/NP)    7/17/2020    Clinical Status of Patient:  Outpatient (Ambulatory)    Chief Complaint:  Jadyn Chance is a 50 y.o. female who presents today for follow-up of depression and anxiety.  Met with patient.      Interval History and Content of Current Session:  Interim Events/Subjective Report/Content of Current Session:   Patient feels that there has been a lot of upheaval in the home front  Things have slowly started getting better and since then she has not felt that she has been feeling depressed or anxioius  Relationship with her  is going well   No suicidal ideation  No homicidal ideation   She recognizes that her mood is stable when she is not feeling overwhelmed  Trying to work on coping skills           Review of Systems   · PSYCHIATRIC: Pertinant items are noted in the narrative.    Past Medical, Family and Social History: The patient's past medical, family and social history have been reviewed and updated as appropriate within the electronic medical record - see encounter notes.    Compliance: yes    Side effects: None    Risk Parameters:  Patient reports no suicidal ideation  Patient reports no homicidal ideation  Patient reports no self-injurious behavior  Patient reports no violent behavior    Exam (detailed: at least 9 elements; comprehensive: all 15 elements)   Constitutional  Vitals:  Most recent vital signs, dated less than 90 days prior to this appointment, were reviewed.   There were no vitals filed for this visit.     General:  unremarkable, age appropriate     Musculoskeletal  Muscle Strength/Tone:  not examined   Gait & Station:  non-ataxic     Psychiatric  Speech:  no latency; no press   Mood & Affect:  Good affect bright   Thought Process:  normal and logical   Associations:  intact   Thought Content:  suicidal thoughts: (active-no), homicidal thoughts: (active-no), depressed mood, anxiety    Insight:  intact   Judgement: behavior  is adequate to circumstances   Orientation:  grossly intact   Memory: intact for content of interview   Language: grossly intact   Attention Span & Concentration:  able to focus   Fund of Knowledge:  intact and appropriate to age and level of education     Assessment and Diagnosis   Status/Progress: Based on the examination today, the patient's problem(s) is/are improved.  New problems have been presented today.   Co-morbidities are complicating management of the primary condition.  There are no active rule-out diagnoses for this patient at this time.     General Impression:   Ms. Salamanca is a 50 yo female with a history of Depression and CINDY disorder, Seizures vs. Pseudoseizures      ICD-10-CM ICD-9-CM   1. CINDY (generalized anxiety disorder)  F41.1 300.02   2. Recurrent major depressive disorder, in full remission  F33.42 296.36   3. Panic attacks  F41.0 300.01       Intervention/Counseling/Treatment Plan   · Continue Doxepin 50 mg   · Continue Cymbalta at 60 mg daily  · Will restart Clonazepam 1 mg in the morning (mid morning) and 2 mg at bedtime  · Continue lamictal 50 mg for mood stabilization    · Is working with pain management.        Return to Clinic: 1 month

## 2020-08-17 ENCOUNTER — TELEPHONE (OUTPATIENT)
Dept: OBSTETRICS AND GYNECOLOGY | Facility: CLINIC | Age: 50
End: 2020-08-17

## 2020-08-17 NOTE — TELEPHONE ENCOUNTER
Patient having pelvic pain and requesting appointment.  Done.  Visitor's policy and check in procedure explained and patient verbalized understanding.

## 2020-08-18 ENCOUNTER — PATIENT OUTREACH (OUTPATIENT)
Dept: ADMINISTRATIVE | Facility: OTHER | Age: 50
End: 2020-08-18

## 2020-08-18 DIAGNOSIS — Z12.31 ENCOUNTER FOR SCREENING MAMMOGRAM FOR MALIGNANT NEOPLASM OF BREAST: Primary | ICD-10-CM

## 2020-08-19 ENCOUNTER — OFFICE VISIT (OUTPATIENT)
Dept: OBSTETRICS AND GYNECOLOGY | Facility: CLINIC | Age: 50
End: 2020-08-19
Payer: MEDICARE

## 2020-08-19 ENCOUNTER — HOSPITAL ENCOUNTER (EMERGENCY)
Facility: HOSPITAL | Age: 50
Discharge: HOME | End: 2020-08-19
Attending: EMERGENCY MEDICINE
Payer: MEDICARE

## 2020-08-19 VITALS
HEIGHT: 63 IN | DIASTOLIC BLOOD PRESSURE: 80 MMHG | BODY MASS INDEX: 24.61 KG/M2 | SYSTOLIC BLOOD PRESSURE: 112 MMHG | WEIGHT: 138.88 LBS

## 2020-08-19 VITALS
WEIGHT: 138.69 LBS | BODY MASS INDEX: 24.57 KG/M2 | OXYGEN SATURATION: 98 % | RESPIRATION RATE: 16 BRPM | TEMPERATURE: 99 F | HEIGHT: 63 IN | SYSTOLIC BLOOD PRESSURE: 129 MMHG | HEART RATE: 79 BPM | DIASTOLIC BLOOD PRESSURE: 84 MMHG

## 2020-08-19 DIAGNOSIS — R07.9 CHEST PAIN: ICD-10-CM

## 2020-08-19 DIAGNOSIS — R73.9 ELEVATED BLOOD SUGAR: ICD-10-CM

## 2020-08-19 DIAGNOSIS — R10.2 PELVIC PAIN IN FEMALE: Primary | ICD-10-CM

## 2020-08-19 DIAGNOSIS — N39.0 URINARY TRACT INFECTION WITHOUT HEMATURIA, SITE UNSPECIFIED: ICD-10-CM

## 2020-08-19 DIAGNOSIS — R11.2 NAUSEA AND VOMITING, INTRACTABILITY OF VOMITING NOT SPECIFIED, UNSPECIFIED VOMITING TYPE: ICD-10-CM

## 2020-08-19 DIAGNOSIS — R19.7 DIARRHEA, UNSPECIFIED TYPE: ICD-10-CM

## 2020-08-19 DIAGNOSIS — K52.9 GASTROENTERITIS: Primary | ICD-10-CM

## 2020-08-19 LAB
ALBUMIN SERPL BCP-MCNC: 3.7 G/DL (ref 3.5–5.2)
ALP SERPL-CCNC: 174 U/L (ref 55–135)
ALT SERPL W/O P-5'-P-CCNC: 42 U/L (ref 10–44)
ANION GAP SERPL CALC-SCNC: 14 MMOL/L (ref 8–16)
AST SERPL-CCNC: 29 U/L (ref 10–40)
BACTERIA #/AREA URNS HPF: ABNORMAL /HPF
BASOPHILS # BLD AUTO: 0.03 K/UL (ref 0–0.2)
BASOPHILS NFR BLD: 0.5 % (ref 0–1.9)
BILIRUB SERPL-MCNC: 1.4 MG/DL (ref 0.1–1)
BILIRUB UR QL STRIP: ABNORMAL
BUN SERPL-MCNC: 12 MG/DL (ref 6–20)
CALCIUM SERPL-MCNC: 9.1 MG/DL (ref 8.7–10.5)
CHLORIDE SERPL-SCNC: 102 MMOL/L (ref 95–110)
CLARITY UR: CLEAR
CO2 SERPL-SCNC: 22 MMOL/L (ref 23–29)
COLOR UR: YELLOW
CREAT SERPL-MCNC: 0.9 MG/DL (ref 0.5–1.4)
DIFFERENTIAL METHOD: ABNORMAL
EOSINOPHIL # BLD AUTO: 0 K/UL (ref 0–0.5)
EOSINOPHIL NFR BLD: 0.5 % (ref 0–8)
ERYTHROCYTE [DISTWIDTH] IN BLOOD BY AUTOMATED COUNT: 13 % (ref 11.5–14.5)
EST. GFR  (AFRICAN AMERICAN): >60 ML/MIN/1.73 M^2
EST. GFR  (NON AFRICAN AMERICAN): >60 ML/MIN/1.73 M^2
GLUCOSE SERPL-MCNC: 324 MG/DL (ref 70–110)
GLUCOSE UR QL STRIP: ABNORMAL
HCT VFR BLD AUTO: 44.2 % (ref 37–48.5)
HGB BLD-MCNC: 14.6 G/DL (ref 12–16)
HGB UR QL STRIP: NEGATIVE
HIV 1+2 AB+HIV1 P24 AG SERPL QL IA: NEGATIVE
HYALINE CASTS #/AREA URNS LPF: 0 /LPF
IMM GRANULOCYTES # BLD AUTO: 0.02 K/UL (ref 0–0.04)
IMM GRANULOCYTES NFR BLD AUTO: 0.3 % (ref 0–0.5)
KETONES UR QL STRIP: ABNORMAL
LACTATE SERPL-SCNC: 1 MMOL/L (ref 0.5–2.2)
LEUKOCYTE ESTERASE UR QL STRIP: NEGATIVE
LIPASE SERPL-CCNC: 10 U/L (ref 4–60)
LYMPHOCYTES # BLD AUTO: 1 K/UL (ref 1–4.8)
LYMPHOCYTES NFR BLD: 15.2 % (ref 18–48)
MCH RBC QN AUTO: 30.4 PG (ref 27–31)
MCHC RBC AUTO-ENTMCNC: 33 G/DL (ref 32–36)
MCV RBC AUTO: 92 FL (ref 82–98)
MICROSCOPIC COMMENT: ABNORMAL
MONOCYTES # BLD AUTO: 0.3 K/UL (ref 0.3–1)
MONOCYTES NFR BLD: 5 % (ref 4–15)
NEUTROPHILS # BLD AUTO: 5.1 K/UL (ref 1.8–7.7)
NEUTROPHILS NFR BLD: 78.5 % (ref 38–73)
NITRITE UR QL STRIP: POSITIVE
NRBC BLD-RTO: 0 /100 WBC
PH UR STRIP: 7 [PH] (ref 5–8)
PLATELET # BLD AUTO: 360 K/UL (ref 150–350)
PMV BLD AUTO: 11.4 FL (ref 9.2–12.9)
POTASSIUM SERPL-SCNC: 4 MMOL/L (ref 3.5–5.1)
PROT SERPL-MCNC: 7.1 G/DL (ref 6–8.4)
PROT UR QL STRIP: ABNORMAL
RBC # BLD AUTO: 4.8 M/UL (ref 4–5.4)
RBC #/AREA URNS HPF: 0 /HPF (ref 0–4)
SARS-COV-2 RDRP RESP QL NAA+PROBE: NEGATIVE
SODIUM SERPL-SCNC: 138 MMOL/L (ref 136–145)
SP GR UR STRIP: >=1.03 (ref 1–1.03)
SQUAMOUS #/AREA URNS HPF: 3 /HPF
URN SPEC COLLECT METH UR: ABNORMAL
UROBILINOGEN UR STRIP-ACNC: 1 EU/DL
WBC # BLD AUTO: 6.44 K/UL (ref 3.9–12.7)
WBC #/AREA URNS HPF: 5 /HPF (ref 0–5)

## 2020-08-19 PROCEDURE — 25000003 PHARM REV CODE 250: Mod: HCNC | Performed by: EMERGENCY MEDICINE

## 2020-08-19 PROCEDURE — 99999 PR PBB SHADOW E&M-EST. PATIENT-LVL IV: CPT | Mod: PBBFAC,HCNC,, | Performed by: NURSE PRACTITIONER

## 2020-08-19 PROCEDURE — 83605 ASSAY OF LACTIC ACID: CPT | Mod: HCNC

## 2020-08-19 PROCEDURE — 93005 ELECTROCARDIOGRAM TRACING: CPT | Mod: HCNC

## 2020-08-19 PROCEDURE — 99999 PR PBB SHADOW E&M-EST. PATIENT-LVL IV: ICD-10-PCS | Mod: PBBFAC,HCNC,, | Performed by: NURSE PRACTITIONER

## 2020-08-19 PROCEDURE — 96375 TX/PRO/DX INJ NEW DRUG ADDON: CPT | Mod: HCNC

## 2020-08-19 PROCEDURE — 87040 BLOOD CULTURE FOR BACTERIA: CPT | Mod: HCNC

## 2020-08-19 PROCEDURE — 81000 URINALYSIS NONAUTO W/SCOPE: CPT | Mod: HCNC

## 2020-08-19 PROCEDURE — 83690 ASSAY OF LIPASE: CPT | Mod: HCNC

## 2020-08-19 PROCEDURE — 99285 EMERGENCY DEPT VISIT HI MDM: CPT | Mod: 25,HCNC

## 2020-08-19 PROCEDURE — 93010 ELECTROCARDIOGRAM REPORT: CPT | Mod: HCNC,,, | Performed by: INTERNAL MEDICINE

## 2020-08-19 PROCEDURE — 99214 PR OFFICE/OUTPT VISIT, EST, LEVL IV, 30-39 MIN: ICD-10-PCS | Mod: HCNC,S$GLB,, | Performed by: NURSE PRACTITIONER

## 2020-08-19 PROCEDURE — 99214 OFFICE O/P EST MOD 30 MIN: CPT | Mod: HCNC,S$GLB,, | Performed by: NURSE PRACTITIONER

## 2020-08-19 PROCEDURE — 96376 TX/PRO/DX INJ SAME DRUG ADON: CPT | Mod: HCNC

## 2020-08-19 PROCEDURE — 63600175 PHARM REV CODE 636 W HCPCS: Mod: HCNC | Performed by: EMERGENCY MEDICINE

## 2020-08-19 PROCEDURE — 93010 EKG 12-LEAD: ICD-10-PCS | Mod: HCNC,,, | Performed by: INTERNAL MEDICINE

## 2020-08-19 PROCEDURE — 86703 HIV-1/HIV-2 1 RESULT ANTBDY: CPT | Mod: HCNC

## 2020-08-19 PROCEDURE — 85025 COMPLETE CBC W/AUTO DIFF WBC: CPT | Mod: HCNC

## 2020-08-19 PROCEDURE — 80053 COMPREHEN METABOLIC PANEL: CPT | Mod: HCNC

## 2020-08-19 PROCEDURE — 96361 HYDRATE IV INFUSION ADD-ON: CPT | Mod: HCNC

## 2020-08-19 PROCEDURE — U0002 COVID-19 LAB TEST NON-CDC: HCPCS | Mod: HCNC

## 2020-08-19 PROCEDURE — 3008F PR BODY MASS INDEX (BMI) DOCUMENTED: ICD-10-PCS | Mod: HCNC,CPTII,S$GLB, | Performed by: NURSE PRACTITIONER

## 2020-08-19 PROCEDURE — 25500020 PHARM REV CODE 255: Mod: HCNC | Performed by: EMERGENCY MEDICINE

## 2020-08-19 PROCEDURE — 3008F BODY MASS INDEX DOCD: CPT | Mod: HCNC,CPTII,S$GLB, | Performed by: NURSE PRACTITIONER

## 2020-08-19 PROCEDURE — 96374 THER/PROPH/DIAG INJ IV PUSH: CPT | Mod: HCNC,59

## 2020-08-19 RX ORDER — ONDANSETRON 2 MG/ML
4 INJECTION INTRAMUSCULAR; INTRAVENOUS
Status: COMPLETED | OUTPATIENT
Start: 2020-08-19 | End: 2020-08-19

## 2020-08-19 RX ORDER — METRONIDAZOLE 500 MG/1
500 TABLET ORAL 3 TIMES DAILY
Qty: 21 TABLET | Refills: 0 | Status: SHIPPED | OUTPATIENT
Start: 2020-08-19 | End: 2020-08-26

## 2020-08-19 RX ORDER — METHYLPREDNISOLONE SOD SUCC 125 MG
125 VIAL (EA) INJECTION
Status: DISCONTINUED | OUTPATIENT
Start: 2020-08-19 | End: 2020-08-19

## 2020-08-19 RX ORDER — ONDANSETRON 4 MG/1
4 TABLET, FILM COATED ORAL EVERY 6 HOURS
Qty: 30 TABLET | Refills: 0 | Status: SHIPPED | OUTPATIENT
Start: 2020-08-19 | End: 2020-10-26 | Stop reason: SDUPTHER

## 2020-08-19 RX ORDER — METHYLPREDNISOLONE SOD SUCC 125 MG
125 VIAL (EA) INJECTION
Status: COMPLETED | OUTPATIENT
Start: 2020-08-19 | End: 2020-08-19

## 2020-08-19 RX ORDER — MORPHINE SULFATE 4 MG/ML
4 INJECTION, SOLUTION INTRAMUSCULAR; INTRAVENOUS
Status: DISCONTINUED | OUTPATIENT
Start: 2020-08-19 | End: 2020-08-19 | Stop reason: HOSPADM

## 2020-08-19 RX ORDER — DOXYCYCLINE 100 MG/1
100 CAPSULE ORAL 2 TIMES DAILY
Qty: 20 CAPSULE | Refills: 0 | Status: SHIPPED | OUTPATIENT
Start: 2020-08-19 | End: 2020-09-02

## 2020-08-19 RX ORDER — HYDROCODONE BITARTRATE AND ACETAMINOPHEN 7.5; 325 MG/1; MG/1
1 TABLET ORAL EVERY 4 HOURS PRN
Qty: 20 TABLET | Refills: 0 | Status: SHIPPED | OUTPATIENT
Start: 2020-08-19 | End: 2020-09-23

## 2020-08-19 RX ORDER — DIPHENHYDRAMINE HYDROCHLORIDE 50 MG/ML
25 INJECTION INTRAMUSCULAR; INTRAVENOUS
Status: COMPLETED | OUTPATIENT
Start: 2020-08-19 | End: 2020-08-19

## 2020-08-19 RX ORDER — HYDROMORPHONE HYDROCHLORIDE 2 MG/ML
1 INJECTION, SOLUTION INTRAMUSCULAR; INTRAVENOUS; SUBCUTANEOUS
Status: COMPLETED | OUTPATIENT
Start: 2020-08-19 | End: 2020-08-19

## 2020-08-19 RX ORDER — ACETAMINOPHEN 500 MG
1000 TABLET ORAL
Status: COMPLETED | OUTPATIENT
Start: 2020-08-19 | End: 2020-08-19

## 2020-08-19 RX ADMIN — ONDANSETRON 4 MG: 2 INJECTION INTRAMUSCULAR; INTRAVENOUS at 01:08

## 2020-08-19 RX ADMIN — HYDROMORPHONE HYDROCHLORIDE 1 MG: 2 INJECTION INTRAMUSCULAR; INTRAVENOUS; SUBCUTANEOUS at 10:08

## 2020-08-19 RX ADMIN — HYDROMORPHONE HYDROCHLORIDE 1 MG: 2 INJECTION INTRAMUSCULAR; INTRAVENOUS; SUBCUTANEOUS at 01:08

## 2020-08-19 RX ADMIN — SODIUM CHLORIDE 1000 ML: 0.9 INJECTION, SOLUTION INTRAVENOUS at 10:08

## 2020-08-19 RX ADMIN — IOHEXOL 75 ML: 350 INJECTION, SOLUTION INTRAVENOUS at 01:08

## 2020-08-19 RX ADMIN — METHYLPREDNISOLONE SODIUM SUCCINATE 125 MG: 125 INJECTION, POWDER, FOR SOLUTION INTRAMUSCULAR; INTRAVENOUS at 02:08

## 2020-08-19 RX ADMIN — DIPHENHYDRAMINE HYDROCHLORIDE 25 MG: 50 INJECTION, SOLUTION INTRAMUSCULAR; INTRAVENOUS at 01:08

## 2020-08-19 RX ADMIN — ONDANSETRON 4 MG: 2 INJECTION INTRAMUSCULAR; INTRAVENOUS at 10:08

## 2020-08-19 RX ADMIN — ACETAMINOPHEN 1000 MG: 500 TABLET ORAL at 10:08

## 2020-08-19 NOTE — PROGRESS NOTES
CC:  Pelvic pain    Jadyn Chance is a 50 y.o. female  presents for worsening left sided pelvic pain for the last few days.  She states along with the pain having nausea and vomiting - pt states can not hold done food or fluids and having diarrhea.  Pt states blood sugar has been in 400's recently but with not eating, vomiting, and diarrhea she states was in 150's.  She states she has not urinated in the last 24 hrs.    She states she feels so bad she wants to go to ER but called pcp and told to see gyn.   Pt has chronic back pain and is on morphine and feeling pain through morphine.  She has had numerous abdominal surgeries.    Patient's last menstrual period was 2014.    Past Medical History:   Diagnosis Date    Abnormal Pap smear     bx was negative, per pt    Anemia     Anxiety     B12 deficiency     Blood transfusion     multiple     Chronic LBP     Degenerative disc disease     l spine    Diabetes mellitus     Diabetic neuropathy     General anesthetics causing adverse effect in therapeutic use     delayed emergence    Mixed hyperlipidemia 2013    RLS (restless legs syndrome)     Seizures     Vitamin D deficiency disease      Past Surgical History:   Procedure Laterality Date    ANUS SURGERY      AUGMENTATION OF BREAST      saline    BELT ABDOMINOPLASTY      tummy tuck    BREAST SURGERY  2008    breast augmentation     SECTION      x2    CHOLECYSTECTOMY      mikel      EXCISIONAL HEMORRHOIDECTOMY      GASTRIC BYPASS      HIP FRACTURE SURGERY      left hip ORIF    MOUTH SURGERY      revision of JJ anastomosis  2/27/15    small bowel resection  2015    TUBAL LIGATION       Family History   Problem Relation Age of Onset    Diabetes Mother     Cataracts Mother     Glaucoma Maternal Aunt     Blindness Maternal Aunt     Breast cancer Neg Hx     Colon cancer Neg Hx     Thrombophilia Neg Hx      Social History     Socioeconomic  "History    Marital status: Significant Other     Spouse name: Not on file    Number of children: 4    Years of education: Not on file    Highest education level: Not on file   Occupational History    Not on file   Social Needs    Financial resource strain: Somewhat hard    Food insecurity     Worry: Never true     Inability: Never true    Transportation needs     Medical: No     Non-medical: No   Tobacco Use    Smoking status: Never Smoker    Smokeless tobacco: Never Used   Substance and Sexual Activity    Alcohol use: No     Frequency: Never     Binge frequency: Never    Drug use: No    Sexual activity: Yes     Partners: Male     Birth control/protection: Surgical     Comment: BTL; mut monog   Lifestyle    Physical activity     Days per week: 3 days     Minutes per session: 30 min    Stress: Very much   Relationships    Social connections     Talks on phone: More than three times a week     Gets together: Once a week     Attends Religion service: Not on file     Active member of club or organization: Yes     Attends meetings of clubs or organizations: More than 4 times per year     Relationship status:    Other Topics Concern    Not on file   Social History Narrative    Not on file     OB History    Para Term  AB Living   3 3 3     4   SAB TAB Ectopic Multiple Live Births         1        # Outcome Date GA Lbr Jesse/2nd Weight Sex Delivery Anes PTL Lv   3 Term            2 Term            1 Term                /80   Ht 5' 3" (1.6 m)   Wt 63 kg (138 lb 14.2 oz)   LMP 2014   BMI 24.60 kg/m²         ROS:  Per hpi    PHYSICAL EXAM:  APPEARANCE: Well nourished, well developed, in no acute distress.  AFFECT: WNL, alert and oriented x 3 but does appear to not feel well.   PELVIC: Normal external genitalia without lesions.  Normal hair distribution.  Adequate perineal body, normal urethral meatus.  Vagina moist and well rugated without lesions or discharge.  Cervix " pink, without lesions, discharge or tenderness.  No significant cystocele or rectocele.  Bimanual exam shows uterus to be normal week size, regular, mobile and tender.  Adnexa without masses but is tender bilaterally - adnexa palpate as normal.       AND PLAN:    Jadyn was seen today for pelvic pain.    Diagnoses and all orders for this visit:    Pelvic pain in female    Nausea and vomiting, intractability of vomiting not specified, unspecified vomiting type    Diarrhea, unspecified type    Elevated blood sugar    pt could not urinate for UA dip    Discussed her case with DR. Castaneda and with all her other symptoms and no urination in 24 hrs to ER for workup of her other issues and can then workup her pain.     To ER now for management of her BS, N/V and pelvic pain

## 2020-08-20 ENCOUNTER — OFFICE VISIT (OUTPATIENT)
Dept: SLEEP MEDICINE | Facility: CLINIC | Age: 50
End: 2020-08-20
Payer: MEDICARE

## 2020-08-20 DIAGNOSIS — F41.9 ANXIETY: ICD-10-CM

## 2020-08-20 DIAGNOSIS — G47.00 INSOMNIA, UNSPECIFIED TYPE: Primary | ICD-10-CM

## 2020-08-20 PROCEDURE — 99203 OFFICE O/P NEW LOW 30 MIN: CPT | Mod: HCNC,95,, | Performed by: PSYCHIATRY & NEUROLOGY

## 2020-08-20 PROCEDURE — 99203 PR OFFICE/OUTPT VISIT, NEW, LEVL III, 30-44 MIN: ICD-10-PCS | Mod: HCNC,95,, | Performed by: PSYCHIATRY & NEUROLOGY

## 2020-08-20 RX ORDER — ALPRAZOLAM 0.5 MG/1
TABLET ORAL
Qty: 30 TABLET | Refills: 0 | Status: SHIPPED | OUTPATIENT
Start: 2020-08-20 | End: 2020-09-08 | Stop reason: SDUPTHER

## 2020-08-20 RX ORDER — DOXEPIN HYDROCHLORIDE 50 MG/1
CAPSULE ORAL
Qty: 60 CAPSULE | Refills: 0 | Status: SHIPPED | OUTPATIENT
Start: 2020-08-20 | End: 2020-09-08 | Stop reason: SDUPTHER

## 2020-08-21 ENCOUNTER — PATIENT OUTREACH (OUTPATIENT)
Dept: OTHER | Facility: OTHER | Age: 50
End: 2020-08-21

## 2020-08-21 NOTE — LETTER
August 21, 2020     Jadyn Chance  [address]  [place] [address]       Dear Jadyn,    Welcome to [facility] Our goal is to make care effective, proactive and convenient by using data you send us from home to better treat your chronic conditions.              My name is Erin Mcfarlane, and I am your dedicated Digital Medicine clinician. As an expert in medication management, I will help ensure that the medications you are taking continue to provide the intended benefits and help you reach your goals. You can reach me directly at [phone] or by sending me a message directly through your [facility] account.      I am Kizzy Jung and I will be your health . My job is to help you identify lifestyle changes to improve your disease control. We will talk about nutrition, exercise, and other ways you may be able to adjust your current habits to better your health. Additionally, we will help ensure you are completing the tests and screenings that are necessary to help manage your conditions. You can reach me directly at [phone] or by sending me a message directly through your [facility] account.    Most importantly, YOU are at the center of this team. Together, we will work to improve your overall health and encourage you to meet your goals for a healthier lifestyle.     What we expect from YOU:  · Please take frequent home blood sugar measurements according to the frequency your physician and Digital Medicine care team specify. It is important that your team see both fasting and after meal readings.      Be available to receive phone calls or [employer] messages, when appropriate, from your care team. Please let us know if there are any specific days or times that work best for us to reach you via phone.     Complete routine tests and screenings. Dont worry, we will help keep you on track!           What you should expect from your Digital Medicine Care  Team:   We will work with you to create a personalized plan of care and provide you with encouragement and education, including regarding lifestyle changes, that could help you manage your disease states.     We will adjust your current medications, if needed, and continue to monitor your long-term progress.     We will provide you and your physician with monthly progress reports after you have been in the program for more than 30 days.     We will send you reminders through "[employer] and text messages to help ensure you do not miss any testing deadlines to help manage your disease states.    You will be able to reach us by phone or through your [facility] account by clicking our names under Care Team on the right side of the home screen.    I look forward to working with you to achieve your blood pressure goals!    We look forward to working with you to help manage your health,    Sincerely,    Your Digital Medicine Team    Please visit our websites to learn more:   · Diabetes: [email or web address]      Remember, we are not available for emergencies. If you have an emergency, please contact your doctors office directly or call [facility] on-call ([phone] or [phone]) or 911.    Diabetes: We want help you get important tests and screenings done regularly to assure that your health needs are met. We have put a new system in place, called CareTouch that will help us improve how we monitor and reach out to you about the following lab tests that you will need to help manage your diabetes.  · Hemoglobin A1c testing (Frequency: Every 3 to 6 months, dependent on A1c goal)  · Nephropathy Assessment, generally urine micro albumin testing (Frequency: Yearly)  · Eye exam through a quick 30-minute Eye Photo Exam (Frequency: 1-2 Years, depending on result)    When necessary you can come in to one of the lab locations between 10:30 am and 4:00 pm to have your tests done prior to their due date. Tell  the  you received a CareTouch letter, or just look for the CareTouch sign.    For CareTouch lab locations, click here.

## 2020-08-21 NOTE — PROGRESS NOTES
Digital Medicine: Health  Introduction    Introduced Jadyn Chance to Digital Medicine. Discussed health  role and recommended lifestyle modifications.    The history is provided by the patient.           Additional Enrollment Details: Patient stated she does occasionally get BG readings below 70. She does know several ways to treat hypoglycemia and also carries glucose tablets.    DIABETES  Explained the goal of the diabetes digital medicine program is to decrease A1c within patient-specific target levels. Reviewed benefits of A1c reduction including reducing risk for kidney, eye, and nerve disease.      Explained that we expect patient to submit blood sugar readings as prescribed. Instructed patient not to allow anyone else to use their glucometer and phone as data submitted is directly entered into their medical record. Reviewed and confirmed appropriate blood sugar testing technique.       Reviewed general Self-Monitoring of Blood Glucose (SMBG) goals:  · FP-130 mg/dL  · 2h PPG: < 180 mg/dL  · Bedtime: < 150 mg/dL    Explained to the patient that the Digital Medicine team is not available for emergencies. Advised patient call [facility] ([phone] or [phone]) or [phone] if needed.     Patient reported SMBG schedule: Three times daily  Reviewed signs and symptoms of hypoglycemia (weakness, dizziness, hunger, shakiness, nausea, headache, heart palpitations, sweating, fatigue, anxiety, etc.).  Reviewed treatment of hypoglycemia (15/15 rule).      Patient's A1C goal is less than or equal to 7.  Patient's most recent A1C result is above goal.  @RESUFAST(LABA1C,HGBA).          Lifestyle    Provided patient education.  Reviewed Device Techniques.     Addressed any questions or concerns and patient has my contact information if needed prior to next outreach. Patient verbalizes understanding.      Explained the importance of self-monitoring and medication adherence. Encouraged the patient  to communicate with their health  for lifestyle modifications to help improve or maintain a healthy lifestyle.        Sent link to [facility] webpages and my contact information via [employer] for future questions.        Explained to the patient that the Digital Medicine team is not available for emergencies. Advised patient call [facility] ([phone] or [phone]) or 911 if needed.           Topic    Eye Exam          Last 6 Patient Entered Readings                                          Most Recent A1c: 7.2% on 6/3/2020  (Goal: 7%)     Recent Readings 8/20/2020 8/20/2020    Blood Glucose (mg/dL) 306 715

## 2020-08-24 ENCOUNTER — PATIENT OUTREACH (OUTPATIENT)
Dept: OTHER | Facility: OTHER | Age: 50
End: 2020-08-24

## 2020-08-24 DIAGNOSIS — E11.9 TYPE 2 DIABETES MELLITUS WITHOUT COMPLICATION, WITH LONG-TERM CURRENT USE OF INSULIN: Primary | Chronic | ICD-10-CM

## 2020-08-24 DIAGNOSIS — Z79.4 TYPE 2 DIABETES MELLITUS WITHOUT COMPLICATION, WITH LONG-TERM CURRENT USE OF INSULIN: Primary | Chronic | ICD-10-CM

## 2020-08-24 LAB
BACTERIA BLD CULT: NORMAL
BACTERIA BLD CULT: NORMAL

## 2020-09-05 ENCOUNTER — PATIENT MESSAGE (OUTPATIENT)
Dept: SLEEP MEDICINE | Facility: CLINIC | Age: 50
End: 2020-09-05

## 2020-09-05 DIAGNOSIS — F41.9 ANXIETY: ICD-10-CM

## 2020-09-05 DIAGNOSIS — G47.00 INSOMNIA, UNSPECIFIED TYPE: ICD-10-CM

## 2020-09-08 DIAGNOSIS — G47.00 INSOMNIA, UNSPECIFIED TYPE: ICD-10-CM

## 2020-09-08 DIAGNOSIS — F41.9 ANXIETY: ICD-10-CM

## 2020-09-08 RX ORDER — DOXEPIN HYDROCHLORIDE 50 MG/1
CAPSULE ORAL
Qty: 60 CAPSULE | Refills: 5 | Status: SHIPPED | OUTPATIENT
Start: 2020-09-08 | End: 2021-03-04 | Stop reason: SDUPTHER

## 2020-09-08 RX ORDER — ALPRAZOLAM 0.5 MG/1
TABLET ORAL
Qty: 60 TABLET | Refills: 2 | Status: SHIPPED | OUTPATIENT
Start: 2020-09-08 | End: 2020-10-22 | Stop reason: ALTCHOICE

## 2020-09-08 RX ORDER — DOXEPIN HYDROCHLORIDE 50 MG/1
CAPSULE ORAL
Qty: 60 CAPSULE | Refills: 0 | Status: SHIPPED | OUTPATIENT
Start: 2020-09-08 | End: 2020-10-05 | Stop reason: SDUPTHER

## 2020-09-08 RX ORDER — ALPRAZOLAM 0.5 MG/1
TABLET ORAL
Qty: 30 TABLET | Refills: 0 | Status: SHIPPED | OUTPATIENT
Start: 2020-09-08 | End: 2020-10-05 | Stop reason: SDUPTHER

## 2020-09-08 NOTE — TELEPHONE ENCOUNTER
LOV 08/20/2020    Last filled doxepin (SINEQUAN) 50 MG capsule 60 capsule w/ 0 refills on 8/20/2020.     Last filled ALPRAZolam (XANAX) 0.5 MG tablet 30 tablet w/ 0 refills on 8/20/2020.

## 2020-09-08 NOTE — TELEPHONE ENCOUNTER
----- Message from Marnaa  sent at 9/8/2020 10:28 AM CDT -----  Type:  RX Refill Request    Who Called: Pt  Refill or New Rx:refill  RX Name and Strength:ALPRAZolam (XANAX) 0.5 MG tablet and doxepin (SINEQUAN) 50 MG capsule  How is the patient currently taking it? x. 1XDay):  Is this a 30 day or 90 day RX:30  Preferred Pharmacy with phone number:  [facility]  [address]  [place] [address]  Phone: [phone] Fax: [phone]  Local or Mail Order:local   Ordering Provider:Dr Duong  Would the patient rather a call back or a response via [facility]? Call back  Best Call Back Number:[phone] (home)   Additional Information:

## 2020-09-11 ENCOUNTER — HOSPITAL ENCOUNTER (EMERGENCY)
Facility: HOSPITAL | Age: 50
Discharge: HOME | End: 2020-09-11
Attending: FAMILY MEDICINE
Payer: MEDICARE

## 2020-09-11 VITALS
HEIGHT: 63 IN | DIASTOLIC BLOOD PRESSURE: 88 MMHG | HEART RATE: 98 BPM | SYSTOLIC BLOOD PRESSURE: 142 MMHG | OXYGEN SATURATION: 96 % | RESPIRATION RATE: 18 BRPM | BODY MASS INDEX: 24.56 KG/M2 | TEMPERATURE: 99 F

## 2020-09-11 DIAGNOSIS — K52.9 ENTERITIS: Primary | ICD-10-CM

## 2020-09-11 DIAGNOSIS — R11.2 NAUSEA AND VOMITING: ICD-10-CM

## 2020-09-11 LAB
ALBUMIN SERPL BCP-MCNC: 4 G/DL (ref 3.5–5.2)
ALP SERPL-CCNC: 119 U/L (ref 55–135)
ALT SERPL W/O P-5'-P-CCNC: 41 U/L (ref 10–44)
ANION GAP SERPL CALC-SCNC: 10 MMOL/L (ref 8–16)
APTT BLDCRRT: 24.9 SEC (ref 21–32)
AST SERPL-CCNC: 23 U/L (ref 10–40)
BASOPHILS # BLD AUTO: 0.04 K/UL (ref 0–0.2)
BASOPHILS NFR BLD: 0.5 % (ref 0–1.9)
BILIRUB SERPL-MCNC: 1.2 MG/DL (ref 0.1–1)
BILIRUB UR QL STRIP: NEGATIVE
BUN SERPL-MCNC: 19 MG/DL (ref 6–20)
CALCIUM SERPL-MCNC: 9.1 MG/DL (ref 8.7–10.5)
CHLORIDE SERPL-SCNC: 105 MMOL/L (ref 95–110)
CLARITY UR: CLEAR
CO2 SERPL-SCNC: 21 MMOL/L (ref 23–29)
COLOR UR: YELLOW
CREAT SERPL-MCNC: 0.9 MG/DL (ref 0.5–1.4)
DIFFERENTIAL METHOD: NORMAL
EOSINOPHIL # BLD AUTO: 0.3 K/UL (ref 0–0.5)
EOSINOPHIL NFR BLD: 3.6 % (ref 0–8)
ERYTHROCYTE [DISTWIDTH] IN BLOOD BY AUTOMATED COUNT: 13.2 % (ref 11.5–14.5)
EST. GFR  (AFRICAN AMERICAN): >60 ML/MIN/1.73 M^2
EST. GFR  (NON AFRICAN AMERICAN): >60 ML/MIN/1.73 M^2
GLUCOSE SERPL-MCNC: 304 MG/DL (ref 70–110)
GLUCOSE UR QL STRIP: ABNORMAL
HCT VFR BLD AUTO: 46.1 % (ref 37–48.5)
HGB BLD-MCNC: 15.2 G/DL (ref 12–16)
HGB UR QL STRIP: NEGATIVE
IMM GRANULOCYTES # BLD AUTO: 0.01 K/UL (ref 0–0.04)
IMM GRANULOCYTES NFR BLD AUTO: 0.1 % (ref 0–0.5)
INR PPP: 1 (ref 0.8–1.2)
KETONES UR QL STRIP: ABNORMAL
LEUKOCYTE ESTERASE UR QL STRIP: NEGATIVE
LIPASE SERPL-CCNC: 30 U/L (ref 4–60)
LYMPHOCYTES # BLD AUTO: 2.3 K/UL (ref 1–4.8)
LYMPHOCYTES NFR BLD: 27.3 % (ref 18–48)
MCH RBC QN AUTO: 30.1 PG (ref 27–31)
MCHC RBC AUTO-ENTMCNC: 33 G/DL (ref 32–36)
MCV RBC AUTO: 91 FL (ref 82–98)
MONOCYTES # BLD AUTO: 0.5 K/UL (ref 0.3–1)
MONOCYTES NFR BLD: 6.5 % (ref 4–15)
NEUTROPHILS # BLD AUTO: 5.2 K/UL (ref 1.8–7.7)
NEUTROPHILS NFR BLD: 62 % (ref 38–73)
NITRITE UR QL STRIP: NEGATIVE
NRBC BLD-RTO: 0 /100 WBC
PH UR STRIP: 7 [PH] (ref 5–8)
PLATELET # BLD AUTO: 287 K/UL (ref 150–350)
PMV BLD AUTO: 11.4 FL (ref 9.2–12.9)
POTASSIUM SERPL-SCNC: 3.8 MMOL/L (ref 3.5–5.1)
PROT SERPL-MCNC: 7.4 G/DL (ref 6–8.4)
PROT UR QL STRIP: NEGATIVE
PROTHROMBIN TIME: 10.7 SEC (ref 9–12.5)
RBC # BLD AUTO: 5.05 M/UL (ref 4–5.4)
SARS-COV-2 RDRP RESP QL NAA+PROBE: NEGATIVE
SODIUM SERPL-SCNC: 136 MMOL/L (ref 136–145)
SP GR UR STRIP: 1.01 (ref 1–1.03)
URN SPEC COLLECT METH UR: ABNORMAL
UROBILINOGEN UR STRIP-ACNC: NEGATIVE EU/DL
WBC # BLD AUTO: 8.36 K/UL (ref 3.9–12.7)

## 2020-09-11 PROCEDURE — 81003 URINALYSIS AUTO W/O SCOPE: CPT | Mod: HCNC

## 2020-09-11 PROCEDURE — 96374 THER/PROPH/DIAG INJ IV PUSH: CPT | Mod: HCNC,59

## 2020-09-11 PROCEDURE — 80053 COMPREHEN METABOLIC PANEL: CPT | Mod: HCNC

## 2020-09-11 PROCEDURE — U0002 COVID-19 LAB TEST NON-CDC: HCPCS | Mod: HCNC

## 2020-09-11 PROCEDURE — 99285 EMERGENCY DEPT VISIT HI MDM: CPT | Mod: 25,HCNC

## 2020-09-11 PROCEDURE — 25500020 PHARM REV CODE 255: Mod: HCNC | Performed by: FAMILY MEDICINE

## 2020-09-11 PROCEDURE — 96375 TX/PRO/DX INJ NEW DRUG ADDON: CPT | Mod: HCNC

## 2020-09-11 PROCEDURE — 93010 ELECTROCARDIOGRAM REPORT: CPT | Mod: HCNC,,, | Performed by: INTERNAL MEDICINE

## 2020-09-11 PROCEDURE — 83690 ASSAY OF LIPASE: CPT | Mod: HCNC

## 2020-09-11 PROCEDURE — 63600175 PHARM REV CODE 636 W HCPCS: Mod: HCNC | Performed by: FAMILY MEDICINE

## 2020-09-11 PROCEDURE — 85025 COMPLETE CBC W/AUTO DIFF WBC: CPT | Mod: HCNC

## 2020-09-11 PROCEDURE — 25000003 PHARM REV CODE 250: Mod: HCNC | Performed by: FAMILY MEDICINE

## 2020-09-11 PROCEDURE — 93010 EKG 12-LEAD: ICD-10-PCS | Mod: HCNC,,, | Performed by: INTERNAL MEDICINE

## 2020-09-11 PROCEDURE — 85610 PROTHROMBIN TIME: CPT | Mod: HCNC

## 2020-09-11 PROCEDURE — 93005 ELECTROCARDIOGRAM TRACING: CPT | Mod: HCNC

## 2020-09-11 PROCEDURE — 96361 HYDRATE IV INFUSION ADD-ON: CPT | Mod: HCNC

## 2020-09-11 PROCEDURE — 85730 THROMBOPLASTIN TIME PARTIAL: CPT | Mod: HCNC

## 2020-09-11 RX ORDER — HYDROMORPHONE HYDROCHLORIDE 2 MG/ML
0.5 INJECTION, SOLUTION INTRAMUSCULAR; INTRAVENOUS; SUBCUTANEOUS
Status: COMPLETED | OUTPATIENT
Start: 2020-09-11 | End: 2020-09-11

## 2020-09-11 RX ORDER — TRAMADOL HYDROCHLORIDE 50 MG/1
50 TABLET ORAL
Status: DISCONTINUED | OUTPATIENT
Start: 2020-09-11 | End: 2020-09-12 | Stop reason: HOSPADM

## 2020-09-11 RX ORDER — ONDANSETRON 2 MG/ML
4 INJECTION INTRAMUSCULAR; INTRAVENOUS
Status: COMPLETED | OUTPATIENT
Start: 2020-09-11 | End: 2020-09-11

## 2020-09-11 RX ADMIN — IOHEXOL 75 ML: 350 INJECTION, SOLUTION INTRAVENOUS at 09:09

## 2020-09-11 RX ADMIN — ONDANSETRON 4 MG: 2 INJECTION INTRAMUSCULAR; INTRAVENOUS at 08:09

## 2020-09-11 RX ADMIN — HYDROMORPHONE HYDROCHLORIDE 0.5 MG: 2 INJECTION INTRAMUSCULAR; INTRAVENOUS; SUBCUTANEOUS at 10:09

## 2020-09-11 RX ADMIN — SODIUM CHLORIDE 1000 ML: 0.9 INJECTION, SOLUTION INTRAVENOUS at 08:09

## 2020-09-11 RX ADMIN — HYDROMORPHONE HYDROCHLORIDE 0.5 MG: 2 INJECTION INTRAMUSCULAR; INTRAVENOUS; SUBCUTANEOUS at 08:09

## 2020-09-12 NOTE — ED PROVIDER NOTES
SCRIBE #1 NOTE: I, Bonnie Ruiz, am scribing for, and in the presence of, Licha Kay MD. I have scribed the entire note.       History     Chief Complaint   Patient presents with    Abdominal Pain     States N/V/D off and on for about 1 month.  States was treated when it began with antiboitics and it got better for a short time. Hx of Gastric bypass 2009     Review of patient's allergies indicates:   Allergen Reactions    Demerol [meperidine] Hallucinations    Penicillins Other (See Comments)     Patient cannot recall specific reaction, reports she has been listing this as an allergy since childhood.    Bactrim [sulfamethoxazole-trimethoprim]     Ciprofloxacin (bulk)     Codeine Nausea And Vomiting    Levetiracetam     Toradol [ketorolac]     Tramadol      seizures    Morpholine analogues Diarrhea, Itching and Nausea And Vomiting         History of Present Illness     HPI    9/11/2020, 8:22 PM  History obtained from the patient      History of Present Illness: Jadyn Chance is a 50 y.o. female patient with a PMHx of blood transfusion, DM, HLD, and seizures who presents to the Emergency Department for evaluation of generalized abdominal pain which onset gradually about 1.5 weeks ago. Symptoms are constant and moderate in severity. No mitigating or exacerbating factors reported. Associated sxs include n/v/d. Patient denies any fever, dysuria, hematuria, frequency, urgency, constipation, and all other sxs at this time. Pt had a bacterial infection in her stomach about 1 month ago that she was given abx for and states current sxs feel similar to then. No further complaints or concerns at this time.       Arrival mode: Personal vehicle     PCP: Mikayla Myles MD        Past Medical History:  Past Medical History:   Diagnosis Date    Abnormal Pap smear 1991    bx was negative, per pt    Anemia     Anxiety     B12 deficiency     Blood transfusion     multiple     Chronic LBP      Degenerative disc disease     l spine    Diabetes mellitus     Diabetic neuropathy     General anesthetics causing adverse effect in therapeutic use     delayed emergence    Mixed hyperlipidemia 2013    RLS (restless legs syndrome)     Seizures     Vitamin D deficiency disease        Past Surgical History:  Past Surgical History:   Procedure Laterality Date    ANUS SURGERY      AUGMENTATION OF BREAST  2008    saline    BELT ABDOMINOPLASTY      tummy tuck    BREAST SURGERY  2008    breast augmentation     SECTION      x2    CHOLECYSTECTOMY      mikel      EXCISIONAL HEMORRHOIDECTOMY      GASTRIC BYPASS      HIP FRACTURE SURGERY      left hip ORIF    MOUTH SURGERY      revision of JJ anastomosis  2/27/15    small bowel resection  2015    TUBAL LIGATION           Family History:  Family History   Problem Relation Age of Onset    Diabetes Mother     Cataracts Mother     Glaucoma Maternal Aunt     Blindness Maternal Aunt     Breast cancer Neg Hx     Colon cancer Neg Hx     Thrombophilia Neg Hx        Social History:  Social History     Tobacco Use    Smoking status: Never Smoker    Smokeless tobacco: Never Used   Substance and Sexual Activity    Alcohol use: No     Frequency: Never     Binge frequency: Never    Drug use: No    Sexual activity: Yes     Partners: Male     Birth control/protection: Surgical     Comment: BTL; mut monog        Review of Systems     Review of Systems   Constitutional: Negative for fever.   HENT: Negative for sore throat.    Respiratory: Negative for shortness of breath.    Cardiovascular: Negative for chest pain.   Gastrointestinal: Positive for abdominal pain (generalized), diarrhea, nausea and vomiting. Negative for constipation.   Genitourinary: Negative for dysuria, frequency, hematuria and urgency.   Musculoskeletal: Negative for back pain.   Skin: Negative for rash.   Neurological: Negative for weakness.   Hematological: Does not  "bruise/bleed easily.   All other systems reviewed and are negative.     Physical Exam     Initial Vitals [09/11/20 1942]   BP Pulse Resp Temp SpO2   (!) 155/93 109 18 99 °F (37.2 °C) 96 %      MAP       --          Physical Exam  Nursing Notes and Vital Signs Reviewed.  Constitutional: Patient is in mild distress. Well-developed and well-nourished.  Head: Atraumatic. Normocephalic.  Eyes: PERRL. EOM intact. Conjunctivae are not pale. No scleral icterus.  ENT: Mucous membranes are moist. Oropharynx is clear and symmetric.    Neck: Supple. Full ROM. No lymphadenopathy.  Cardiovascular: Regular rate. Regular rhythm. No murmurs, rubs, or gallops. Distal pulses are 2+ and symmetric.  Pulmonary/Chest: No respiratory distress. Clear to auscultation bilaterally. No wheezing or rales.  Abdominal: Soft and non-distended.  There is tenderness to palpation throughout.  No rebound, guarding, or rigidity. Good bowel sounds.  Genitourinary: No CVA tenderness  Musculoskeletal: Moves all extremities. No obvious deformities. No edema. No calf tenderness.  Skin: Warm and dry.  Neurological:  Alert, awake, and appropriate.  Normal speech.  No acute focal neurological deficits are appreciated.  Psychiatric: Normal affect. Good eye contact. Appropriate in content.     ED Course   Procedures  ED Vital Signs:  Vitals:    09/11/20 1942 09/11/20 2054 09/11/20 2056 09/11/20 2230   BP: (!) 155/93  (!) 121/94    Pulse: 109  95    Resp: 18 18 18 18   Temp: 99 °F (37.2 °C)      TempSrc: Oral      SpO2: 96%  100%    Height: 5' 3" (1.6 m)       09/11/20 2243 09/11/20 2316   BP:  (!) 142/88   Pulse:  98   Resp: 18 18   Temp:  98.9 °F (37.2 °C)   TempSrc:  Oral   SpO2:  96%   Height:         Abnormal Lab Results:  Labs Reviewed   COMPREHENSIVE METABOLIC PANEL - Abnormal; Notable for the following components:       Result Value    CO2 21 (*)     Glucose 304 (*)     Total Bilirubin 1.2 (*)     All other components within normal limits   URINALYSIS, " REFLEX TO URINE CULTURE - Abnormal; Notable for the following components:    Glucose, UA 2+ (*)     Ketones, UA Trace (*)     All other components within normal limits    Narrative:     Specimen Source->Urine   CBC W/ AUTO DIFFERENTIAL   LIPASE   APTT   PROTIME-INR   SARS-COV-2 RNA AMPLIFICATION, QUAL        All Lab Results:  Results for orders placed or performed during the hospital encounter of 09/11/20   CBC W/ AUTO DIFFERENTIAL   Result Value Ref Range    WBC 8.36 3.90 - 12.70 K/uL    RBC 5.05 4.00 - 5.40 M/uL    Hemoglobin 15.2 12.0 - 16.0 g/dL    Hematocrit 46.1 37.0 - 48.5 %    Mean Corpuscular Volume 91 82 - 98 fL    Mean Corpuscular Hemoglobin 30.1 27.0 - 31.0 pg    Mean Corpuscular Hemoglobin Conc 33.0 32.0 - 36.0 g/dL    RDW 13.2 11.5 - 14.5 %    Platelets 287 150 - 350 K/uL    MPV 11.4 9.2 - 12.9 fL    Immature Granulocytes 0.1 0.0 - 0.5 %    Gran # (ANC) 5.2 1.8 - 7.7 K/uL    Immature Grans (Abs) 0.01 0.00 - 0.04 K/uL    Lymph # 2.3 1.0 - 4.8 K/uL    Mono # 0.5 0.3 - 1.0 K/uL    Eos # 0.3 0.0 - 0.5 K/uL    Baso # 0.04 0.00 - 0.20 K/uL    nRBC 0 0 /100 WBC    Gran% 62.0 38.0 - 73.0 %    Lymph% 27.3 18.0 - 48.0 %    Mono% 6.5 4.0 - 15.0 %    Eosinophil% 3.6 0.0 - 8.0 %    Basophil% 0.5 0.0 - 1.9 %    Differential Method Automated    Comp. Metabolic Panel   Result Value Ref Range    Sodium 136 136 - 145 mmol/L    Potassium 3.8 3.5 - 5.1 mmol/L    Chloride 105 95 - 110 mmol/L    CO2 21 (L) 23 - 29 mmol/L    Glucose 304 (H) 70 - 110 mg/dL    BUN, Bld 19 6 - 20 mg/dL    Creatinine 0.9 0.5 - 1.4 mg/dL    Calcium 9.1 8.7 - 10.5 mg/dL    Total Protein 7.4 6.0 - 8.4 g/dL    Albumin 4.0 3.5 - 5.2 g/dL    Total Bilirubin 1.2 (H) 0.1 - 1.0 mg/dL    Alkaline Phosphatase 119 55 - 135 U/L    AST 23 10 - 40 U/L    ALT 41 10 - 44 U/L    Anion Gap 10 8 - 16 mmol/L    eGFR if African American >60 >60 mL/min/1.73 m^2    eGFR if non African American >60 >60 mL/min/1.73 m^2   Lipase   Result Value Ref Range    Lipase 30 4 -  60 U/L   Urinalysis, Reflex to Urine Culture Urine, Clean Catch    Specimen: Urine   Result Value Ref Range    Specimen UA Urine, Clean Catch     Color, UA Yellow Yellow, Straw, Miriam    Appearance, UA Clear Clear    pH, UA 7.0 5.0 - 8.0    Specific Gravity, UA 1.010 1.005 - 1.030    Protein, UA Negative Negative    Glucose, UA 2+ (A) Negative    Ketones, UA Trace (A) Negative    Bilirubin (UA) Negative Negative    Occult Blood UA Negative Negative    Nitrite, UA Negative Negative    Urobilinogen, UA Negative <2.0 EU/dL    Leukocytes, UA Negative Negative   APTT   Result Value Ref Range    aPTT 24.9 21.0 - 32.0 sec   Protime-INR   Result Value Ref Range    Prothrombin Time 10.7 9.0 - 12.5 sec    INR 1.0 0.8 - 1.2   COVID-19 Rapid Screening   Result Value Ref Range    SARS-CoV-2 RNA, Amplification, Qual Negative Negative       Imaging Results:  Imaging Results          CT Abdomen Pelvis With Contrast (Final result)  Result time 09/11/20 21:44:08    Final result by Charanjit Corcoran MD (09/11/20 21:44:08)                 Impression:      No acute abnormality identified.    Postsurgical change of Kayleen-en-Y gastric bypass.  No evidence of bowel obstruction, inflammatory change, or intussusception.    Pancreatic atrophy.    Postsurgical changes of the left hip and pelvis.    All CT scans at this facility are performed  using dose modulation techniques as appropriate to performed exam including the following:  automated exposure control; adjustment of mA and/or kV according to the patients size (this includes techniques or standardized protocols for targeted exams where dose is matched to indication/reason for exam: i.e. extremities or head);  iterative reconstruction technique.      Electronically signed by: Charanjit Corcoran  Date:    09/11/2020  Time:    21:44             Narrative:    EXAMINATION:  CT ABDOMEN PELVIS WITH CONTRAST    CLINICAL HISTORY:  Abdominal pain, acute, nonlocalized;h/o Intussusception, gastric   bypass;    TECHNIQUE:  Low dose axial images, sagittal and coronal reformations were obtained from the lung bases to the pubic symphysis.  Contrast was not administered.    COMPARISON:  CT abdomen pelvis 08/19/2020    FINDINGS:  Heart: Normal in size. No pericardial effusion.    Lung Bases: Well aerated, without consolidation or pleural fluid.    Liver: Normal in size and attenuation, with no focal hepatic lesions.    Gallbladder: Surgically absent.    Bile Ducts: No evidence of dilated ducts.    Pancreas: Pancreatic atrophy.    Spleen: Unremarkable.    Adrenals: Unremarkable.    Kidneys/ Ureters: No obstructive uropathy.    Bladder: No evidence of wall thickening.    Reproductive organs: Uterus and ovaries appear within normal limits.    GI Tract/Mesentery: Surgical change of Kayleen-en-Y gastric bypass.  No evidence of small or large bowel intussusception.  No hernia.  Colon appears unremarkable.  Appendix is well visualized without local inflammation.    Peritoneal Space: No ascites. No free air.    Retroperitoneum: No significant adenopathy.    Abdominal wall: Unremarkable.    Vasculature: No significant atherosclerosis or aneurysm.    Bones: Surgical change of the left hip with some heterotopic ossification.  Left sacroiliac fixation screws.                                 The EKG was ordered, reviewed, and independently interpreted by the ED provider.  Interpretation time: 2111  Rate: 99 BPM  Rhythm: normal sinus rhythm  Interpretation: Septal infarct, age undetermined. No STEMI.         The Emergency Provider reviewed the vital signs and test results, which are outlined above.     ED Discussion     10:24 PM: Reassessed pt at this time. Pt states she feels much better. Discussed with pt all pertinent ED information and results. Discussed pt dx and plan of tx. Gave pt all f/u and return to the ED instructions. All questions and concerns were addressed at this time. Pt expresses understanding of information and  instructions, and is comfortable with plan to discharge. Pt is stable for discharge.    I discussed with patient and/or family/caretaker that evaluation in the ED does not suggest any emergent or life threatening medical conditions requiring immediate intervention beyond what was provided in the ED, and I believe patient is safe for discharge.  Regardless, an unremarkable evaluation in the ED does not preclude the development or presence of a serious of life threatening condition. As such, patient was instructed to return immediately for any worsening or change in current symptoms.       Medical Decision Making:   Clinical Tests:   Lab Tests: Ordered and Reviewed  Radiological Study: Ordered and Reviewed  Medical Tests: Ordered and Reviewed           ED Medication(s):  Medications   sodium chloride 0.9% bolus 1,000 mL (0 mLs Intravenous Stopped 9/11/20 2116)   hydromorphone (PF) injection 0.5 mg (0.5 mg Intravenous Given 9/11/20 2054)   ondansetron injection 4 mg (4 mg Intravenous Given 9/11/20 2054)   iohexoL (OMNIPAQUE 350) injection 75 mL (75 mLs Intravenous Given 9/11/20 2128)   hydromorphone (PF) injection 0.5 mg (0.5 mg Intravenous Given 9/11/20 2243)       Discharge Medication List as of 9/11/2020 10:25 PM          Follow-up Information     Mikayla Myles MD. Schedule an appointment as soon as possible for a visit in 3 days.    Specialty: Internal Medicine  Contact information:  [address]  [facility] [id number] [phone]                       Scribe Attestation:   Scribe #1: I performed the above scribed service and the documentation accurately describes the services I performed. I attest to the accuracy of the note.     Attending:   Physician Attestation Statement for Scribe #1: I, Licha Kay MD, personally performed the services described in this documentation, as scribed by Bonnie Ruiz, in my presence, and it is both accurate and complete.           Clinical Impression       ICD-10-CM  "ICD-9-CM   1. Enteritis  K52.9 558.9   2. Nausea and vomiting  R11.2 787.01       Disposition:   Disposition: Discharged  Condition: Stable  Portions of this note may have been created with voice recognition software. Occasional "wrong-word" or "sound-a-like" substitutions may have occurred due to the inherent limitations of voice recognition software. Please, read the note carefully and recognize, using context, where substitutions have occurred.            Licha Kay MD  09/13/20 1547    "

## 2020-09-15 ENCOUNTER — TELEPHONE (OUTPATIENT)
Dept: FAMILY MEDICINE | Facility: CLINIC | Age: 50
End: 2020-09-15

## 2020-09-15 NOTE — TELEPHONE ENCOUNTER
----- Message from Zainabdes Schwab sent at 9/15/2020 12:53 PM CDT -----  States she has been to the ER twice, four weeks ago and this Sunday. States she has lost 50 pounds in the last month. States the ER doesn't know what's wrong and she needs to know what to do. They done CT and can't find anything. States she had these symptoms before, and they had to take part of her colon out. Please call pt [phone]. Thank you

## 2020-09-15 NOTE — TELEPHONE ENCOUNTER
Pt reports having severe lt side abd pain, ongoing.  She has been to the ER and nothing was found.  Offered pt appt tomorrow with Dr. Yates.  Pt declined, stated she is going to go to her local ER for eval./rpr

## 2020-09-17 ENCOUNTER — HOSPITAL ENCOUNTER (OUTPATIENT)
Dept: RADIOLOGY | Facility: HOSPITAL | Age: 50
Discharge: HOME | End: 2020-09-17
Attending: INTERNAL MEDICINE
Payer: MEDICARE

## 2020-09-17 VITALS — HEIGHT: 63 IN | WEIGHT: 138.69 LBS | BODY MASS INDEX: 24.57 KG/M2

## 2020-09-17 DIAGNOSIS — Z12.31 ENCOUNTER FOR SCREENING MAMMOGRAM FOR MALIGNANT NEOPLASM OF BREAST: ICD-10-CM

## 2020-09-17 PROCEDURE — 77067 SCR MAMMO BI INCL CAD: CPT | Mod: 26,HCNC,, | Performed by: RADIOLOGY

## 2020-09-17 PROCEDURE — 77067 MAMMO DIGITAL SCREENING BILAT WITH TOMOSYNTHESIS_CAD: ICD-10-PCS | Mod: 26,HCNC,, | Performed by: RADIOLOGY

## 2020-09-17 PROCEDURE — 77063 BREAST TOMOSYNTHESIS BI: CPT | Mod: 26,HCNC,, | Performed by: RADIOLOGY

## 2020-09-17 PROCEDURE — 77067 SCR MAMMO BI INCL CAD: CPT | Mod: TC,HCNC

## 2020-09-17 PROCEDURE — 77063 MAMMO DIGITAL SCREENING BILAT WITH TOMOSYNTHESIS_CAD: ICD-10-PCS | Mod: 26,HCNC,, | Performed by: RADIOLOGY

## 2020-09-21 ENCOUNTER — PATIENT OUTREACH (OUTPATIENT)
Dept: ADMINISTRATIVE | Facility: OTHER | Age: 50
End: 2020-09-21

## 2020-09-22 ENCOUNTER — TELEPHONE (OUTPATIENT)
Dept: GASTROENTEROLOGY | Facility: CLINIC | Age: 50
End: 2020-09-22

## 2020-09-22 ENCOUNTER — HOSPITAL ENCOUNTER (OUTPATIENT)
Dept: RADIOLOGY | Facility: HOSPITAL | Age: 50
Discharge: HOME | End: 2020-09-22
Attending: NURSE PRACTITIONER
Payer: MEDICARE

## 2020-09-22 ENCOUNTER — PATIENT MESSAGE (OUTPATIENT)
Dept: GASTROENTEROLOGY | Facility: CLINIC | Age: 50
End: 2020-09-22

## 2020-09-22 ENCOUNTER — OFFICE VISIT (OUTPATIENT)
Dept: GASTROENTEROLOGY | Facility: CLINIC | Age: 50
End: 2020-09-22
Payer: MEDICARE

## 2020-09-22 VITALS
DIASTOLIC BLOOD PRESSURE: 60 MMHG | BODY MASS INDEX: 26.29 KG/M2 | HEART RATE: 100 BPM | HEIGHT: 62 IN | WEIGHT: 142.88 LBS | SYSTOLIC BLOOD PRESSURE: 100 MMHG

## 2020-09-22 DIAGNOSIS — R10.84 GENERALIZED ABDOMINAL PAIN: Primary | ICD-10-CM

## 2020-09-22 DIAGNOSIS — K59.03 DRUG-INDUCED CONSTIPATION: ICD-10-CM

## 2020-09-22 DIAGNOSIS — R10.84 GENERALIZED ABDOMINAL PAIN: ICD-10-CM

## 2020-09-22 PROCEDURE — 99214 PR OFFICE/OUTPT VISIT, EST, LEVL IV, 30-39 MIN: ICD-10-PCS | Mod: HCNC,S$GLB,, | Performed by: NURSE PRACTITIONER

## 2020-09-22 PROCEDURE — 3008F BODY MASS INDEX DOCD: CPT | Mod: HCNC,CPTII,S$GLB, | Performed by: NURSE PRACTITIONER

## 2020-09-22 PROCEDURE — 99999 PR PBB SHADOW E&M-EST. PATIENT-LVL III: CPT | Mod: PBBFAC,HCNC,, | Performed by: NURSE PRACTITIONER

## 2020-09-22 PROCEDURE — 99999 PR PBB SHADOW E&M-EST. PATIENT-LVL III: ICD-10-PCS | Mod: PBBFAC,HCNC,, | Performed by: NURSE PRACTITIONER

## 2020-09-22 PROCEDURE — 99214 OFFICE O/P EST MOD 30 MIN: CPT | Mod: HCNC,S$GLB,, | Performed by: NURSE PRACTITIONER

## 2020-09-22 PROCEDURE — 74019 XR ABDOMEN FLAT AND ERECT: ICD-10-PCS | Mod: 26,HCNC,, | Performed by: RADIOLOGY

## 2020-09-22 PROCEDURE — 3008F PR BODY MASS INDEX (BMI) DOCUMENTED: ICD-10-PCS | Mod: HCNC,CPTII,S$GLB, | Performed by: NURSE PRACTITIONER

## 2020-09-22 PROCEDURE — 74019 RADEX ABDOMEN 2 VIEWS: CPT | Mod: 26,HCNC,, | Performed by: RADIOLOGY

## 2020-09-22 PROCEDURE — 74019 RADEX ABDOMEN 2 VIEWS: CPT | Mod: TC,HCNC

## 2020-09-22 RX ORDER — ONDANSETRON HYDROCHLORIDE 8 MG/1
8 TABLET, FILM COATED ORAL EVERY 8 HOURS PRN
Qty: 10 TABLET | Refills: 0 | Status: SHIPPED | OUTPATIENT
Start: 2020-09-22 | End: 2020-10-26

## 2020-09-22 RX ORDER — SODIUM, POTASSIUM,MAG SULFATES 17.5-3.13G
1 SOLUTION, RECONSTITUTED, ORAL ORAL ONCE
Qty: 354 ML | Refills: 0 | Status: SHIPPED | OUTPATIENT
Start: 2020-09-22 | End: 2020-09-23

## 2020-09-22 NOTE — TELEPHONE ENCOUNTER
----- Message from Alicia Nassar MA sent at 9/22/2020  1:21 PM CDT -----  Can you please send something in for nausea to [facility] at the [place]?

## 2020-09-22 NOTE — TELEPHONE ENCOUNTER
----- Message from Alyssa Olivier sent at 9/22/2020  1:14 PM CDT -----  Regarding: Rx  Pt request call back to verify regarding Rx pt lives in [place] doesn't know if was sent to [facility]   .... [phone] (home)

## 2020-09-22 NOTE — PROGRESS NOTES
Clinic Follow Up:  [facility] Follow Up Note    Reason for Follow Up:  The primary encounter diagnosis was Generalized abdominal pain. A diagnosis of Drug-induced constipation was also pertinent to this visit.    PCP: Mikayla Myles       HPI:  This is a 50 y.o. female here for follow up of the above  Pt has not been seen since 2018  She states that over the last few months, she has had progressive worsening of abdominal pain.  Reports that the pain is moderate to severe.  Constant with acute sharp pain. The sharp pain is at different spots within the abdomen.   Pain is worse with worsening constipation.  Reports that she is having a BM every 3-4 days with small amounts of hard stools.   Denies any associated melena or hematochezia.   Has associates nausea but no vomiting.   Not taking any medication for the constipation.   Previous workup included CT that was unremarkable.   Last colonoscopy 2017 also unremarkable.   Is on chronic pain medications.    Has had some weight loss due to decreased appetite related to pain.         Review of Systems   Constitutional: Positive for malaise/fatigue and weight loss. Negative for chills and fever.   Respiratory: Negative for cough.    Cardiovascular: Negative for chest pain.   Gastrointestinal:        Per HPI   Musculoskeletal: Positive for myalgias.   Skin: Negative for itching and rash.   Neurological: Negative for headaches.   Psychiatric/Behavioral: The patient is nervous/anxious.        Medical History:  Past Medical History:   Diagnosis Date    Abnormal Pap smear 1991    bx was negative, per pt    Anemia     Anxiety     B12 deficiency     Blood transfusion     multiple     Chronic LBP     Degenerative disc disease     l spine    Diabetes mellitus     Diabetic neuropathy     General anesthetics causing adverse effect in therapeutic use     delayed emergence    Mixed hyperlipidemia 11/18/2013    RLS (restless legs syndrome)     Seizures      Vitamin D deficiency disease        Surgical History:   Past Surgical History:   Procedure Laterality Date    ANUS SURGERY      AUGMENTATION OF BREAST  2008    saline    BELT ABDOMINOPLASTY      tummy tuck    BREAST SURGERY  2008    breast augmentation     SECTION      x2    CHOLECYSTECTOMY      mikel      EXCISIONAL HEMORRHOIDECTOMY      GASTRIC BYPASS      HIP FRACTURE SURGERY      left hip ORIF    MOUTH SURGERY      revision of JJ anastomosis  2/27/15    small bowel resection  2015    TUBAL LIGATION         Family History:   Family History   Problem Relation Age of Onset    Diabetes Mother     Cataracts Mother     Glaucoma Maternal Aunt     Blindness Maternal Aunt     Breast cancer Neg Hx     Colon cancer Neg Hx     Thrombophilia Neg Hx        Social History:   Social History     Tobacco Use    Smoking status: Never Smoker    Smokeless tobacco: Never Used   Substance Use Topics    Alcohol use: No     Frequency: Never     Binge frequency: Never    Drug use: No       Allergies: Reviewed    Home Medications:  Current Outpatient Medications on File Prior to Visit   Medication Sig Dispense Refill    ALPRAZolam (XANAX) 0.5 MG tablet take one tablet by mouth as needed for anxiety 30 tablet 0    BD ALCOHOL SWABS PadM       BIOTIN ORAL Take 3,000 Units by mouth.      blood sugar diagnostic Strp To check BG 3 times daily, to use with insurance preferred meter    DxE11.9 200 strip 3    blood-glucose meter Misc Use to check blood glucose 3 times daily 1 each 0    cyanocobalamin 1,000 mcg/mL injection Inject 1 mL (1,000 mcg total) into the skin every 28 days. 10 mL 11    cyclobenzaprine (FLEXERIL) 10 MG tablet Take 1 tablet by mouth three times a day as needed /May cause drowsiness, use caution/ muscle spasms/ 90 tablet 5    DULoxetine (CYMBALTA) 30 MG capsule Take 1 capsule (30 mg total) by mouth 2 (two) times daily. 60 capsule 11    ergocalciferol (VITAMIN D2) 50,000 unit  "Cap TAKE 1 CAPSULE (50,000 UNITS TOTAL) BY MOUTH ONCE A WEEK. 12 capsule 3    insulin detemir U-100 (LEVEMIR FLEXTOUCH) 100 unit/mL (3 mL) SubQ InPn pen Inject 20 Units into the skin every evening. 15 mL 5    lancets 30 gauge Misc To check blood glucose 3 times daily 200 each 3    meloxicam (MOBIC) 7.5 MG tablet Take 1 tablet by mouth once a day as directed take with largest meal of day/ for arthritis joint pain 30 tablet 5    oxyCODONE (ROXICODONE) 15 MG Tab Take 1 tablet by mouth every 8 hours as needed for pain 90 tablet 0    pen needle, diabetic 32 gauge x 5/32" Ndle Use 1 needle once daily. 100 each 3    syringe with needle (SYRINGE 3CC/25GX1") 3 mL 25 gauge x 1" Syrg For vitamin B12 injection 100 Syringe 1    ALPRAZolam (XANAX) 0.5 MG tablet Take 2 tablets by mouth as needed for anxiety (Patient not taking: Reported on 9/22/2020) 60 tablet 2    clonazePAM (KLONOPIN) 1 MG tablet Take 1 tablet (1 mg total) by mouth once daily AND 2 tablets (2 mg total) every evening. 90 tablet 3    clonazePAM (KLONOPIN) 2 MG Tab       divalproex (DEPAKOTE) 250 MG EC tablet Take 1 tablet (250 mg total) by mouth 2 (two) times daily. (Patient not taking: Reported on 9/22/2020) 60 tablet 2    doxepin (SINEQUAN) 25 MG capsule Take 1 capsule (25 mg total) by mouth every evening. (Patient not taking: Reported on 9/22/2020) 30 capsule 11    doxepin (SINEQUAN) 50 MG capsule take one capsule by mouth at bedtime as needed for insomnia. If 1 capsule is not effective, increase the dose to 2 capsules (Patient not taking: Reported on 9/22/2020) 60 capsule 0    doxepin (SINEQUAN) 50 MG capsule Take 1 capsule by mouth at bedtime as needed for insomnia. If 1 pill is not effective, increase the dose to 2 pills. (Patient not taking: Reported on 9/22/2020) 60 capsule 5    fluticasone propionate (FLONASE) 50 mcg/actuation nasal spray Instill 2 sprays (100 mcg total) in each nostril once daily. (Patient not taking: Reported on 9/22/2020) " "16 g 6    HYDROcodone-acetaminophen (NORCO) 7.5-325 mg per tablet Take 1 tablet by mouth every 4 (four) hours as needed for Pain. 20 tablet 0    NOVOLIN R REGULAR U-100 INSULN 100 unit/mL injection       ondansetron (ZOFRAN) 4 MG tablet Take 1 tablet (4 mg total) by mouth every 6 (six) hours. (Patient not taking: Reported on 9/22/2020) 30 tablet 0    oxyCODONE-acetaminophen (PERCOCET)  mg per tablet Take 1 tablet by mouth every 8 to 12 hours as needed for pain (Patient not taking: Reported on 9/22/2020) 90 tablet 0    [DISCONTINUED] fluoxetine (PROZAC) 40 MG capsule Take 40 mg by mouth once daily.       No current facility-administered medications on file prior to visit.        Physical Exam:  Vital Signs:  /60   Pulse 100   Ht 5' 2" (1.575 m)   Wt 64.8 kg (142 lb 13.7 oz)   LMP 12/04/2014   BMI 26.13 kg/m²   Body mass index is 26.13 kg/m².  Physical Exam   Constitutional: She is oriented to person, place, and time. She appears well-developed and well-nourished.   HENT:   Head: Normocephalic.   Eyes: No scleral icterus.   Neck: Normal range of motion.   Cardiovascular: Normal rate.   Pulmonary/Chest: Effort normal.   Abdominal: Soft. Bowel sounds are normal. She exhibits no distension. There is abdominal tenderness (generalized).   Musculoskeletal: Normal range of motion.   Neurological: She is alert and oriented to person, place, and time.   Skin: Skin is dry.   Psychiatric: She has a normal mood and affect.   Vitals reviewed.      Labs: Pertinent labs reviewed.  Assessment:  1. Generalized abdominal pain    2. Drug-induced constipation        Recommendations:  I suspect that the symptoms are related to uncontrolled OIC with acute worsening of constipation  - will check an x-ray today to determine stool burden  - Will likely need a bowel prep followed by daily medication to control the constipation.   - Will likely need Amitiza vs Motegrity or Movantik  - If the x-ray is unremarkable or if the " symptoms continue with treatment of constipation, will need Colonoscopy for further evaluation.       Return to Clinic:  Follow up to be determined by results of above.

## 2020-09-22 NOTE — TELEPHONE ENCOUNTER
RETURNED CALL TO PATIENT. SHE INFORMED ME THAT SHE WOULD PREFER HER PREP TO BE SENT HERE AT THE [facility]. I INFORMED HER THAT IS WHERE IT WAS AT. SHE VERBALIZED UNDERSTANDING.
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
## 2020-09-25 NOTE — PROGRESS NOTES
"Subjective:      Patient ID: Jadyn Chance is a 50 y.o. female.    Chief Complaint: Follow-up      HPI  Here for follow up of medical problems.  Has been to ER several times in past month, dx with constipation, better with bowel prep.  AC breakfast sugars 140-150s, HS 170s.  Has been adjusting her Levemir every day, between 20-30u.  No f/c/sw/cough.  Now BMs normal.  Urine trouble "getting it out", "have to strain."      Updated/ annual due 2/21:  HM:  10/20 today fluvax, 1/19 [id number], 8/14 [id number], 10/20 today Td, 12/10 TDaP, 9/20 MMG/Gyn, Pain Dr. Mcgregor, 6/13 Cscope rep 10y.     Review of Systems   Constitutional: Negative for chills, diaphoresis and fever.   Respiratory: Negative for cough and shortness of breath.    Cardiovascular: Negative for chest pain, palpitations and leg swelling.   Gastrointestinal: Positive for nausea. Negative for blood in stool, constipation, diarrhea and vomiting.   Genitourinary: Positive for flank pain and urgency. Negative for dysuria, frequency and hematuria.   Skin: Negative for rash.   Psychiatric/Behavioral: The patient is not nervous/anxious.          Objective:   /70   Pulse 103   Temp 97.9 °F (36.6 °C)   Ht 5' 2" (1.575 m)   Wt 64 kg (141 lb 1.5 oz)   LMP 12/04/2014   SpO2 97%   BMI 25.81 kg/m²     Physical Exam  Constitutional:       Appearance: She is well-developed.   Neck:      Musculoskeletal: Neck supple.      Thyroid: No thyroid mass.      Vascular: No carotid bruit.   Cardiovascular:      Rate and Rhythm: Normal rate and regular rhythm.      Heart sounds: No murmur. No friction rub. No gallop.    Pulmonary:      Effort: Pulmonary effort is normal.      Breath sounds: Normal breath sounds. No wheezing or rales.   Abdominal:      General: Bowel sounds are normal.      Palpations: Abdomen is soft. There is no mass.      Tenderness: There is no abdominal tenderness.   Lymphadenopathy:      Cervical: No cervical adenopathy.   Neurological:      " Mental Status: She is alert and oriented to person, place, and time.             Assessment:       1. Recurrent major depressive disorder, in full remission    2. Type 2 diabetes mellitus without complication, with long-term current use of insulin    3. Pseudoseizure    4. Hyperlipidemia associated with type 2 diabetes mellitus    5. Vitamin B12 deficiency    6. Vitamin D deficiency    7. Preventive measure    8. Urine retention          Plan:     Recurrent major depressive disorder, in full remission- no more SSRI, doxepin and low dose xanax at hs.    Type 2 diabetes mellitus without complication, with long-term current use of insulin- take same dose daily, 20u.  Monitor sugars.  DM digital program.  -     insulin detemir U-100 (LEVEMIR FLEXTOUCH) 100 unit/mL (3 mL) SubQ InPn pen; Inject 20 Units into the skin every evening.  Dispense: 15 mL; Refill: 5  -     CBC auto differential; Future; Expected date: 10/05/2020  -     Comprehensive Metabolic Panel; Future; Expected date: 10/05/2020  -     Lipid Panel; Future; Expected date: 10/05/2020  -     TSH; Future; Expected date: 10/05/2020  -     Hemoglobin A1C; Future; Expected date: 10/05/2020  -     Microalbumin/Creatinine Ratio, Urine; Future; Expected date: 10/05/2020    Pseudoseizure    Hyperlipidemia associated with type 2 diabetes mellitus- recheck 3mo.    Vitamin B12 deficiency- cont supplement, recheck 3mo.  -     Vitamin B12; Future; Expected date: 10/05/2020    Vitamin D deficiency- cont supplement, recheck 3mo.  -     Vitamin D; Future    Preventive measure- fluvax, Td now.  -     Td Vaccine (Adult) - Preservative Free    Urine retention  -     Urinalysis; Future; Expected date: 10/05/2020  -     Ambulatory referral/consult to Urology; Future; Expected date: 10/12/2020    RTC 3 mo.

## 2020-09-30 PROCEDURE — 99457 RPM TX MGMT 1ST 20 MIN: CPT | Mod: S$GLB,,, | Performed by: INTERNAL MEDICINE

## 2020-09-30 PROCEDURE — 99457 PR MONITORING, PHYSIOL PARAM, REMOTE, 1ST 20 MINS, PER MONTH: ICD-10-PCS | Mod: S$GLB,,, | Performed by: INTERNAL MEDICINE

## 2020-10-05 ENCOUNTER — OFFICE VISIT (OUTPATIENT)
Dept: FAMILY MEDICINE | Facility: CLINIC | Age: 50
End: 2020-10-05
Payer: MEDICARE

## 2020-10-05 ENCOUNTER — IMMUNIZATION (OUTPATIENT)
Dept: PHARMACY | Facility: CLINIC | Age: 50
End: 2020-10-05
Payer: MEDICARE

## 2020-10-05 VITALS
WEIGHT: 141.13 LBS | DIASTOLIC BLOOD PRESSURE: 70 MMHG | TEMPERATURE: 98 F | HEART RATE: 103 BPM | BODY MASS INDEX: 25.97 KG/M2 | HEIGHT: 62 IN | SYSTOLIC BLOOD PRESSURE: 126 MMHG | OXYGEN SATURATION: 97 %

## 2020-10-05 DIAGNOSIS — F33.42 RECURRENT MAJOR DEPRESSIVE DISORDER, IN FULL REMISSION: Primary | ICD-10-CM

## 2020-10-05 DIAGNOSIS — R33.9 URINE RETENTION: ICD-10-CM

## 2020-10-05 DIAGNOSIS — E11.69 HYPERLIPIDEMIA ASSOCIATED WITH TYPE 2 DIABETES MELLITUS: ICD-10-CM

## 2020-10-05 DIAGNOSIS — F44.5 PSEUDOSEIZURE: ICD-10-CM

## 2020-10-05 DIAGNOSIS — E55.9 VITAMIN D DEFICIENCY: ICD-10-CM

## 2020-10-05 DIAGNOSIS — E11.9 TYPE 2 DIABETES MELLITUS WITHOUT COMPLICATION, WITH LONG-TERM CURRENT USE OF INSULIN: Chronic | ICD-10-CM

## 2020-10-05 DIAGNOSIS — Z79.4 TYPE 2 DIABETES MELLITUS WITHOUT COMPLICATION, WITH LONG-TERM CURRENT USE OF INSULIN: Chronic | ICD-10-CM

## 2020-10-05 DIAGNOSIS — E53.8 VITAMIN B12 DEFICIENCY: ICD-10-CM

## 2020-10-05 DIAGNOSIS — E78.5 HYPERLIPIDEMIA ASSOCIATED WITH TYPE 2 DIABETES MELLITUS: ICD-10-CM

## 2020-10-05 DIAGNOSIS — Z29.9 PREVENTIVE MEASURE: ICD-10-CM

## 2020-10-05 PROCEDURE — 90714 TD VACCINE GREATER THAN OR EQUAL TO 7YO PRESERVATIVE FREE IM: ICD-10-PCS | Mod: HCNC,S$GLB,, | Performed by: INTERNAL MEDICINE

## 2020-10-05 PROCEDURE — 90471 TD VACCINE GREATER THAN OR EQUAL TO 7YO PRESERVATIVE FREE IM: ICD-10-PCS | Mod: HCNC,S$GLB,, | Performed by: INTERNAL MEDICINE

## 2020-10-05 PROCEDURE — 90471 IMMUNIZATION ADMIN: CPT | Mod: HCNC,S$GLB,, | Performed by: INTERNAL MEDICINE

## 2020-10-05 PROCEDURE — 3052F HG A1C>EQUAL 8.0%<EQUAL 9.0%: CPT | Mod: HCNC,CPTII,S$GLB, | Performed by: INTERNAL MEDICINE

## 2020-10-05 PROCEDURE — 99999 PR PBB SHADOW E&M-EST. PATIENT-LVL V: ICD-10-PCS | Mod: PBBFAC,HCNC,, | Performed by: INTERNAL MEDICINE

## 2020-10-05 PROCEDURE — 99214 PR OFFICE/OUTPT VISIT, EST, LEVL IV, 30-39 MIN: ICD-10-PCS | Mod: 25,HCNC,S$GLB, | Performed by: INTERNAL MEDICINE

## 2020-10-05 PROCEDURE — 90714 TD VACC NO PRESV 7 YRS+ IM: CPT | Mod: HCNC,S$GLB,, | Performed by: INTERNAL MEDICINE

## 2020-10-05 PROCEDURE — 3008F BODY MASS INDEX DOCD: CPT | Mod: HCNC,CPTII,S$GLB, | Performed by: INTERNAL MEDICINE

## 2020-10-05 PROCEDURE — 99999 PR PBB SHADOW E&M-EST. PATIENT-LVL V: CPT | Mod: PBBFAC,HCNC,, | Performed by: INTERNAL MEDICINE

## 2020-10-05 PROCEDURE — 99214 OFFICE O/P EST MOD 30 MIN: CPT | Mod: 25,HCNC,S$GLB, | Performed by: INTERNAL MEDICINE

## 2020-10-05 PROCEDURE — 3008F PR BODY MASS INDEX (BMI) DOCUMENTED: ICD-10-PCS | Mod: HCNC,CPTII,S$GLB, | Performed by: INTERNAL MEDICINE

## 2020-10-05 PROCEDURE — 3052F PR MOST RECENT HEMOGLOBIN A1C LEVEL 8.0 - < 9.0%: ICD-10-PCS | Mod: HCNC,CPTII,S$GLB, | Performed by: INTERNAL MEDICINE

## 2020-10-05 RX ORDER — CYANOCOBALAMIN 1000 UG/ML
1000 INJECTION, SOLUTION INTRAMUSCULAR; SUBCUTANEOUS
Qty: 10 ML | Refills: 11 | Status: SHIPPED | OUTPATIENT
Start: 2020-10-05 | End: 2021-11-05 | Stop reason: SDUPTHER

## 2020-10-06 ENCOUNTER — PATIENT MESSAGE (OUTPATIENT)
Dept: FAMILY MEDICINE | Facility: CLINIC | Age: 50
End: 2020-10-06

## 2020-10-20 ENCOUNTER — OFFICE VISIT (OUTPATIENT)
Dept: UROLOGY | Facility: CLINIC | Age: 50
End: 2020-10-20
Payer: MEDICARE

## 2020-10-20 VITALS
TEMPERATURE: 98 F | DIASTOLIC BLOOD PRESSURE: 76 MMHG | SYSTOLIC BLOOD PRESSURE: 110 MMHG | BODY MASS INDEX: 25.81 KG/M2 | WEIGHT: 141.13 LBS

## 2020-10-20 DIAGNOSIS — R33.9 URINE RETENTION: ICD-10-CM

## 2020-10-20 LAB
BILIRUB SERPL-MCNC: NORMAL MG/DL
BLOOD URINE, POC: NORMAL
CLARITY, POC UA: CLEAR
COLOR, POC UA: YELLOW
GLUCOSE UR QL STRIP: NORMAL
KETONES UR QL STRIP: NORMAL
LEUKOCYTE ESTERASE URINE, POC: NORMAL
NITRITE, POC UA: NORMAL
PH, POC UA: 7
POC RESIDUAL URINE VOLUME: 7 ML (ref 0–100)
PROTEIN, POC: NORMAL
SPECIFIC GRAVITY, POC UA: 1.01
UROBILINOGEN, POC UA: NORMAL

## 2020-10-20 PROCEDURE — 99499 UNLISTED E&M SERVICE: CPT | Mod: S$GLB,,, | Performed by: UROLOGY

## 2020-10-20 PROCEDURE — 81002 URINALYSIS NONAUTO W/O SCOPE: CPT | Mod: HCNC,S$GLB,, | Performed by: UROLOGY

## 2020-10-20 PROCEDURE — 99204 PR OFFICE/OUTPT VISIT, NEW, LEVL IV, 45-59 MIN: ICD-10-PCS | Mod: HCNC,25,S$GLB, | Performed by: UROLOGY

## 2020-10-20 PROCEDURE — 3008F BODY MASS INDEX DOCD: CPT | Mod: HCNC,CPTII,S$GLB, | Performed by: UROLOGY

## 2020-10-20 PROCEDURE — 3008F PR BODY MASS INDEX (BMI) DOCUMENTED: ICD-10-PCS | Mod: HCNC,CPTII,S$GLB, | Performed by: UROLOGY

## 2020-10-20 PROCEDURE — 81002 POCT URINE DIPSTICK WITHOUT MICROSCOPE: ICD-10-PCS | Mod: HCNC,S$GLB,, | Performed by: UROLOGY

## 2020-10-20 PROCEDURE — 51798 US URINE CAPACITY MEASURE: CPT | Mod: HCNC,S$GLB,, | Performed by: UROLOGY

## 2020-10-20 PROCEDURE — 99999 PR PBB SHADOW E&M-EST. PATIENT-LVL IV: ICD-10-PCS | Mod: PBBFAC,HCNC,, | Performed by: UROLOGY

## 2020-10-20 PROCEDURE — 99499 RISK ADDL DX/OHS AUDIT: ICD-10-PCS | Mod: S$GLB,,, | Performed by: UROLOGY

## 2020-10-20 PROCEDURE — 99204 OFFICE O/P NEW MOD 45 MIN: CPT | Mod: HCNC,25,S$GLB, | Performed by: UROLOGY

## 2020-10-20 PROCEDURE — 99999 PR PBB SHADOW E&M-EST. PATIENT-LVL IV: CPT | Mod: PBBFAC,HCNC,, | Performed by: UROLOGY

## 2020-10-20 PROCEDURE — 51798 POCT BLADDER SCAN: ICD-10-PCS | Mod: HCNC,S$GLB,, | Performed by: UROLOGY

## 2020-10-20 NOTE — PROGRESS NOTES
Chief Complaint:  Dribbling    HPI:   Jadyn Chance is a 50 y.o. female with PMH DM1 that presents today as a referral from Dr. Yates for weak urinary stream.  Patient notes a 6-12 month history of this occurring.  She states that she voids 7-8x per day and that her urinary stream is weak during 2-3 voids of those voids.  She states that when this occurs she will dribble urine and her stream will be very weak, and she will also need to strain in order to manifest a good stream.  She does have a history of type 1 diabetes, but notes that her A1c is have been between 6 and 8 for the last several years.  She does have subjective normal bladder sensation and will also have a significant urge, however she denies urge urinary incontinence.  She also notes mild stress urinary incontinence when she sneezes, but is not bothered by this. She denies abd or pelvic pain, denies hematuria, denies urolithiasis.    PMH:  Past Medical History:   Diagnosis Date    Abnormal Pap smear     bx was negative, per pt    Anemia     Anxiety     B12 deficiency     Blood transfusion     multiple     Chronic LBP     Degenerative disc disease     l spine    Diabetes mellitus     Diabetic neuropathy     General anesthetics causing adverse effect in therapeutic use     delayed emergence    Mixed hyperlipidemia 2013    RLS (restless legs syndrome)     Seizures     Vitamin D deficiency disease        PSH:  Past Surgical History:   Procedure Laterality Date    ANUS SURGERY      AUGMENTATION OF BREAST      saline    BELT ABDOMINOPLASTY      tummy tuck    BREAST SURGERY  2008    breast augmentation     SECTION      x2    CHOLECYSTECTOMY      mikel      EXCISIONAL HEMORRHOIDECTOMY      GASTRIC BYPASS      HIP FRACTURE SURGERY      left hip ORIF    MOUTH SURGERY      revision of JJ anastomosis  2/27/15    small bowel resection  2015    TUBAL LIGATION         Family History:  Family History    Problem Relation Age of Onset    Diabetes Mother     Cataracts Mother     Glaucoma Maternal Aunt     Blindness Maternal Aunt     Breast cancer Neg Hx     Colon cancer Neg Hx     Thrombophilia Neg Hx        Social History:  Social History     Tobacco Use    Smoking status: Never Smoker    Smokeless tobacco: Never Used   Substance Use Topics    Alcohol use: No     Frequency: Never     Binge frequency: Never    Drug use: No       Review of Systems:  General: No fever, chills, fatigability, or weight loss.  Skin: No rashes, itching, or changes in color or texture of skin.  Chest: Denies GAMING, cyanosis, wheezing, cough, and sputum production.  Heart: Denies chest pain  Resp: Denies dyspnea  Abdomen: Appetite fine. No weight loss. Denies diarrhea, abdominal pain, hematemesis, or blood in stool.  Musculoskeletal: No joint stiffness or swelling. Denies back pain.  : see HPI  Neuro: no dizziness or weakness    Allergies:  Demerol [meperidine], Penicillins, Bactrim [sulfamethoxazole-trimethoprim], Ciprofloxacin (bulk), Codeine, Levetiracetam, Toradol [ketorolac], Tramadol, and Morpholine analogues    Medications:    Current Outpatient Medications:     ALPRAZolam (XANAX) 0.5 MG tablet, Take 2 tablets by mouth as needed for anxiety, Disp: 60 tablet, Rfl: 2    BD ALCOHOL SWABS PadM, , Disp: , Rfl:     BIOTIN ORAL, Take 3,000 Units by mouth., Disp: , Rfl:     blood sugar diagnostic Strp, To check BG 3 times daily, to use with insurance preferred meter    DxE11.9, Disp: 200 strip, Rfl: 3    blood-glucose meter Misc, Use to check blood glucose 3 times daily, Disp: 1 each, Rfl: 0    cyanocobalamin 1,000 mcg/mL injection, Inject 1 mL (1,000 mcg total) into the skin every 28 days., Disp: 10 mL, Rfl: 11    cyclobenzaprine (FLEXERIL) 10 MG tablet, Take 1 tablet by mouth three times a day as needed /May cause drowsiness, use caution/ muscle spasms/, Disp: 90 tablet, Rfl: 5    doxepin (SINEQUAN) 50 MG capsule, Take 1  "capsule by mouth at bedtime as needed for insomnia. If 1 pill is not effective, increase the dose to 2 pills., Disp: 60 capsule, Rfl: 5    DULoxetine (CYMBALTA) 30 MG capsule, Take 1 capsule (30 mg total) by mouth 2 (two) times daily., Disp: 60 capsule, Rfl: 11    ergocalciferol (VITAMIN D2) 50,000 unit Cap, TAKE 1 CAPSULE (50,000 UNITS TOTAL) BY MOUTH ONCE A WEEK., Disp: 12 capsule, Rfl: 3    flu vacc [id number] 6mos up,PF, (FLUARIX QUAD [id number], PF,) 60 mcg (15 mcg x 4)/0.5 mL Syrg, Inject into the muscle as directed, Disp: 0.5 mL, Rfl: 0    fluticasone propionate (FLONASE) 50 mcg/actuation nasal spray, Instill 2 sprays (100 mcg total) in each nostril once daily., Disp: 16 g, Rfl: 6    insulin detemir U-100 (LEVEMIR FLEXTOUCH) 100 unit/mL (3 mL) SubQ InPn pen, Inject 20 Units into the skin every evening., Disp: 15 mL, Rfl: 5    lancets 30 gauge Misc, To check blood glucose 3 times daily, Disp: 200 each, Rfl: 3    meloxicam (MOBIC) 7.5 MG tablet, Take 1 tablet by mouth once a day as directed take with largest meal of day/ for arthritis joint pain, Disp: 30 tablet, Rfl: 5    ondansetron (ZOFRAN) 4 MG tablet, Take 1 tablet (4 mg total) by mouth every 6 (six) hours., Disp: 30 tablet, Rfl: 0    ondansetron (ZOFRAN) 8 MG tablet, Take 1 tablet (8 mg total) by mouth every 8 (eight) hours as needed for Nausea., Disp: 10 tablet, Rfl: 0    oxyCODONE (ROXICODONE) 15 MG Tab, Take 1 tablet by mouth every 8 hours as needed for pain, Disp: 90 tablet, Rfl: 0    pen needle, diabetic 32 gauge x 5/32" Ndle, Use 1 needle once daily., Disp: 100 each, Rfl: 3    syringe with needle (SYRINGE 3CC/25GX1") 3 mL 25 gauge x 1" Syrg, For vitamin B12 injection, Disp: 100 Syringe, Rfl: 1    Physical Exam:  Vitals:    10/20/20 0820   BP: 110/76   Temp: 97.5 °F (36.4 °C)     General: awake, alert, cooperative  Head: NC/AT  Ears: external ears normal  Eyes: sclera normal  Lungs: normal inspiration, NAD  Heart: well-perfused  Abdomen: " Soft, NT, ND, no masses, no hernias  Back: no CVA tenderness or spine pain  Skin: The skin is warm and dry.  Ext: No c/c/e.    RADIOLOGY:  I personally reviewed CT abd/pelvis with contrast from 09/11/2020 which was notable for normal kidneys bilaterally with symmetric nephrograms, no hydro present bilaterally, no stones present, no evidence of bladder wall thickening.  CT ABDOMEN PELVIS WITH CONTRAST  09/11/2020     CLINICAL HISTORY:  Abdominal pain, acute, nonlocalized;h/o Intussusception, gastric  bypass;     TECHNIQUE:  Low dose axial images, sagittal and coronal reformations were obtained from the lung bases to the pubic symphysis.  Contrast was not administered.     COMPARISON:  CT abdomen pelvis 08/19/2020     FINDINGS:  Heart: Normal in size. No pericardial effusion.     Lung Bases: Well aerated, without consolidation or pleural fluid.     Liver: Normal in size and attenuation, with no focal hepatic lesions.     Gallbladder: Surgically absent.     Bile Ducts: No evidence of dilated ducts.     Pancreas: Pancreatic atrophy.     Spleen: Unremarkable.     Adrenals: Unremarkable.     Kidneys/ Ureters: No obstructive uropathy.     Bladder: No evidence of wall thickening.     Reproductive organs: Uterus and ovaries appear within normal limits.     GI Tract/Mesentery: Surgical change of Kayleen-en-Y gastric bypass.  No evidence of small or large bowel intussusception.  No hernia.  Colon appears unremarkable.  Appendix is well visualized without local inflammation.     Peritoneal Space: No ascites. No free air.     Retroperitoneum: No significant adenopathy.     Abdominal wall: Unremarkable.     Vasculature: No significant atherosclerosis or aneurysm.     Bones: Surgical change of the left hip with some heterotopic ossification.  Left sacroiliac fixation screws.     Impression:     No acute abnormality identified.     Postsurgical change of Kayleen-en-Y gastric bypass.  No evidence of bowel obstruction, inflammatory change,  or intussusception.     Pancreatic atrophy.     Postsurgical changes of the left hip and pelvis.    LABS:  I personally reviewed the following lab values:  Lab Results   Component Value Date    WBC 8.36 09/11/2020    HGB 15.2 09/11/2020    HCT 46.1 09/11/2020     09/11/2020     09/11/2020    K 3.8 09/11/2020     09/11/2020    CREATININE 0.9 09/11/2020    BUN 19 09/11/2020    CO2 21 (L) 09/11/2020    TSH 2.019 02/12/2020    INR 1.0 09/11/2020    HGBA1C 8.2 (H) 09/17/2020    CHOL 143 02/12/2020    TRIG 199 (H) 02/12/2020    HDL 47 02/12/2020    ALT 41 09/11/2020    AST 23 09/11/2020     UA - specific gravity 1.010 pH 7, neg for all parameters    PVR = 7 mL    Assessment/Plan:   Discussed her OAB symptoms and explained that as she typically has urge and usually manifests a good stream, that her weak stream and dribbling is likely not related to her diabetes, but is instead likely related to overactive bladder symptoms. I reviewed options including conservative management like quick flicks exercises and kegel exercises.  I reviewed medications including anticholinergics and myrbetriq. Her OAB is not bothersome enough that she would like to try a medication currently and she will attempt conservative measures like a Quick flex exercises and increasing fluid intake.     - follow-up 1 year

## 2020-10-20 NOTE — LETTER
October 20, 2020      Mikayla Yates MD  [address]  [place] [address]           [facility]  [address] [address]  [place] [address]  Phone: [phone]  Fax: [phone]          Patient: Jadyn Chance   MR Number: [id number]   YOB: 1970   Date of Visit: 10/20/2020       Dear Dr. Mikayla Yates:    Thank you for referring Jadyn Chance to me for evaluation. Attached you will find relevant portions of my assessment and plan of care.    If you have questions, please do not hesitate to call me. I look forward to following Jadyn Chance along with you.    Sincerely,    Kodak Wilson MD    Enclosure  CC:  No Recipients    If you would like to receive this communication electronically, please contact [email or web address] or [phone] to request more information on [employer] Link access.    For providers and/or their staff who would like to refer a patient to [facility], please contact us through our one-stop-shop provider referral line, [facility], at [phone].    If you feel you have received this communication in error or would no longer like to receive these types of communications, please e-mail [email or web address]

## 2020-10-21 ENCOUNTER — PATIENT OUTREACH (OUTPATIENT)
Dept: OTHER | Facility: OTHER | Age: 50
End: 2020-10-21
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
## 2020-10-22 ENCOUNTER — OFFICE VISIT (OUTPATIENT)
Dept: SLEEP MEDICINE | Facility: CLINIC | Age: 50
End: 2020-10-22
Payer: MEDICARE

## 2020-10-22 ENCOUNTER — PATIENT OUTREACH (OUTPATIENT)
Dept: ADMINISTRATIVE | Facility: OTHER | Age: 50
End: 2020-10-22

## 2020-10-22 DIAGNOSIS — Z79.4 TYPE 2 DIABETES MELLITUS WITHOUT COMPLICATION, WITH LONG-TERM CURRENT USE OF INSULIN: Primary | Chronic | ICD-10-CM

## 2020-10-22 DIAGNOSIS — G47.00 INSOMNIA, UNSPECIFIED TYPE: ICD-10-CM

## 2020-10-22 DIAGNOSIS — E11.9 TYPE 2 DIABETES MELLITUS WITHOUT COMPLICATION, WITH LONG-TERM CURRENT USE OF INSULIN: Primary | Chronic | ICD-10-CM

## 2020-10-22 DIAGNOSIS — G62.9 SMALL FIBER NEUROPATHY: Primary | ICD-10-CM

## 2020-10-22 DIAGNOSIS — F41.9 ANXIETY: ICD-10-CM

## 2020-10-22 PROCEDURE — 99214 OFFICE O/P EST MOD 30 MIN: CPT | Mod: HCNC,95,, | Performed by: PSYCHIATRY & NEUROLOGY

## 2020-10-22 PROCEDURE — 99214 PR OFFICE/OUTPT VISIT, EST, LEVL IV, 30-39 MIN: ICD-10-PCS | Mod: HCNC,95,, | Performed by: PSYCHIATRY & NEUROLOGY

## 2020-10-22 RX ORDER — PREGABALIN 100 MG/1
CAPSULE ORAL
Qty: 90 CAPSULE | Refills: 5 | Status: SHIPPED | OUTPATIENT
Start: 2020-10-22 | End: 2021-05-02 | Stop reason: SDUPTHER

## 2020-10-22 RX ORDER — ALPRAZOLAM 0.5 MG/1
TABLET ORAL
Qty: 70 TABLET | Refills: 3 | Status: SHIPPED | OUTPATIENT
Start: 2020-10-22 | End: 2021-03-04 | Stop reason: SDUPTHER

## 2020-10-22 NOTE — PROGRESS NOTES
Health Maintenance Due   Topic Date Due    Sign Pain Contract  08/06/1987    Complete Opioid Risk Tool  08/06/1987    Low Dose Statin  08/06/1990    Shingles Vaccine (1 of 2) 08/06/2019    Eye Exam  04/03/2020    Pap Smear  08/28/2020     Updates were requested from care everywhere.  Chart was reviewed for overdue Proactive [facility] Encounters (ALDO) topics (CRS, Breast Cancer Screening, Eye exam)  Health Maintenance has been updated.  LINKS immunization registry triggered.  Immunizations were reconciled.  Diabetic eye cam ordered

## 2020-10-22 NOTE — PROGRESS NOTES
The patient location is: home  The chief complaint leading to consultation is: new patient evaluation  Visit type: audiovisual  Total time spent with patient: 30 minutes  Each patient to whom he or she provides medical services by telemedicine is:  (1) informed of the relationship between the physician and patient and the respective role of any other health care provider with respect to management of the patient; and (2) notified that he or she may decline to receive medical services by telemedicine and may withdraw from such care at any time.    She is off Clonazepam now - now taking Xanax  0.5 mg -  2-2.5 pills  And Doxepin 50 mg 30 min before bed.   No hang over feel in the morning. ESS 2/24 today.    Painful DM neuropathy remains a problem at night.      Avoiding caffeine in the afternoon; no blue light at night; bedroom is dark and quiet.       Bedtime: 10:30-11 pm   Sleep latency: 20-30 MIN  Nocturnal awakenings: 2-4 TIMES WITH PAIN; 1 time to use the restroom.  Wake up time: 7:30 AM  No ETOH  Not a smoker    Medications pertinent to sleep disorders: cutting down on oxycodone; never taking Oxycodone with Xanax.  Xanax  0.5 mg -  2-2.5 pills  And Doxepin 50 mg 30 min before bed.   Previously tried medications:    Jadyn Chance denied  excessive daytime sleepiness, excessive daytime fatigue, snoring,  witnessed breathing pauses,  gasping for air in sleep and interrupted sleep.      Jadyn Chance denied symptoms concerning for RLS. Reports leg pain at night; moving her legs all night long; Clonazepam all night long - over last 15 years. Has known RLS - was on  Lyrica' prior to that was on Gababentin. Has tried Mirapex and Requip - does not recall the effect          Sleep Studies: none      PAST MEDICAL HISTORY:    Active Ambulatory Problems     Diagnosis Date Noted    Anxiety 06/26/2013    Type 2 diabetes mellitus without complication, with long-term current use of insulin 07/02/2013     Vitamin B12 deficiency 09/10/2013    History of Kayleen-en-Y gastric bypass 09/10/2013    Orthostatic hypotension 10/28/2013    History of iron deficiency 11/01/2013    Hypermenorrhea 04/15/2014    Dysmenorrhea 04/15/2014    Pelvic pain in female 04/15/2014    Fibroids 04/15/2014    Vitamin D deficiency 12/10/2014    Insomnia 12/10/2014    Degenerative lumbar spinal stenosis     Thoracic or lumbosacral neuritis or radiculitis, unspecified 12/16/2014    Dehydration 02/16/2015    Acute renal failure (ARF) 02/16/2015    Abdominal pain 03/26/2015    Marginal ulcer 03/31/2015    Jejunal ulcer 05/27/2015    Pseudoseizure     Chronic right-sided low back pain 04/25/2018    Hyperglycemia 04/25/2018    Elevated liver enzymes 01/02/2019    Chronic pain syndrome 01/02/2019    Recurrent major depressive disorder, in full remission 01/07/2019    CINDY (generalized anxiety disorder) 01/07/2019    Hyperlipidemia associated with type 2 diabetes mellitus 02/11/2020     Resolved Ambulatory Problems     Diagnosis Date Noted    Heart palpitations 06/26/2013    Iron deficiency anemia secondary to blood loss (chronic) 09/10/2013    Syncope 10/28/2013    Dizziness 10/28/2013    Anemia 10/28/2013    Diabetes mellitus 10/28/2013    Back pain 11/01/2013    Leg pain 11/01/2013    Atypical chest pain 11/18/2013    Diarrhea 02/16/2015    Acute gastroenteritis 02/16/2015    SBO (small bowel obstruction) 02/26/2015     Past Medical History:   Diagnosis Date    Abnormal Pap smear 1991    B12 deficiency     Blood transfusion     Chronic LBP     Degenerative disc disease     Diabetes mellitus     Diabetic neuropathy     General anesthetics causing adverse effect in therapeutic use     Mixed hyperlipidemia 11/18/2013    RLS (restless legs syndrome)     Seizures     Vitamin D deficiency disease                 PAST SURGICAL HISTORY:    Past Surgical History:   Procedure Laterality Date    ANUS SURGERY       AUGMENTATION OF BREAST      saline    BELT ABDOMINOPLASTY      [facility]    BREAST SURGERY  2008    breast augmentation     SECTION      x2    CHOLECYSTECTOMY      mikel      EXCISIONAL HEMORRHOIDECTOMY      GASTRIC BYPASS      HIP FRACTURE SURGERY      left hip ORIF    MOUTH SURGERY      revision of JJ anastomosis  2/27/15    small bowel resection  2015    TUBAL LIGATION           FAMILY HISTORY:                Family History   Problem Relation Age of Onset    Diabetes Mother     Cataracts Mother     Glaucoma Maternal Aunt     Blindness Maternal Aunt     Breast cancer Neg Hx     Colon cancer Neg Hx     Thrombophilia Neg Hx        SOCIAL HISTORY:          Tobacco:   Social History     Tobacco Use   Smoking Status Never Smoker   Smokeless Tobacco Never Used       alcohol use:    Social History     Substance and Sexual Activity   Alcohol Use No    Frequency: Never    Binge frequency: Never                   ALLERGIES:    Review of patient's allergies indicates:   Allergen Reactions    Demerol [meperidine] Hallucinations    Penicillins Other (See Comments)     Patient cannot recall specific reaction, reports she has been listing this as an allergy since childhood.    Bactrim [sulfamethoxazole-trimethoprim]     Ciprofloxacin (bulk)     Codeine Nausea And Vomiting    Levetiracetam     Toradol [ketorolac]     Tramadol      seizures    Morpholine analogues Diarrhea, Itching and Nausea And Vomiting       CURRENT MEDICATIONS:    Current Outpatient Medications   Medication Sig Dispense Refill    ALPRAZolam (XANAX) 0.5 MG tablet Take 2 tablets by mouth as needed for anxiety 60 tablet 2    BD ALCOHOL SWABS PadM       BIOTIN ORAL Take 3,000 Units by mouth.      blood sugar diagnostic Strp To check BG 3 times daily, to use with insurance preferred meter    DxE11.9 200 strip 3    blood-glucose meter Misc Use to check blood glucose 3 times daily 1 each 0    cyanocobalamin  "1,000 mcg/mL injection Inject 1 mL (1,000 mcg total) into the skin every 28 days. 10 mL 11    cyclobenzaprine (FLEXERIL) 10 MG tablet Take 1 tablet by mouth three times a day as needed /May cause drowsiness, use caution/ muscle spasms/ 90 tablet 5    doxepin (SINEQUAN) 50 MG capsule Take 1 capsule by mouth at bedtime as needed for insomnia. If 1 pill is not effective, increase the dose to 2 pills. 60 capsule 5    DULoxetine (CYMBALTA) 30 MG capsule Take 1 capsule (30 mg total) by mouth 2 (two) times daily. 60 capsule 11    ergocalciferol (VITAMIN D2) 50,000 unit Cap TAKE 1 CAPSULE (50,000 UNITS TOTAL) BY MOUTH ONCE A WEEK. 12 capsule 3    flu vacc [id number] 6mos up,PF, (FLUARIX QUAD [id number], PF,) 60 mcg (15 mcg x 4)/0.5 mL Syrg Inject into the muscle as directed 0.5 mL 0    fluticasone propionate (FLONASE) 50 mcg/actuation nasal spray Instill 2 sprays (100 mcg total) in each nostril once daily. 16 g 6    insulin detemir U-100 (LEVEMIR FLEXTOUCH) 100 unit/mL (3 mL) SubQ InPn pen Inject 20 Units into the skin every evening. 15 mL 5    lancets 30 gauge Misc To check blood glucose 3 times daily 200 each 3    meloxicam (MOBIC) 7.5 MG tablet Take 1 tablet by mouth once a day as directed take with largest meal of day/ for arthritis joint pain 30 tablet 5    ondansetron (ZOFRAN) 4 MG tablet Take 1 tablet (4 mg total) by mouth every 6 (six) hours. 30 tablet 0    ondansetron (ZOFRAN) 8 MG tablet Take 1 tablet (8 mg total) by mouth every 8 (eight) hours as needed for Nausea. 10 tablet 0    oxyCODONE (ROXICODONE) 15 MG Tab Take 1 tablet by mouth every 8 hours as needed for pain 90 tablet 0    pen needle, diabetic 32 gauge x 5/32" Ndle Use 1 needle once daily. 100 each 3    syringe with needle (SYRINGE 3CC/25GX1") 3 mL 25 gauge x 1" Syrg For vitamin B12 injection 100 Syringe 1     No current facility-administered medications for this visit.                       REVIEW OF SYSTEMS:   Sleep related symptoms as per " HPI        PHYSICAL EXAM:        Using [facility]: yes      ASSESSMENT:    1. Insomnia NEC. Multi-factorial -  Anxiety and depression likely play a role. Good control on combination of Xanax and Doxepin. Neuropathic pain is interrupting her sleep.   2. Small fiber neuropathy ; possible PLMD without infrequent  RLS symptoms. - stopped Clonazepam with great improvement in daily alertness. No significant urge to move her legs, however severe pain and paresthesias from small fiber neuropathy keep her up at night.  Previous good response to Lyrica. Previously failed Gabapentin at 1500 -1800 mg.         PLAN:  Continue good sleep hygiene  Continue Xanax  0.5 mg - 2-2.5 pills at night and Doxepin.  Will start Lyricall 100 mg TID for small fiber neuropathy.  Once her sleep continuity improves, if she still feels tired during the day, will  Consider PSG to evaluate for PLMD        More than 15 minutes of this 30 minutes visit was spent in counseling: and coordination of care.      Precautions: The patient was advised to abstain from driving should he feel sleepy or drowsy.     Follow up: MD in 5 months    Thank you for allowing me the opportunity to participate in the care of your patient.    This visit summary will be sent to referring provider via [employer]

## 2020-10-26 ENCOUNTER — PATIENT MESSAGE (OUTPATIENT)
Dept: FAMILY MEDICINE | Facility: CLINIC | Age: 50
End: 2020-10-26

## 2020-10-26 RX ORDER — ONDANSETRON 4 MG/1
4 TABLET, FILM COATED ORAL EVERY 6 HOURS
Qty: 30 TABLET | Refills: 3 | Status: SHIPPED | OUTPATIENT
Start: 2020-10-26 | End: 2021-01-05 | Stop reason: SDUPTHER

## 2020-10-26 RX ORDER — ONDANSETRON 4 MG/1
4 TABLET, FILM COATED ORAL EVERY 6 HOURS
Qty: 30 TABLET | Refills: 3 | Status: SHIPPED | OUTPATIENT
Start: 2020-10-26 | End: 2020-10-26

## 2020-10-26 RX ORDER — FLUCONAZOLE 150 MG/1
150 TABLET ORAL DAILY
Qty: 1 TABLET | Refills: 0 | Status: SHIPPED | OUTPATIENT
Start: 2020-10-26 | End: 2020-10-27

## 2020-11-03 DIAGNOSIS — Z79.4 TYPE 2 DIABETES MELLITUS WITHOUT COMPLICATION, WITH LONG-TERM CURRENT USE OF INSULIN: Chronic | ICD-10-CM

## 2020-11-03 DIAGNOSIS — E11.69 HYPERLIPIDEMIA ASSOCIATED WITH TYPE 2 DIABETES MELLITUS: ICD-10-CM

## 2020-11-03 DIAGNOSIS — E78.5 HYPERLIPIDEMIA ASSOCIATED WITH TYPE 2 DIABETES MELLITUS: ICD-10-CM

## 2020-11-03 DIAGNOSIS — E11.9 TYPE 2 DIABETES MELLITUS WITHOUT COMPLICATION, WITH LONG-TERM CURRENT USE OF INSULIN: Chronic | ICD-10-CM

## 2020-11-07 ENCOUNTER — PATIENT MESSAGE (OUTPATIENT)
Dept: FAMILY MEDICINE | Facility: CLINIC | Age: 50
End: 2020-11-07

## 2020-11-12 ENCOUNTER — PATIENT OUTREACH (OUTPATIENT)
Dept: OTHER | Facility: OTHER | Age: 50
End: 2020-11-12

## 2020-11-13 NOTE — PROGRESS NOTES
Digital Medicine: Health  Follow-Up    The history is provided by the patient.               Care Team received low BG alert.          Topics Covered on Call: Diet    Additional Follow-up details: Patient alerted 11/6/20 with BG of 51. Stated this was a valid reading and she was experiencing symptoms of hypoglycemia. Ate a meal and brought it up to 144. Alerted 11/12 with 65, treated with glucose tablets, retested at 65. Ate a meal but did not retest again because she had to leave for an appointment. Alerted 11/13 with 43 at 3 am. Was experiencing symptoms. Ate a full meal and retested at 103. Patient stated her doctor told her to keep her insulin at 20 units every evening regardless of BG reading. Made this change 1 month ago. Doctor stated that since the patient is in digital medicine program and sees an endocrinologist that either one of them should figure out what kind of adjustment is needed. Routed to clinician. Stated her doctor also told her she was misdiagnosed and she is actually a Type I Diabetic, not Type II. Patient also has an appointment with a neurologist on 11/16, but will be switching to an [facility] neurologist next year. That appointment is not until April 2021.            Diet-no change to diet    No change to diet.  Patient reports eating or drinking the following: Patient stated she does not skip meals, but does not eat anything after 7 pm. Tries to eat balance meals. May have beef or fish for dinner with vegetables and some type of starch. Will have 1/2 a banana, granola, or yogurt for a snack.       Physical Activity-Not assessed    Medication Adherence-Medication Adherence not addressed.      Substance, Sleep, Stress-Not assessed      Provided patient education.       Addressed patient questions and patient has my contact information if needed prior to next outreach. Patient verbalizes understanding.      Explained the importance of self-monitoring and medication adherence. Encouraged  the patient to communicate with their health  for lifestyle modifications to help improve or maintain a healthy lifestyle.                   Topic    Eye Exam            Last 6 Patient Entered Readings                                          Most Recent A1c: 8.2% on 9/17/2020  (Goal: 8%)     Recent Readings 11/13/2020 11/13/2020 11/13/2020 11/12/2020 11/12/2020    Blood Glucose (mg/dL) 177 103 43 65 63

## 2020-11-16 ENCOUNTER — PATIENT OUTREACH (OUTPATIENT)
Dept: OTHER | Facility: OTHER | Age: 50
End: 2020-11-16

## 2020-11-16 DIAGNOSIS — E11.9 TYPE 2 DIABETES MELLITUS WITHOUT COMPLICATION, WITH LONG-TERM CURRENT USE OF INSULIN: Primary | Chronic | ICD-10-CM

## 2020-11-16 DIAGNOSIS — E11.69 HYPERLIPIDEMIA ASSOCIATED WITH TYPE 2 DIABETES MELLITUS: ICD-10-CM

## 2020-11-16 DIAGNOSIS — E78.5 HYPERLIPIDEMIA ASSOCIATED WITH TYPE 2 DIABETES MELLITUS: ICD-10-CM

## 2020-11-16 DIAGNOSIS — Z79.4 TYPE 2 DIABETES MELLITUS WITHOUT COMPLICATION, WITH LONG-TERM CURRENT USE OF INSULIN: Primary | Chronic | ICD-10-CM

## 2020-11-16 RX ORDER — METFORMIN HYDROCHLORIDE 500 MG/1
500 TABLET ORAL 2 TIMES DAILY WITH MEALS
Qty: 180 TABLET | Refills: 1 | Status: SHIPPED | OUTPATIENT
Start: 2020-11-16 | End: 2021-01-13 | Stop reason: SDUPTHER

## 2020-11-16 NOTE — PROGRESS NOTES
Digital Medicine: Clinician Follow-Up    HPI:  Called patient to follow up regarding the DDMP (Diabetes Digital Medicine Program) and follow up regarding multiple hypoglycemic episodes.    Patient injects levemir 20 units nightly and states she believes it is the reason for her hypoglycemic episodes because it is usually first thing in the morning when they occur. She states she eats smaller meals 4-5 times/day and has lost weight. She currently weighs ~ 125 Ibs.    The history is provided by the patient.      Review of patient's allergies indicates:   -- Demerol (meperidine) -- Hallucinations   -- Penicillins -- Other (See Comments)    --  Patient cannot recall specific reaction, reports             she has been listing this as an allergy since             childhood.   -- Bactrim (sulfamethoxazole-trimethoprim)    -- Ciprofloxacin (bulk)    -- Codeine -- Nausea And Vomiting   -- Levetiracetam    -- Toradol (ketorolac)    -- Tramadol     --  seizures   -- Morpholine analogues -- Diarrhea, Itching and Nausea And                            Vomiting  Follow-up reason(s): Alert received.   Care Team received low BG alert.      Diabetes    Readings are trending down Patient is experiencing signs/symptoms of hypoglycemia. Patient has experienced multiple hypoglycemic events and has discussed them with health . She is aware of how to treat them.  Patient is not experiencing signs/symptoms of hyperglycemia.            Last 6 Patient Entered Readings                                          Most Recent A1c: 8.2% on 9/17/2020  (Goal: 8%)     Recent Readings 11/16/2020 11/16/2020 11/16/2020 11/16/2020 11/15/2020    Blood Glucose (mg/dL) 164 78 63 63 225               Depression Screening  Did not address depression screening.    Sleep Apnea Screening    Did not address sleep apnea screening.     Medication Affordability Screening  Did not address medication affordability screening.     Medication Adherence-Medication  adherence was assessed.          ASSESSMENT(S)  Patient's A1C goal is less than or equal to 8. Patient's most recent A1C result is above goal. Lab Results    Component                Value               Date                     HGBA1C                   8.2 (H)             09/17/2020          .        Diabetes Plan  Medication change. Decrease levemir dose to 17 units nightly and start metformin 500 mg BID.  Reviewed treatment of hypoglycemia (rule of 15/15).  Additional monitoring needed.  Continue current diet/physical activity routine.  Will call patient in a few weeks, sooner if needed. Patient knows to reach out at any time for any questions or concerns.        Addressed patient questions and patient has my contact information if needed prior to next outreach. Patient verbalizes understanding.                 Topic    Eye Exam      There are no preventive care reminders to display for this patient.        Diabetes Medications             insulin detemir U-100 (LEVEMIR FLEXTOUCH) 100 unit/mL (3 mL) SubQ InPn pen Inject 20 Units into the skin every evening.        Erin Mcfarlane, PharmD  [facility]ician  [phone]

## 2020-11-16 NOTE — PROGRESS NOTES
Messaged PCP to confirm patient's diagnosis:    MD Erin Oates, Ignacio   Caller: Unspecified (3 days ago,  2:30 PM)             Thank you.  She is type 2.  She was altering her levemir on her own.  Please make sure she is taking same dose daily, and go from there.   SM    Previous Messages    ----- Message -----   From: Erin Mcfarlane PharmD   Sent: 11/13/2020   2:30 PM CST   To: Mikayla Yates MD   Subject: Diabetes Diagnosis                               Good afternoon Dr. Yates,     I hope all is well. I wanted to bring your attention to Ms. Chance' BG readings (in the episodes tab in her chart under patient entered readings). She ranges from the 40s to the 400s in a matter of days and has had recent hypoglycemic events. Only on levemir.     I see in her chart that other providers have noted that she is a type 1 diabetic, which would make her ineligible for the DM digital medicine program.   Please confirm her diagnosis so I am able to best manage her and prevent her episodes.     Thanks so much,   Erin Mcfarlane, PharmD   Digital Medicine Clinician   [phone]

## 2020-11-20 PROCEDURE — 99454 REM MNTR PHYSIOL PARAM 16-30: CPT | Mod: S$GLB,,, | Performed by: INTERNAL MEDICINE

## 2020-11-20 PROCEDURE — 99454 PR REMOTE MNTR, PHYS PARAM, INITIAL, EA 30 DAYS: ICD-10-PCS | Mod: S$GLB,,, | Performed by: INTERNAL MEDICINE

## 2020-11-30 PROCEDURE — 99457 PR MONITORING, PHYSIOL PARAM, REMOTE, 1ST 20 MINS, PER MONTH: ICD-10-PCS | Mod: S$GLB,,, | Performed by: INTERNAL MEDICINE

## 2020-11-30 PROCEDURE — 99457 RPM TX MGMT 1ST 20 MIN: CPT | Mod: S$GLB,,, | Performed by: INTERNAL MEDICINE

## 2020-12-01 DIAGNOSIS — Z79.4 TYPE 2 DIABETES MELLITUS WITHOUT COMPLICATION, WITH LONG-TERM CURRENT USE OF INSULIN: Chronic | ICD-10-CM

## 2020-12-01 DIAGNOSIS — E11.9 TYPE 2 DIABETES MELLITUS WITHOUT COMPLICATION, WITH LONG-TERM CURRENT USE OF INSULIN: Chronic | ICD-10-CM

## 2020-12-02 ENCOUNTER — PATIENT OUTREACH (OUTPATIENT)
Dept: OTHER | Facility: OTHER | Age: 50
End: 2020-12-02

## 2020-12-02 DIAGNOSIS — Z79.4 TYPE 2 DIABETES MELLITUS WITHOUT COMPLICATION, WITH LONG-TERM CURRENT USE OF INSULIN: Chronic | ICD-10-CM

## 2020-12-02 DIAGNOSIS — E11.69 HYPERLIPIDEMIA ASSOCIATED WITH TYPE 2 DIABETES MELLITUS: ICD-10-CM

## 2020-12-02 DIAGNOSIS — E78.5 HYPERLIPIDEMIA ASSOCIATED WITH TYPE 2 DIABETES MELLITUS: ICD-10-CM

## 2020-12-02 DIAGNOSIS — E11.9 TYPE 2 DIABETES MELLITUS WITHOUT COMPLICATION, WITH LONG-TERM CURRENT USE OF INSULIN: Chronic | ICD-10-CM

## 2020-12-02 NOTE — PROGRESS NOTES
Digital Medicine: Clinician Follow-Up    HPI:  Called patient to follow up regarding the DDMP (Diabetes Digital Medicine Program).    The history is provided by the patient.      Review of patient's allergies indicates:   -- Demerol (meperidine) -- Hallucinations   -- Penicillins -- Other (See Comments)    --  Patient cannot recall specific reaction, reports             she has been listing this as an allergy since             childhood.   -- Bactrim (sulfamethoxazole-trimethoprim)    -- Ciprofloxacin (bulk)    -- Codeine -- Nausea And Vomiting   -- Levetiracetam    -- Toradol (ketorolac)    -- Tramadol     --  seizures   -- Morpholine analogues -- Diarrhea, Itching and Nausea And                            Vomiting  Follow-up reason(s): medication change follow-up.     Diabetes    Readings are trending down due to medication adherence.     Patient is not experiencing signs/symptoms of hypoglycemia.  Patient is not experiencing signs/symptoms of hyperglycemia.    Additional Follow-up details: Patient reports initiating metformin 500 mg BID on 11/16 and decreasing levemir to 17 units. She states she is tolerating metformin well and is no longer experiencing side effects like before.     Patient did make medication change.    Is patient tolerating med change? yes              Last 6 Patient Entered Readings                                          Most Recent A1c: 8.2% on 9/17/2020  (Goal: 8%)     Recent Readings 12/2/2020 12/2/2020 12/1/2020 12/1/2020 12/1/2020    Blood Glucose (mg/dL) 95 63 275 141 258               Depression Screening  Did not address depression screening.    Sleep Apnea Screening    Did not address sleep apnea screening.     Medication Affordability Screening  Did not address medication affordability screening.     Medication Adherence-Medication adherence was assessed.          ASSESSMENT(S)  Patient's A1C goal is less than or equal to 8. Patient's most recent A1C result is above goal. Lab  Results    Component                Value               Date                     HGBA1C                   8.2 (H)             09/17/2020          .        Diabetes Plan  Labs ordered. Scheduled A1C  Expected Lab Date: 12/10/2020  Additional monitoring needed.  Continue current therapy.  Will call patient in a few weeks, sooner if needed. Patient knows to reach out at any time for any questions or concerns.        Addressed patient questions and patient has my contact information if needed prior to next outreach. Patient verbalizes understanding.                 Topic    Eye Exam      There are no preventive care reminders to display for this patient.        Diabetes Medications             insulin detemir U-100 (LEVEMIR FLEXTOUCH) 100 unit/mL (3 mL) SubQ InPn pen Inject 17 Units into the skin every evening.    metFORMIN (GLUCOPHAGE) 500 MG tablet Take 1 tablet (500 mg total) by mouth 2 (two) times daily with meals.        Erin Mcfarlane, PharmD  Digital Medicine Clinician  [phone]

## 2020-12-02 NOTE — PROGRESS NOTES
Attempted to reach patient for routine follow up and follow up on metformin initiation. Reviewed available SMBG readings and labs. Per ADA guidelines, current SMBG readings and A1C are not at goal.    Last 6 Patient Entered Readings                                          Most Recent A1c: 8.2% on 9/17/2020  (Goal: 8%)     Recent Readings 12/2/2020 12/2/2020 12/1/2020 12/1/2020 12/1/2020    Blood Glucose (mg/dL) 95 63 275 141 258          BMP  Lab Results   Component Value Date     09/11/2020    K 3.8 09/11/2020     09/11/2020    CO2 21 (L) 09/11/2020    BUN 19 09/11/2020    CREATININE 0.9 09/11/2020    CALCIUM 9.1 09/11/2020    ANIONGAP 10 09/11/2020    ESTGFRAFRICA >60 09/11/2020    EGFRNONAA >60 09/11/2020       Left a voicemail and requested patient call back.    Will continue to monitor regularly. Will follow up in 1 week, sooner if any concerning SMBG readings are received.    Erin Mcfarlane, PharmD  Digital Medicine Clinician  [phone]

## 2020-12-03 ENCOUNTER — PATIENT OUTREACH (OUTPATIENT)
Dept: ADMINISTRATIVE | Facility: HOSPITAL | Age: 50
End: 2020-12-03

## 2020-12-03 NOTE — PROGRESS NOTES
Working [employer] Statin-Diabetes Report.    Pt not currently on a statin.  Mess sent to pcp Statin Registry-Please Review/consider-statin or document statin allergy or intolerance.

## 2020-12-10 ENCOUNTER — LAB VISIT (OUTPATIENT)
Dept: LAB | Facility: HOSPITAL | Age: 50
End: 2020-12-10
Attending: INTERNAL MEDICINE
Payer: MEDICARE

## 2020-12-10 DIAGNOSIS — Z79.4 TYPE 2 DIABETES MELLITUS WITHOUT COMPLICATION, WITH LONG-TERM CURRENT USE OF INSULIN: Chronic | ICD-10-CM

## 2020-12-10 DIAGNOSIS — E11.9 TYPE 2 DIABETES MELLITUS WITHOUT COMPLICATION, WITH LONG-TERM CURRENT USE OF INSULIN: Chronic | ICD-10-CM

## 2020-12-10 PROCEDURE — 36415 COLL VENOUS BLD VENIPUNCTURE: CPT | Mod: HCNC

## 2020-12-10 PROCEDURE — 83036 HEMOGLOBIN GLYCOSYLATED A1C: CPT | Mod: HCNC

## 2020-12-11 LAB
ESTIMATED AVG GLUCOSE: 140 MG/DL (ref 68–131)
HBA1C MFR BLD HPLC: 6.5 % (ref 4–5.6)

## 2020-12-17 PROCEDURE — 99454 REM MNTR PHYSIOL PARAM 16-30: CPT | Mod: S$GLB,,, | Performed by: INTERNAL MEDICINE

## 2020-12-17 PROCEDURE — 99454 PR REMOTE MNTR, PHYS PARAM, INITIAL, EA 30 DAYS: ICD-10-PCS | Mod: S$GLB,,, | Performed by: INTERNAL MEDICINE

## 2020-12-31 ENCOUNTER — PATIENT OUTREACH (OUTPATIENT)
Dept: OTHER | Facility: OTHER | Age: 50
End: 2020-12-31

## 2021-01-05 DIAGNOSIS — R11.0 NAUSEA: Primary | ICD-10-CM

## 2021-01-06 RX ORDER — ONDANSETRON 4 MG/1
4 TABLET, FILM COATED ORAL EVERY 6 HOURS
Qty: 30 TABLET | Refills: 3 | Status: SHIPPED | OUTPATIENT
Start: 2021-01-06 | End: 2021-08-02 | Stop reason: SDUPTHER

## 2021-01-07 ENCOUNTER — LAB VISIT (OUTPATIENT)
Dept: LAB | Facility: HOSPITAL | Age: 51
End: 2021-01-07
Attending: INTERNAL MEDICINE
Payer: MEDICARE

## 2021-01-07 DIAGNOSIS — E11.9 TYPE 2 DIABETES MELLITUS WITHOUT COMPLICATION, WITH LONG-TERM CURRENT USE OF INSULIN: Chronic | ICD-10-CM

## 2021-01-07 DIAGNOSIS — E55.9 VITAMIN D DEFICIENCY: ICD-10-CM

## 2021-01-07 DIAGNOSIS — E11.69 HYPERLIPIDEMIA ASSOCIATED WITH TYPE 2 DIABETES MELLITUS: ICD-10-CM

## 2021-01-07 DIAGNOSIS — Z79.4 TYPE 2 DIABETES MELLITUS WITHOUT COMPLICATION, WITH LONG-TERM CURRENT USE OF INSULIN: Chronic | ICD-10-CM

## 2021-01-07 DIAGNOSIS — E78.5 HYPERLIPIDEMIA ASSOCIATED WITH TYPE 2 DIABETES MELLITUS: ICD-10-CM

## 2021-01-07 LAB
ALBUMIN SERPL BCP-MCNC: 3.7 G/DL (ref 3.5–5.2)
ALP SERPL-CCNC: 91 U/L (ref 55–135)
ALT SERPL W/O P-5'-P-CCNC: 41 U/L (ref 10–44)
ANION GAP SERPL CALC-SCNC: 15 MMOL/L (ref 8–16)
AST SERPL-CCNC: 23 U/L (ref 10–40)
BASOPHILS # BLD AUTO: 0.06 K/UL (ref 0–0.2)
BASOPHILS NFR BLD: 0.9 % (ref 0–1.9)
BILIRUB SERPL-MCNC: 1.8 MG/DL (ref 0.1–1)
BUN SERPL-MCNC: 14 MG/DL (ref 6–20)
CALCIUM SERPL-MCNC: 8.9 MG/DL (ref 8.7–10.5)
CHLORIDE SERPL-SCNC: 108 MMOL/L (ref 95–110)
CHOLEST SERPL-MCNC: 146 MG/DL (ref 120–199)
CHOLEST/HDLC SERPL: 2.1 {RATIO} (ref 2–5)
CO2 SERPL-SCNC: 20 MMOL/L (ref 23–29)
CREAT SERPL-MCNC: 0.7 MG/DL (ref 0.5–1.4)
DIFFERENTIAL METHOD: ABNORMAL
EOSINOPHIL # BLD AUTO: 0.2 K/UL (ref 0–0.5)
EOSINOPHIL NFR BLD: 2.8 % (ref 0–8)
ERYTHROCYTE [DISTWIDTH] IN BLOOD BY AUTOMATED COUNT: 14.4 % (ref 11.5–14.5)
EST. GFR  (AFRICAN AMERICAN): >60 ML/MIN/1.73 M^2
EST. GFR  (NON AFRICAN AMERICAN): >60 ML/MIN/1.73 M^2
GLUCOSE SERPL-MCNC: 95 MG/DL (ref 70–110)
HCT VFR BLD AUTO: 42.5 % (ref 37–48.5)
HDLC SERPL-MCNC: 70 MG/DL (ref 40–75)
HDLC SERPL: 47.9 % (ref 20–50)
HGB BLD-MCNC: 13.5 G/DL (ref 12–16)
IMM GRANULOCYTES # BLD AUTO: 0.01 K/UL (ref 0–0.04)
IMM GRANULOCYTES NFR BLD AUTO: 0.2 % (ref 0–0.5)
LDLC SERPL CALC-MCNC: 56.2 MG/DL (ref 63–159)
LYMPHOCYTES # BLD AUTO: 1.8 K/UL (ref 1–4.8)
LYMPHOCYTES NFR BLD: 27 % (ref 18–48)
MCH RBC QN AUTO: 30.7 PG (ref 27–31)
MCHC RBC AUTO-ENTMCNC: 31.8 G/DL (ref 32–36)
MCV RBC AUTO: 97 FL (ref 82–98)
MONOCYTES # BLD AUTO: 0.4 K/UL (ref 0.3–1)
MONOCYTES NFR BLD: 5.8 % (ref 4–15)
NEUTROPHILS # BLD AUTO: 4.1 K/UL (ref 1.8–7.7)
NEUTROPHILS NFR BLD: 63.3 % (ref 38–73)
NONHDLC SERPL-MCNC: 76 MG/DL
NRBC BLD-RTO: 0 /100 WBC
PLATELET # BLD AUTO: 234 K/UL (ref 150–350)
PMV BLD AUTO: 11.9 FL (ref 9.2–12.9)
POTASSIUM SERPL-SCNC: 3.7 MMOL/L (ref 3.5–5.1)
PROT SERPL-MCNC: 7 G/DL (ref 6–8.4)
RBC # BLD AUTO: 4.4 M/UL (ref 4–5.4)
SODIUM SERPL-SCNC: 143 MMOL/L (ref 136–145)
TRIGL SERPL-MCNC: 99 MG/DL (ref 30–150)
TSH SERPL DL<=0.005 MIU/L-ACNC: 1.95 UIU/ML (ref 0.4–4)
WBC # BLD AUTO: 6.51 K/UL (ref 3.9–12.7)

## 2021-01-07 PROCEDURE — 85025 COMPLETE CBC W/AUTO DIFF WBC: CPT | Mod: HCNC

## 2021-01-07 PROCEDURE — 83036 HEMOGLOBIN GLYCOSYLATED A1C: CPT | Mod: HCNC

## 2021-01-07 PROCEDURE — 84443 ASSAY THYROID STIM HORMONE: CPT | Mod: HCNC

## 2021-01-07 PROCEDURE — 82306 VITAMIN D 25 HYDROXY: CPT | Mod: HCNC

## 2021-01-07 PROCEDURE — 80053 COMPREHEN METABOLIC PANEL: CPT | Mod: HCNC

## 2021-01-07 PROCEDURE — 80061 LIPID PANEL: CPT | Mod: HCNC

## 2021-01-07 PROCEDURE — 36415 COLL VENOUS BLD VENIPUNCTURE: CPT | Mod: HCNC,PO

## 2021-01-08 LAB
25(OH)D3+25(OH)D2 SERPL-MCNC: 18 NG/ML (ref 30–96)
ESTIMATED AVG GLUCOSE: 137 MG/DL (ref 68–131)
HBA1C MFR BLD HPLC: 6.4 % (ref 4–5.6)

## 2021-01-11 ENCOUNTER — LAB VISIT (OUTPATIENT)
Dept: LAB | Facility: HOSPITAL | Age: 51
End: 2021-01-11
Attending: INTERNAL MEDICINE
Payer: MEDICARE

## 2021-01-11 ENCOUNTER — OFFICE VISIT (OUTPATIENT)
Dept: FAMILY MEDICINE | Facility: CLINIC | Age: 51
End: 2021-01-11
Payer: MEDICARE

## 2021-01-11 ENCOUNTER — DOCUMENTATION ONLY (OUTPATIENT)
Dept: HEMATOLOGY/ONCOLOGY | Facility: CLINIC | Age: 51
End: 2021-01-11

## 2021-01-11 ENCOUNTER — TELEPHONE (OUTPATIENT)
Dept: HEMATOLOGY/ONCOLOGY | Facility: CLINIC | Age: 51
End: 2021-01-11

## 2021-01-11 VITALS
SYSTOLIC BLOOD PRESSURE: 134 MMHG | OXYGEN SATURATION: 95 % | TEMPERATURE: 97 F | WEIGHT: 145.75 LBS | HEIGHT: 62 IN | HEART RATE: 114 BPM | DIASTOLIC BLOOD PRESSURE: 70 MMHG | BODY MASS INDEX: 26.82 KG/M2

## 2021-01-11 DIAGNOSIS — G89.4 CHRONIC PAIN SYNDROME: ICD-10-CM

## 2021-01-11 DIAGNOSIS — F44.5 PSEUDOSEIZURE: ICD-10-CM

## 2021-01-11 DIAGNOSIS — Z79.4 TYPE 2 DIABETES MELLITUS WITHOUT COMPLICATION, WITH LONG-TERM CURRENT USE OF INSULIN: Primary | Chronic | ICD-10-CM

## 2021-01-11 DIAGNOSIS — E11.9 TYPE 2 DIABETES MELLITUS WITHOUT COMPLICATION, WITH LONG-TERM CURRENT USE OF INSULIN: Primary | Chronic | ICD-10-CM

## 2021-01-11 DIAGNOSIS — E11.69 HYPERLIPIDEMIA ASSOCIATED WITH TYPE 2 DIABETES MELLITUS: ICD-10-CM

## 2021-01-11 DIAGNOSIS — F41.1 GAD (GENERALIZED ANXIETY DISORDER): ICD-10-CM

## 2021-01-11 DIAGNOSIS — R74.8 ELEVATED LIVER ENZYMES: ICD-10-CM

## 2021-01-11 DIAGNOSIS — E78.5 HYPERLIPIDEMIA ASSOCIATED WITH TYPE 2 DIABETES MELLITUS: ICD-10-CM

## 2021-01-11 DIAGNOSIS — E53.8 VITAMIN B12 DEFICIENCY: ICD-10-CM

## 2021-01-11 DIAGNOSIS — F33.42 RECURRENT MAJOR DEPRESSIVE DISORDER, IN FULL REMISSION: ICD-10-CM

## 2021-01-11 DIAGNOSIS — R56.9 CONVULSIONS, UNSPECIFIED CONVULSION TYPE: ICD-10-CM

## 2021-01-11 DIAGNOSIS — E55.9 VITAMIN D DEFICIENCY: ICD-10-CM

## 2021-01-11 DIAGNOSIS — M48.061 DEGENERATIVE LUMBAR SPINAL STENOSIS: ICD-10-CM

## 2021-01-11 DIAGNOSIS — Z98.84 HISTORY OF ROUX-EN-Y GASTRIC BYPASS: ICD-10-CM

## 2021-01-11 DIAGNOSIS — Z00.00 ENCOUNTER FOR PREVENTIVE HEALTH EXAMINATION: ICD-10-CM

## 2021-01-11 LAB — VIT B12 SERPL-MCNC: 572 PG/ML (ref 210–950)

## 2021-01-11 PROCEDURE — 3008F PR BODY MASS INDEX (BMI) DOCUMENTED: ICD-10-PCS | Mod: HCNC,CPTII,S$GLB, | Performed by: INTERNAL MEDICINE

## 2021-01-11 PROCEDURE — 3044F PR MOST RECENT HEMOGLOBIN A1C LEVEL <7.0%: ICD-10-PCS | Mod: HCNC,CPTII,S$GLB, | Performed by: INTERNAL MEDICINE

## 2021-01-11 PROCEDURE — 99499 RISK ADDL DX/OHS AUDIT: ICD-10-PCS | Mod: HCNC,S$GLB,, | Performed by: INTERNAL MEDICINE

## 2021-01-11 PROCEDURE — 1125F PR PAIN SEVERITY QUANTIFIED, PAIN PRESENT: ICD-10-PCS | Mod: HCNC,S$GLB,, | Performed by: INTERNAL MEDICINE

## 2021-01-11 PROCEDURE — 82607 VITAMIN B-12: CPT | Mod: HCNC

## 2021-01-11 PROCEDURE — 1125F AMNT PAIN NOTED PAIN PRSNT: CPT | Mod: HCNC,S$GLB,, | Performed by: INTERNAL MEDICINE

## 2021-01-11 PROCEDURE — 3008F BODY MASS INDEX DOCD: CPT | Mod: HCNC,CPTII,S$GLB, | Performed by: INTERNAL MEDICINE

## 2021-01-11 PROCEDURE — 99214 PR OFFICE/OUTPT VISIT, EST, LEVL IV, 30-39 MIN: ICD-10-PCS | Mod: HCNC,S$GLB,, | Performed by: INTERNAL MEDICINE

## 2021-01-11 PROCEDURE — 99999 PR PBB SHADOW E&M-EST. PATIENT-LVL IV: CPT | Mod: PBBFAC,HCNC,, | Performed by: INTERNAL MEDICINE

## 2021-01-11 PROCEDURE — 99214 OFFICE O/P EST MOD 30 MIN: CPT | Mod: HCNC,S$GLB,, | Performed by: INTERNAL MEDICINE

## 2021-01-11 PROCEDURE — 99499 UNLISTED E&M SERVICE: CPT | Mod: HCNC,S$GLB,, | Performed by: INTERNAL MEDICINE

## 2021-01-11 PROCEDURE — 3044F HG A1C LEVEL LT 7.0%: CPT | Mod: HCNC,CPTII,S$GLB, | Performed by: INTERNAL MEDICINE

## 2021-01-11 PROCEDURE — 36415 COLL VENOUS BLD VENIPUNCTURE: CPT | Mod: HCNC,PO

## 2021-01-11 PROCEDURE — 99999 PR PBB SHADOW E&M-EST. PATIENT-LVL IV: ICD-10-PCS | Mod: PBBFAC,HCNC,, | Performed by: INTERNAL MEDICINE

## 2021-01-11 RX ORDER — VALPROIC ACID 250 MG/1
CAPSULE, LIQUID FILLED ORAL
COMMUNITY
Start: 2020-11-05 | End: 2021-07-12

## 2021-01-11 RX ORDER — ERGOCALCIFEROL 1.25 MG/1
50000 CAPSULE ORAL
Qty: 24 CAPSULE | Refills: 3 | Status: SHIPPED | OUTPATIENT
Start: 2021-01-11 | End: 2022-01-20 | Stop reason: SDUPTHER

## 2021-01-12 ENCOUNTER — PATIENT MESSAGE (OUTPATIENT)
Dept: FAMILY MEDICINE | Facility: CLINIC | Age: 51
End: 2021-01-12

## 2021-01-17 PROCEDURE — 99454 PR REMOTE MNTR, PHYS PARAM, INITIAL, EA 30 DAYS: ICD-10-PCS | Mod: S$GLB,,, | Performed by: INTERNAL MEDICINE

## 2021-01-17 PROCEDURE — 99454 REM MNTR PHYSIOL PARAM 16-30: CPT | Mod: S$GLB,,, | Performed by: INTERNAL MEDICINE

## 2021-01-19 ENCOUNTER — PATIENT MESSAGE (OUTPATIENT)
Dept: FAMILY MEDICINE | Facility: CLINIC | Age: 51
End: 2021-01-19

## 2021-01-19 RX ORDER — DOXYCYCLINE 100 MG/1
100 CAPSULE ORAL 2 TIMES DAILY
Qty: 14 CAPSULE | Refills: 0 | Status: SHIPPED | OUTPATIENT
Start: 2021-01-19 | End: 2021-01-26

## 2021-01-26 ENCOUNTER — OFFICE VISIT (OUTPATIENT)
Dept: OPHTHALMOLOGY | Facility: CLINIC | Age: 51
End: 2021-01-26
Payer: MEDICARE

## 2021-01-26 DIAGNOSIS — E11.9 DIABETES MELLITUS TYPE 2 WITHOUT RETINOPATHY: Primary | ICD-10-CM

## 2021-01-26 DIAGNOSIS — H52.4 MYOPIA WITH ASTIGMATISM AND PRESBYOPIA, BILATERAL: ICD-10-CM

## 2021-01-26 DIAGNOSIS — H52.13 MYOPIA WITH ASTIGMATISM AND PRESBYOPIA, BILATERAL: ICD-10-CM

## 2021-01-26 DIAGNOSIS — H52.203 MYOPIA WITH ASTIGMATISM AND PRESBYOPIA, BILATERAL: ICD-10-CM

## 2021-01-26 PROCEDURE — 99999 PR PBB SHADOW E&M-EST. PATIENT-LVL III: ICD-10-PCS | Mod: PBBFAC,,, | Performed by: OPTOMETRIST

## 2021-01-26 PROCEDURE — 2023F DILAT RTA XM W/O RTNOPTHY: CPT | Mod: S$GLB,,, | Performed by: OPTOMETRIST

## 2021-01-26 PROCEDURE — 2023F PR DILATED RETINAL EXAM W/O EVID OF RETINOPATHY: ICD-10-PCS | Mod: S$GLB,,, | Performed by: OPTOMETRIST

## 2021-01-26 PROCEDURE — 92014 PR EYE EXAM, EST PATIENT,COMPREHESV: ICD-10-PCS | Mod: S$GLB,,, | Performed by: OPTOMETRIST

## 2021-01-26 PROCEDURE — 92015 PR REFRACTION: ICD-10-PCS | Mod: S$GLB,,, | Performed by: OPTOMETRIST

## 2021-01-26 PROCEDURE — 99999 PR PBB SHADOW E&M-EST. PATIENT-LVL III: CPT | Mod: PBBFAC,,, | Performed by: OPTOMETRIST

## 2021-01-26 PROCEDURE — 92015 DETERMINE REFRACTIVE STATE: CPT | Mod: S$GLB,,, | Performed by: OPTOMETRIST

## 2021-01-26 PROCEDURE — 92014 COMPRE OPH EXAM EST PT 1/>: CPT | Mod: S$GLB,,, | Performed by: OPTOMETRIST

## 2021-02-17 PROCEDURE — 99454 REM MNTR PHYSIOL PARAM 16-30: CPT | Mod: S$GLB,,, | Performed by: INTERNAL MEDICINE

## 2021-02-17 PROCEDURE — 99454 PR REMOTE MNTR, PHYS PARAM, INITIAL, EA 30 DAYS: ICD-10-PCS | Mod: S$GLB,,, | Performed by: INTERNAL MEDICINE

## 2021-02-25 ENCOUNTER — PES CALL (OUTPATIENT)
Dept: ADMINISTRATIVE | Facility: CLINIC | Age: 51
End: 2021-02-25

## 2021-03-03 ENCOUNTER — TELEPHONE (OUTPATIENT)
Dept: SLEEP MEDICINE | Facility: CLINIC | Age: 51
End: 2021-03-03

## 2021-03-04 ENCOUNTER — OFFICE VISIT (OUTPATIENT)
Dept: SLEEP MEDICINE | Facility: CLINIC | Age: 51
End: 2021-03-04
Payer: MEDICARE

## 2021-03-04 DIAGNOSIS — F41.9 ANXIETY: ICD-10-CM

## 2021-03-04 DIAGNOSIS — G47.00 INSOMNIA, UNSPECIFIED TYPE: ICD-10-CM

## 2021-03-04 DIAGNOSIS — G47.30 SLEEP APNEA, UNSPECIFIED TYPE: Primary | ICD-10-CM

## 2021-03-04 PROCEDURE — 99213 PR OFFICE/OUTPT VISIT, EST, LEVL III, 20-29 MIN: ICD-10-PCS | Mod: 95,,, | Performed by: PSYCHIATRY & NEUROLOGY

## 2021-03-04 PROCEDURE — 99213 OFFICE O/P EST LOW 20 MIN: CPT | Mod: 95,,, | Performed by: PSYCHIATRY & NEUROLOGY

## 2021-03-04 RX ORDER — DOXEPIN HYDROCHLORIDE 50 MG/1
CAPSULE ORAL
Qty: 60 CAPSULE | Refills: 5 | Status: SHIPPED | OUTPATIENT
Start: 2021-03-04 | End: 2021-08-12 | Stop reason: SDUPTHER

## 2021-03-04 RX ORDER — ALPRAZOLAM 0.5 MG/1
TABLET ORAL
Qty: 70 TABLET | Refills: 3 | Status: SHIPPED | OUTPATIENT
Start: 2021-03-04 | End: 2021-06-16 | Stop reason: SDUPTHER

## 2021-03-05 ENCOUNTER — TELEPHONE (OUTPATIENT)
Dept: SLEEP MEDICINE | Facility: OTHER | Age: 51
End: 2021-03-05

## 2021-03-29 ENCOUNTER — PATIENT MESSAGE (OUTPATIENT)
Dept: SLEEP MEDICINE | Facility: CLINIC | Age: 51
End: 2021-03-29

## 2021-03-29 ENCOUNTER — TELEPHONE (OUTPATIENT)
Dept: SLEEP MEDICINE | Facility: OTHER | Age: 51
End: 2021-03-29

## 2021-03-30 PROCEDURE — 99454 PR REMOTE MNTR, PHYS PARAM, INITIAL, EA 30 DAYS: ICD-10-PCS | Mod: S$GLB,,, | Performed by: INTERNAL MEDICINE

## 2021-03-30 PROCEDURE — 99454 REM MNTR PHYSIOL PARAM 16-30: CPT | Mod: S$GLB,,, | Performed by: INTERNAL MEDICINE

## 2021-04-12 ENCOUNTER — OFFICE VISIT (OUTPATIENT)
Dept: INTERNAL MEDICINE | Facility: CLINIC | Age: 51
End: 2021-04-12
Payer: MEDICARE

## 2021-04-12 ENCOUNTER — HOSPITAL ENCOUNTER (OUTPATIENT)
Dept: RADIOLOGY | Facility: HOSPITAL | Age: 51
Discharge: HOME | End: 2021-04-12
Attending: NURSE PRACTITIONER
Payer: MEDICARE

## 2021-04-12 VITALS
OXYGEN SATURATION: 95 % | DIASTOLIC BLOOD PRESSURE: 74 MMHG | WEIGHT: 158.06 LBS | RESPIRATION RATE: 16 BRPM | SYSTOLIC BLOOD PRESSURE: 122 MMHG | BODY MASS INDEX: 28.91 KG/M2 | TEMPERATURE: 98 F | HEART RATE: 104 BPM

## 2021-04-12 DIAGNOSIS — R11.0 NAUSEA: Primary | ICD-10-CM

## 2021-04-12 DIAGNOSIS — R50.9 FEVER, UNSPECIFIED FEVER CAUSE: ICD-10-CM

## 2021-04-12 PROCEDURE — 3008F PR BODY MASS INDEX (BMI) DOCUMENTED: ICD-10-PCS | Mod: CPTII,S$GLB,, | Performed by: NURSE PRACTITIONER

## 2021-04-12 PROCEDURE — 99999 PR PBB SHADOW E&M-EST. PATIENT-LVL V: ICD-10-PCS | Mod: PBBFAC,,, | Performed by: NURSE PRACTITIONER

## 2021-04-12 PROCEDURE — 71046 X-RAY EXAM CHEST 2 VIEWS: CPT | Mod: 26,,, | Performed by: RADIOLOGY

## 2021-04-12 PROCEDURE — 71046 XR CHEST PA AND LATERAL: ICD-10-PCS | Mod: 26,,, | Performed by: RADIOLOGY

## 2021-04-12 PROCEDURE — 99214 PR OFFICE/OUTPT VISIT, EST, LEVL IV, 30-39 MIN: ICD-10-PCS | Mod: S$GLB,,, | Performed by: NURSE PRACTITIONER

## 2021-04-12 PROCEDURE — 99999 PR PBB SHADOW E&M-EST. PATIENT-LVL V: CPT | Mod: PBBFAC,,, | Performed by: NURSE PRACTITIONER

## 2021-04-12 PROCEDURE — 99214 OFFICE O/P EST MOD 30 MIN: CPT | Mod: S$GLB,,, | Performed by: NURSE PRACTITIONER

## 2021-04-12 PROCEDURE — 3008F BODY MASS INDEX DOCD: CPT | Mod: CPTII,S$GLB,, | Performed by: NURSE PRACTITIONER

## 2021-04-12 PROCEDURE — 71046 X-RAY EXAM CHEST 2 VIEWS: CPT | Mod: TC

## 2021-04-12 RX ORDER — PROMETHAZINE HYDROCHLORIDE 12.5 MG/1
12.5 SUPPOSITORY RECTAL EVERY 12 HOURS PRN
Qty: 10 SUPPOSITORY | Refills: 0 | Status: SHIPPED | OUTPATIENT
Start: 2021-04-12 | End: 2021-07-12

## 2021-04-20 ENCOUNTER — HOSPITAL ENCOUNTER (OUTPATIENT)
Dept: SLEEP MEDICINE | Facility: OTHER | Age: 51
Discharge: HOME | End: 2021-04-20
Attending: PSYCHIATRY & NEUROLOGY
Payer: MEDICARE

## 2021-04-20 DIAGNOSIS — G47.33 OSA (OBSTRUCTIVE SLEEP APNEA): ICD-10-CM

## 2021-04-20 DIAGNOSIS — G47.30 SLEEP APNEA, UNSPECIFIED TYPE: ICD-10-CM

## 2021-04-20 PROCEDURE — 95800 PR SLEEP STUDY, UNATTENDED, RECORD HEART RATE/O2 SAT/RESP ANAL/SLEEP TIME: ICD-10-PCS | Mod: 26,,, | Performed by: PSYCHIATRY & NEUROLOGY

## 2021-04-20 PROCEDURE — 95800 SLP STDY UNATTENDED: CPT

## 2021-04-20 PROCEDURE — 95800 SLP STDY UNATTENDED: CPT | Mod: 26,,, | Performed by: PSYCHIATRY & NEUROLOGY

## 2021-04-22 ENCOUNTER — TELEPHONE (OUTPATIENT)
Dept: PHARMACY | Facility: CLINIC | Age: 51
End: 2021-04-22

## 2021-05-02 ENCOUNTER — PATIENT MESSAGE (OUTPATIENT)
Dept: SLEEP MEDICINE | Facility: CLINIC | Age: 51
End: 2021-05-02

## 2021-05-02 DIAGNOSIS — F41.9 ANXIETY: ICD-10-CM

## 2021-05-02 DIAGNOSIS — G62.9 SMALL FIBER NEUROPATHY: ICD-10-CM

## 2021-05-03 RX ORDER — PREGABALIN 100 MG/1
CAPSULE ORAL
Qty: 90 CAPSULE | Refills: 5 | Status: SHIPPED | OUTPATIENT
Start: 2021-05-03 | End: 2021-10-20

## 2021-05-06 ENCOUNTER — TELEPHONE (OUTPATIENT)
Dept: SLEEP MEDICINE | Facility: CLINIC | Age: 51
End: 2021-05-06

## 2021-05-06 ENCOUNTER — PATIENT MESSAGE (OUTPATIENT)
Dept: SLEEP MEDICINE | Facility: CLINIC | Age: 51
End: 2021-05-06

## 2021-05-12 ENCOUNTER — PATIENT MESSAGE (OUTPATIENT)
Dept: RESEARCH | Facility: HOSPITAL | Age: 51
End: 2021-05-12

## 2021-05-19 PROCEDURE — 99454 REM MNTR PHYSIOL PARAM 16-30: CPT | Mod: S$GLB,,, | Performed by: INTERNAL MEDICINE

## 2021-05-19 PROCEDURE — 99454 PR REMOTE MNTR, PHYS PARAM, INITIAL, EA 30 DAYS: ICD-10-PCS | Mod: S$GLB,,, | Performed by: INTERNAL MEDICINE

## 2021-05-31 PROCEDURE — 99457 RPM TX MGMT 1ST 20 MIN: CPT | Mod: S$GLB,,, | Performed by: INTERNAL MEDICINE

## 2021-05-31 PROCEDURE — 99457 PR MONITORING, PHYSIOL PARAM, REMOTE, 1ST 20 MINS, PER MONTH: ICD-10-PCS | Mod: S$GLB,,, | Performed by: INTERNAL MEDICINE

## 2021-06-16 ENCOUNTER — TELEPHONE (OUTPATIENT)
Dept: SLEEP MEDICINE | Facility: CLINIC | Age: 51
End: 2021-06-16

## 2021-06-16 ENCOUNTER — PATIENT MESSAGE (OUTPATIENT)
Dept: FAMILY MEDICINE | Facility: CLINIC | Age: 51
End: 2021-06-16

## 2021-06-16 ENCOUNTER — PATIENT MESSAGE (OUTPATIENT)
Dept: SLEEP MEDICINE | Facility: CLINIC | Age: 51
End: 2021-06-16

## 2021-06-16 DIAGNOSIS — G47.00 INSOMNIA, UNSPECIFIED TYPE: ICD-10-CM

## 2021-06-16 DIAGNOSIS — G47.33 OSA (OBSTRUCTIVE SLEEP APNEA): Primary | ICD-10-CM

## 2021-06-16 DIAGNOSIS — M25.532 BILATERAL WRIST PAIN: Primary | ICD-10-CM

## 2021-06-16 DIAGNOSIS — F41.9 ANXIETY: ICD-10-CM

## 2021-06-16 DIAGNOSIS — M25.531 BILATERAL WRIST PAIN: Primary | ICD-10-CM

## 2021-06-16 RX ORDER — ALPRAZOLAM 0.5 MG/1
TABLET ORAL
Qty: 70 TABLET | Refills: 3 | Status: SHIPPED | OUTPATIENT
Start: 2021-06-16 | End: 2021-09-23 | Stop reason: SDUPTHER

## 2021-06-16 RX ORDER — ALPRAZOLAM 0.5 MG/1
TABLET ORAL
Qty: 70 TABLET | Refills: 3 | Status: SHIPPED | OUTPATIENT
Start: 2021-06-16 | End: 2021-06-16 | Stop reason: SDUPTHER

## 2021-06-17 DIAGNOSIS — M25.531 BILATERAL WRIST PAIN: Primary | ICD-10-CM

## 2021-06-17 DIAGNOSIS — M25.532 BILATERAL WRIST PAIN: Primary | ICD-10-CM

## 2021-06-28 PROCEDURE — 99454 REM MNTR PHYSIOL PARAM 16-30: CPT | Mod: S$GLB,,, | Performed by: INTERNAL MEDICINE

## 2021-06-28 PROCEDURE — 99454 PR REMOTE MNTR, PHYS PARAM, INITIAL, EA 30 DAYS: ICD-10-PCS | Mod: S$GLB,,, | Performed by: INTERNAL MEDICINE

## 2021-07-12 ENCOUNTER — CLINICAL SUPPORT (OUTPATIENT)
Dept: CARDIOLOGY | Facility: CLINIC | Age: 51
End: 2021-07-12
Payer: MEDICARE

## 2021-07-12 ENCOUNTER — OFFICE VISIT (OUTPATIENT)
Dept: FAMILY MEDICINE | Facility: CLINIC | Age: 51
End: 2021-07-12
Payer: MEDICARE

## 2021-07-12 ENCOUNTER — LAB VISIT (OUTPATIENT)
Dept: LAB | Facility: HOSPITAL | Age: 51
End: 2021-07-12
Payer: MEDICARE

## 2021-07-12 VITALS
SYSTOLIC BLOOD PRESSURE: 106 MMHG | TEMPERATURE: 98 F | WEIGHT: 148.5 LBS | OXYGEN SATURATION: 97 % | DIASTOLIC BLOOD PRESSURE: 76 MMHG | HEART RATE: 91 BPM | BODY MASS INDEX: 27.33 KG/M2 | HEIGHT: 62 IN

## 2021-07-12 DIAGNOSIS — R55 SYNCOPE, UNSPECIFIED SYNCOPE TYPE: ICD-10-CM

## 2021-07-12 DIAGNOSIS — Z29.9 PREVENTIVE MEASURE: ICD-10-CM

## 2021-07-12 DIAGNOSIS — Z79.4 TYPE 2 DIABETES MELLITUS WITHOUT COMPLICATION, WITH LONG-TERM CURRENT USE OF INSULIN: Chronic | ICD-10-CM

## 2021-07-12 DIAGNOSIS — F44.5 PSEUDOSEIZURE: ICD-10-CM

## 2021-07-12 DIAGNOSIS — R56.9 CONVULSIONS, UNSPECIFIED CONVULSION TYPE: ICD-10-CM

## 2021-07-12 DIAGNOSIS — E11.9 TYPE 2 DIABETES MELLITUS WITHOUT COMPLICATION, WITH LONG-TERM CURRENT USE OF INSULIN: Chronic | ICD-10-CM

## 2021-07-12 DIAGNOSIS — R55 SYNCOPE, UNSPECIFIED SYNCOPE TYPE: Primary | ICD-10-CM

## 2021-07-12 LAB
BASOPHILS # BLD AUTO: 0.06 K/UL (ref 0–0.2)
BASOPHILS NFR BLD: 0.9 % (ref 0–1.9)
DIFFERENTIAL METHOD: NORMAL
EOSINOPHIL # BLD AUTO: 0.2 K/UL (ref 0–0.5)
EOSINOPHIL NFR BLD: 3.3 % (ref 0–8)
ERYTHROCYTE [DISTWIDTH] IN BLOOD BY AUTOMATED COUNT: 13.2 % (ref 11.5–14.5)
HCT VFR BLD AUTO: 39.9 % (ref 37–48.5)
HGB BLD-MCNC: 12.9 G/DL (ref 12–16)
IMM GRANULOCYTES # BLD AUTO: 0.02 K/UL (ref 0–0.04)
IMM GRANULOCYTES NFR BLD AUTO: 0.3 % (ref 0–0.5)
LYMPHOCYTES # BLD AUTO: 2.3 K/UL (ref 1–4.8)
LYMPHOCYTES NFR BLD: 36.4 % (ref 18–48)
MCH RBC QN AUTO: 30.7 PG (ref 27–31)
MCHC RBC AUTO-ENTMCNC: 32.3 G/DL (ref 32–36)
MCV RBC AUTO: 95 FL (ref 82–98)
MONOCYTES # BLD AUTO: 0.4 K/UL (ref 0.3–1)
MONOCYTES NFR BLD: 6.8 % (ref 4–15)
NEUTROPHILS # BLD AUTO: 3.4 K/UL (ref 1.8–7.7)
NEUTROPHILS NFR BLD: 52.3 % (ref 38–73)
NRBC BLD-RTO: 0 /100 WBC
PLATELET # BLD AUTO: 183 K/UL (ref 150–450)
PMV BLD AUTO: 12.1 FL (ref 9.2–12.9)
RBC # BLD AUTO: 4.2 M/UL (ref 4–5.4)
WBC # BLD AUTO: 6.43 K/UL (ref 3.9–12.7)

## 2021-07-12 PROCEDURE — 36415 COLL VENOUS BLD VENIPUNCTURE: CPT | Mod: PO | Performed by: INTERNAL MEDICINE

## 2021-07-12 PROCEDURE — 99499 RISK ADDL DX/OHS AUDIT: ICD-10-PCS | Mod: HCNC,S$GLB,, | Performed by: INTERNAL MEDICINE

## 2021-07-12 PROCEDURE — 99999 PR PBB SHADOW E&M-EST. PATIENT-LVL V: ICD-10-PCS | Mod: PBBFAC,,, | Performed by: INTERNAL MEDICINE

## 2021-07-12 PROCEDURE — 85025 COMPLETE CBC W/AUTO DIFF WBC: CPT | Performed by: INTERNAL MEDICINE

## 2021-07-12 PROCEDURE — 1125F AMNT PAIN NOTED PAIN PRSNT: CPT | Mod: S$GLB,,, | Performed by: INTERNAL MEDICINE

## 2021-07-12 PROCEDURE — 99499 UNLISTED E&M SERVICE: CPT | Mod: HCNC,S$GLB,, | Performed by: INTERNAL MEDICINE

## 2021-07-12 PROCEDURE — 93010 ELECTROCARDIOGRAM REPORT: CPT | Mod: S$GLB,,, | Performed by: INTERNAL MEDICINE

## 2021-07-12 PROCEDURE — 99214 PR OFFICE/OUTPT VISIT, EST, LEVL IV, 30-39 MIN: ICD-10-PCS | Mod: S$GLB,,, | Performed by: INTERNAL MEDICINE

## 2021-07-12 PROCEDURE — 3008F BODY MASS INDEX DOCD: CPT | Mod: CPTII,S$GLB,, | Performed by: INTERNAL MEDICINE

## 2021-07-12 PROCEDURE — 93005 ELECTROCARDIOGRAM TRACING: CPT | Mod: S$GLB,,, | Performed by: INTERNAL MEDICINE

## 2021-07-12 PROCEDURE — 80053 COMPREHEN METABOLIC PANEL: CPT | Performed by: INTERNAL MEDICINE

## 2021-07-12 PROCEDURE — 3008F PR BODY MASS INDEX (BMI) DOCUMENTED: ICD-10-PCS | Mod: CPTII,S$GLB,, | Performed by: INTERNAL MEDICINE

## 2021-07-12 PROCEDURE — 99999 PR PBB SHADOW E&M-EST. PATIENT-LVL V: CPT | Mod: PBBFAC,,, | Performed by: INTERNAL MEDICINE

## 2021-07-12 PROCEDURE — 1125F PR PAIN SEVERITY QUANTIFIED, PAIN PRESENT: ICD-10-PCS | Mod: S$GLB,,, | Performed by: INTERNAL MEDICINE

## 2021-07-12 PROCEDURE — 93010 EKG 12-LEAD: ICD-10-PCS | Mod: S$GLB,,, | Performed by: INTERNAL MEDICINE

## 2021-07-12 PROCEDURE — 99214 OFFICE O/P EST MOD 30 MIN: CPT | Mod: S$GLB,,, | Performed by: INTERNAL MEDICINE

## 2021-07-12 PROCEDURE — 93005 EKG 12-LEAD: ICD-10-PCS | Mod: S$GLB,,, | Performed by: INTERNAL MEDICINE

## 2021-07-13 ENCOUNTER — HOSPITAL ENCOUNTER (OUTPATIENT)
Dept: RADIOLOGY | Facility: HOSPITAL | Age: 51
Discharge: HOME | End: 2021-07-13
Attending: ORTHOPAEDIC SURGERY
Payer: MEDICARE

## 2021-07-13 ENCOUNTER — PATIENT MESSAGE (OUTPATIENT)
Dept: FAMILY MEDICINE | Facility: CLINIC | Age: 51
End: 2021-07-13

## 2021-07-13 ENCOUNTER — OFFICE VISIT (OUTPATIENT)
Dept: ORTHOPEDICS | Facility: CLINIC | Age: 51
End: 2021-07-13
Payer: MEDICARE

## 2021-07-13 VITALS
WEIGHT: 148 LBS | HEART RATE: 95 BPM | DIASTOLIC BLOOD PRESSURE: 67 MMHG | BODY MASS INDEX: 27.23 KG/M2 | SYSTOLIC BLOOD PRESSURE: 103 MMHG | HEIGHT: 62 IN

## 2021-07-13 DIAGNOSIS — M25.531 BILATERAL WRIST PAIN: ICD-10-CM

## 2021-07-13 DIAGNOSIS — M25.532 BILATERAL WRIST PAIN: ICD-10-CM

## 2021-07-13 DIAGNOSIS — G56.21 CUBITAL TUNNEL SYNDROME, RIGHT: ICD-10-CM

## 2021-07-13 DIAGNOSIS — G56.03 CARPAL TUNNEL SYNDROME ON BOTH SIDES: Primary | ICD-10-CM

## 2021-07-13 LAB
ALBUMIN SERPL BCP-MCNC: 3.8 G/DL (ref 3.5–5.2)
ALP SERPL-CCNC: 108 U/L (ref 55–135)
ALT SERPL W/O P-5'-P-CCNC: 71 U/L (ref 10–44)
ANION GAP SERPL CALC-SCNC: 14 MMOL/L (ref 8–16)
AST SERPL-CCNC: 57 U/L (ref 10–40)
BILIRUB SERPL-MCNC: 1.2 MG/DL (ref 0.1–1)
BUN SERPL-MCNC: 14 MG/DL (ref 6–20)
CALCIUM SERPL-MCNC: 9.3 MG/DL (ref 8.7–10.5)
CHLORIDE SERPL-SCNC: 105 MMOL/L (ref 95–110)
CO2 SERPL-SCNC: 20 MMOL/L (ref 23–29)
CREAT SERPL-MCNC: 0.9 MG/DL (ref 0.5–1.4)
EST. GFR  (AFRICAN AMERICAN): >60 ML/MIN/1.73 M^2
EST. GFR  (NON AFRICAN AMERICAN): >60 ML/MIN/1.73 M^2
GLUCOSE SERPL-MCNC: 124 MG/DL (ref 70–110)
POTASSIUM SERPL-SCNC: 4.6 MMOL/L (ref 3.5–5.1)
PROT SERPL-MCNC: 6.8 G/DL (ref 6–8.4)
SODIUM SERPL-SCNC: 139 MMOL/L (ref 136–145)

## 2021-07-13 PROCEDURE — 99999 PR PBB SHADOW E&M-EST. PATIENT-LVL IV: CPT | Mod: PBBFAC,,, | Performed by: ORTHOPAEDIC SURGERY

## 2021-07-13 PROCEDURE — 99999 PR PBB SHADOW E&M-EST. PATIENT-LVL IV: ICD-10-PCS | Mod: PBBFAC,,, | Performed by: ORTHOPAEDIC SURGERY

## 2021-07-13 PROCEDURE — 73110 X-RAY EXAM OF WRIST: CPT | Mod: TC,50

## 2021-07-13 PROCEDURE — 73110 X-RAY EXAM OF WRIST: CPT | Mod: 26,50,, | Performed by: RADIOLOGY

## 2021-07-13 PROCEDURE — 99203 PR OFFICE/OUTPT VISIT, NEW, LEVL III, 30-44 MIN: ICD-10-PCS | Mod: S$GLB,,, | Performed by: ORTHOPAEDIC SURGERY

## 2021-07-13 PROCEDURE — 99203 OFFICE O/P NEW LOW 30 MIN: CPT | Mod: S$GLB,,, | Performed by: ORTHOPAEDIC SURGERY

## 2021-07-13 PROCEDURE — 3008F BODY MASS INDEX DOCD: CPT | Mod: CPTII,S$GLB,, | Performed by: ORTHOPAEDIC SURGERY

## 2021-07-13 PROCEDURE — 1125F PR PAIN SEVERITY QUANTIFIED, PAIN PRESENT: ICD-10-PCS | Mod: S$GLB,,, | Performed by: ORTHOPAEDIC SURGERY

## 2021-07-13 PROCEDURE — 73110 XR WRIST COMPLETE 3 VIEWS BILATERAL: ICD-10-PCS | Mod: 26,50,, | Performed by: RADIOLOGY

## 2021-07-13 PROCEDURE — 1125F AMNT PAIN NOTED PAIN PRSNT: CPT | Mod: S$GLB,,, | Performed by: ORTHOPAEDIC SURGERY

## 2021-07-13 PROCEDURE — 3008F PR BODY MASS INDEX (BMI) DOCUMENTED: ICD-10-PCS | Mod: CPTII,S$GLB,, | Performed by: ORTHOPAEDIC SURGERY

## 2021-07-14 DIAGNOSIS — G56.21 CUBITAL TUNNEL SYNDROME, RIGHT: ICD-10-CM

## 2021-07-14 DIAGNOSIS — G56.03 CARPAL TUNNEL SYNDROME ON BOTH SIDES: Primary | ICD-10-CM

## 2021-07-16 ENCOUNTER — PATIENT MESSAGE (OUTPATIENT)
Dept: SLEEP MEDICINE | Facility: CLINIC | Age: 51
End: 2021-07-16

## 2021-07-16 DIAGNOSIS — G47.33 OBSTRUCTIVE SLEEP APNEA: Primary | ICD-10-CM

## 2021-07-18 PROCEDURE — 99454 PR REMOTE MNTR, PHYS PARAM, INITIAL, EA 30 DAYS: ICD-10-PCS | Mod: S$GLB,,, | Performed by: INTERNAL MEDICINE

## 2021-07-18 PROCEDURE — 99454 REM MNTR PHYSIOL PARAM 16-30: CPT | Mod: S$GLB,,, | Performed by: INTERNAL MEDICINE

## 2021-07-21 ENCOUNTER — TELEPHONE (OUTPATIENT)
Dept: FAMILY MEDICINE | Facility: CLINIC | Age: 51
End: 2021-07-21

## 2021-07-21 DIAGNOSIS — Z12.31 ENCOUNTER FOR SCREENING MAMMOGRAM FOR MALIGNANT NEOPLASM OF BREAST: Primary | ICD-10-CM

## 2021-07-22 ENCOUNTER — LAB VISIT (OUTPATIENT)
Dept: LAB | Facility: HOSPITAL | Age: 51
End: 2021-07-22
Payer: MEDICARE

## 2021-07-22 ENCOUNTER — PATIENT OUTREACH (OUTPATIENT)
Dept: ADMINISTRATIVE | Facility: OTHER | Age: 51
End: 2021-07-22

## 2021-07-22 ENCOUNTER — OFFICE VISIT (OUTPATIENT)
Dept: CARDIOLOGY | Facility: CLINIC | Age: 51
End: 2021-07-22
Payer: MEDICARE

## 2021-07-22 VITALS
DIASTOLIC BLOOD PRESSURE: 75 MMHG | WEIGHT: 145.5 LBS | OXYGEN SATURATION: 98 % | BODY MASS INDEX: 26.61 KG/M2 | SYSTOLIC BLOOD PRESSURE: 114 MMHG | HEART RATE: 108 BPM

## 2021-07-22 DIAGNOSIS — R06.02 SOB (SHORTNESS OF BREATH): ICD-10-CM

## 2021-07-22 DIAGNOSIS — Z79.4 TYPE 2 DIABETES MELLITUS WITHOUT COMPLICATION, WITH LONG-TERM CURRENT USE OF INSULIN: Chronic | ICD-10-CM

## 2021-07-22 DIAGNOSIS — E78.5 HYPERLIPIDEMIA ASSOCIATED WITH TYPE 2 DIABETES MELLITUS: ICD-10-CM

## 2021-07-22 DIAGNOSIS — Z01.810 PREOP CARDIOVASCULAR EXAM: ICD-10-CM

## 2021-07-22 DIAGNOSIS — E11.69 HYPERLIPIDEMIA ASSOCIATED WITH TYPE 2 DIABETES MELLITUS: ICD-10-CM

## 2021-07-22 DIAGNOSIS — R55 SYNCOPE, UNSPECIFIED SYNCOPE TYPE: Primary | ICD-10-CM

## 2021-07-22 DIAGNOSIS — E11.9 TYPE 2 DIABETES MELLITUS WITHOUT COMPLICATION, WITH LONG-TERM CURRENT USE OF INSULIN: Chronic | ICD-10-CM

## 2021-07-22 LAB
ESTIMATED AVG GLUCOSE: 154 MG/DL (ref 68–131)
HBA1C MFR BLD: 7 % (ref 4–5.6)

## 2021-07-22 PROCEDURE — 99499 UNLISTED E&M SERVICE: CPT | Mod: HCNC,S$GLB,, | Performed by: INTERNAL MEDICINE

## 2021-07-22 PROCEDURE — 3044F HG A1C LEVEL LT 7.0%: CPT | Mod: CPTII,S$GLB,, | Performed by: INTERNAL MEDICINE

## 2021-07-22 PROCEDURE — 3008F PR BODY MASS INDEX (BMI) DOCUMENTED: ICD-10-PCS | Mod: CPTII,S$GLB,, | Performed by: INTERNAL MEDICINE

## 2021-07-22 PROCEDURE — 83036 HEMOGLOBIN GLYCOSYLATED A1C: CPT | Performed by: INTERNAL MEDICINE

## 2021-07-22 PROCEDURE — 99999 PR PBB SHADOW E&M-EST. PATIENT-LVL V: CPT | Mod: PBBFAC,,, | Performed by: INTERNAL MEDICINE

## 2021-07-22 PROCEDURE — 99999 PR PBB SHADOW E&M-EST. PATIENT-LVL V: ICD-10-PCS | Mod: PBBFAC,,, | Performed by: INTERNAL MEDICINE

## 2021-07-22 PROCEDURE — 3044F PR MOST RECENT HEMOGLOBIN A1C LEVEL <7.0%: ICD-10-PCS | Mod: CPTII,S$GLB,, | Performed by: INTERNAL MEDICINE

## 2021-07-22 PROCEDURE — 99205 OFFICE O/P NEW HI 60 MIN: CPT | Mod: S$GLB,,, | Performed by: INTERNAL MEDICINE

## 2021-07-22 PROCEDURE — 36415 COLL VENOUS BLD VENIPUNCTURE: CPT | Performed by: INTERNAL MEDICINE

## 2021-07-22 PROCEDURE — 3008F BODY MASS INDEX DOCD: CPT | Mod: CPTII,S$GLB,, | Performed by: INTERNAL MEDICINE

## 2021-07-22 PROCEDURE — 99205 PR OFFICE/OUTPT VISIT, NEW, LEVL V, 60-74 MIN: ICD-10-PCS | Mod: S$GLB,,, | Performed by: INTERNAL MEDICINE

## 2021-07-22 PROCEDURE — 99499 RISK ADDL DX/OHS AUDIT: ICD-10-PCS | Mod: HCNC,S$GLB,, | Performed by: INTERNAL MEDICINE

## 2021-07-23 ENCOUNTER — PATIENT MESSAGE (OUTPATIENT)
Dept: FAMILY MEDICINE | Facility: CLINIC | Age: 51
End: 2021-07-23

## 2021-07-23 ENCOUNTER — HOSPITAL ENCOUNTER (OUTPATIENT)
Dept: CARDIOLOGY | Facility: HOSPITAL | Age: 51
Discharge: HOME | End: 2021-07-23
Attending: INTERNAL MEDICINE
Payer: MEDICARE

## 2021-07-23 VITALS — BODY MASS INDEX: 26.68 KG/M2 | WEIGHT: 145 LBS | HEIGHT: 62 IN

## 2021-07-23 DIAGNOSIS — Z01.810 PREOP CARDIOVASCULAR EXAM: ICD-10-CM

## 2021-07-23 DIAGNOSIS — R55 SYNCOPE, UNSPECIFIED SYNCOPE TYPE: ICD-10-CM

## 2021-07-23 LAB
CV STRESS BASE HR: 110 BPM
DIASTOLIC BLOOD PRESSURE: 74 MMHG
OHS CV CPX 1 MINUTE RECOVERY HEART RATE: 142 BPM
OHS CV CPX 85 PERCENT MAX PREDICTED HEART RATE MALE: 137
OHS CV CPX ESTIMATED METS: 7
OHS CV CPX MAX PREDICTED HEART RATE: 161
OHS CV CPX PATIENT IS FEMALE: 1
OHS CV CPX PATIENT IS MALE: 0
OHS CV CPX PEAK DIASTOLIC BLOOD PRESSURE: 94 MMHG
OHS CV CPX PEAK HEAR RATE: 166 BPM
OHS CV CPX PEAK RATE PRESSURE PRODUCT: NORMAL
OHS CV CPX PEAK SYSTOLIC BLOOD PRESSURE: 191 MMHG
OHS CV CPX PERCENT MAX PREDICTED HEART RATE ACHIEVED: 103
OHS CV CPX RATE PRESSURE PRODUCT PRESENTING: NORMAL
STRESS ECHO POST EXERCISE DUR MIN: 6 MINUTES
SYSTOLIC BLOOD PRESSURE: 100 MMHG

## 2021-07-23 PROCEDURE — 93306 ECHO (CUPID ONLY): ICD-10-PCS | Mod: 26,,, | Performed by: INTERNAL MEDICINE

## 2021-07-23 PROCEDURE — 93016 EXERCISE STRESS - EKG (CUPID ONLY): ICD-10-PCS | Mod: ,,, | Performed by: INTERNAL MEDICINE

## 2021-07-23 PROCEDURE — 93306 TTE W/DOPPLER COMPLETE: CPT

## 2021-07-23 PROCEDURE — 93018 EXERCISE STRESS - EKG (CUPID ONLY): ICD-10-PCS | Mod: ,,, | Performed by: INTERNAL MEDICINE

## 2021-07-23 PROCEDURE — 93306 TTE W/DOPPLER COMPLETE: CPT | Mod: 26,,, | Performed by: INTERNAL MEDICINE

## 2021-07-23 PROCEDURE — 93016 CV STRESS TEST SUPVJ ONLY: CPT | Mod: ,,, | Performed by: INTERNAL MEDICINE

## 2021-07-23 PROCEDURE — 93018 CV STRESS TEST I&R ONLY: CPT | Mod: ,,, | Performed by: INTERNAL MEDICINE

## 2021-07-26 ENCOUNTER — TELEPHONE (OUTPATIENT)
Dept: CARDIOLOGY | Facility: CLINIC | Age: 51
End: 2021-07-26

## 2021-07-26 LAB
AORTIC ROOT ANNULUS: 3.41 CM
AV INDEX (PROSTH): 0.77
AV MEAN GRADIENT: 3 MMHG
AV PEAK GRADIENT: 5 MMHG
AV VALVE AREA: 2.53 CM2
AV VELOCITY RATIO: 0.74
BSA FOR ECHO PROCEDURE: 1.7 M2
CV ECHO LV RWT: 0.52 CM
DOP CALC AO PEAK VEL: 1.17 M/S
DOP CALC AO VTI: 21.93 CM
DOP CALC LVOT AREA: 3.3 CM2
DOP CALC LVOT DIAMETER: 2.04 CM
DOP CALC LVOT PEAK VEL: 0.86 M/S
DOP CALC LVOT STROKE VOLUME: 55.44 CM3
DOP CALC RVOT PEAK VEL: 0.56 M/S
DOP CALC RVOT VTI: 8.84 CM
DOP CALCLVOT PEAK VEL VTI: 16.97 CM
E WAVE DECELERATION TIME: 146.93 MSEC
E/A RATIO: 0.56
E/E' RATIO: 5.36 M/S
ECHO LV POSTERIOR WALL: 0.86 CM (ref 0.6–1.1)
EJECTION FRACTION: 55 %
FRACTIONAL SHORTENING: 24 % (ref 28–44)
INTERVENTRICULAR SEPTUM: 0.97 CM (ref 0.6–1.1)
IVRT: 91.34 MSEC
LA MAJOR: 4.24 CM
LA MINOR: 4.4 CM
LA WIDTH: 2.8 CM
LEFT ATRIUM SIZE: 3.82 CM
LEFT ATRIUM VOLUME INDEX: 23.5 ML/M2
LEFT ATRIUM VOLUME: 39.26 CM3
LEFT INTERNAL DIMENSION IN SYSTOLE: 2.5 CM (ref 2.1–4)
LEFT VENTRICLE DIASTOLIC VOLUME INDEX: 26.69 ML/M2
LEFT VENTRICLE DIASTOLIC VOLUME: 44.57 ML
LEFT VENTRICLE MASS INDEX: 50 G/M2
LEFT VENTRICLE SYSTOLIC VOLUME INDEX: 13.3 ML/M2
LEFT VENTRICLE SYSTOLIC VOLUME: 22.25 ML
LEFT VENTRICULAR INTERNAL DIMENSION IN DIASTOLE: 3.31 CM (ref 3.5–6)
LEFT VENTRICULAR MASS: 83.41 G
LV LATERAL E/E' RATIO: 4.92 M/S
LV SEPTAL E/E' RATIO: 5.9 M/S
MV A" WAVE DURATION": 9.13 MSEC
MV PEAK A VEL: 1.05 M/S
MV PEAK E VEL: 0.59 M/S
MV STENOSIS PRESSURE HALF TIME: 42.61 MS
MV VALVE AREA P 1/2 METHOD: 5.16 CM2
PISA TR MAX VEL: 2.54 M/S
PULM VEIN S/D RATIO: 1.69
PV MEAN GRADIENT: 1 MMHG
PV PEAK D VEL: 0.32 M/S
PV PEAK S VEL: 0.54 M/S
PV PEAK VELOCITY: 0.79 CM/S
RA MAJOR: 3.76 CM
RA PRESSURE: 3 MMHG
RA WIDTH: 2.39 CM
SINUS: 2.52 CM
STJ: 2.39 CM
TDI LATERAL: 0.12 M/S
TDI SEPTAL: 0.1 M/S
TDI: 0.11 M/S
TR MAX PG: 26 MMHG
TRICUSPID ANNULAR PLANE SYSTOLIC EXCURSION: 1.95 CM
TV REST PULMONARY ARTERY PRESSURE: 29 MMHG

## 2021-07-27 ENCOUNTER — TELEPHONE (OUTPATIENT)
Dept: CARDIOLOGY | Facility: CLINIC | Age: 51
End: 2021-07-27

## 2021-08-02 DIAGNOSIS — R11.0 NAUSEA: ICD-10-CM

## 2021-08-03 RX ORDER — ONDANSETRON 4 MG/1
4 TABLET, FILM COATED ORAL EVERY 6 HOURS
Qty: 30 TABLET | Refills: 3 | Status: SHIPPED | OUTPATIENT
Start: 2021-08-03 | End: 2022-04-14 | Stop reason: SDUPTHER

## 2021-08-04 ENCOUNTER — TELEPHONE (OUTPATIENT)
Dept: PREADMISSION TESTING | Facility: HOSPITAL | Age: 51
End: 2021-08-04

## 2021-08-05 ENCOUNTER — TELEPHONE (OUTPATIENT)
Dept: PREADMISSION TESTING | Facility: HOSPITAL | Age: 51
End: 2021-08-05

## 2021-08-12 ENCOUNTER — PATIENT MESSAGE (OUTPATIENT)
Dept: SLEEP MEDICINE | Facility: CLINIC | Age: 51
End: 2021-08-12

## 2021-08-12 DIAGNOSIS — G47.00 INSOMNIA, UNSPECIFIED TYPE: ICD-10-CM

## 2021-08-12 RX ORDER — DOXEPIN HYDROCHLORIDE 50 MG/1
CAPSULE ORAL
Qty: 60 CAPSULE | Refills: 5 | Status: SHIPPED | OUTPATIENT
Start: 2021-08-12 | End: 2022-02-19 | Stop reason: SDUPTHER

## 2021-08-12 RX ORDER — DOXEPIN HYDROCHLORIDE 50 MG/1
CAPSULE ORAL
Qty: 60 CAPSULE | Refills: 5 | Status: SHIPPED | OUTPATIENT
Start: 2021-08-12 | End: 2021-08-12 | Stop reason: SDUPTHER

## 2021-08-17 PROCEDURE — 99454 REM MNTR PHYSIOL PARAM 16-30: CPT | Mod: S$GLB,,, | Performed by: INTERNAL MEDICINE

## 2021-08-17 PROCEDURE — 99454 PR REMOTE MNTR, PHYS PARAM, INITIAL, EA 30 DAYS: ICD-10-PCS | Mod: S$GLB,,, | Performed by: INTERNAL MEDICINE

## 2021-09-03 ENCOUNTER — TELEPHONE (OUTPATIENT)
Dept: PREADMISSION TESTING | Facility: HOSPITAL | Age: 51
End: 2021-09-03

## 2021-09-08 ENCOUNTER — TELEPHONE (OUTPATIENT)
Dept: PREADMISSION TESTING | Facility: HOSPITAL | Age: 51
End: 2021-09-08

## 2021-09-08 ENCOUNTER — ANESTHESIA EVENT (OUTPATIENT)
Dept: SURGERY | Facility: HOSPITAL | Age: 51
End: 2021-09-08
Payer: MEDICARE

## 2021-09-09 ENCOUNTER — HOSPITAL ENCOUNTER (OUTPATIENT)
Facility: HOSPITAL | Age: 51
Discharge: HOME | End: 2021-09-09
Attending: ORTHOPAEDIC SURGERY | Admitting: ORTHOPAEDIC SURGERY
Payer: MEDICARE

## 2021-09-09 ENCOUNTER — ANESTHESIA (OUTPATIENT)
Dept: SURGERY | Facility: HOSPITAL | Age: 51
End: 2021-09-09
Payer: MEDICARE

## 2021-09-09 ENCOUNTER — TELEPHONE (OUTPATIENT)
Dept: ORTHOPEDICS | Facility: CLINIC | Age: 51
End: 2021-09-09

## 2021-09-09 VITALS
DIASTOLIC BLOOD PRESSURE: 52 MMHG | RESPIRATION RATE: 20 BRPM | HEART RATE: 91 BPM | HEIGHT: 63 IN | TEMPERATURE: 98 F | BODY MASS INDEX: 26.23 KG/M2 | OXYGEN SATURATION: 96 % | WEIGHT: 148.06 LBS | SYSTOLIC BLOOD PRESSURE: 110 MMHG

## 2021-09-09 DIAGNOSIS — G56.01 CARPAL TUNNEL SYNDROME OF RIGHT WRIST: Primary | ICD-10-CM

## 2021-09-09 LAB
POCT GLUCOSE: 120 MG/DL (ref 70–110)
SARS-COV-2 RDRP RESP QL NAA+PROBE: NEGATIVE

## 2021-09-09 PROCEDURE — 36000707: Performed by: ORTHOPAEDIC SURGERY

## 2021-09-09 PROCEDURE — D9220A PRA ANESTHESIA: ICD-10-PCS | Mod: CRNA,,, | Performed by: NURSE ANESTHETIST, CERTIFIED REGISTERED

## 2021-09-09 PROCEDURE — 36000706: Performed by: ORTHOPAEDIC SURGERY

## 2021-09-09 PROCEDURE — 64718 REVISE ULNAR NERVE AT ELBOW: CPT | Mod: RT,,, | Performed by: ORTHOPAEDIC SURGERY

## 2021-09-09 PROCEDURE — 71000033 HC RECOVERY, INTIAL HOUR: Performed by: ORTHOPAEDIC SURGERY

## 2021-09-09 PROCEDURE — 63600175 PHARM REV CODE 636 W HCPCS: Performed by: NURSE ANESTHETIST, CERTIFIED REGISTERED

## 2021-09-09 PROCEDURE — 37000008 HC ANESTHESIA 1ST 15 MINUTES: Performed by: ORTHOPAEDIC SURGERY

## 2021-09-09 PROCEDURE — 64721 CARPAL TUNNEL SURGERY: CPT | Mod: 51,RT,, | Performed by: ORTHOPAEDIC SURGERY

## 2021-09-09 PROCEDURE — 63600175 PHARM REV CODE 636 W HCPCS: Performed by: ANESTHESIOLOGY

## 2021-09-09 PROCEDURE — 25000003 PHARM REV CODE 250: Performed by: ORTHOPAEDIC SURGERY

## 2021-09-09 PROCEDURE — 37000009 HC ANESTHESIA EA ADD 15 MINS: Performed by: ORTHOPAEDIC SURGERY

## 2021-09-09 PROCEDURE — D9220A PRA ANESTHESIA: Mod: ANES,,, | Performed by: STUDENT IN AN ORGANIZED HEALTH CARE EDUCATION/TRAINING PROGRAM

## 2021-09-09 PROCEDURE — 82962 GLUCOSE BLOOD TEST: CPT | Performed by: ORTHOPAEDIC SURGERY

## 2021-09-09 PROCEDURE — D9220A PRA ANESTHESIA: ICD-10-PCS | Mod: ANES,,, | Performed by: STUDENT IN AN ORGANIZED HEALTH CARE EDUCATION/TRAINING PROGRAM

## 2021-09-09 PROCEDURE — D9220A PRA ANESTHESIA: Mod: CRNA,,, | Performed by: NURSE ANESTHETIST, CERTIFIED REGISTERED

## 2021-09-09 PROCEDURE — 64718 PR REVISE ULNAR NERVE AT ELBOW: ICD-10-PCS | Mod: RT,,, | Performed by: ORTHOPAEDIC SURGERY

## 2021-09-09 PROCEDURE — 25000003 PHARM REV CODE 250: Performed by: PHYSICIAN ASSISTANT

## 2021-09-09 PROCEDURE — U0002 COVID-19 LAB TEST NON-CDC: HCPCS | Performed by: ORTHOPAEDIC SURGERY

## 2021-09-09 PROCEDURE — 71000015 HC POSTOP RECOV 1ST HR: Performed by: ORTHOPAEDIC SURGERY

## 2021-09-09 PROCEDURE — 64721 PR REVISE MEDIAN N/CARPAL TUNNEL SURG: ICD-10-PCS | Mod: 51,RT,, | Performed by: ORTHOPAEDIC SURGERY

## 2021-09-09 RX ORDER — ALBUTEROL SULFATE 0.83 MG/ML
2.5 SOLUTION RESPIRATORY (INHALATION) EVERY 4 HOURS PRN
Status: DISCONTINUED | OUTPATIENT
Start: 2021-09-09 | End: 2021-09-09 | Stop reason: HOSPADM

## 2021-09-09 RX ORDER — CHLORHEXIDINE GLUCONATE ORAL RINSE 1.2 MG/ML
10 SOLUTION DENTAL 2 TIMES DAILY
Status: DISCONTINUED | OUTPATIENT
Start: 2021-09-09 | End: 2021-09-09 | Stop reason: HOSPADM

## 2021-09-09 RX ORDER — DEXAMETHASONE SODIUM PHOSPHATE 4 MG/ML
INJECTION, SOLUTION INTRA-ARTICULAR; INTRALESIONAL; INTRAMUSCULAR; INTRAVENOUS; SOFT TISSUE
Status: DISCONTINUED | OUTPATIENT
Start: 2021-09-09 | End: 2021-09-09

## 2021-09-09 RX ORDER — FENTANYL CITRATE 50 UG/ML
INJECTION, SOLUTION INTRAMUSCULAR; INTRAVENOUS
Status: DISCONTINUED | OUTPATIENT
Start: 2021-09-09 | End: 2021-09-09

## 2021-09-09 RX ORDER — BUPIVACAINE HYDROCHLORIDE 2.5 MG/ML
INJECTION, SOLUTION EPIDURAL; INFILTRATION; INTRACAUDAL
Status: DISCONTINUED | OUTPATIENT
Start: 2021-09-09 | End: 2021-09-09 | Stop reason: HOSPADM

## 2021-09-09 RX ORDER — FENTANYL CITRATE 50 UG/ML
25 INJECTION, SOLUTION INTRAMUSCULAR; INTRAVENOUS EVERY 5 MIN PRN
Status: DISCONTINUED | OUTPATIENT
Start: 2021-09-09 | End: 2021-09-09 | Stop reason: HOSPADM

## 2021-09-09 RX ORDER — CLINDAMYCIN PHOSPHATE 900 MG/50ML
900 INJECTION, SOLUTION INTRAVENOUS
Status: COMPLETED | OUTPATIENT
Start: 2021-09-09 | End: 2021-09-09

## 2021-09-09 RX ORDER — SODIUM CHLORIDE, SODIUM LACTATE, POTASSIUM CHLORIDE, CALCIUM CHLORIDE 600; 310; 30; 20 MG/100ML; MG/100ML; MG/100ML; MG/100ML
INJECTION, SOLUTION INTRAVENOUS
Status: DISCONTINUED | OUTPATIENT
Start: 2021-09-09 | End: 2021-09-09 | Stop reason: HOSPADM

## 2021-09-09 RX ORDER — LIDOCAINE HYDROCHLORIDE 20 MG/ML
INJECTION, SOLUTION INFILTRATION; PERINEURAL
Status: DISCONTINUED
Start: 2021-09-09 | End: 2021-09-09 | Stop reason: WASHOUT

## 2021-09-09 RX ORDER — MIDAZOLAM HYDROCHLORIDE 1 MG/ML
INJECTION INTRAMUSCULAR; INTRAVENOUS
Status: DISCONTINUED | OUTPATIENT
Start: 2021-09-09 | End: 2021-09-09

## 2021-09-09 RX ORDER — HYDROMORPHONE HYDROCHLORIDE 2 MG/1
2 TABLET ORAL
Status: DISCONTINUED | OUTPATIENT
Start: 2021-09-09 | End: 2021-09-09 | Stop reason: RX

## 2021-09-09 RX ORDER — LIDOCAINE HCL/PF 100 MG/5ML
SYRINGE (ML) INTRAVENOUS
Status: DISCONTINUED | OUTPATIENT
Start: 2021-09-09 | End: 2021-09-09

## 2021-09-09 RX ORDER — BUPIVACAINE HYDROCHLORIDE 2.5 MG/ML
INJECTION, SOLUTION EPIDURAL; INFILTRATION; INTRACAUDAL
Status: DISCONTINUED
Start: 2021-09-09 | End: 2021-09-09 | Stop reason: HOSPADM

## 2021-09-09 RX ORDER — OXYCODONE AND ACETAMINOPHEN 10; 325 MG/1; MG/1
1 TABLET ORAL ONCE AS NEEDED
Status: COMPLETED | OUTPATIENT
Start: 2021-09-09 | End: 2021-09-09

## 2021-09-09 RX ORDER — SODIUM CHLORIDE, SODIUM LACTATE, POTASSIUM CHLORIDE, CALCIUM CHLORIDE 600; 310; 30; 20 MG/100ML; MG/100ML; MG/100ML; MG/100ML
INJECTION, SOLUTION INTRAVENOUS CONTINUOUS
Status: DISCONTINUED | OUTPATIENT
Start: 2021-09-09 | End: 2021-09-09 | Stop reason: HOSPADM

## 2021-09-09 RX ORDER — DIPHENHYDRAMINE HYDROCHLORIDE 50 MG/ML
25 INJECTION INTRAMUSCULAR; INTRAVENOUS EVERY 6 HOURS PRN
Status: DISCONTINUED | OUTPATIENT
Start: 2021-09-09 | End: 2021-09-09 | Stop reason: HOSPADM

## 2021-09-09 RX ORDER — CLINDAMYCIN PHOSPHATE 900 MG/50ML
INJECTION, SOLUTION INTRAVENOUS
Status: COMPLETED
Start: 2021-09-09 | End: 2021-09-09

## 2021-09-09 RX ORDER — CLINDAMYCIN PHOSPHATE 900 MG/50ML
900 INJECTION, SOLUTION INTRAVENOUS
Status: CANCELLED | OUTPATIENT
Start: 2021-09-09

## 2021-09-09 RX ORDER — ONDANSETRON 2 MG/ML
4 INJECTION INTRAMUSCULAR; INTRAVENOUS ONCE AS NEEDED
Status: DISCONTINUED | OUTPATIENT
Start: 2021-09-09 | End: 2021-09-09 | Stop reason: HOSPADM

## 2021-09-09 RX ORDER — PROPOFOL 10 MG/ML
VIAL (ML) INTRAVENOUS
Status: DISCONTINUED | OUTPATIENT
Start: 2021-09-09 | End: 2021-09-09

## 2021-09-09 RX ADMIN — MIDAZOLAM HYDROCHLORIDE 2 MG: 1 INJECTION INTRAMUSCULAR; INTRAVENOUS at 01:09

## 2021-09-09 RX ADMIN — Medication 50 MG: at 01:09

## 2021-09-09 RX ADMIN — PROPOFOL 150 MG: 10 INJECTION, EMULSION INTRAVENOUS at 01:09

## 2021-09-09 RX ADMIN — FENTANYL CITRATE 50 MCG: 50 INJECTION, SOLUTION INTRAMUSCULAR; INTRAVENOUS at 01:09

## 2021-09-09 RX ADMIN — FENTANYL CITRATE 25 MCG: 50 INJECTION, SOLUTION INTRAMUSCULAR; INTRAVENOUS at 02:09

## 2021-09-09 RX ADMIN — SODIUM CHLORIDE, SODIUM LACTATE, POTASSIUM CHLORIDE, AND CALCIUM CHLORIDE: .6; .31; .03; .02 INJECTION, SOLUTION INTRAVENOUS at 08:09

## 2021-09-09 RX ADMIN — CLINDAMYCIN PHOSPHATE 900 MG: 18 INJECTION, SOLUTION INTRAVENOUS at 01:09

## 2021-09-09 RX ADMIN — OXYCODONE HYDROCHLORIDE AND ACETAMINOPHEN 1 TABLET: 10; 325 TABLET ORAL at 03:09

## 2021-09-09 RX ADMIN — FENTANYL CITRATE 25 MCG: 50 INJECTION, SOLUTION INTRAMUSCULAR; INTRAVENOUS at 01:09

## 2021-09-09 RX ADMIN — PROPOFOL 50 MG: 10 INJECTION, EMULSION INTRAVENOUS at 01:09

## 2021-09-09 RX ADMIN — DEXAMETHASONE SODIUM PHOSPHATE 4 MG: 4 INJECTION, SOLUTION INTRA-ARTICULAR; INTRALESIONAL; INTRAMUSCULAR; INTRAVENOUS; SOFT TISSUE at 01:09

## 2021-09-10 LAB — POCT GLUCOSE: 140 MG/DL (ref 70–110)

## 2021-09-13 ENCOUNTER — TELEPHONE (OUTPATIENT)
Dept: ORTHOPEDICS | Facility: CLINIC | Age: 51
End: 2021-09-13

## 2021-09-14 ENCOUNTER — TELEPHONE (OUTPATIENT)
Dept: ORTHOPEDICS | Facility: HOSPITAL | Age: 51
End: 2021-09-14

## 2021-09-14 ENCOUNTER — PATIENT MESSAGE (OUTPATIENT)
Dept: ORTHOPEDICS | Facility: CLINIC | Age: 51
End: 2021-09-14

## 2021-09-16 ENCOUNTER — OFFICE VISIT (OUTPATIENT)
Dept: ORTHOPEDICS | Facility: CLINIC | Age: 51
End: 2021-09-16
Payer: MEDICARE

## 2021-09-16 VITALS
SYSTOLIC BLOOD PRESSURE: 124 MMHG | HEIGHT: 63 IN | HEART RATE: 96 BPM | DIASTOLIC BLOOD PRESSURE: 81 MMHG | BODY MASS INDEX: 26.22 KG/M2 | WEIGHT: 148 LBS

## 2021-09-16 DIAGNOSIS — G56.21 CUBITAL TUNNEL SYNDROME, RIGHT: ICD-10-CM

## 2021-09-16 DIAGNOSIS — G56.01 CARPAL TUNNEL SYNDROME OF RIGHT WRIST: Primary | ICD-10-CM

## 2021-09-16 PROCEDURE — 99999 PR PBB SHADOW E&M-EST. PATIENT-LVL IV: CPT | Mod: PBBFAC,,, | Performed by: PHYSICIAN ASSISTANT

## 2021-09-16 PROCEDURE — 3008F PR BODY MASS INDEX (BMI) DOCUMENTED: ICD-10-PCS | Mod: CPTII,S$GLB,, | Performed by: PHYSICIAN ASSISTANT

## 2021-09-16 PROCEDURE — 99024 PR POST-OP FOLLOW-UP VISIT: ICD-10-PCS | Mod: S$GLB,,, | Performed by: PHYSICIAN ASSISTANT

## 2021-09-16 PROCEDURE — 3079F PR MOST RECENT DIASTOLIC BLOOD PRESSURE 80-89 MM HG: ICD-10-PCS | Mod: CPTII,S$GLB,, | Performed by: PHYSICIAN ASSISTANT

## 2021-09-16 PROCEDURE — 3051F PR MOST RECENT HEMOGLOBIN A1C LEVEL 7.0 - < 8.0%: ICD-10-PCS | Mod: CPTII,S$GLB,, | Performed by: PHYSICIAN ASSISTANT

## 2021-09-16 PROCEDURE — 1159F PR MEDICATION LIST DOCUMENTED IN MEDICAL RECORD: ICD-10-PCS | Mod: CPTII,S$GLB,, | Performed by: PHYSICIAN ASSISTANT

## 2021-09-16 PROCEDURE — 1160F RVW MEDS BY RX/DR IN RCRD: CPT | Mod: CPTII,S$GLB,, | Performed by: PHYSICIAN ASSISTANT

## 2021-09-16 PROCEDURE — 3074F PR MOST RECENT SYSTOLIC BLOOD PRESSURE < 130 MM HG: ICD-10-PCS | Mod: CPTII,S$GLB,, | Performed by: PHYSICIAN ASSISTANT

## 2021-09-16 PROCEDURE — 3051F HG A1C>EQUAL 7.0%<8.0%: CPT | Mod: CPTII,S$GLB,, | Performed by: PHYSICIAN ASSISTANT

## 2021-09-16 PROCEDURE — 1160F PR REVIEW ALL MEDS BY PRESCRIBER/CLIN PHARMACIST DOCUMENTED: ICD-10-PCS | Mod: CPTII,S$GLB,, | Performed by: PHYSICIAN ASSISTANT

## 2021-09-16 PROCEDURE — 99024 POSTOP FOLLOW-UP VISIT: CPT | Mod: S$GLB,,, | Performed by: PHYSICIAN ASSISTANT

## 2021-09-16 PROCEDURE — 1159F MED LIST DOCD IN RCRD: CPT | Mod: CPTII,S$GLB,, | Performed by: PHYSICIAN ASSISTANT

## 2021-09-16 PROCEDURE — 3066F PR DOCUMENTATION OF TREATMENT FOR NEPHROPATHY: ICD-10-PCS | Mod: CPTII,S$GLB,, | Performed by: PHYSICIAN ASSISTANT

## 2021-09-16 PROCEDURE — 3008F BODY MASS INDEX DOCD: CPT | Mod: CPTII,S$GLB,, | Performed by: PHYSICIAN ASSISTANT

## 2021-09-16 PROCEDURE — 3074F SYST BP LT 130 MM HG: CPT | Mod: CPTII,S$GLB,, | Performed by: PHYSICIAN ASSISTANT

## 2021-09-16 PROCEDURE — 3061F NEG MICROALBUMINURIA REV: CPT | Mod: CPTII,S$GLB,, | Performed by: PHYSICIAN ASSISTANT

## 2021-09-16 PROCEDURE — 3066F NEPHROPATHY DOC TX: CPT | Mod: CPTII,S$GLB,, | Performed by: PHYSICIAN ASSISTANT

## 2021-09-16 PROCEDURE — 99999 PR PBB SHADOW E&M-EST. PATIENT-LVL IV: ICD-10-PCS | Mod: PBBFAC,,, | Performed by: PHYSICIAN ASSISTANT

## 2021-09-16 PROCEDURE — 3061F PR NEG MICROALBUMINURIA RESULT DOCUMENTED/REVIEW: ICD-10-PCS | Mod: CPTII,S$GLB,, | Performed by: PHYSICIAN ASSISTANT

## 2021-09-16 PROCEDURE — 3079F DIAST BP 80-89 MM HG: CPT | Mod: CPTII,S$GLB,, | Performed by: PHYSICIAN ASSISTANT

## 2021-09-17 ENCOUNTER — TELEPHONE (OUTPATIENT)
Dept: ADMINISTRATIVE | Facility: HOSPITAL | Age: 51
End: 2021-09-17

## 2021-09-22 PROCEDURE — 99454 REM MNTR PHYSIOL PARAM 16-30: CPT | Mod: S$GLB,,, | Performed by: INTERNAL MEDICINE

## 2021-09-22 PROCEDURE — 99454 PR REMOTE MNTR, PHYS PARAM, INITIAL, EA 30 DAYS: ICD-10-PCS | Mod: S$GLB,,, | Performed by: INTERNAL MEDICINE

## 2021-09-23 ENCOUNTER — OFFICE VISIT (OUTPATIENT)
Dept: ORTHOPEDICS | Facility: CLINIC | Age: 51
End: 2021-09-23
Payer: MEDICARE

## 2021-09-23 VITALS
HEIGHT: 63 IN | WEIGHT: 147.94 LBS | SYSTOLIC BLOOD PRESSURE: 108 MMHG | DIASTOLIC BLOOD PRESSURE: 69 MMHG | BODY MASS INDEX: 26.21 KG/M2 | HEART RATE: 116 BPM

## 2021-09-23 DIAGNOSIS — G47.00 INSOMNIA, UNSPECIFIED TYPE: ICD-10-CM

## 2021-09-23 DIAGNOSIS — G56.01 CARPAL TUNNEL SYNDROME OF RIGHT WRIST: Primary | ICD-10-CM

## 2021-09-23 DIAGNOSIS — G56.21 CUBITAL TUNNEL SYNDROME, RIGHT: ICD-10-CM

## 2021-09-23 DIAGNOSIS — F41.9 ANXIETY: ICD-10-CM

## 2021-09-23 PROCEDURE — 3074F PR MOST RECENT SYSTOLIC BLOOD PRESSURE < 130 MM HG: ICD-10-PCS | Mod: CPTII,S$GLB,, | Performed by: PHYSICIAN ASSISTANT

## 2021-09-23 PROCEDURE — 3078F PR MOST RECENT DIASTOLIC BLOOD PRESSURE < 80 MM HG: ICD-10-PCS | Mod: CPTII,S$GLB,, | Performed by: PHYSICIAN ASSISTANT

## 2021-09-23 PROCEDURE — 99024 PR POST-OP FOLLOW-UP VISIT: ICD-10-PCS | Mod: S$GLB,,, | Performed by: PHYSICIAN ASSISTANT

## 2021-09-23 PROCEDURE — 99999 PR PBB SHADOW E&M-EST. PATIENT-LVL IV: CPT | Mod: PBBFAC,,, | Performed by: PHYSICIAN ASSISTANT

## 2021-09-23 PROCEDURE — 3061F NEG MICROALBUMINURIA REV: CPT | Mod: CPTII,S$GLB,, | Performed by: PHYSICIAN ASSISTANT

## 2021-09-23 PROCEDURE — 99024 POSTOP FOLLOW-UP VISIT: CPT | Mod: S$GLB,,, | Performed by: PHYSICIAN ASSISTANT

## 2021-09-23 PROCEDURE — 3066F PR DOCUMENTATION OF TREATMENT FOR NEPHROPATHY: ICD-10-PCS | Mod: CPTII,S$GLB,, | Performed by: PHYSICIAN ASSISTANT

## 2021-09-23 PROCEDURE — 3066F NEPHROPATHY DOC TX: CPT | Mod: CPTII,S$GLB,, | Performed by: PHYSICIAN ASSISTANT

## 2021-09-23 PROCEDURE — 3051F HG A1C>EQUAL 7.0%<8.0%: CPT | Mod: CPTII,S$GLB,, | Performed by: PHYSICIAN ASSISTANT

## 2021-09-23 PROCEDURE — 1160F RVW MEDS BY RX/DR IN RCRD: CPT | Mod: CPTII,S$GLB,, | Performed by: PHYSICIAN ASSISTANT

## 2021-09-23 PROCEDURE — 1159F MED LIST DOCD IN RCRD: CPT | Mod: CPTII,S$GLB,, | Performed by: PHYSICIAN ASSISTANT

## 2021-09-23 PROCEDURE — 3074F SYST BP LT 130 MM HG: CPT | Mod: CPTII,S$GLB,, | Performed by: PHYSICIAN ASSISTANT

## 2021-09-23 PROCEDURE — 3051F PR MOST RECENT HEMOGLOBIN A1C LEVEL 7.0 - < 8.0%: ICD-10-PCS | Mod: CPTII,S$GLB,, | Performed by: PHYSICIAN ASSISTANT

## 2021-09-23 PROCEDURE — 1160F PR REVIEW ALL MEDS BY PRESCRIBER/CLIN PHARMACIST DOCUMENTED: ICD-10-PCS | Mod: CPTII,S$GLB,, | Performed by: PHYSICIAN ASSISTANT

## 2021-09-23 PROCEDURE — 3008F BODY MASS INDEX DOCD: CPT | Mod: CPTII,S$GLB,, | Performed by: PHYSICIAN ASSISTANT

## 2021-09-23 PROCEDURE — 99999 PR PBB SHADOW E&M-EST. PATIENT-LVL IV: ICD-10-PCS | Mod: PBBFAC,,, | Performed by: PHYSICIAN ASSISTANT

## 2021-09-23 PROCEDURE — 3078F DIAST BP <80 MM HG: CPT | Mod: CPTII,S$GLB,, | Performed by: PHYSICIAN ASSISTANT

## 2021-09-23 PROCEDURE — 1159F PR MEDICATION LIST DOCUMENTED IN MEDICAL RECORD: ICD-10-PCS | Mod: CPTII,S$GLB,, | Performed by: PHYSICIAN ASSISTANT

## 2021-09-23 PROCEDURE — 3008F PR BODY MASS INDEX (BMI) DOCUMENTED: ICD-10-PCS | Mod: CPTII,S$GLB,, | Performed by: PHYSICIAN ASSISTANT

## 2021-09-23 PROCEDURE — 3061F PR NEG MICROALBUMINURIA RESULT DOCUMENTED/REVIEW: ICD-10-PCS | Mod: CPTII,S$GLB,, | Performed by: PHYSICIAN ASSISTANT

## 2021-09-23 RX ORDER — ALPRAZOLAM 0.5 MG/1
TABLET ORAL
Qty: 70 TABLET | Refills: 3 | Status: SHIPPED | OUTPATIENT
Start: 2021-09-23 | End: 2022-01-03 | Stop reason: SDUPTHER

## 2021-09-23 RX ORDER — CLINDAMYCIN HYDROCHLORIDE 150 MG/1
150 CAPSULE ORAL EVERY 8 HOURS
Qty: 15 CAPSULE | Refills: 0 | Status: SHIPPED | OUTPATIENT
Start: 2021-09-23 | End: 2021-11-01

## 2021-09-28 ENCOUNTER — PATIENT MESSAGE (OUTPATIENT)
Dept: ORTHOPEDICS | Facility: CLINIC | Age: 51
End: 2021-09-28

## 2021-10-04 ENCOUNTER — PATIENT MESSAGE (OUTPATIENT)
Dept: ADMINISTRATIVE | Facility: HOSPITAL | Age: 51
End: 2021-10-04

## 2021-10-18 ENCOUNTER — PATIENT OUTREACH (OUTPATIENT)
Dept: ADMINISTRATIVE | Facility: OTHER | Age: 51
End: 2021-10-18

## 2021-10-18 PROCEDURE — 99454 REM MNTR PHYSIOL PARAM 16-30: CPT | Mod: S$GLB,,, | Performed by: INTERNAL MEDICINE

## 2021-10-18 PROCEDURE — 99454 PR REMOTE MNTR, PHYS PARAM, INITIAL, EA 30 DAYS: ICD-10-PCS | Mod: S$GLB,,, | Performed by: INTERNAL MEDICINE

## 2021-10-19 ENCOUNTER — HOSPITAL ENCOUNTER (OUTPATIENT)
Dept: RADIOLOGY | Facility: HOSPITAL | Age: 51
Discharge: HOME | End: 2021-10-19
Attending: INTERNAL MEDICINE
Payer: MEDICARE

## 2021-10-19 ENCOUNTER — OFFICE VISIT (OUTPATIENT)
Dept: OBSTETRICS AND GYNECOLOGY | Facility: CLINIC | Age: 51
End: 2021-10-19
Payer: MEDICARE

## 2021-10-19 VITALS — SYSTOLIC BLOOD PRESSURE: 94 MMHG | WEIGHT: 145.5 LBS | BODY MASS INDEX: 25.77 KG/M2 | DIASTOLIC BLOOD PRESSURE: 60 MMHG

## 2021-10-19 VITALS — WEIGHT: 145.5 LBS | HEIGHT: 63 IN | BODY MASS INDEX: 25.78 KG/M2

## 2021-10-19 DIAGNOSIS — Z12.31 ENCOUNTER FOR SCREENING MAMMOGRAM FOR MALIGNANT NEOPLASM OF BREAST: ICD-10-CM

## 2021-10-19 DIAGNOSIS — Z01.419 ENCOUNTER FOR GYNECOLOGICAL EXAMINATION WITHOUT ABNORMAL FINDING: Primary | ICD-10-CM

## 2021-10-19 DIAGNOSIS — Z78.0 POSTMENOPAUSAL: ICD-10-CM

## 2021-10-19 PROCEDURE — 3074F PR MOST RECENT SYSTOLIC BLOOD PRESSURE < 130 MM HG: ICD-10-PCS | Mod: HCNC,CPTII,S$GLB, | Performed by: NURSE PRACTITIONER

## 2021-10-19 PROCEDURE — 3078F DIAST BP <80 MM HG: CPT | Mod: HCNC,CPTII,S$GLB, | Performed by: NURSE PRACTITIONER

## 2021-10-19 PROCEDURE — 1160F RVW MEDS BY RX/DR IN RCRD: CPT | Mod: HCNC,CPTII,S$GLB, | Performed by: NURSE PRACTITIONER

## 2021-10-19 PROCEDURE — 3066F NEPHROPATHY DOC TX: CPT | Mod: HCNC,CPTII,S$GLB, | Performed by: NURSE PRACTITIONER

## 2021-10-19 PROCEDURE — 99999 PR PBB SHADOW E&M-EST. PATIENT-LVL IV: CPT | Mod: PBBFAC,HCNC,, | Performed by: NURSE PRACTITIONER

## 2021-10-19 PROCEDURE — 77063 BREAST TOMOSYNTHESIS BI: CPT | Mod: 26,HCNC,, | Performed by: RADIOLOGY

## 2021-10-19 PROCEDURE — 3008F BODY MASS INDEX DOCD: CPT | Mod: HCNC,CPTII,S$GLB, | Performed by: NURSE PRACTITIONER

## 2021-10-19 PROCEDURE — 77067 SCR MAMMO BI INCL CAD: CPT | Mod: 26,HCNC,, | Performed by: RADIOLOGY

## 2021-10-19 PROCEDURE — 77067 MAMMO DIGITAL SCREENING BILAT WITH TOMO: ICD-10-PCS | Mod: 26,HCNC,, | Performed by: RADIOLOGY

## 2021-10-19 PROCEDURE — 3061F PR NEG MICROALBUMINURIA RESULT DOCUMENTED/REVIEW: ICD-10-PCS | Mod: HCNC,CPTII,S$GLB, | Performed by: NURSE PRACTITIONER

## 2021-10-19 PROCEDURE — 3061F NEG MICROALBUMINURIA REV: CPT | Mod: HCNC,CPTII,S$GLB, | Performed by: NURSE PRACTITIONER

## 2021-10-19 PROCEDURE — 3051F HG A1C>EQUAL 7.0%<8.0%: CPT | Mod: HCNC,CPTII,S$GLB, | Performed by: NURSE PRACTITIONER

## 2021-10-19 PROCEDURE — 77063 MAMMO DIGITAL SCREENING BILAT WITH TOMO: ICD-10-PCS | Mod: 26,HCNC,, | Performed by: RADIOLOGY

## 2021-10-19 PROCEDURE — 1160F PR REVIEW ALL MEDS BY PRESCRIBER/CLIN PHARMACIST DOCUMENTED: ICD-10-PCS | Mod: HCNC,CPTII,S$GLB, | Performed by: NURSE PRACTITIONER

## 2021-10-19 PROCEDURE — 1159F MED LIST DOCD IN RCRD: CPT | Mod: HCNC,CPTII,S$GLB, | Performed by: NURSE PRACTITIONER

## 2021-10-19 PROCEDURE — 3008F PR BODY MASS INDEX (BMI) DOCUMENTED: ICD-10-PCS | Mod: HCNC,CPTII,S$GLB, | Performed by: NURSE PRACTITIONER

## 2021-10-19 PROCEDURE — 77067 SCR MAMMO BI INCL CAD: CPT | Mod: TC,HCNC

## 2021-10-19 PROCEDURE — 99999 PR PBB SHADOW E&M-EST. PATIENT-LVL IV: ICD-10-PCS | Mod: PBBFAC,HCNC,, | Performed by: NURSE PRACTITIONER

## 2021-10-19 PROCEDURE — 3074F SYST BP LT 130 MM HG: CPT | Mod: HCNC,CPTII,S$GLB, | Performed by: NURSE PRACTITIONER

## 2021-10-19 PROCEDURE — 3078F PR MOST RECENT DIASTOLIC BLOOD PRESSURE < 80 MM HG: ICD-10-PCS | Mod: HCNC,CPTII,S$GLB, | Performed by: NURSE PRACTITIONER

## 2021-10-19 PROCEDURE — 88175 CYTOPATH C/V AUTO FLUID REDO: CPT | Mod: HCNC | Performed by: NURSE PRACTITIONER

## 2021-10-19 PROCEDURE — 87624 HPV HI-RISK TYP POOLED RSLT: CPT | Mod: HCNC | Performed by: NURSE PRACTITIONER

## 2021-10-19 PROCEDURE — 3051F PR MOST RECENT HEMOGLOBIN A1C LEVEL 7.0 - < 8.0%: ICD-10-PCS | Mod: HCNC,CPTII,S$GLB, | Performed by: NURSE PRACTITIONER

## 2021-10-19 PROCEDURE — 1159F PR MEDICATION LIST DOCUMENTED IN MEDICAL RECORD: ICD-10-PCS | Mod: HCNC,CPTII,S$GLB, | Performed by: NURSE PRACTITIONER

## 2021-10-19 PROCEDURE — 3066F PR DOCUMENTATION OF TREATMENT FOR NEPHROPATHY: ICD-10-PCS | Mod: HCNC,CPTII,S$GLB, | Performed by: NURSE PRACTITIONER

## 2021-10-20 ENCOUNTER — OFFICE VISIT (OUTPATIENT)
Dept: FAMILY MEDICINE | Facility: CLINIC | Age: 51
End: 2021-10-20
Payer: MEDICARE

## 2021-10-20 VITALS
RESPIRATION RATE: 18 BRPM | BODY MASS INDEX: 25.78 KG/M2 | TEMPERATURE: 98 F | WEIGHT: 145.5 LBS | HEIGHT: 63 IN | SYSTOLIC BLOOD PRESSURE: 110 MMHG | OXYGEN SATURATION: 99 % | DIASTOLIC BLOOD PRESSURE: 70 MMHG | HEART RATE: 103 BPM

## 2021-10-20 DIAGNOSIS — L03.031 CELLULITIS OF TOE OF RIGHT FOOT: ICD-10-CM

## 2021-10-20 DIAGNOSIS — Z79.4 TYPE 2 DIABETES MELLITUS WITHOUT COMPLICATION, WITH LONG-TERM CURRENT USE OF INSULIN: Chronic | ICD-10-CM

## 2021-10-20 DIAGNOSIS — E55.9 VITAMIN D DEFICIENCY: ICD-10-CM

## 2021-10-20 DIAGNOSIS — F41.1 GAD (GENERALIZED ANXIETY DISORDER): ICD-10-CM

## 2021-10-20 DIAGNOSIS — R55 SYNCOPE, UNSPECIFIED SYNCOPE TYPE: Primary | ICD-10-CM

## 2021-10-20 DIAGNOSIS — E11.69 HYPERLIPIDEMIA ASSOCIATED WITH TYPE 2 DIABETES MELLITUS: ICD-10-CM

## 2021-10-20 DIAGNOSIS — F44.5 PSEUDOSEIZURE: ICD-10-CM

## 2021-10-20 DIAGNOSIS — F33.42 RECURRENT MAJOR DEPRESSIVE DISORDER, IN FULL REMISSION: ICD-10-CM

## 2021-10-20 DIAGNOSIS — E78.5 HYPERLIPIDEMIA ASSOCIATED WITH TYPE 2 DIABETES MELLITUS: ICD-10-CM

## 2021-10-20 DIAGNOSIS — E53.8 VITAMIN B12 DEFICIENCY: ICD-10-CM

## 2021-10-20 DIAGNOSIS — Z29.9 PREVENTIVE MEASURE: ICD-10-CM

## 2021-10-20 DIAGNOSIS — E11.9 TYPE 2 DIABETES MELLITUS WITHOUT COMPLICATION, WITH LONG-TERM CURRENT USE OF INSULIN: Chronic | ICD-10-CM

## 2021-10-20 PROCEDURE — 3074F SYST BP LT 130 MM HG: CPT | Mod: HCNC,CPTII,S$GLB, | Performed by: INTERNAL MEDICINE

## 2021-10-20 PROCEDURE — 90686 IIV4 VACC NO PRSV 0.5 ML IM: CPT | Mod: HCNC,S$GLB,, | Performed by: INTERNAL MEDICINE

## 2021-10-20 PROCEDURE — 99999 PR PBB SHADOW E&M-EST. PATIENT-LVL V: ICD-10-PCS | Mod: PBBFAC,HCNC,, | Performed by: INTERNAL MEDICINE

## 2021-10-20 PROCEDURE — 1159F MED LIST DOCD IN RCRD: CPT | Mod: HCNC,CPTII,S$GLB, | Performed by: INTERNAL MEDICINE

## 2021-10-20 PROCEDURE — 3061F PR NEG MICROALBUMINURIA RESULT DOCUMENTED/REVIEW: ICD-10-PCS | Mod: HCNC,CPTII,S$GLB, | Performed by: INTERNAL MEDICINE

## 2021-10-20 PROCEDURE — 3008F BODY MASS INDEX DOCD: CPT | Mod: HCNC,CPTII,S$GLB, | Performed by: INTERNAL MEDICINE

## 2021-10-20 PROCEDURE — 3066F PR DOCUMENTATION OF TREATMENT FOR NEPHROPATHY: ICD-10-PCS | Mod: HCNC,CPTII,S$GLB, | Performed by: INTERNAL MEDICINE

## 2021-10-20 PROCEDURE — G0008 ADMIN INFLUENZA VIRUS VAC: HCPCS | Mod: HCNC,S$GLB,, | Performed by: INTERNAL MEDICINE

## 2021-10-20 PROCEDURE — G0008 FLU VACCINE (QUAD) GREATER THAN OR EQUAL TO 3YO PRESERVATIVE FREE IM: ICD-10-PCS | Mod: HCNC,S$GLB,, | Performed by: INTERNAL MEDICINE

## 2021-10-20 PROCEDURE — 1160F PR REVIEW ALL MEDS BY PRESCRIBER/CLIN PHARMACIST DOCUMENTED: ICD-10-PCS | Mod: HCNC,CPTII,S$GLB, | Performed by: INTERNAL MEDICINE

## 2021-10-20 PROCEDURE — 3074F PR MOST RECENT SYSTOLIC BLOOD PRESSURE < 130 MM HG: ICD-10-PCS | Mod: HCNC,CPTII,S$GLB, | Performed by: INTERNAL MEDICINE

## 2021-10-20 PROCEDURE — 99214 PR OFFICE/OUTPT VISIT, EST, LEVL IV, 30-39 MIN: ICD-10-PCS | Mod: HCNC,S$GLB,, | Performed by: INTERNAL MEDICINE

## 2021-10-20 PROCEDURE — 99214 OFFICE O/P EST MOD 30 MIN: CPT | Mod: HCNC,S$GLB,, | Performed by: INTERNAL MEDICINE

## 2021-10-20 PROCEDURE — 3061F NEG MICROALBUMINURIA REV: CPT | Mod: HCNC,CPTII,S$GLB, | Performed by: INTERNAL MEDICINE

## 2021-10-20 PROCEDURE — 3008F PR BODY MASS INDEX (BMI) DOCUMENTED: ICD-10-PCS | Mod: HCNC,CPTII,S$GLB, | Performed by: INTERNAL MEDICINE

## 2021-10-20 PROCEDURE — 90686 FLU VACCINE (QUAD) GREATER THAN OR EQUAL TO 3YO PRESERVATIVE FREE IM: ICD-10-PCS | Mod: HCNC,S$GLB,, | Performed by: INTERNAL MEDICINE

## 2021-10-20 PROCEDURE — 3078F PR MOST RECENT DIASTOLIC BLOOD PRESSURE < 80 MM HG: ICD-10-PCS | Mod: HCNC,CPTII,S$GLB, | Performed by: INTERNAL MEDICINE

## 2021-10-20 PROCEDURE — 3051F HG A1C>EQUAL 7.0%<8.0%: CPT | Mod: HCNC,CPTII,S$GLB, | Performed by: INTERNAL MEDICINE

## 2021-10-20 PROCEDURE — 3066F NEPHROPATHY DOC TX: CPT | Mod: HCNC,CPTII,S$GLB, | Performed by: INTERNAL MEDICINE

## 2021-10-20 PROCEDURE — 1160F RVW MEDS BY RX/DR IN RCRD: CPT | Mod: HCNC,CPTII,S$GLB, | Performed by: INTERNAL MEDICINE

## 2021-10-20 PROCEDURE — 1159F PR MEDICATION LIST DOCUMENTED IN MEDICAL RECORD: ICD-10-PCS | Mod: HCNC,CPTII,S$GLB, | Performed by: INTERNAL MEDICINE

## 2021-10-20 PROCEDURE — 99499 RISK ADDL DX/OHS AUDIT: ICD-10-PCS | Mod: HCNC,S$GLB,, | Performed by: INTERNAL MEDICINE

## 2021-10-20 PROCEDURE — 99999 PR PBB SHADOW E&M-EST. PATIENT-LVL V: CPT | Mod: PBBFAC,HCNC,, | Performed by: INTERNAL MEDICINE

## 2021-10-20 PROCEDURE — 3078F DIAST BP <80 MM HG: CPT | Mod: HCNC,CPTII,S$GLB, | Performed by: INTERNAL MEDICINE

## 2021-10-20 PROCEDURE — 99499 UNLISTED E&M SERVICE: CPT | Mod: HCNC,S$GLB,, | Performed by: INTERNAL MEDICINE

## 2021-10-20 PROCEDURE — 3051F PR MOST RECENT HEMOGLOBIN A1C LEVEL 7.0 - < 8.0%: ICD-10-PCS | Mod: HCNC,CPTII,S$GLB, | Performed by: INTERNAL MEDICINE

## 2021-10-20 RX ORDER — DULOXETIN HYDROCHLORIDE 30 MG/1
30 CAPSULE, DELAYED RELEASE ORAL 2 TIMES DAILY
Qty: 60 CAPSULE | Refills: 11 | Status: SHIPPED | OUTPATIENT
Start: 2021-10-20 | End: 2021-12-27

## 2021-10-20 RX ORDER — GABAPENTIN 300 MG/1
300 CAPSULE ORAL 3 TIMES DAILY
Qty: 270 CAPSULE | Refills: 1 | Status: SHIPPED | OUTPATIENT
Start: 2021-10-20 | End: 2022-01-20 | Stop reason: SDUPTHER

## 2021-10-20 RX ORDER — DOXYCYCLINE 100 MG/1
100 CAPSULE ORAL 2 TIMES DAILY
Qty: 14 CAPSULE | Refills: 0 | Status: SHIPPED | OUTPATIENT
Start: 2021-10-20 | End: 2021-10-27

## 2021-10-21 LAB
FINAL PATHOLOGIC DIAGNOSIS: NORMAL
Lab: NORMAL

## 2021-10-27 LAB
HPV HR 12 DNA SPEC QL NAA+PROBE: NEGATIVE
HPV16 AG SPEC QL: NEGATIVE
HPV18 DNA SPEC QL NAA+PROBE: NEGATIVE

## 2021-10-28 ENCOUNTER — PATIENT MESSAGE (OUTPATIENT)
Dept: OBSTETRICS AND GYNECOLOGY | Facility: CLINIC | Age: 51
End: 2021-10-28
Payer: MEDICARE

## 2021-10-31 PROCEDURE — 99457 RPM TX MGMT 1ST 20 MIN: CPT | Mod: S$GLB,,, | Performed by: INTERNAL MEDICINE

## 2021-10-31 PROCEDURE — 99457 PR MONITORING, PHYSIOL PARAM, REMOTE, 1ST 20 MINS, PER MONTH: ICD-10-PCS | Mod: S$GLB,,, | Performed by: INTERNAL MEDICINE

## 2021-11-01 ENCOUNTER — HOSPITAL ENCOUNTER (EMERGENCY)
Facility: HOSPITAL | Age: 51
Discharge: HOME | End: 2021-11-01
Attending: EMERGENCY MEDICINE
Payer: MEDICARE

## 2021-11-01 VITALS
TEMPERATURE: 98 F | HEART RATE: 83 BPM | OXYGEN SATURATION: 98 % | RESPIRATION RATE: 18 BRPM | WEIGHT: 145 LBS | DIASTOLIC BLOOD PRESSURE: 64 MMHG | SYSTOLIC BLOOD PRESSURE: 114 MMHG | BODY MASS INDEX: 25.69 KG/M2

## 2021-11-01 DIAGNOSIS — R51.9 NONINTRACTABLE HEADACHE, UNSPECIFIED CHRONICITY PATTERN, UNSPECIFIED HEADACHE TYPE: Primary | ICD-10-CM

## 2021-11-01 LAB
ALBUMIN SERPL BCP-MCNC: 3.5 G/DL (ref 3.5–5.2)
ALP SERPL-CCNC: 132 U/L (ref 55–135)
ALT SERPL W/O P-5'-P-CCNC: 56 U/L (ref 10–44)
ANION GAP SERPL CALC-SCNC: 11 MMOL/L (ref 8–16)
AST SERPL-CCNC: 41 U/L (ref 10–40)
BASOPHILS # BLD AUTO: 0.06 K/UL (ref 0–0.2)
BASOPHILS NFR BLD: 1 % (ref 0–1.9)
BILIRUB SERPL-MCNC: 1.2 MG/DL (ref 0.1–1)
BUN SERPL-MCNC: 12 MG/DL (ref 6–20)
CALCIUM SERPL-MCNC: 8.9 MG/DL (ref 8.7–10.5)
CHLORIDE SERPL-SCNC: 107 MMOL/L (ref 95–110)
CO2 SERPL-SCNC: 20 MMOL/L (ref 23–29)
CREAT SERPL-MCNC: 0.8 MG/DL (ref 0.5–1.4)
CTP QC/QA: YES
DIFFERENTIAL METHOD: NORMAL
EOSINOPHIL # BLD AUTO: 0.1 K/UL (ref 0–0.5)
EOSINOPHIL NFR BLD: 0.8 % (ref 0–8)
ERYTHROCYTE [DISTWIDTH] IN BLOOD BY AUTOMATED COUNT: 12.8 % (ref 11.5–14.5)
EST. GFR  (AFRICAN AMERICAN): >60 ML/MIN/1.73 M^2
EST. GFR  (NON AFRICAN AMERICAN): >60 ML/MIN/1.73 M^2
GLUCOSE SERPL-MCNC: 135 MG/DL (ref 70–110)
HCT VFR BLD AUTO: 42.4 % (ref 37–48.5)
HGB BLD-MCNC: 13.9 G/DL (ref 12–16)
IMM GRANULOCYTES # BLD AUTO: 0.02 K/UL (ref 0–0.04)
IMM GRANULOCYTES NFR BLD AUTO: 0.3 % (ref 0–0.5)
LYMPHOCYTES # BLD AUTO: 1.6 K/UL (ref 1–4.8)
LYMPHOCYTES NFR BLD: 26.1 % (ref 18–48)
MCH RBC QN AUTO: 30.3 PG (ref 27–31)
MCHC RBC AUTO-ENTMCNC: 32.8 G/DL (ref 32–36)
MCV RBC AUTO: 92 FL (ref 82–98)
MONOCYTES # BLD AUTO: 0.4 K/UL (ref 0.3–1)
MONOCYTES NFR BLD: 6 % (ref 4–15)
NEUTROPHILS # BLD AUTO: 4.1 K/UL (ref 1.8–7.7)
NEUTROPHILS NFR BLD: 65.8 % (ref 38–73)
NRBC BLD-RTO: 0 /100 WBC
PLATELET # BLD AUTO: 211 K/UL (ref 150–450)
PMV BLD AUTO: 11.7 FL (ref 9.2–12.9)
POTASSIUM SERPL-SCNC: 4.6 MMOL/L (ref 3.5–5.1)
PROT SERPL-MCNC: 6.9 G/DL (ref 6–8.4)
RBC # BLD AUTO: 4.59 M/UL (ref 4–5.4)
SARS-COV-2 RDRP RESP QL NAA+PROBE: NEGATIVE
SODIUM SERPL-SCNC: 138 MMOL/L (ref 136–145)
WBC # BLD AUTO: 6.16 K/UL (ref 3.9–12.7)

## 2021-11-01 PROCEDURE — 99285 EMERGENCY DEPT VISIT HI MDM: CPT | Mod: 25,HCNC

## 2021-11-01 PROCEDURE — 85025 COMPLETE CBC W/AUTO DIFF WBC: CPT | Mod: HCNC | Performed by: EMERGENCY MEDICINE

## 2021-11-01 PROCEDURE — 96374 THER/PROPH/DIAG INJ IV PUSH: CPT | Mod: HCNC

## 2021-11-01 PROCEDURE — 25000003 PHARM REV CODE 250: Mod: HCNC | Performed by: EMERGENCY MEDICINE

## 2021-11-01 PROCEDURE — 80053 COMPREHEN METABOLIC PANEL: CPT | Mod: HCNC | Performed by: EMERGENCY MEDICINE

## 2021-11-01 PROCEDURE — 96361 HYDRATE IV INFUSION ADD-ON: CPT | Mod: HCNC

## 2021-11-01 PROCEDURE — 96375 TX/PRO/DX INJ NEW DRUG ADDON: CPT | Mod: HCNC

## 2021-11-01 PROCEDURE — 63600175 PHARM REV CODE 636 W HCPCS: Mod: HCNC | Performed by: EMERGENCY MEDICINE

## 2021-11-01 PROCEDURE — U0002 COVID-19 LAB TEST NON-CDC: HCPCS | Mod: HCNC | Performed by: EMERGENCY MEDICINE

## 2021-11-01 RX ORDER — BUTALBITAL, ACETAMINOPHEN AND CAFFEINE 50; 325; 40 MG/1; MG/1; MG/1
1 TABLET ORAL
Status: COMPLETED | OUTPATIENT
Start: 2021-11-01 | End: 2021-11-01

## 2021-11-01 RX ORDER — MORPHINE SULFATE 4 MG/ML
4 INJECTION, SOLUTION INTRAMUSCULAR; INTRAVENOUS
Status: COMPLETED | OUTPATIENT
Start: 2021-11-01 | End: 2021-11-01

## 2021-11-01 RX ORDER — DIPHENHYDRAMINE HYDROCHLORIDE 50 MG/ML
25 INJECTION INTRAMUSCULAR; INTRAVENOUS
Status: COMPLETED | OUTPATIENT
Start: 2021-11-01 | End: 2021-11-01

## 2021-11-01 RX ORDER — PROCHLORPERAZINE EDISYLATE 5 MG/ML
10 INJECTION INTRAMUSCULAR; INTRAVENOUS
Status: COMPLETED | OUTPATIENT
Start: 2021-11-01 | End: 2021-11-01

## 2021-11-01 RX ADMIN — DIPHENHYDRAMINE HYDROCHLORIDE 25 MG: 50 INJECTION INTRAMUSCULAR; INTRAVENOUS at 10:11

## 2021-11-01 RX ADMIN — SODIUM CHLORIDE 1000 ML: 0.9 INJECTION, SOLUTION INTRAVENOUS at 09:11

## 2021-11-01 RX ADMIN — BUTALBITAL, ACETAMINOPHEN AND CAFFEINE 1 TABLET: 50; 325; 40 TABLET ORAL at 09:11

## 2021-11-01 RX ADMIN — PROCHLORPERAZINE EDISYLATE 10 MG: 5 INJECTION INTRAMUSCULAR; INTRAVENOUS at 09:11

## 2021-11-01 RX ADMIN — MORPHINE SULFATE 4 MG: 4 INJECTION INTRAVENOUS at 10:11

## 2021-11-04 ENCOUNTER — PATIENT MESSAGE (OUTPATIENT)
Dept: FAMILY MEDICINE | Facility: CLINIC | Age: 51
End: 2021-11-04
Payer: MEDICARE

## 2021-11-05 ENCOUNTER — TELEPHONE (OUTPATIENT)
Dept: FAMILY MEDICINE | Facility: CLINIC | Age: 51
End: 2021-11-05
Payer: MEDICARE

## 2021-11-05 ENCOUNTER — HOSPITAL ENCOUNTER (EMERGENCY)
Facility: HOSPITAL | Age: 51
Discharge: HOME | End: 2021-11-05
Attending: EMERGENCY MEDICINE
Payer: MEDICARE

## 2021-11-05 VITALS
RESPIRATION RATE: 16 BRPM | SYSTOLIC BLOOD PRESSURE: 112 MMHG | TEMPERATURE: 99 F | BODY MASS INDEX: 23.22 KG/M2 | DIASTOLIC BLOOD PRESSURE: 63 MMHG | WEIGHT: 131.06 LBS | HEART RATE: 89 BPM | OXYGEN SATURATION: 97 %

## 2021-11-05 DIAGNOSIS — G44.209 TENSION-TYPE HEADACHE, NOT INTRACTABLE, UNSPECIFIED CHRONICITY PATTERN: Primary | ICD-10-CM

## 2021-11-05 DIAGNOSIS — E53.8 VITAMIN B12 DEFICIENCY: ICD-10-CM

## 2021-11-05 LAB
ALBUMIN SERPL BCP-MCNC: 3.9 G/DL (ref 3.5–5.2)
ALP SERPL-CCNC: 149 U/L (ref 55–135)
ALT SERPL W/O P-5'-P-CCNC: 75 U/L (ref 10–44)
ANION GAP SERPL CALC-SCNC: 13 MMOL/L (ref 8–16)
AST SERPL-CCNC: 57 U/L (ref 10–40)
BASOPHILS # BLD AUTO: 0.04 K/UL (ref 0–0.2)
BASOPHILS NFR BLD: 0.5 % (ref 0–1.9)
BILIRUB SERPL-MCNC: 1.7 MG/DL (ref 0.1–1)
BUN SERPL-MCNC: 26 MG/DL (ref 6–20)
CALCIUM SERPL-MCNC: 9.4 MG/DL (ref 8.7–10.5)
CHLORIDE SERPL-SCNC: 102 MMOL/L (ref 95–110)
CO2 SERPL-SCNC: 24 MMOL/L (ref 23–29)
CREAT SERPL-MCNC: 0.8 MG/DL (ref 0.5–1.4)
DIFFERENTIAL METHOD: NORMAL
EOSINOPHIL # BLD AUTO: 0.2 K/UL (ref 0–0.5)
EOSINOPHIL NFR BLD: 2.2 % (ref 0–8)
ERYTHROCYTE [DISTWIDTH] IN BLOOD BY AUTOMATED COUNT: 12.6 % (ref 11.5–14.5)
EST. GFR  (AFRICAN AMERICAN): >60 ML/MIN/1.73 M^2
EST. GFR  (NON AFRICAN AMERICAN): >60 ML/MIN/1.73 M^2
GLUCOSE SERPL-MCNC: 99 MG/DL (ref 70–110)
HCT VFR BLD AUTO: 43.2 % (ref 37–48.5)
HCV AB SERPL QL IA: NEGATIVE
HEP C VIRUS HOLD SPECIMEN: NORMAL
HGB BLD-MCNC: 14.5 G/DL (ref 12–16)
HIV 1+2 AB+HIV1 P24 AG SERPL QL IA: NEGATIVE
IMM GRANULOCYTES # BLD AUTO: 0.01 K/UL (ref 0–0.04)
IMM GRANULOCYTES NFR BLD AUTO: 0.1 % (ref 0–0.5)
LYMPHOCYTES # BLD AUTO: 2.8 K/UL (ref 1–4.8)
LYMPHOCYTES NFR BLD: 36.4 % (ref 18–48)
MCH RBC QN AUTO: 30.5 PG (ref 27–31)
MCHC RBC AUTO-ENTMCNC: 33.6 G/DL (ref 32–36)
MCV RBC AUTO: 91 FL (ref 82–98)
MONOCYTES # BLD AUTO: 0.6 K/UL (ref 0.3–1)
MONOCYTES NFR BLD: 7.5 % (ref 4–15)
NEUTROPHILS # BLD AUTO: 4.1 K/UL (ref 1.8–7.7)
NEUTROPHILS NFR BLD: 53.3 % (ref 38–73)
NRBC BLD-RTO: 0 /100 WBC
PLATELET # BLD AUTO: 216 K/UL (ref 150–450)
PMV BLD AUTO: 11.8 FL (ref 9.2–12.9)
POCT GLUCOSE: 154 MG/DL (ref 70–110)
POTASSIUM SERPL-SCNC: 4.2 MMOL/L (ref 3.5–5.1)
PROT SERPL-MCNC: 7.3 G/DL (ref 6–8.4)
RBC # BLD AUTO: 4.75 M/UL (ref 4–5.4)
SODIUM SERPL-SCNC: 139 MMOL/L (ref 136–145)
WBC # BLD AUTO: 7.61 K/UL (ref 3.9–12.7)

## 2021-11-05 PROCEDURE — 25000003 PHARM REV CODE 250: Mod: HCNC | Performed by: NURSE PRACTITIONER

## 2021-11-05 PROCEDURE — 80053 COMPREHEN METABOLIC PANEL: CPT | Mod: HCNC | Performed by: NURSE PRACTITIONER

## 2021-11-05 PROCEDURE — 96374 THER/PROPH/DIAG INJ IV PUSH: CPT | Mod: HCNC

## 2021-11-05 PROCEDURE — 96361 HYDRATE IV INFUSION ADD-ON: CPT | Mod: HCNC

## 2021-11-05 PROCEDURE — 96375 TX/PRO/DX INJ NEW DRUG ADDON: CPT | Mod: HCNC

## 2021-11-05 PROCEDURE — 63600175 PHARM REV CODE 636 W HCPCS: Mod: HCNC | Performed by: NURSE PRACTITIONER

## 2021-11-05 PROCEDURE — 99284 EMERGENCY DEPT VISIT MOD MDM: CPT | Mod: 25,HCNC

## 2021-11-05 PROCEDURE — 86803 HEPATITIS C AB TEST: CPT | Mod: HCNC | Performed by: EMERGENCY MEDICINE

## 2021-11-05 PROCEDURE — 82962 GLUCOSE BLOOD TEST: CPT | Mod: HCNC

## 2021-11-05 PROCEDURE — 85025 COMPLETE CBC W/AUTO DIFF WBC: CPT | Mod: HCNC | Performed by: NURSE PRACTITIONER

## 2021-11-05 PROCEDURE — 87389 HIV-1 AG W/HIV-1&-2 AB AG IA: CPT | Mod: HCNC | Performed by: EMERGENCY MEDICINE

## 2021-11-05 RX ORDER — SODIUM CHLORIDE 9 MG/ML
INJECTION, SOLUTION INTRAVENOUS
Status: COMPLETED | OUTPATIENT
Start: 2021-11-05 | End: 2021-11-05

## 2021-11-05 RX ORDER — METOCLOPRAMIDE HYDROCHLORIDE 5 MG/ML
10 INJECTION INTRAMUSCULAR; INTRAVENOUS
Status: COMPLETED | OUTPATIENT
Start: 2021-11-05 | End: 2021-11-05

## 2021-11-05 RX ORDER — ONDANSETRON 2 MG/ML
4 INJECTION INTRAMUSCULAR; INTRAVENOUS
Status: COMPLETED | OUTPATIENT
Start: 2021-11-05 | End: 2021-11-05

## 2021-11-05 RX ORDER — MORPHINE SULFATE 4 MG/ML
4 INJECTION, SOLUTION INTRAMUSCULAR; INTRAVENOUS
Status: COMPLETED | OUTPATIENT
Start: 2021-11-05 | End: 2021-11-05

## 2021-11-05 RX ORDER — BUTALBITAL, ACETAMINOPHEN AND CAFFEINE 50; 325; 40 MG/1; MG/1; MG/1
1 TABLET ORAL EVERY 6 HOURS PRN
Qty: 15 TABLET | Refills: 0 | Status: SHIPPED | OUTPATIENT
Start: 2021-11-05 | End: 2021-12-29 | Stop reason: SDUPTHER

## 2021-11-05 RX ORDER — CYANOCOBALAMIN 1000 UG/ML
1000 INJECTION, SOLUTION INTRAMUSCULAR; SUBCUTANEOUS
Qty: 10 ML | Refills: 11 | Status: SHIPPED | OUTPATIENT
Start: 2021-11-05 | End: 2022-01-20 | Stop reason: SDUPTHER

## 2021-11-05 RX ORDER — DIPHENHYDRAMINE HYDROCHLORIDE 50 MG/ML
12.5 INJECTION INTRAMUSCULAR; INTRAVENOUS
Status: COMPLETED | OUTPATIENT
Start: 2021-11-05 | End: 2021-11-05

## 2021-11-05 RX ADMIN — METOCLOPRAMIDE 10 MG: 5 INJECTION, SOLUTION INTRAMUSCULAR; INTRAVENOUS at 04:11

## 2021-11-05 RX ADMIN — DIPHENHYDRAMINE HYDROCHLORIDE 12.5 MG: 50 INJECTION INTRAMUSCULAR; INTRAVENOUS at 04:11

## 2021-11-05 RX ADMIN — SODIUM CHLORIDE: 0.9 INJECTION, SOLUTION INTRAVENOUS at 04:11

## 2021-11-05 RX ADMIN — ONDANSETRON 4 MG: 2 INJECTION INTRAMUSCULAR; INTRAVENOUS at 05:11

## 2021-11-05 RX ADMIN — MORPHINE SULFATE 4 MG: 4 INJECTION INTRAVENOUS at 05:11

## 2021-11-18 ENCOUNTER — PATIENT MESSAGE (OUTPATIENT)
Dept: FAMILY MEDICINE | Facility: CLINIC | Age: 51
End: 2021-11-18
Payer: MEDICARE

## 2021-11-19 ENCOUNTER — HOSPITAL ENCOUNTER (EMERGENCY)
Facility: HOSPITAL | Age: 51
Discharge: HOME | End: 2021-11-19
Attending: EMERGENCY MEDICINE
Payer: MEDICARE

## 2021-11-19 VITALS
OXYGEN SATURATION: 100 % | HEART RATE: 92 BPM | DIASTOLIC BLOOD PRESSURE: 70 MMHG | TEMPERATURE: 99 F | WEIGHT: 144 LBS | RESPIRATION RATE: 18 BRPM | HEIGHT: 63 IN | SYSTOLIC BLOOD PRESSURE: 135 MMHG | BODY MASS INDEX: 25.52 KG/M2

## 2021-11-19 DIAGNOSIS — R51.9 ACUTE NONINTRACTABLE HEADACHE, UNSPECIFIED HEADACHE TYPE: Primary | ICD-10-CM

## 2021-11-19 LAB
ALBUMIN SERPL BCP-MCNC: 3.8 G/DL (ref 3.5–5.2)
ALP SERPL-CCNC: 162 U/L (ref 55–135)
ALT SERPL W/O P-5'-P-CCNC: 58 U/L (ref 10–44)
ANION GAP SERPL CALC-SCNC: 10 MMOL/L (ref 8–16)
AST SERPL-CCNC: 29 U/L (ref 10–40)
BASOPHILS # BLD AUTO: 0.05 K/UL (ref 0–0.2)
BASOPHILS NFR BLD: 0.9 % (ref 0–1.9)
BILIRUB SERPL-MCNC: 0.9 MG/DL (ref 0.1–1)
BUN SERPL-MCNC: 15 MG/DL (ref 6–20)
CALCIUM SERPL-MCNC: 8.9 MG/DL (ref 8.7–10.5)
CHLORIDE SERPL-SCNC: 105 MMOL/L (ref 95–110)
CO2 SERPL-SCNC: 24 MMOL/L (ref 23–29)
CREAT SERPL-MCNC: 0.8 MG/DL (ref 0.5–1.4)
DIFFERENTIAL METHOD: NORMAL
EOSINOPHIL # BLD AUTO: 0.2 K/UL (ref 0–0.5)
EOSINOPHIL NFR BLD: 3.1 % (ref 0–8)
ERYTHROCYTE [DISTWIDTH] IN BLOOD BY AUTOMATED COUNT: 13.3 % (ref 11.5–14.5)
EST. GFR  (AFRICAN AMERICAN): >60 ML/MIN/1.73 M^2
EST. GFR  (NON AFRICAN AMERICAN): >60 ML/MIN/1.73 M^2
GLUCOSE SERPL-MCNC: 204 MG/DL (ref 70–110)
HCT VFR BLD AUTO: 41.9 % (ref 37–48.5)
HGB BLD-MCNC: 13.6 G/DL (ref 12–16)
IMM GRANULOCYTES # BLD AUTO: 0.01 K/UL (ref 0–0.04)
IMM GRANULOCYTES NFR BLD AUTO: 0.2 % (ref 0–0.5)
LYMPHOCYTES # BLD AUTO: 2.3 K/UL (ref 1–4.8)
LYMPHOCYTES NFR BLD: 38.3 % (ref 18–48)
MCH RBC QN AUTO: 30 PG (ref 27–31)
MCHC RBC AUTO-ENTMCNC: 32.5 G/DL (ref 32–36)
MCV RBC AUTO: 93 FL (ref 82–98)
MONOCYTES # BLD AUTO: 0.4 K/UL (ref 0.3–1)
MONOCYTES NFR BLD: 6.6 % (ref 4–15)
NEUTROPHILS # BLD AUTO: 3 K/UL (ref 1.8–7.7)
NEUTROPHILS NFR BLD: 50.9 % (ref 38–73)
NRBC BLD-RTO: 0 /100 WBC
PLATELET # BLD AUTO: 340 K/UL (ref 150–450)
PMV BLD AUTO: 10.5 FL (ref 9.2–12.9)
POTASSIUM SERPL-SCNC: 4.4 MMOL/L (ref 3.5–5.1)
PROT SERPL-MCNC: 6.8 G/DL (ref 6–8.4)
RBC # BLD AUTO: 4.53 M/UL (ref 4–5.4)
SODIUM SERPL-SCNC: 139 MMOL/L (ref 136–145)
WBC # BLD AUTO: 5.87 K/UL (ref 3.9–12.7)

## 2021-11-19 PROCEDURE — 96361 HYDRATE IV INFUSION ADD-ON: CPT | Mod: HCNC

## 2021-11-19 PROCEDURE — 25000003 PHARM REV CODE 250: Mod: HCNC | Performed by: NURSE PRACTITIONER

## 2021-11-19 PROCEDURE — 96372 THER/PROPH/DIAG INJ SC/IM: CPT | Mod: HCNC,59

## 2021-11-19 PROCEDURE — 96376 TX/PRO/DX INJ SAME DRUG ADON: CPT | Mod: HCNC

## 2021-11-19 PROCEDURE — 99284 EMERGENCY DEPT VISIT MOD MDM: CPT | Mod: 25,HCNC

## 2021-11-19 PROCEDURE — 80053 COMPREHEN METABOLIC PANEL: CPT | Mod: HCNC | Performed by: NURSE PRACTITIONER

## 2021-11-19 PROCEDURE — 85025 COMPLETE CBC W/AUTO DIFF WBC: CPT | Mod: HCNC | Performed by: NURSE PRACTITIONER

## 2021-11-19 PROCEDURE — 96374 THER/PROPH/DIAG INJ IV PUSH: CPT | Mod: HCNC

## 2021-11-19 PROCEDURE — 63600175 PHARM REV CODE 636 W HCPCS: Mod: HCNC | Performed by: NURSE PRACTITIONER

## 2021-11-19 RX ORDER — HYDROMORPHONE HYDROCHLORIDE 2 MG/ML
1 INJECTION, SOLUTION INTRAMUSCULAR; INTRAVENOUS; SUBCUTANEOUS
Status: COMPLETED | OUTPATIENT
Start: 2021-11-19 | End: 2021-11-19

## 2021-11-19 RX ORDER — DEXAMETHASONE SODIUM PHOSPHATE 4 MG/ML
8 INJECTION, SOLUTION INTRA-ARTICULAR; INTRALESIONAL; INTRAMUSCULAR; INTRAVENOUS; SOFT TISSUE
Status: COMPLETED | OUTPATIENT
Start: 2021-11-19 | End: 2021-11-19

## 2021-11-19 RX ORDER — PROMETHAZINE HYDROCHLORIDE 25 MG/ML
25 INJECTION, SOLUTION INTRAMUSCULAR; INTRAVENOUS
Status: COMPLETED | OUTPATIENT
Start: 2021-11-19 | End: 2021-11-19

## 2021-11-19 RX ADMIN — DEXAMETHASONE SODIUM PHOSPHATE 8 MG: 4 INJECTION INTRA-ARTICULAR; INTRALESIONAL; INTRAMUSCULAR; INTRAVENOUS; SOFT TISSUE at 03:11

## 2021-11-19 RX ADMIN — HYDROMORPHONE HYDROCHLORIDE 1 MG: 2 INJECTION INTRAMUSCULAR; INTRAVENOUS; SUBCUTANEOUS at 03:11

## 2021-11-19 RX ADMIN — SODIUM CHLORIDE 1000 ML: 0.9 INJECTION, SOLUTION INTRAVENOUS at 03:11

## 2021-11-19 RX ADMIN — HYDROMORPHONE HYDROCHLORIDE 1 MG: 2 INJECTION INTRAMUSCULAR; INTRAVENOUS; SUBCUTANEOUS at 05:11

## 2021-11-19 RX ADMIN — PROMETHAZINE HYDROCHLORIDE 25 MG: 25 INJECTION INTRAMUSCULAR; INTRAVENOUS at 03:11

## 2021-11-22 ENCOUNTER — TELEPHONE (OUTPATIENT)
Dept: NEUROLOGY | Facility: CLINIC | Age: 51
End: 2021-11-22
Payer: MEDICARE

## 2021-11-24 ENCOUNTER — OFFICE VISIT (OUTPATIENT)
Dept: FAMILY MEDICINE | Facility: CLINIC | Age: 51
End: 2021-11-24
Payer: MEDICARE

## 2021-11-24 ENCOUNTER — HOSPITAL ENCOUNTER (EMERGENCY)
Facility: HOSPITAL | Age: 51
Discharge: HOME | End: 2021-11-24
Attending: EMERGENCY MEDICINE
Payer: MEDICARE

## 2021-11-24 VITALS
HEART RATE: 101 BPM | TEMPERATURE: 98 F | DIASTOLIC BLOOD PRESSURE: 67 MMHG | RESPIRATION RATE: 16 BRPM | BODY MASS INDEX: 25.35 KG/M2 | OXYGEN SATURATION: 99 % | SYSTOLIC BLOOD PRESSURE: 113 MMHG | WEIGHT: 143.06 LBS

## 2021-11-24 VITALS
HEART RATE: 69 BPM | DIASTOLIC BLOOD PRESSURE: 69 MMHG | RESPIRATION RATE: 18 BRPM | TEMPERATURE: 99 F | WEIGHT: 170 LBS | SYSTOLIC BLOOD PRESSURE: 125 MMHG | BODY MASS INDEX: 30.11 KG/M2 | OXYGEN SATURATION: 100 %

## 2021-11-24 DIAGNOSIS — G89.29 CHRONIC INTRACTABLE HEADACHE, UNSPECIFIED HEADACHE TYPE: Primary | ICD-10-CM

## 2021-11-24 DIAGNOSIS — R51.9 CHRONIC INTRACTABLE HEADACHE, UNSPECIFIED HEADACHE TYPE: Primary | ICD-10-CM

## 2021-11-24 DIAGNOSIS — R11.2 NAUSEA AND VOMITING, INTRACTABILITY OF VOMITING NOT SPECIFIED, UNSPECIFIED VOMITING TYPE: ICD-10-CM

## 2021-11-24 DIAGNOSIS — F44.5 PSEUDOSEIZURES: ICD-10-CM

## 2021-11-24 DIAGNOSIS — G43.909 MIGRAINE WITHOUT STATUS MIGRAINOSUS, NOT INTRACTABLE, UNSPECIFIED MIGRAINE TYPE: Primary | ICD-10-CM

## 2021-11-24 PROBLEM — R06.02 SOB (SHORTNESS OF BREATH): Status: RESOLVED | Noted: 2021-07-22 | Resolved: 2021-11-24

## 2021-11-24 PROBLEM — Z01.810 PREOP CARDIOVASCULAR EXAM: Status: RESOLVED | Noted: 2021-07-22 | Resolved: 2021-11-24

## 2021-11-24 LAB
ALBUMIN SERPL BCP-MCNC: 3.9 G/DL (ref 3.5–5.2)
ALP SERPL-CCNC: 158 U/L (ref 55–135)
ALT SERPL W/O P-5'-P-CCNC: 49 U/L (ref 10–44)
ANION GAP SERPL CALC-SCNC: 14 MMOL/L (ref 8–16)
AST SERPL-CCNC: 27 U/L (ref 10–40)
BASOPHILS # BLD AUTO: 0.06 K/UL (ref 0–0.2)
BASOPHILS NFR BLD: 0.7 % (ref 0–1.9)
BILIRUB SERPL-MCNC: 1.3 MG/DL (ref 0.1–1)
BUN SERPL-MCNC: 16 MG/DL (ref 6–20)
CALCIUM SERPL-MCNC: 8.5 MG/DL (ref 8.7–10.5)
CHLORIDE SERPL-SCNC: 106 MMOL/L (ref 95–110)
CO2 SERPL-SCNC: 20 MMOL/L (ref 23–29)
CREAT SERPL-MCNC: 0.8 MG/DL (ref 0.5–1.4)
DIFFERENTIAL METHOD: NORMAL
EOSINOPHIL # BLD AUTO: 0.2 K/UL (ref 0–0.5)
EOSINOPHIL NFR BLD: 2.1 % (ref 0–8)
ERYTHROCYTE [DISTWIDTH] IN BLOOD BY AUTOMATED COUNT: 13.8 % (ref 11.5–14.5)
EST. GFR  (AFRICAN AMERICAN): >60 ML/MIN/1.73 M^2
EST. GFR  (NON AFRICAN AMERICAN): >60 ML/MIN/1.73 M^2
GLUCOSE SERPL-MCNC: 186 MG/DL (ref 70–110)
HCT VFR BLD AUTO: 42.8 % (ref 37–48.5)
HGB BLD-MCNC: 14.2 G/DL (ref 12–16)
IMM GRANULOCYTES # BLD AUTO: 0.01 K/UL (ref 0–0.04)
IMM GRANULOCYTES NFR BLD AUTO: 0.1 % (ref 0–0.5)
LYMPHOCYTES # BLD AUTO: 1.7 K/UL (ref 1–4.8)
LYMPHOCYTES NFR BLD: 20.8 % (ref 18–48)
MCH RBC QN AUTO: 30.3 PG (ref 27–31)
MCHC RBC AUTO-ENTMCNC: 33.2 G/DL (ref 32–36)
MCV RBC AUTO: 91 FL (ref 82–98)
MONOCYTES # BLD AUTO: 0.4 K/UL (ref 0.3–1)
MONOCYTES NFR BLD: 5.3 % (ref 4–15)
NEUTROPHILS # BLD AUTO: 5.7 K/UL (ref 1.8–7.7)
NEUTROPHILS NFR BLD: 71 % (ref 38–73)
NRBC BLD-RTO: 0 /100 WBC
PLATELET # BLD AUTO: 355 K/UL (ref 150–450)
PMV BLD AUTO: 10.8 FL (ref 9.2–12.9)
POTASSIUM SERPL-SCNC: 4.3 MMOL/L (ref 3.5–5.1)
PROT SERPL-MCNC: 6.8 G/DL (ref 6–8.4)
RBC # BLD AUTO: 4.69 M/UL (ref 4–5.4)
SODIUM SERPL-SCNC: 140 MMOL/L (ref 136–145)
WBC # BLD AUTO: 8.06 K/UL (ref 3.9–12.7)

## 2021-11-24 PROCEDURE — 96375 TX/PRO/DX INJ NEW DRUG ADDON: CPT | Mod: HCNC

## 2021-11-24 PROCEDURE — 63600175 PHARM REV CODE 636 W HCPCS: Mod: HCNC | Performed by: EMERGENCY MEDICINE

## 2021-11-24 PROCEDURE — 96365 THER/PROPH/DIAG IV INF INIT: CPT | Mod: HCNC

## 2021-11-24 PROCEDURE — 85025 COMPLETE CBC W/AUTO DIFF WBC: CPT | Mod: HCNC | Performed by: NURSE PRACTITIONER

## 2021-11-24 PROCEDURE — 99999 PR PBB SHADOW E&M-EST. PATIENT-LVL IV: CPT | Mod: PBBFAC,HCNC,, | Performed by: REGISTERED NURSE

## 2021-11-24 PROCEDURE — 96376 TX/PRO/DX INJ SAME DRUG ADON: CPT | Mod: HCNC

## 2021-11-24 PROCEDURE — 99285 EMERGENCY DEPT VISIT HI MDM: CPT | Mod: 25,HCNC

## 2021-11-24 PROCEDURE — 3066F NEPHROPATHY DOC TX: CPT | Mod: HCNC,CPTII,S$GLB, | Performed by: REGISTERED NURSE

## 2021-11-24 PROCEDURE — 3061F NEG MICROALBUMINURIA REV: CPT | Mod: HCNC,CPTII,S$GLB, | Performed by: REGISTERED NURSE

## 2021-11-24 PROCEDURE — 3061F PR NEG MICROALBUMINURIA RESULT DOCUMENTED/REVIEW: ICD-10-PCS | Mod: HCNC,CPTII,S$GLB, | Performed by: REGISTERED NURSE

## 2021-11-24 PROCEDURE — 99999 PR PBB SHADOW E&M-EST. PATIENT-LVL IV: ICD-10-PCS | Mod: PBBFAC,HCNC,, | Performed by: REGISTERED NURSE

## 2021-11-24 PROCEDURE — 96361 HYDRATE IV INFUSION ADD-ON: CPT | Mod: HCNC

## 2021-11-24 PROCEDURE — 3066F PR DOCUMENTATION OF TREATMENT FOR NEPHROPATHY: ICD-10-PCS | Mod: HCNC,CPTII,S$GLB, | Performed by: REGISTERED NURSE

## 2021-11-24 PROCEDURE — 80053 COMPREHEN METABOLIC PANEL: CPT | Mod: HCNC | Performed by: NURSE PRACTITIONER

## 2021-11-24 PROCEDURE — 63600175 PHARM REV CODE 636 W HCPCS: Mod: HCNC | Performed by: NURSE PRACTITIONER

## 2021-11-24 PROCEDURE — 25000003 PHARM REV CODE 250: Mod: HCNC | Performed by: EMERGENCY MEDICINE

## 2021-11-24 PROCEDURE — 99213 OFFICE O/P EST LOW 20 MIN: CPT | Mod: HCNC,S$GLB,, | Performed by: REGISTERED NURSE

## 2021-11-24 PROCEDURE — 99213 PR OFFICE/OUTPT VISIT, EST, LEVL III, 20-29 MIN: ICD-10-PCS | Mod: HCNC,S$GLB,, | Performed by: REGISTERED NURSE

## 2021-11-24 RX ORDER — MORPHINE SULFATE 4 MG/ML
4 INJECTION, SOLUTION INTRAMUSCULAR; INTRAVENOUS
Status: DISCONTINUED | OUTPATIENT
Start: 2021-11-24 | End: 2021-11-24

## 2021-11-24 RX ORDER — BUTORPHANOL TARTRATE 1 MG/ML
1 INJECTION INTRAMUSCULAR; INTRAVENOUS
Status: COMPLETED | OUTPATIENT
Start: 2021-11-24 | End: 2021-11-24

## 2021-11-24 RX ORDER — METOCLOPRAMIDE HYDROCHLORIDE 5 MG/ML
10 INJECTION INTRAMUSCULAR; INTRAVENOUS
Status: COMPLETED | OUTPATIENT
Start: 2021-11-24 | End: 2021-11-24

## 2021-11-24 RX ORDER — SUMATRIPTAN 50 MG/1
50 TABLET, FILM COATED ORAL DAILY PRN
Qty: 9 TABLET | Refills: 0 | Status: SHIPPED | OUTPATIENT
Start: 2021-11-24 | End: 2022-01-20

## 2021-11-24 RX ORDER — HYDROMORPHONE HYDROCHLORIDE 2 MG/ML
1 INJECTION, SOLUTION INTRAMUSCULAR; INTRAVENOUS; SUBCUTANEOUS
Status: COMPLETED | OUTPATIENT
Start: 2021-11-24 | End: 2021-11-24

## 2021-11-24 RX ORDER — ONDANSETRON 2 MG/ML
4 INJECTION INTRAMUSCULAR; INTRAVENOUS
Status: COMPLETED | OUTPATIENT
Start: 2021-11-24 | End: 2021-11-24

## 2021-11-24 RX ORDER — ONDANSETRON 4 MG/1
4 TABLET, FILM COATED ORAL EVERY 6 HOURS
Qty: 30 TABLET | Refills: 3 | Status: CANCELLED | OUTPATIENT
Start: 2021-11-24

## 2021-11-24 RX ADMIN — HYDROMORPHONE HYDROCHLORIDE 1 MG: 2 INJECTION INTRAMUSCULAR; INTRAVENOUS; SUBCUTANEOUS at 12:11

## 2021-11-24 RX ADMIN — METOCLOPRAMIDE 10 MG: 5 INJECTION, SOLUTION INTRAMUSCULAR; INTRAVENOUS at 12:11

## 2021-11-24 RX ADMIN — PROMETHAZINE HYDROCHLORIDE 12.5 MG: 25 INJECTION INTRAMUSCULAR; INTRAVENOUS at 01:11

## 2021-11-24 RX ADMIN — BUTORPHANOL TARTRATE 1 MG: 1 INJECTION, SOLUTION INTRAMUSCULAR; INTRAVENOUS at 01:11

## 2021-11-24 RX ADMIN — ONDANSETRON 4 MG: 2 INJECTION INTRAMUSCULAR; INTRAVENOUS at 12:11

## 2021-11-24 RX ADMIN — SODIUM CHLORIDE 1000 ML: 0.9 INJECTION, SOLUTION INTRAVENOUS at 12:11

## 2021-11-24 RX ADMIN — BUTORPHANOL TARTRATE 1 MG: 1 INJECTION, SOLUTION INTRAMUSCULAR; INTRAVENOUS at 02:11

## 2021-12-01 ENCOUNTER — PATIENT OUTREACH (OUTPATIENT)
Dept: ADMINISTRATIVE | Facility: OTHER | Age: 51
End: 2021-12-01
Payer: MEDICARE

## 2021-12-07 ENCOUNTER — OFFICE VISIT (OUTPATIENT)
Dept: ORTHOPEDICS | Facility: CLINIC | Age: 51
End: 2021-12-07
Payer: MEDICARE

## 2021-12-07 VITALS — DIASTOLIC BLOOD PRESSURE: 87 MMHG | HEART RATE: 100 BPM | SYSTOLIC BLOOD PRESSURE: 132 MMHG

## 2021-12-07 DIAGNOSIS — G56.01 CARPAL TUNNEL SYNDROME OF RIGHT WRIST: Primary | ICD-10-CM

## 2021-12-07 DIAGNOSIS — Z01.818 PREOP TESTING: Primary | ICD-10-CM

## 2021-12-07 DIAGNOSIS — G56.03 CARPAL TUNNEL SYNDROME ON BOTH SIDES: ICD-10-CM

## 2021-12-07 DIAGNOSIS — M25.531 BILATERAL WRIST PAIN: ICD-10-CM

## 2021-12-07 DIAGNOSIS — G56.21 CUBITAL TUNNEL SYNDROME, RIGHT: ICD-10-CM

## 2021-12-07 DIAGNOSIS — M25.532 BILATERAL WRIST PAIN: ICD-10-CM

## 2021-12-07 PROCEDURE — 99024 PR POST-OP FOLLOW-UP VISIT: ICD-10-PCS | Mod: S$GLB,,, | Performed by: ORTHOPAEDIC SURGERY

## 2021-12-07 PROCEDURE — 3066F PR DOCUMENTATION OF TREATMENT FOR NEPHROPATHY: ICD-10-PCS | Mod: HCNC,CPTII,S$GLB, | Performed by: ORTHOPAEDIC SURGERY

## 2021-12-07 PROCEDURE — 99213 PR OFFICE/OUTPT VISIT, EST, LEVL III, 20-29 MIN: ICD-10-PCS | Mod: HCNC,24,S$GLB, | Performed by: ORTHOPAEDIC SURGERY

## 2021-12-07 PROCEDURE — 99999 PR PBB SHADOW E&M-EST. PATIENT-LVL III: CPT | Mod: PBBFAC,HCNC,, | Performed by: ORTHOPAEDIC SURGERY

## 2021-12-07 PROCEDURE — 3061F NEG MICROALBUMINURIA REV: CPT | Mod: HCNC,CPTII,S$GLB, | Performed by: ORTHOPAEDIC SURGERY

## 2021-12-07 PROCEDURE — 99999 PR PBB SHADOW E&M-EST. PATIENT-LVL III: ICD-10-PCS | Mod: PBBFAC,HCNC,, | Performed by: ORTHOPAEDIC SURGERY

## 2021-12-07 PROCEDURE — 99213 OFFICE O/P EST LOW 20 MIN: CPT | Mod: HCNC,24,S$GLB, | Performed by: ORTHOPAEDIC SURGERY

## 2021-12-07 PROCEDURE — 99024 POSTOP FOLLOW-UP VISIT: CPT | Mod: S$GLB,,, | Performed by: ORTHOPAEDIC SURGERY

## 2021-12-07 PROCEDURE — 3066F NEPHROPATHY DOC TX: CPT | Mod: HCNC,CPTII,S$GLB, | Performed by: ORTHOPAEDIC SURGERY

## 2021-12-07 PROCEDURE — 3061F PR NEG MICROALBUMINURIA RESULT DOCUMENTED/REVIEW: ICD-10-PCS | Mod: HCNC,CPTII,S$GLB, | Performed by: ORTHOPAEDIC SURGERY

## 2021-12-08 DIAGNOSIS — G56.03 CARPAL TUNNEL SYNDROME ON BOTH SIDES: Primary | ICD-10-CM

## 2021-12-13 ENCOUNTER — PATIENT MESSAGE (OUTPATIENT)
Dept: FAMILY MEDICINE | Facility: CLINIC | Age: 51
End: 2021-12-13
Payer: MEDICARE

## 2021-12-13 DIAGNOSIS — R11.2 NAUSEA AND VOMITING, INTRACTABILITY OF VOMITING NOT SPECIFIED, UNSPECIFIED VOMITING TYPE: Primary | ICD-10-CM

## 2021-12-15 ENCOUNTER — LAB VISIT (OUTPATIENT)
Dept: LAB | Facility: HOSPITAL | Age: 51
End: 2021-12-15
Attending: ORTHOPAEDIC SURGERY
Payer: MEDICARE

## 2021-12-15 DIAGNOSIS — Z01.818 PREOP TESTING: ICD-10-CM

## 2021-12-15 DIAGNOSIS — E11.9 TYPE 2 DIABETES MELLITUS WITHOUT COMPLICATION, WITH LONG-TERM CURRENT USE OF INSULIN: Chronic | ICD-10-CM

## 2021-12-15 DIAGNOSIS — Z79.4 TYPE 2 DIABETES MELLITUS WITHOUT COMPLICATION, WITH LONG-TERM CURRENT USE OF INSULIN: Chronic | ICD-10-CM

## 2021-12-15 LAB
ALBUMIN SERPL BCP-MCNC: 3.7 G/DL (ref 3.5–5.2)
ALP SERPL-CCNC: 130 U/L (ref 55–135)
ALT SERPL W/O P-5'-P-CCNC: 49 U/L (ref 10–44)
ANION GAP SERPL CALC-SCNC: 12 MMOL/L (ref 8–16)
AST SERPL-CCNC: 26 U/L (ref 10–40)
BASOPHILS # BLD AUTO: 0.04 K/UL (ref 0–0.2)
BASOPHILS NFR BLD: 0.8 % (ref 0–1.9)
BILIRUB SERPL-MCNC: 1.7 MG/DL (ref 0.1–1)
BUN SERPL-MCNC: 19 MG/DL (ref 6–20)
CALCIUM SERPL-MCNC: 8.9 MG/DL (ref 8.7–10.5)
CHLORIDE SERPL-SCNC: 102 MMOL/L (ref 95–110)
CO2 SERPL-SCNC: 22 MMOL/L (ref 23–29)
CREAT SERPL-MCNC: 0.8 MG/DL (ref 0.5–1.4)
DIFFERENTIAL METHOD: ABNORMAL
EOSINOPHIL # BLD AUTO: 0.2 K/UL (ref 0–0.5)
EOSINOPHIL NFR BLD: 3 % (ref 0–8)
ERYTHROCYTE [DISTWIDTH] IN BLOOD BY AUTOMATED COUNT: 14.3 % (ref 11.5–14.5)
EST. GFR  (AFRICAN AMERICAN): >60 ML/MIN/1.73 M^2
EST. GFR  (NON AFRICAN AMERICAN): >60 ML/MIN/1.73 M^2
ESTIMATED AVG GLUCOSE: 157 MG/DL (ref 68–131)
GLUCOSE SERPL-MCNC: 336 MG/DL (ref 70–110)
HBA1C MFR BLD: 7.1 % (ref 4–5.6)
HCT VFR BLD AUTO: 41.1 % (ref 37–48.5)
HGB BLD-MCNC: 12.7 G/DL (ref 12–16)
IMM GRANULOCYTES # BLD AUTO: 0.01 K/UL (ref 0–0.04)
IMM GRANULOCYTES NFR BLD AUTO: 0.2 % (ref 0–0.5)
LYMPHOCYTES # BLD AUTO: 1.2 K/UL (ref 1–4.8)
LYMPHOCYTES NFR BLD: 24.1 % (ref 18–48)
MCH RBC QN AUTO: 30.3 PG (ref 27–31)
MCHC RBC AUTO-ENTMCNC: 30.9 G/DL (ref 32–36)
MCV RBC AUTO: 98 FL (ref 82–98)
MONOCYTES # BLD AUTO: 0.4 K/UL (ref 0.3–1)
MONOCYTES NFR BLD: 7.4 % (ref 4–15)
NEUTROPHILS # BLD AUTO: 3.3 K/UL (ref 1.8–7.7)
NEUTROPHILS NFR BLD: 64.5 % (ref 38–73)
NRBC BLD-RTO: 0 /100 WBC
PLATELET # BLD AUTO: 270 K/UL (ref 150–450)
PMV BLD AUTO: 11.7 FL (ref 9.2–12.9)
POTASSIUM SERPL-SCNC: 4.9 MMOL/L (ref 3.5–5.1)
PROT SERPL-MCNC: 6.3 G/DL (ref 6–8.4)
RBC # BLD AUTO: 4.19 M/UL (ref 4–5.4)
SODIUM SERPL-SCNC: 136 MMOL/L (ref 136–145)
WBC # BLD AUTO: 5.03 K/UL (ref 3.9–12.7)

## 2021-12-15 PROCEDURE — 36415 COLL VENOUS BLD VENIPUNCTURE: CPT | Mod: HCNC | Performed by: ORTHOPAEDIC SURGERY

## 2021-12-15 PROCEDURE — 80053 COMPREHEN METABOLIC PANEL: CPT | Mod: HCNC | Performed by: ORTHOPAEDIC SURGERY

## 2021-12-15 PROCEDURE — 85025 COMPLETE CBC W/AUTO DIFF WBC: CPT | Mod: HCNC | Performed by: ORTHOPAEDIC SURGERY

## 2021-12-15 PROCEDURE — 83036 HEMOGLOBIN GLYCOSYLATED A1C: CPT | Mod: HCNC | Performed by: INTERNAL MEDICINE

## 2021-12-15 RX ORDER — PROMETHAZINE HYDROCHLORIDE 25 MG/1
25 TABLET ORAL EVERY 6 HOURS PRN
Qty: 40 TABLET | Refills: 2 | Status: SHIPPED | OUTPATIENT
Start: 2021-12-15 | End: 2022-02-22 | Stop reason: SDUPTHER

## 2021-12-29 ENCOUNTER — HOSPITAL ENCOUNTER (EMERGENCY)
Facility: HOSPITAL | Age: 51
Discharge: HOME | End: 2021-12-29
Attending: EMERGENCY MEDICINE
Payer: MEDICARE

## 2021-12-29 VITALS
WEIGHT: 142.94 LBS | DIASTOLIC BLOOD PRESSURE: 69 MMHG | SYSTOLIC BLOOD PRESSURE: 105 MMHG | BODY MASS INDEX: 25.33 KG/M2 | OXYGEN SATURATION: 98 % | HEART RATE: 95 BPM | TEMPERATURE: 99 F | RESPIRATION RATE: 20 BRPM

## 2021-12-29 DIAGNOSIS — G43.809 OTHER MIGRAINE WITHOUT STATUS MIGRAINOSUS, NOT INTRACTABLE: ICD-10-CM

## 2021-12-29 DIAGNOSIS — G89.4 CHRONIC PAIN SYNDROME: Primary | ICD-10-CM

## 2021-12-29 DIAGNOSIS — Z79.4 TYPE 2 DIABETES MELLITUS WITHOUT COMPLICATION, WITH LONG-TERM CURRENT USE OF INSULIN: Chronic | ICD-10-CM

## 2021-12-29 DIAGNOSIS — E11.9 TYPE 2 DIABETES MELLITUS WITHOUT COMPLICATION, WITH LONG-TERM CURRENT USE OF INSULIN: Chronic | ICD-10-CM

## 2021-12-29 LAB
CTP QC/QA: YES
SARS-COV-2 RDRP RESP QL NAA+PROBE: NEGATIVE

## 2021-12-29 PROCEDURE — U0002 COVID-19 LAB TEST NON-CDC: HCPCS | Mod: HCNC | Performed by: NURSE PRACTITIONER

## 2021-12-29 PROCEDURE — 99284 EMERGENCY DEPT VISIT MOD MDM: CPT | Mod: 25,HCNC

## 2021-12-29 PROCEDURE — 63600175 PHARM REV CODE 636 W HCPCS: Mod: HCNC | Performed by: NURSE PRACTITIONER

## 2021-12-29 PROCEDURE — 96372 THER/PROPH/DIAG INJ SC/IM: CPT | Mod: 59,HCNC

## 2021-12-29 RX ORDER — DIPHENHYDRAMINE HYDROCHLORIDE 50 MG/ML
50 INJECTION INTRAMUSCULAR; INTRAVENOUS
Status: COMPLETED | OUTPATIENT
Start: 2021-12-29 | End: 2021-12-29

## 2021-12-29 RX ORDER — PROMETHAZINE HYDROCHLORIDE 25 MG/ML
25 INJECTION, SOLUTION INTRAMUSCULAR; INTRAVENOUS
Status: COMPLETED | OUTPATIENT
Start: 2021-12-29 | End: 2021-12-29

## 2021-12-29 RX ORDER — METOCLOPRAMIDE HYDROCHLORIDE 5 MG/ML
10 INJECTION INTRAMUSCULAR; INTRAVENOUS
Status: COMPLETED | OUTPATIENT
Start: 2021-12-29 | End: 2021-12-29

## 2021-12-29 RX ORDER — HYDROMORPHONE HYDROCHLORIDE 1 MG/ML
1 INJECTION, SOLUTION INTRAMUSCULAR; INTRAVENOUS; SUBCUTANEOUS
Status: COMPLETED | OUTPATIENT
Start: 2021-12-29 | End: 2021-12-29

## 2021-12-29 RX ADMIN — HYDROMORPHONE HYDROCHLORIDE 1 MG: 1 INJECTION, SOLUTION INTRAMUSCULAR; INTRAVENOUS; SUBCUTANEOUS at 02:12

## 2021-12-29 RX ADMIN — DIPHENHYDRAMINE HYDROCHLORIDE 50 MG: 50 INJECTION INTRAMUSCULAR; INTRAVENOUS at 12:12

## 2021-12-29 RX ADMIN — PROMETHAZINE HYDROCHLORIDE 25 MG: 25 INJECTION INTRAMUSCULAR; INTRAVENOUS at 12:12

## 2021-12-29 RX ADMIN — METOCLOPRAMIDE 10 MG: 5 INJECTION, SOLUTION INTRAMUSCULAR; INTRAVENOUS at 02:12

## 2021-12-29 NOTE — FIRST PROVIDER EVALUATION
Medical screening exam completed.  I have conducted a focused provider triage encounter, findings are as follows:    Brief history of present illness:  Pt. C/o general headache for three days.   Vitals:    12/29/21 1156   BP: 99/72   BP Location: Right arm   Patient Position: Sitting   Pulse: 109   Resp: 19   Temp: 98.7 °F (37.1 °C)   TempSrc: Oral   SpO2: 96%   Weight: 64.8 kg (142 lb 15.5 oz)         Preliminary workup initiated; this workup will be continued and followed by the physician or advanced practice provider that is assigned to the patient when roomed.

## 2021-12-30 RX ORDER — BUTALBITAL, ACETAMINOPHEN AND CAFFEINE 50; 325; 40 MG/1; MG/1; MG/1
1 TABLET ORAL EVERY 6 HOURS PRN
Qty: 15 TABLET | Refills: 0 | Status: SHIPPED | OUTPATIENT
Start: 2021-12-30 | End: 2022-02-22

## 2022-01-01 ENCOUNTER — PATIENT MESSAGE (OUTPATIENT)
Dept: SLEEP MEDICINE | Facility: CLINIC | Age: 52
End: 2022-01-01
Payer: MEDICARE

## 2022-01-02 ENCOUNTER — PATIENT MESSAGE (OUTPATIENT)
Dept: OTHER | Facility: OTHER | Age: 52
End: 2022-01-02
Payer: MEDICARE

## 2022-01-03 DIAGNOSIS — F41.9 ANXIETY: ICD-10-CM

## 2022-01-03 DIAGNOSIS — G47.00 INSOMNIA, UNSPECIFIED TYPE: ICD-10-CM

## 2022-01-03 RX ORDER — ALPRAZOLAM 0.5 MG/1
TABLET ORAL
Qty: 70 TABLET | Refills: 3 | Status: SHIPPED | OUTPATIENT
Start: 2022-01-03 | End: 2022-03-27 | Stop reason: SDUPTHER

## 2022-01-07 ENCOUNTER — TELEPHONE (OUTPATIENT)
Dept: ORTHOPEDICS | Facility: CLINIC | Age: 52
End: 2022-01-07
Payer: MEDICARE

## 2022-01-07 DIAGNOSIS — Z01.818 PREOP TESTING: Primary | ICD-10-CM

## 2022-01-07 NOTE — TELEPHONE ENCOUNTER
Attempted to call [employer] company back an answer question but they need to speak to our referrals department I gave them our referrals departments phone number the  verbalized understanding.         ----- Message from Radha Chowdary sent at 1/7/2022  9:53 AM CST -----  Regarding: information  Contact: elizabeth with [employer]  Caller is requesting a call back regarding some information for this pt.  Please call back at [phone].  Thanks.

## 2022-01-07 NOTE — PROGRESS NOTES
"Subjective:      Patient ID: Jadyn Chance is a 51 y.o. female.    Chief Complaint: Follow-up      HPI  Here for f/u medical problems and preventive exam.  In digital med, AC breakfast .  No lows.  Energy low, no exercising.  Feels tired, feels depressed despite 60mg cymbalta.  Danielle has helped pain, radicular and PN, taking 300mg TID, leg pain.  Had right CTR and cubital release, to have left in 2 wk.  No f/c/sw/cough.  No cp/sob/palp.  BMs normal.  Urine frequent x 3-4d, no dysuria.  Down 7#, not purposeful.    HM:  10/21 fluvax, 5/21 covid vaccines, 1/19 [id number], 8/14 [id number], 10/20 Td, 12/10 TDaP, 10/21 MMG/Gyn, Pain Dr. Mcgregor, 6/13 Cscope rep 10y, 1/21 Eye Dr. Delacruz.     Review of Systems   Constitutional: Negative for appetite change, chills, diaphoresis and fever.   HENT: Negative for congestion, ear pain, rhinorrhea, sinus pressure and sore throat.    Respiratory: Negative for cough, chest tightness and shortness of breath.    Cardiovascular: Negative for chest pain and palpitations.   Gastrointestinal: Negative for blood in stool, constipation, diarrhea, nausea and vomiting.   Genitourinary: Negative for dysuria, frequency, hematuria, menstrual problem, urgency and vaginal discharge.   Musculoskeletal: Negative for arthralgias.   Skin: Negative for rash.   Neurological: Negative for dizziness and headaches.   Psychiatric/Behavioral: Negative for sleep disturbance. The patient is not nervous/anxious.          Objective:   /66 (BP Location: Left arm, Patient Position: Sitting)   Pulse 86   Temp 97.6 °F (36.4 °C) (Temporal)   Resp 18   Ht 5' 3" (1.6 m)   Wt 63 kg (138 lb 14.2 oz)   LMP 12/04/2014   SpO2 98%   BMI 24.60 kg/m²     Physical Exam  Constitutional:       Appearance: She is well-developed and well-nourished.   HENT:      Right Ear: External ear normal. Tympanic membrane is not injected.      Left Ear: External ear normal. Tympanic membrane is not injected.      " Mouth/Throat:      Mouth: Oropharynx is clear and moist.   Eyes:      Conjunctiva/sclera: Conjunctivae normal.   Neck:      Thyroid: No thyromegaly.   Cardiovascular:      Rate and Rhythm: Normal rate and regular rhythm.      Pulses: Intact distal pulses.           Dorsalis pedis pulses are 2+ on the right side and 2+ on the left side.        Posterior tibial pulses are 2+ on the right side and 2+ on the left side.      Heart sounds: No murmur heard.  No friction rub. No gallop.    Pulmonary:      Effort: Pulmonary effort is normal.      Breath sounds: Normal breath sounds. No wheezing or rales.   Abdominal:      General: Bowel sounds are normal.      Palpations: Abdomen is soft. There is no mass.      Tenderness: There is no abdominal tenderness.   Musculoskeletal:         General: No edema.      Cervical back: Normal range of motion and neck supple.   Feet:      Right foot:      Protective Sensation: 10 sites tested. 10 sites sensed.      Skin integrity: No ulcer, blister, skin breakdown, erythema, warmth, callus or dry skin.      Left foot:      Protective Sensation: 10 sites tested. 10 sites sensed.      Skin integrity: No ulcer, blister, skin breakdown, erythema, warmth, callus or dry skin.   Lymphadenopathy:      Cervical: No cervical adenopathy.   Skin:     General: Skin is warm.      Findings: No rash.   Neurological:      Mental Status: She is alert and oriented to person, place, and time.   Psychiatric:         Mood and Affect: Mood and affect normal.       Results for ADRIANNA ANGELES (MRN [id number]) as of 1/20/2022 10:16   Ref. Range 1/13/2022 11:35 1/13/2022 11:51   WBC Latest Ref Range: 3.90 - 12.70 K/uL  4.88   RBC Latest Ref Range: 4.00 - 5.40 M/uL  4.24   Hemoglobin Latest Ref Range: 12.0 - 16.0 g/dL  13.0   Hematocrit Latest Ref Range: 37.0 - 48.5 %  41.7   MCV Latest Ref Range: 82 - 98 fL  98   MCH Latest Ref Range: 27.0 - 31.0 pg  30.7   MCHC Latest Ref Range: 32.0 - 36.0 g/dL  31.2 (L)    RDW Latest Ref Range: 11.5 - 14.5 %  13.4   Platelets Latest Ref Range: 150 - 450 K/uL  277   MPV Latest Ref Range: 9.2 - 12.9 fL  11.7   Gran % Latest Ref Range: 38.0 - 73.0 %  77.2 (H)   Lymph % Latest Ref Range: 18.0 - 48.0 %  13.7 (L)   Mono % Latest Ref Range: 4.0 - 15.0 %  5.9   Eosinophil % Latest Ref Range: 0.0 - 8.0 %  2.0   Basophil % Latest Ref Range: 0.0 - 1.9 %  1.0   Immature Granulocytes Latest Ref Range: 0.0 - 0.5 %  0.2   Gran # (ANC) Latest Ref Range: 1.8 - 7.7 K/uL  3.8   Lymph # Latest Ref Range: 1.0 - 4.8 K/uL  0.7 (L)   Mono # Latest Ref Range: 0.3 - 1.0 K/uL  0.3   Eos # Latest Ref Range: 0.0 - 0.5 K/uL  0.1   Baso # Latest Ref Range: 0.00 - 0.20 K/uL  0.05   Immature Grans (Abs) Latest Ref Range: 0.00 - 0.04 K/uL  0.01   nRBC Latest Ref Range: 0 /100 WBC  0   Differential Method Unknown  Automated   Vitamin B-12 Latest Ref Range: 210 - 950 pg/mL  443   Sodium Latest Ref Range: 136 - 145 mmol/L  136   Potassium Latest Ref Range: 3.5 - 5.1 mmol/L  5.3 (H)   Chloride Latest Ref Range: 95 - 110 mmol/L  100   CO2 Latest Ref Range: 23 - 29 mmol/L  27   Anion Gap Latest Ref Range: 8 - 16 mmol/L  9   BUN Latest Ref Range: 6 - 20 mg/dL  10   Creatinine Latest Ref Range: 0.5 - 1.4 mg/dL  0.9   eGFR if non African American Latest Ref Range: >60 mL/min/1.73 m^2  >60.0   eGFR if African American Latest Ref Range: >60 mL/min/1.73 m^2  >60.0   Glucose Latest Ref Range: 70 - 110 mg/dL  319 (H)   Calcium Latest Ref Range: 8.7 - 10.5 mg/dL  9.2   Alkaline Phosphatase Latest Ref Range: 55 - 135 U/L  116   PROTEIN TOTAL Latest Ref Range: 6.0 - 8.4 g/dL  6.3   Albumin Latest Ref Range: 3.5 - 5.2 g/dL  3.7   BILIRUBIN TOTAL Latest Ref Range: 0.1 - 1.0 mg/dL  0.7   AST Latest Ref Range: 10 - 40 U/L  37   ALT Latest Ref Range: 10 - 44 U/L  47 (H)   Triglycerides Latest Ref Range: 30 - 150 mg/dL  153 (H)   Cholesterol Latest Ref Range: 120 - 199 mg/dL  149   HDL Latest Ref Range: 40 - 75 mg/dL  42   HDL/Cholesterol  Ratio Latest Ref Range: 20.0 - 50.0 %  28.2   LDL Cholesterol External Latest Ref Range: 63.0 - 159.0 mg/dL  76.4   Non-HDL Cholesterol Latest Units: mg/dL  107   Total Cholesterol/HDL Ratio Latest Ref Range: 2.0 - 5.0   3.5   Vit D, 25-Hydroxy Latest Ref Range: 30 - 96 ng/mL  17 (L)   Hemoglobin A1C External Latest Ref Range: 4.0 - 5.6 %  6.7 (H)   Estimated Avg Glucose Latest Ref Range: 68 - 131 mg/dL  146 (H)   TSH Latest Ref Range: 0.400 - 4.000 uIU/mL  0.664   Creatinine, Urine Latest Ref Range: 15.0 - 325.0 mg/dL 29.0    Microalbumin, Urine Latest Units: ug/mL <5.0    MICROALB/CREAT RATIO Latest Ref Range: 0.0 - 30.0 ug/mg Unable to calculate          Assessment:       1. Type 2 diabetes mellitus without complication, with long-term current use of insulin    2. Vitamin B12 deficiency    3. Vitamin D deficiency    4. ALTAGRACIA (obstructive sleep apnea)    5. Hyperlipidemia associated with type 2 diabetes mellitus    6. Pseudoseizure    7. Anxiety    8. Encounter for preventive health examination    9. Chronic pain syndrome    10. Chronic right-sided low back pain with sciatica, sciatica laterality unspecified    11. Preventive measure    12. Urinary frequency          Plan:     Jadyn was seen today for follow-up.    Diagnoses and all orders for this visit:    Type 2 diabetes mellitus without complication, with long-term current use of insulin  -     Hemoglobin A1C; Future    Vitamin B12 deficiency- increase to weekly x 4, then resume monthly injection.    Vitamin D deficiency- increase to 3x weekly.  -     ergocalciferol (VITAMIN D2) 50,000 unit Cap; Take 1 capsule (50,000 Units total) by mouth 3 (three) times a week.    ALTAGRACIA (obstructive sleep apnea)    Hyperlipidemia associated with type 2 diabetes mellitus  -     gabapentin (NEURONTIN) 300 MG capsule; Take 2 capsules (600 mg total) by mouth 3 (three) times daily.    Pseudoseizure    Anxiety- increase SNRI, refer to Psychiatry.  B12 def may be contributing to  depression/fatigue.  -     Ambulatory referral/consult to Psychiatry; Future  -     DULoxetine (CYMBALTA) 30 MG capsule; Take 3 capsules (90 mg total) by mouth once daily.    Encounter for preventive health examination- utd.    Chronic pain syndrome,Chronic right-sided low back pain with sciatica, sciatica laterality unspecified    Preventive measure  -     gabapentin (NEURONTIN) 300 MG capsule; Take 2 capsules (600 mg total) by mouth 3 (three) times daily.    Urinary frequency, r/o UTI  -     Urinalysis; Future  -     Urine culture; Future    7# wt loss, unintentional- RTC 3mo.

## 2022-01-07 NOTE — ED PROVIDER NOTES
Encounter Date: 2021       History     Chief Complaint   Patient presents with    Headache     Recurrent headaches and vomiting x 3 days.     PT. C/o chronic migraines with a new episode for three days.         Review of patient's allergies indicates:   Allergen Reactions    Demerol [meperidine] Hallucinations    Penicillins Other (See Comments)     Patient cannot recall specific reaction, reports she has been listing this as an allergy since childhood.    Bactrim [sulfamethoxazole-trimethoprim]     Ciprofloxacin (bulk)     Codeine Nausea And Vomiting    Levetiracetam     Toradol [ketorolac]     Tramadol      seizures    Morpholine analogues Diarrhea, Itching and Nausea And Vomiting     Past Medical History:   Diagnosis Date    Abnormal Pap smear     bx was negative, per pt    Anemia     Anxiety     B12 deficiency     Blood transfusion     multiple     Chronic LBP     Degenerative disc disease     l spine    Diabetes mellitus     Diabetic neuropathy     General anesthetics causing adverse effect in therapeutic use     delayed emergence    Mixed hyperlipidemia 2013    PONV (postoperative nausea and vomiting)     RLS (restless legs syndrome)     Seizures     Sleep apnea     with CPAP    Vitamin D deficiency disease      Past Surgical History:   Procedure Laterality Date    ANUS SURGERY      AUGMENTATION OF BREAST      saline    BELT ABDOMINOPLASTY      tummy [facility]    BREAST SURGERY  2008    breast augmentation    CARPAL TUNNEL RELEASE Right 2021    Procedure: RELEASE, CARPAL TUNNEL;  Surgeon: Tyler Victor MD;  Location: [facility];  Service: Orthopedics;  Laterality: Right;  right carpal tunnel release and right cubital tunnel release with possible anterior transposition ulnar nerve     SECTION      x2    CHOLECYSTECTOMY      mikel      EXCISIONAL HEMORRHOIDECTOMY      GASTRIC BYPASS      HIP FRACTURE SURGERY      left hip ORIF    MOUTH SURGERY       revision of JJ anastomosis  2/27/15    small bowel resection  02/27/2015    TUBAL LIGATION      ULNAR TUNNEL RELEASE Right 9/9/2021    Procedure: RELEASE, CUBITAL TUNNEL;  Surgeon: Tyler Victor MD;  Location[facility];  Service: Orthopedics;  Laterality: Right;  right carpal tunnel release and right cubital tunnel release with possible anterior transposition ulnar nerve     Family History   Problem Relation Age of Onset    Diabetes Mother     Cataracts Mother     Glaucoma Maternal Aunt     Blindness Maternal Aunt     Breast cancer Neg Hx     Colon cancer Neg Hx     Thrombophilia Neg Hx      Social History     Tobacco Use    Smoking status: Never Smoker    Smokeless tobacco: Never Used   Substance Use Topics    Alcohol use: No    Drug use: No     Review of Systems   Constitutional: Negative for fever.   HENT: Negative for sore throat.    Respiratory: Negative for shortness of breath.    Cardiovascular: Negative for chest pain.   Gastrointestinal: Negative for nausea.   Genitourinary: Negative for dysuria.   Musculoskeletal: Negative for back pain.   Skin: Negative for rash.   Neurological: Negative for weakness.   Hematological: Does not bruise/bleed easily.       Physical Exam     Initial Vitals [12/29/21 1156]   BP Pulse Resp Temp SpO2   99/72 109 19 98.7 °F (37.1 °C) 96 %      MAP       --         Physical Exam    Nursing note and vitals reviewed.  Constitutional: She appears well-developed and well-nourished.   HENT:   Head: Normocephalic and atraumatic.   Eyes: Conjunctivae are normal.   Neck:   Normal range of motion.  Cardiovascular: Normal rate.   Pulmonary/Chest: Breath sounds normal. No respiratory distress. She has no wheezes.   Musculoskeletal:         General: Normal range of motion.      Cervical back: Normal range of motion.     Neurological: She is alert and oriented to person, place, and time.   Skin: Skin is warm and dry.   Psychiatric: She has a normal mood and affect.  Her behavior is normal. Thought content normal.         ED Course   Procedures  Labs Reviewed   SARS-COV-2 RDRP GENE    Narrative:     This test utilizes isothermal nucleic acid amplification   technology to detect the SARS-CoV-2 RdRp nucleic acid segment.   The analytical sensitivity (limit of detection) is 125 genome   equivalents/mL.   A POSITIVE result implies infection with the SARS-CoV-2 virus;   the patient is presumed to be contagious.     A NEGATIVE result means that SARS-CoV-2 nucleic acids are not   present above the limit of detection. A NEGATIVE result should be   treated as presumptive. It does not rule out the possibility of   COVID-19 and should not be the sole basis for treatment decisions.   If COVID-19 is strongly suspected based on clinical and exposure   history, re-testing using an alternate molecular assay should be   considered.   This test is only for use under the Food and Drug   Administration s Emergency Use Authorization (EUA).   Commercial kits are provided by [employer].   Performance characteristics of the EUA have been independently   verified by [facility] Department of   Pathology and Laboratory Medicine.   _________________________________________________________________   The authorized Fact Sheet for Healthcare Providers and the authorized Fact   Sheet for Patients of the ID NOW COVID-19 are available on the FDA   website:     https://www.fda.gov/media/832317/download  https://www.fda.gov/media/579464/download              Imaging Results    None          Medications   promethazine injection 25 mg (25 mg Intramuscular Given 12/29/21 1234)   diphenhydrAMINE injection 50 mg (50 mg Intramuscular Given 12/29/21 1235)   metoclopramide HCl injection 10 mg (10 mg Intramuscular Given 12/29/21 1408)   HYDROmorphone injection 1 mg (1 mg Intramuscular Given 12/29/21 1428)                          Clinical Impression:   Final diagnoses:  [G43.809] Other migraine without  status migrainosus, not intractable          ED Disposition Condition    Discharge Stable        ED Prescriptions     None        Follow-up Information     Follow up With Specialties Details Why Contact Info    Mikayla Yates MD Internal Medicine Schedule an appointment as soon as possible for a visit today  [address] [place] [address]  [phone]             Marck Green NP  01/07/22 3380

## 2022-01-10 ENCOUNTER — TELEPHONE (OUTPATIENT)
Dept: ORTHOPEDICS | Facility: CLINIC | Age: 52
End: 2022-01-10
Payer: MEDICARE

## 2022-01-13 ENCOUNTER — LAB VISIT (OUTPATIENT)
Dept: LAB | Facility: HOSPITAL | Age: 52
End: 2022-01-13
Attending: INTERNAL MEDICINE
Payer: MEDICARE

## 2022-01-13 DIAGNOSIS — Z79.4 TYPE 2 DIABETES MELLITUS WITHOUT COMPLICATION, WITH LONG-TERM CURRENT USE OF INSULIN: Chronic | ICD-10-CM

## 2022-01-13 DIAGNOSIS — E78.5 HYPERLIPIDEMIA ASSOCIATED WITH TYPE 2 DIABETES MELLITUS: ICD-10-CM

## 2022-01-13 DIAGNOSIS — E11.69 HYPERLIPIDEMIA ASSOCIATED WITH TYPE 2 DIABETES MELLITUS: ICD-10-CM

## 2022-01-13 DIAGNOSIS — E53.8 VITAMIN B12 DEFICIENCY: ICD-10-CM

## 2022-01-13 DIAGNOSIS — Z29.9 PREVENTIVE MEASURE: ICD-10-CM

## 2022-01-13 DIAGNOSIS — E11.9 TYPE 2 DIABETES MELLITUS WITHOUT COMPLICATION, WITH LONG-TERM CURRENT USE OF INSULIN: Chronic | ICD-10-CM

## 2022-01-13 DIAGNOSIS — E55.9 VITAMIN D DEFICIENCY: ICD-10-CM

## 2022-01-13 LAB
25(OH)D3+25(OH)D2 SERPL-MCNC: 17 NG/ML (ref 30–96)
ALBUMIN SERPL BCP-MCNC: 3.7 G/DL (ref 3.5–5.2)
ALP SERPL-CCNC: 116 U/L (ref 55–135)
ALT SERPL W/O P-5'-P-CCNC: 47 U/L (ref 10–44)
ANION GAP SERPL CALC-SCNC: 9 MMOL/L (ref 8–16)
AST SERPL-CCNC: 37 U/L (ref 10–40)
BILIRUB SERPL-MCNC: 0.7 MG/DL (ref 0.1–1)
BUN SERPL-MCNC: 10 MG/DL (ref 6–20)
CALCIUM SERPL-MCNC: 9.2 MG/DL (ref 8.7–10.5)
CHLORIDE SERPL-SCNC: 100 MMOL/L (ref 95–110)
CHOLEST SERPL-MCNC: 149 MG/DL (ref 120–199)
CHOLEST/HDLC SERPL: 3.5 {RATIO} (ref 2–5)
CO2 SERPL-SCNC: 27 MMOL/L (ref 23–29)
CREAT SERPL-MCNC: 0.9 MG/DL (ref 0.5–1.4)
EST. GFR  (AFRICAN AMERICAN): >60 ML/MIN/1.73 M^2
EST. GFR  (NON AFRICAN AMERICAN): >60 ML/MIN/1.73 M^2
GLUCOSE SERPL-MCNC: 319 MG/DL (ref 70–110)
HDLC SERPL-MCNC: 42 MG/DL (ref 40–75)
HDLC SERPL: 28.2 % (ref 20–50)
LDLC SERPL CALC-MCNC: 76.4 MG/DL (ref 63–159)
NONHDLC SERPL-MCNC: 107 MG/DL
POTASSIUM SERPL-SCNC: 5.3 MMOL/L (ref 3.5–5.1)
PROT SERPL-MCNC: 6.3 G/DL (ref 6–8.4)
SODIUM SERPL-SCNC: 136 MMOL/L (ref 136–145)
TRIGL SERPL-MCNC: 153 MG/DL (ref 30–150)
TSH SERPL DL<=0.005 MIU/L-ACNC: 0.66 UIU/ML (ref 0.4–4)
VIT B12 SERPL-MCNC: 443 PG/ML (ref 210–950)

## 2022-01-13 PROCEDURE — 82607 VITAMIN B-12: CPT | Mod: HCNC | Performed by: INTERNAL MEDICINE

## 2022-01-13 PROCEDURE — 80061 LIPID PANEL: CPT | Mod: HCNC | Performed by: INTERNAL MEDICINE

## 2022-01-13 PROCEDURE — 82306 VITAMIN D 25 HYDROXY: CPT | Mod: HCNC | Performed by: INTERNAL MEDICINE

## 2022-01-13 PROCEDURE — 84443 ASSAY THYROID STIM HORMONE: CPT | Mod: HCNC | Performed by: INTERNAL MEDICINE

## 2022-01-13 PROCEDURE — 36415 COLL VENOUS BLD VENIPUNCTURE: CPT | Mod: HCNC | Performed by: INTERNAL MEDICINE

## 2022-01-13 PROCEDURE — 85025 COMPLETE CBC W/AUTO DIFF WBC: CPT | Mod: HCNC | Performed by: INTERNAL MEDICINE

## 2022-01-13 PROCEDURE — 83036 HEMOGLOBIN GLYCOSYLATED A1C: CPT | Mod: HCNC | Performed by: INTERNAL MEDICINE

## 2022-01-13 PROCEDURE — 80053 COMPREHEN METABOLIC PANEL: CPT | Mod: HCNC | Performed by: INTERNAL MEDICINE

## 2022-01-14 LAB
BASOPHILS # BLD AUTO: 0.05 K/UL (ref 0–0.2)
BASOPHILS NFR BLD: 1 % (ref 0–1.9)
DIFFERENTIAL METHOD: ABNORMAL
EOSINOPHIL # BLD AUTO: 0.1 K/UL (ref 0–0.5)
EOSINOPHIL NFR BLD: 2 % (ref 0–8)
ERYTHROCYTE [DISTWIDTH] IN BLOOD BY AUTOMATED COUNT: 13.4 % (ref 11.5–14.5)
ESTIMATED AVG GLUCOSE: 146 MG/DL (ref 68–131)
HBA1C MFR BLD: 6.7 % (ref 4–5.6)
HCT VFR BLD AUTO: 41.7 % (ref 37–48.5)
HGB BLD-MCNC: 13 G/DL (ref 12–16)
IMM GRANULOCYTES # BLD AUTO: 0.01 K/UL (ref 0–0.04)
IMM GRANULOCYTES NFR BLD AUTO: 0.2 % (ref 0–0.5)
LYMPHOCYTES # BLD AUTO: 0.7 K/UL (ref 1–4.8)
LYMPHOCYTES NFR BLD: 13.7 % (ref 18–48)
MCH RBC QN AUTO: 30.7 PG (ref 27–31)
MCHC RBC AUTO-ENTMCNC: 31.2 G/DL (ref 32–36)
MCV RBC AUTO: 98 FL (ref 82–98)
MONOCYTES # BLD AUTO: 0.3 K/UL (ref 0.3–1)
MONOCYTES NFR BLD: 5.9 % (ref 4–15)
NEUTROPHILS # BLD AUTO: 3.8 K/UL (ref 1.8–7.7)
NEUTROPHILS NFR BLD: 77.2 % (ref 38–73)
NRBC BLD-RTO: 0 /100 WBC
PLATELET # BLD AUTO: 277 K/UL (ref 150–450)
PMV BLD AUTO: 11.7 FL (ref 9.2–12.9)
RBC # BLD AUTO: 4.24 M/UL (ref 4–5.4)
WBC # BLD AUTO: 4.88 K/UL (ref 3.9–12.7)

## 2022-01-20 ENCOUNTER — TELEPHONE (OUTPATIENT)
Dept: PHARMACY | Facility: CLINIC | Age: 52
End: 2022-01-20
Payer: MEDICARE

## 2022-01-20 ENCOUNTER — LAB VISIT (OUTPATIENT)
Dept: LAB | Facility: HOSPITAL | Age: 52
End: 2022-01-20
Payer: MEDICARE

## 2022-01-20 ENCOUNTER — OFFICE VISIT (OUTPATIENT)
Dept: FAMILY MEDICINE | Facility: CLINIC | Age: 52
End: 2022-01-20
Payer: MEDICARE

## 2022-01-20 VITALS
OXYGEN SATURATION: 98 % | TEMPERATURE: 98 F | WEIGHT: 138.88 LBS | DIASTOLIC BLOOD PRESSURE: 66 MMHG | HEIGHT: 63 IN | RESPIRATION RATE: 18 BRPM | SYSTOLIC BLOOD PRESSURE: 110 MMHG | HEART RATE: 86 BPM | BODY MASS INDEX: 24.61 KG/M2

## 2022-01-20 DIAGNOSIS — Z00.00 ENCOUNTER FOR PREVENTIVE HEALTH EXAMINATION: ICD-10-CM

## 2022-01-20 DIAGNOSIS — F41.9 ANXIETY: ICD-10-CM

## 2022-01-20 DIAGNOSIS — E78.5 HYPERLIPIDEMIA ASSOCIATED WITH TYPE 2 DIABETES MELLITUS: ICD-10-CM

## 2022-01-20 DIAGNOSIS — R35.0 URINARY FREQUENCY: ICD-10-CM

## 2022-01-20 DIAGNOSIS — E53.8 VITAMIN B12 DEFICIENCY: ICD-10-CM

## 2022-01-20 DIAGNOSIS — Z29.9 PREVENTIVE MEASURE: ICD-10-CM

## 2022-01-20 DIAGNOSIS — F44.5 PSEUDOSEIZURE: ICD-10-CM

## 2022-01-20 DIAGNOSIS — Z79.4 TYPE 2 DIABETES MELLITUS WITHOUT COMPLICATION, WITH LONG-TERM CURRENT USE OF INSULIN: Primary | Chronic | ICD-10-CM

## 2022-01-20 DIAGNOSIS — M54.40 CHRONIC RIGHT-SIDED LOW BACK PAIN WITH SCIATICA, SCIATICA LATERALITY UNSPECIFIED: ICD-10-CM

## 2022-01-20 DIAGNOSIS — G47.33 OSA (OBSTRUCTIVE SLEEP APNEA): ICD-10-CM

## 2022-01-20 DIAGNOSIS — E55.9 VITAMIN D DEFICIENCY: ICD-10-CM

## 2022-01-20 DIAGNOSIS — G89.4 CHRONIC PAIN SYNDROME: ICD-10-CM

## 2022-01-20 DIAGNOSIS — G89.29 CHRONIC RIGHT-SIDED LOW BACK PAIN WITH SCIATICA, SCIATICA LATERALITY UNSPECIFIED: ICD-10-CM

## 2022-01-20 DIAGNOSIS — E11.69 HYPERLIPIDEMIA ASSOCIATED WITH TYPE 2 DIABETES MELLITUS: ICD-10-CM

## 2022-01-20 DIAGNOSIS — E11.9 TYPE 2 DIABETES MELLITUS WITHOUT COMPLICATION, WITH LONG-TERM CURRENT USE OF INSULIN: Primary | Chronic | ICD-10-CM

## 2022-01-20 LAB
BACTERIA #/AREA URNS AUTO: ABNORMAL /HPF
BILIRUB UR QL STRIP: NEGATIVE
CLARITY UR REFRACT.AUTO: CLEAR
COLOR UR AUTO: YELLOW
GLUCOSE UR QL STRIP: ABNORMAL
HGB UR QL STRIP: NEGATIVE
KETONES UR QL STRIP: NEGATIVE
LEUKOCYTE ESTERASE UR QL STRIP: NEGATIVE
MICROSCOPIC COMMENT: ABNORMAL
NITRITE UR QL STRIP: NEGATIVE
PH UR STRIP: 5 [PH] (ref 5–8)
PROT UR QL STRIP: NEGATIVE
RBC #/AREA URNS AUTO: 1 /HPF (ref 0–4)
SP GR UR STRIP: 1.03 (ref 1–1.03)
SQUAMOUS #/AREA URNS AUTO: 2 /HPF
URN SPEC COLLECT METH UR: ABNORMAL
WBC #/AREA URNS AUTO: 1 /HPF (ref 0–5)
WBC CLUMPS UR QL AUTO: ABNORMAL
YEAST UR QL AUTO: ABNORMAL

## 2022-01-20 PROCEDURE — 99214 OFFICE O/P EST MOD 30 MIN: CPT | Mod: HCNC,S$GLB,, | Performed by: INTERNAL MEDICINE

## 2022-01-20 PROCEDURE — 3061F PR NEG MICROALBUMINURIA RESULT DOCUMENTED/REVIEW: ICD-10-PCS | Mod: HCNC,CPTII,S$GLB, | Performed by: INTERNAL MEDICINE

## 2022-01-20 PROCEDURE — 99214 PR OFFICE/OUTPT VISIT, EST, LEVL IV, 30-39 MIN: ICD-10-PCS | Mod: HCNC,S$GLB,, | Performed by: INTERNAL MEDICINE

## 2022-01-20 PROCEDURE — 3061F NEG MICROALBUMINURIA REV: CPT | Mod: HCNC,CPTII,S$GLB, | Performed by: INTERNAL MEDICINE

## 2022-01-20 PROCEDURE — 3044F HG A1C LEVEL LT 7.0%: CPT | Mod: HCNC,CPTII,S$GLB, | Performed by: INTERNAL MEDICINE

## 2022-01-20 PROCEDURE — 1159F MED LIST DOCD IN RCRD: CPT | Mod: HCNC,CPTII,S$GLB, | Performed by: INTERNAL MEDICINE

## 2022-01-20 PROCEDURE — 3072F LOW RISK FOR RETINOPATHY: CPT | Mod: HCNC,CPTII,S$GLB, | Performed by: INTERNAL MEDICINE

## 2022-01-20 PROCEDURE — 3044F PR MOST RECENT HEMOGLOBIN A1C LEVEL <7.0%: ICD-10-PCS | Mod: HCNC,CPTII,S$GLB, | Performed by: INTERNAL MEDICINE

## 2022-01-20 PROCEDURE — 99499 RISK ADDL DX/OHS AUDIT: ICD-10-PCS | Mod: HCNC,S$GLB,, | Performed by: INTERNAL MEDICINE

## 2022-01-20 PROCEDURE — 3074F SYST BP LT 130 MM HG: CPT | Mod: HCNC,CPTII,S$GLB, | Performed by: INTERNAL MEDICINE

## 2022-01-20 PROCEDURE — 3072F PR LOW RISK FOR RETINOPATHY: ICD-10-PCS | Mod: HCNC,CPTII,S$GLB, | Performed by: INTERNAL MEDICINE

## 2022-01-20 PROCEDURE — 3078F PR MOST RECENT DIASTOLIC BLOOD PRESSURE < 80 MM HG: ICD-10-PCS | Mod: HCNC,CPTII,S$GLB, | Performed by: INTERNAL MEDICINE

## 2022-01-20 PROCEDURE — 99499 UNLISTED E&M SERVICE: CPT | Mod: HCNC,S$GLB,, | Performed by: INTERNAL MEDICINE

## 2022-01-20 PROCEDURE — 81001 URINALYSIS AUTO W/SCOPE: CPT | Mod: HCNC | Performed by: INTERNAL MEDICINE

## 2022-01-20 PROCEDURE — 3066F NEPHROPATHY DOC TX: CPT | Mod: HCNC,CPTII,S$GLB, | Performed by: INTERNAL MEDICINE

## 2022-01-20 PROCEDURE — 87086 URINE CULTURE/COLONY COUNT: CPT | Mod: HCNC | Performed by: INTERNAL MEDICINE

## 2022-01-20 PROCEDURE — 3074F PR MOST RECENT SYSTOLIC BLOOD PRESSURE < 130 MM HG: ICD-10-PCS | Mod: HCNC,CPTII,S$GLB, | Performed by: INTERNAL MEDICINE

## 2022-01-20 PROCEDURE — 3008F PR BODY MASS INDEX (BMI) DOCUMENTED: ICD-10-PCS | Mod: HCNC,CPTII,S$GLB, | Performed by: INTERNAL MEDICINE

## 2022-01-20 PROCEDURE — 1159F PR MEDICATION LIST DOCUMENTED IN MEDICAL RECORD: ICD-10-PCS | Mod: HCNC,CPTII,S$GLB, | Performed by: INTERNAL MEDICINE

## 2022-01-20 PROCEDURE — 99999 PR PBB SHADOW E&M-EST. PATIENT-LVL V: CPT | Mod: PBBFAC,HCNC,, | Performed by: INTERNAL MEDICINE

## 2022-01-20 PROCEDURE — 3008F BODY MASS INDEX DOCD: CPT | Mod: HCNC,CPTII,S$GLB, | Performed by: INTERNAL MEDICINE

## 2022-01-20 PROCEDURE — 3078F DIAST BP <80 MM HG: CPT | Mod: HCNC,CPTII,S$GLB, | Performed by: INTERNAL MEDICINE

## 2022-01-20 PROCEDURE — 3066F PR DOCUMENTATION OF TREATMENT FOR NEPHROPATHY: ICD-10-PCS | Mod: HCNC,CPTII,S$GLB, | Performed by: INTERNAL MEDICINE

## 2022-01-20 PROCEDURE — 99999 PR PBB SHADOW E&M-EST. PATIENT-LVL V: ICD-10-PCS | Mod: PBBFAC,HCNC,, | Performed by: INTERNAL MEDICINE

## 2022-01-20 RX ORDER — CYANOCOBALAMIN 1000 UG/ML
1000 INJECTION, SOLUTION INTRAMUSCULAR; SUBCUTANEOUS
Qty: 10 ML | Refills: 11 | Status: SHIPPED | OUTPATIENT
Start: 2022-01-20 | End: 2023-08-03 | Stop reason: SDUPTHER

## 2022-01-20 RX ORDER — GABAPENTIN 300 MG/1
600 CAPSULE ORAL 3 TIMES DAILY
Qty: 540 CAPSULE | Refills: 1 | Status: SHIPPED | OUTPATIENT
Start: 2022-01-20 | End: 2022-04-14 | Stop reason: SDUPTHER

## 2022-01-20 RX ORDER — ERGOCALCIFEROL 1.25 MG/1
50000 CAPSULE ORAL
Qty: 36 CAPSULE | Refills: 3 | Status: SHIPPED | OUTPATIENT
Start: 2022-01-21 | End: 2023-09-07 | Stop reason: ALTCHOICE

## 2022-01-20 RX ORDER — DULOXETIN HYDROCHLORIDE 30 MG/1
90 CAPSULE, DELAYED RELEASE ORAL DAILY
Qty: 270 CAPSULE | Refills: 3 | Status: SHIPPED | OUTPATIENT
Start: 2022-01-20 | End: 2022-04-14 | Stop reason: SDUPTHER

## 2022-01-21 ENCOUNTER — PATIENT MESSAGE (OUTPATIENT)
Dept: FAMILY MEDICINE | Facility: CLINIC | Age: 52
End: 2022-01-21
Payer: MEDICARE

## 2022-01-21 LAB — BACTERIA UR CULT: NO GROWTH

## 2022-01-24 ENCOUNTER — OFFICE VISIT (OUTPATIENT)
Dept: PSYCHIATRY | Facility: CLINIC | Age: 52
End: 2022-01-24
Payer: MEDICARE

## 2022-01-24 DIAGNOSIS — F41.9 ANXIETY: Primary | ICD-10-CM

## 2022-01-24 DIAGNOSIS — F43.10 POST TRAUMATIC STRESS DISORDER (PTSD): ICD-10-CM

## 2022-01-24 DIAGNOSIS — F39 UNSPECIFIED MOOD (AFFECTIVE) DISORDER: ICD-10-CM

## 2022-01-24 PROCEDURE — 3044F HG A1C LEVEL LT 7.0%: CPT | Mod: HCNC,CPTII,S$GLB, | Performed by: SOCIAL WORKER

## 2022-01-24 PROCEDURE — 90837 PR PSYCHOTHERAPY W/PATIENT, 60 MIN: ICD-10-PCS | Mod: HCNC,S$GLB,, | Performed by: SOCIAL WORKER

## 2022-01-24 PROCEDURE — 99999 PR PBB SHADOW E&M-EST. PATIENT-LVL I: ICD-10-PCS | Mod: PBBFAC,HCNC,, | Performed by: SOCIAL WORKER

## 2022-01-24 PROCEDURE — 3072F LOW RISK FOR RETINOPATHY: CPT | Mod: HCNC,CPTII,S$GLB, | Performed by: SOCIAL WORKER

## 2022-01-24 PROCEDURE — 99499 UNLISTED E&M SERVICE: CPT | Mod: S$GLB,,, | Performed by: SOCIAL WORKER

## 2022-01-24 PROCEDURE — 3072F PR LOW RISK FOR RETINOPATHY: ICD-10-PCS | Mod: HCNC,CPTII,S$GLB, | Performed by: SOCIAL WORKER

## 2022-01-24 PROCEDURE — 3044F PR MOST RECENT HEMOGLOBIN A1C LEVEL <7.0%: ICD-10-PCS | Mod: HCNC,CPTII,S$GLB, | Performed by: SOCIAL WORKER

## 2022-01-24 PROCEDURE — 3066F NEPHROPATHY DOC TX: CPT | Mod: HCNC,CPTII,S$GLB, | Performed by: SOCIAL WORKER

## 2022-01-24 PROCEDURE — 99999 PR PBB SHADOW E&M-EST. PATIENT-LVL I: CPT | Mod: PBBFAC,HCNC,, | Performed by: SOCIAL WORKER

## 2022-01-24 PROCEDURE — 3061F NEG MICROALBUMINURIA REV: CPT | Mod: HCNC,CPTII,S$GLB, | Performed by: SOCIAL WORKER

## 2022-01-24 PROCEDURE — 3066F PR DOCUMENTATION OF TREATMENT FOR NEPHROPATHY: ICD-10-PCS | Mod: HCNC,CPTII,S$GLB, | Performed by: SOCIAL WORKER

## 2022-01-24 PROCEDURE — 3061F PR NEG MICROALBUMINURIA RESULT DOCUMENTED/REVIEW: ICD-10-PCS | Mod: HCNC,CPTII,S$GLB, | Performed by: SOCIAL WORKER

## 2022-01-24 PROCEDURE — 99499 RISK ADDL DX/OHS AUDIT: ICD-10-PCS | Mod: S$GLB,,, | Performed by: SOCIAL WORKER

## 2022-01-24 PROCEDURE — 90837 PSYTX W PT 60 MINUTES: CPT | Mod: HCNC,S$GLB,, | Performed by: SOCIAL WORKER
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
## 2022-01-31 ENCOUNTER — TELEPHONE (OUTPATIENT)
Dept: ORTHOPEDICS | Facility: CLINIC | Age: 52
End: 2022-01-31
Payer: MEDICARE

## 2022-02-09 ENCOUNTER — OUTPATIENT CASE MANAGEMENT (OUTPATIENT)
Dept: ADMINISTRATIVE | Facility: OTHER | Age: 52
End: 2022-02-09
Payer: MEDICARE

## 2022-02-09 NOTE — LETTER
February 11, 2022    Jadyn Chance  [address]  [place] [address]             [facility]  [address]  [place] [address] Welcome to [facility]s Complex Care Management Program.  It was a pleasure talking with you today.  My name is Michelle Arrieta. I look forward to working with you as your .  My goal is to help you function at the healthiest and highest level possible.  You can contact me directly at [phone].    As an [facility] patient with [employer], some of the services we may be able to provide include:      Development of an individualized care plan with a Registered Nurse    Connection with a    Connection with available resources and services     Coordinate communication among your care team members    Provide coaching and education    Help you understand your doctors treatment plan   Help you obtain information about your insurance coverage.     All services provided by [facility]s Complex Care Managers and other care team members are coordinated with and communicated to your primary care team.    As part of your enrollment, you will be receiving education materials and more information about these services in your [facility] account, by phone or through the mail.  If you do not wish to participate or receive information, please contact our office at [phone].      Sincerely,        Michelle Arrieta RN, CCM [facility]   Out-patient RN Complex Care Manager

## 2022-02-11 ENCOUNTER — TELEPHONE (OUTPATIENT)
Dept: NEUROLOGY | Facility: CLINIC | Age: 52
End: 2022-02-11
Payer: MEDICARE

## 2022-02-11 NOTE — TELEPHONE ENCOUNTER
----- Message from Michelle Arrieta RN sent at 2/11/2022  4:20 PM CST -----  Dr Laboy/Staff,    Mrs Chance is scheduled to see you on 2/22/22. I screened her for OPCM today and in addition to those things you have planned to evaluate her for, I wanted to let you know she reports having frequent falls of unknown cause. She does not use any type of assistive device for mobility and reports she is independent with all activities of daily living. She stated she has assumed they were related to either hypoglycemia episodes or seizure related. On med review it appears she takes numerous meds that increase fall risk that could also be a causative or contributing factor.     Thank you,     Michelle Arrieta RN, [facility]  Outpatient Case Management  [phone]  Ext [phone]  [email or web address]

## 2022-02-11 NOTE — PROGRESS NOTES
Outpatient Care Management  Initial Patient Assessment    Patient: Jadyn Chance  MRN: [id number]  Date of Service: 02/09/2022  Completed by: Michelle Arrieta RN  Referral Date: 12/29/2021  Program: Case Management (High Risk)    Reason for Visit   Patient presents with    OPCM RN First Assessment Attempt    OPCM Enrollment Call    PHQ-9    Initial Assessment    Plan Of Care     Medication Reconciliation        Brief Summary:  Jadyn Chance was referred by Dr THERESE Sage for chronic pain syndrome and T2DM. Patient qualifies for program based on risk score of 85% with multiple comorbid ongoing conditions.   Active problem list, medical, surgical and social history reviewed. Active comorbidities include T2DM, chronic pain syndrome and need for R carpel tunnel release procedure that causes numbness and weakness to R hand and fingers. Areas of need identified by patient include difficulty with controlling depression symptoms and need for carpel tunnel release procedure to help with R hand symptoms. She also reports frequent falls for unknown reasons, stating they are possibly caused by hypoglycemia episodes or seizure d/o.   Complex care plan created with patient/caregiver input. By next encounter, patient agrees to attend neuro and psych provider appts. She also agrees to receive educational literature from this CM related to fall prevention and chronic pain control.    Assessment Documentation     OPCM Initial Assessment    Involvement of Care  Do I have permission to speak with other family members about your care?: Yes  Assessment completed by: Patient  Identified Areas of Need  Housing: no  Medication Adherence: Yes  *Active medication list was reviewed and reconciled with patient and/or caregiver:   Nutrition: no  Lab Adherence: no  Depression: No  Communication: no  Health Literacy: no  Fall risk?: Yes  Equipment/Supplies/Services: yes          Problem List and History     Problems  Addressed This Visit    Depression: Identified as current problem  Diabetes: Not identified by patient as current problem  Chronic Kidney Disease: Not identified by patient as current problem  Hyperlipidemia: Not identified by patient as current problem  Seizures: Not identified by patient as current problem         Reviewed medical and social history with patient and/or caregiver. A complex care plan was discussed and completed today, with input from patient and/or caregiver.    Patient Instructions     Instructions were provided via the [employer] patient resources and are available for the patient to view on the patient portal, if active.    Next steps: Begin to review care plan tasks. Michelle Arrieta RN    Follow up in about 2 days (around 2/11/2022).    Todays OPCM Self-Management Care Plan was developed with the patients/caregivers input and was based on identified barriers from todays assessment.  Goals were written today with the patient/caregiver and the patient has agreed to work towards these goals to improve his/her overall well-being. Patient verbalized understanding of the care plan, goals, and all of today's instructions. Encouraged patient/caregiver to communicate with his/her physician and health care team about health conditions and the treatment plan.  Provided my contact information today and encouraged patient/caregiver to call me with any questions as needed.

## 2022-02-19 DIAGNOSIS — G47.00 INSOMNIA, UNSPECIFIED TYPE: ICD-10-CM

## 2022-02-20 RX ORDER — DOXEPIN HYDROCHLORIDE 50 MG/1
CAPSULE ORAL
Qty: 60 CAPSULE | Refills: 5 | Status: SHIPPED | OUTPATIENT
Start: 2022-02-20 | End: 2022-04-04 | Stop reason: SDUPTHER

## 2022-02-21 ENCOUNTER — OUTPATIENT CASE MANAGEMENT (OUTPATIENT)
Dept: ADMINISTRATIVE | Facility: OTHER | Age: 52
End: 2022-02-21
Payer: MEDICARE

## 2022-02-21 NOTE — PROGRESS NOTES
Outpatient Care Management  Plan of Care Follow Up Visit    Patient: Jadyn Chance  MRN: [id number]  Date of Service: 02/21/2022  Completed by: Michelle Arrieta RN  Referral Date: 12/29/2021  Program: Case Management (High Risk)    Reason for Visit   Patient presents with    Update Plan Of Care       Brief Summary: Follow up phone contact and reviewed several care plan tasks and upcoming appts. She agreed to follow up contact from this  in about 2 weeks.     Patient Summary     Involvement of Care:  Do I have permission to speak with other family members about your care?       Patient Reported Labs & Vitals:  1.  Any Patient Reported Labs & Vitals?     2.  Patient Reported Blood Pressure:     3.  Patient Reported Pulse:     4.  Patient Reported Weight (Kg):     5.  Patient Reported Blood Glucose (mg/dl):       Medical and social history was reviewed with patient and/or caregiver.     Clinical Assessment     Reviewed and provided basic information on available community resources for mental health, transportation, wellness resources, and palliative care programs with patient and/or caregiver.     Complex Care Plan     Care plan was discussed and completed today with input from patient and/or caregiver.    Patient Instructions     Instructions were provided via the [employer] patient resources and are available for the patient to view on the patient portal.    Next Steps: Continue to discuss fall risk and pain management. Michelle Arrieta RN    No follow-ups on file.    Todays OPC Self-Management Care Plan was developed with the patients/caregivers input and was based on identified barriers from todays assessment.  Goals were written today with the patient/caregiver and the patient has agreed to work towards these goals to improve his/her overall well-being. Patient verbalized understanding of the care plan, goals, and all of today's instructions. Encouraged patient/caregiver to communicate with his/her  physician and health care team about health conditions and the treatment plan.  Provided my contact information today and encouraged patient/caregiver to call me with any questions as needed.

## 2022-02-22 ENCOUNTER — OFFICE VISIT (OUTPATIENT)
Dept: NEUROLOGY | Facility: CLINIC | Age: 52
End: 2022-02-22
Payer: MEDICARE

## 2022-02-22 ENCOUNTER — PATIENT OUTREACH (OUTPATIENT)
Dept: ADMINISTRATIVE | Facility: OTHER | Age: 52
End: 2022-02-22
Payer: MEDICARE

## 2022-02-22 VITALS
WEIGHT: 143.31 LBS | HEIGHT: 63 IN | BODY MASS INDEX: 25.39 KG/M2 | RESPIRATION RATE: 18 BRPM | HEART RATE: 101 BPM | DIASTOLIC BLOOD PRESSURE: 80 MMHG | SYSTOLIC BLOOD PRESSURE: 122 MMHG

## 2022-02-22 DIAGNOSIS — R42 DIZZINESS AND GIDDINESS: ICD-10-CM

## 2022-02-22 DIAGNOSIS — G44.86 CERVICOGENIC HEADACHE: ICD-10-CM

## 2022-02-22 DIAGNOSIS — M54.2 CERVICALGIA: ICD-10-CM

## 2022-02-22 DIAGNOSIS — G44.52 NPDH (NEW PERSISTENT DAILY HEADACHE): Primary | ICD-10-CM

## 2022-02-22 DIAGNOSIS — M47.812 FACET JOINT DISEASE OF CERVICAL REGION: ICD-10-CM

## 2022-02-22 DIAGNOSIS — H93.A9 PULSATILE TINNITUS, UNSPECIFIED EAR: ICD-10-CM

## 2022-02-22 DIAGNOSIS — M54.81 OCCIPITAL NEURALGIA, UNSPECIFIED LATERALITY: ICD-10-CM

## 2022-02-22 PROCEDURE — 3066F NEPHROPATHY DOC TX: CPT | Mod: HCNC,CPTII,S$GLB, | Performed by: PSYCHIATRY & NEUROLOGY

## 2022-02-22 PROCEDURE — 99205 OFFICE O/P NEW HI 60 MIN: CPT | Mod: HCNC,S$GLB,, | Performed by: PSYCHIATRY & NEUROLOGY

## 2022-02-22 PROCEDURE — 99205 PR OFFICE/OUTPT VISIT, NEW, LEVL V, 60-74 MIN: ICD-10-PCS | Mod: HCNC,S$GLB,, | Performed by: PSYCHIATRY & NEUROLOGY

## 2022-02-22 PROCEDURE — 3008F BODY MASS INDEX DOCD: CPT | Mod: HCNC,CPTII,S$GLB, | Performed by: PSYCHIATRY & NEUROLOGY

## 2022-02-22 PROCEDURE — 3072F PR LOW RISK FOR RETINOPATHY: ICD-10-PCS | Mod: HCNC,CPTII,S$GLB, | Performed by: PSYCHIATRY & NEUROLOGY

## 2022-02-22 PROCEDURE — 1159F PR MEDICATION LIST DOCUMENTED IN MEDICAL RECORD: ICD-10-PCS | Mod: HCNC,CPTII,S$GLB, | Performed by: PSYCHIATRY & NEUROLOGY

## 2022-02-22 PROCEDURE — 3079F DIAST BP 80-89 MM HG: CPT | Mod: HCNC,CPTII,S$GLB, | Performed by: PSYCHIATRY & NEUROLOGY

## 2022-02-22 PROCEDURE — 99999 PR PBB SHADOW E&M-EST. PATIENT-LVL V: CPT | Mod: PBBFAC,HCNC,, | Performed by: PSYCHIATRY & NEUROLOGY

## 2022-02-22 PROCEDURE — 99999 PR PBB SHADOW E&M-EST. PATIENT-LVL V: ICD-10-PCS | Mod: PBBFAC,HCNC,, | Performed by: PSYCHIATRY & NEUROLOGY

## 2022-02-22 PROCEDURE — 3072F LOW RISK FOR RETINOPATHY: CPT | Mod: HCNC,CPTII,S$GLB, | Performed by: PSYCHIATRY & NEUROLOGY

## 2022-02-22 PROCEDURE — 3044F PR MOST RECENT HEMOGLOBIN A1C LEVEL <7.0%: ICD-10-PCS | Mod: HCNC,CPTII,S$GLB, | Performed by: PSYCHIATRY & NEUROLOGY

## 2022-02-22 PROCEDURE — 3061F NEG MICROALBUMINURIA REV: CPT | Mod: HCNC,CPTII,S$GLB, | Performed by: PSYCHIATRY & NEUROLOGY

## 2022-02-22 PROCEDURE — 3074F SYST BP LT 130 MM HG: CPT | Mod: HCNC,CPTII,S$GLB, | Performed by: PSYCHIATRY & NEUROLOGY

## 2022-02-22 PROCEDURE — 3044F HG A1C LEVEL LT 7.0%: CPT | Mod: HCNC,CPTII,S$GLB, | Performed by: PSYCHIATRY & NEUROLOGY

## 2022-02-22 PROCEDURE — 1159F MED LIST DOCD IN RCRD: CPT | Mod: HCNC,CPTII,S$GLB, | Performed by: PSYCHIATRY & NEUROLOGY

## 2022-02-22 PROCEDURE — 3066F PR DOCUMENTATION OF TREATMENT FOR NEPHROPATHY: ICD-10-PCS | Mod: HCNC,CPTII,S$GLB, | Performed by: PSYCHIATRY & NEUROLOGY

## 2022-02-22 PROCEDURE — 3008F PR BODY MASS INDEX (BMI) DOCUMENTED: ICD-10-PCS | Mod: HCNC,CPTII,S$GLB, | Performed by: PSYCHIATRY & NEUROLOGY

## 2022-02-22 PROCEDURE — 3074F PR MOST RECENT SYSTOLIC BLOOD PRESSURE < 130 MM HG: ICD-10-PCS | Mod: HCNC,CPTII,S$GLB, | Performed by: PSYCHIATRY & NEUROLOGY

## 2022-02-22 PROCEDURE — 3079F PR MOST RECENT DIASTOLIC BLOOD PRESSURE 80-89 MM HG: ICD-10-PCS | Mod: HCNC,CPTII,S$GLB, | Performed by: PSYCHIATRY & NEUROLOGY

## 2022-02-22 PROCEDURE — 3061F PR NEG MICROALBUMINURIA RESULT DOCUMENTED/REVIEW: ICD-10-PCS | Mod: HCNC,CPTII,S$GLB, | Performed by: PSYCHIATRY & NEUROLOGY

## 2022-02-22 RX ORDER — TOPIRAMATE 25 MG/1
TABLET ORAL
Qty: 120 TABLET | Refills: 5 | Status: SHIPPED | OUTPATIENT
Start: 2022-02-22 | End: 2022-04-14 | Stop reason: SDUPTHER

## 2022-02-22 RX ORDER — NARATRIPTAN 2.5 MG/1
TABLET ORAL
Qty: 9 TABLET | Refills: 12 | Status: SHIPPED | OUTPATIENT
Start: 2022-02-22 | End: 2022-04-14 | Stop reason: SDUPTHER

## 2022-02-22 RX ORDER — PROMETHAZINE HYDROCHLORIDE 25 MG/1
25 TABLET ORAL 2 TIMES DAILY PRN
Qty: 40 TABLET | Refills: 6 | Status: SHIPPED | OUTPATIENT
Start: 2022-02-22 | End: 2023-02-06 | Stop reason: SDUPTHER

## 2022-02-22 NOTE — PROGRESS NOTES
Subjective:       Patient ID: Jadyn Chance is a 51 y.o. female.    Chief Complaint: Headache and Seizures    HPI   Patient seen in consultation at the request of Dr. Yates for        Care Timeline  12/29/212  1023   Arrived   1234   promethazine HCl 25 mg   1235   diphenhydramine HCl 50 mg   1251   POCT COVID-19 Rapid Screening   1408   metoclopramide HCl 10 mg   1428   hydromorphone HCl 1 mg   1519   Discharged       Care Timeline  11/24/21  1146   Arrived   1221   CT Head Without Contrast   1245   0.9 % sodium chloride 1000 mL   1247   Comprehensive metabolic panel      CBC auto differential     ondansetron HCl/PF 4 mg     hydromorphone HCl/PF 1 mg     metoclopramide HCl 10 mg   1348   butorphanol tartrate 1 mg     promethazine (PHENERGAN) 12.5 mg in dextrose 5 %... 12.5 mg   1443   butorphanol tartrate 1 mg   1533   Discharged       [facility] - received dilaudid 2 mg + phenergan. Relief for 5 days     Headache history:   Age of onset -  Nov 2021, after a possible fall. Pt not clear   Location - bitemple, biocciptal  Nature of pain -  Throbbing   Prodrome - no   Aura -  No   Duration of headache - > 4 hrs   Time to peak intensity - n/a   Pain scale - range of intensity -  7-10/10  Frequency -  Daily   Status Migrainosus history - no   Time of day of most headaches- anytime     Associated symptoms with the headache:   Blurred vision   Dizziness   Nausea/vomitting    Cluster headache symptoms: none   Symptoms of increased intracranial pressure:   Whooshing sounds - yes   Visual spots/blotches  - yes     Headache Triggers: unknown     Other comorbid conditions:  Anxiety - yes   Motion sickness symptom - yes     Treatment history:  Resolution of headache with sleep - yes   Meds tried:  Takes oxycodone and xanax 3 times a day   Doxepin, Gabapentin, Cymbalta - not help   Not using CPAP   fioricet - not help   imitrex - [place]   ONB - helped 1 week     Headache risk factors:   H/o TBI  - no    H/o Meningitis  - no   F/h Aneurysms  - no    Headache burden:   In the last three months:  Days missed from work = 0  Days unable to perform activities of daily living = 0  Days unable to attend social activities = 0      Review of Systems   Constitutional: Negative.    HENT: Negative.    Eyes: Negative.    Respiratory: Negative.    Cardiovascular: Negative.    Gastrointestinal: Positive for nausea.   Endocrine: Negative.    Genitourinary: Negative.    Musculoskeletal: Positive for back pain.   Integumentary:  Negative.   Allergic/Immunologic: Negative.    Neurological: Positive for dizziness and headaches.   Hematological: Negative.    Psychiatric/Behavioral: The patient is nervous/anxious.          Objective:      Physical Exam  Constitutional:       Appearance: Normal appearance.   HENT:      Head: Normocephalic and atraumatic.      Right Ear: External ear normal.      Left Ear: External ear normal.      Nose: Nose normal.   Eyes:      Extraocular Movements: Extraocular movements intact.      Pupils: Pupils are equal, round, and reactive to light.   Cardiovascular:      Rate and Rhythm: Normal rate and regular rhythm.   Pulmonary:      Effort: Pulmonary effort is normal.      Breath sounds: Normal breath sounds.   Musculoskeletal:         General: Normal range of motion.      Cervical back: Normal range of motion.   Skin:     General: Skin is warm and dry.   Neurological:      General: No focal deficit present.      Mental Status: She is alert and oriented to person, place, and time.      Cranial Nerves: No cranial nerve deficit.      Sensory: No sensory deficit.      Motor: No weakness.      Coordination: Coordination normal.      Gait: Gait normal.      Deep Tendon Reflexes: Reflexes normal.   Psychiatric:         Mood and Affect: Mood normal.         Behavior: Behavior normal.         Thought Content: Thought content normal.         Judgment: Judgment normal.         Headache Exam:  Optic disc is flat OU    Temples appear symmetric  No V1,V2,V3 distribution allodynia/hyperalgesia hipolito   No facial swelling  No gross TMJ abnormalities   No ptosis   No conj injection   No meningismus   No neck stiffness  Mild C spine tenderness  Cervical facet tenderness on palpation hipolito   No tender points over trapezium   No supraorbital nerve exit point tenderness  hipolito occipital nerve exit point tenderness  No auriculotemporal nerve tenderness     Assessment:       Problem List Items Addressed This Visit     Cervicogenic headache    Facet joint disease of cervical region    NPDH (new persistent daily headache) - Primary    Occipital neuralgia      Other Visit Diagnoses     Cervicalgia        Relevant Orders    MRI Cervical Spine Without Contrast    Dizziness and giddiness        Relevant Orders    MRI Brain Without Contrast    MRV Brain Without Contrast    Pulsatile tinnitus, unspecified ear        Relevant Orders    MRV Brain Without Contrast    Dizziness and giddiness         Relevant Orders    MRI Brain Without Contrast    MRV Brain Without Contrast          Head CT  Impression:     No acute intracranial abnormality.        Electronically signed by: Radha Murray  Date:                                            11/24/2021  Time:                                           12:42             Exam Ended: 11/24/21 12:21           Sleep study   Dos: 4/20/21  SLEEP STUDY FINDINGS: Patient underwent a one night Home Sleep Test and by behavioral criteria, slept for approximately  6.3 hours, with a sleep latency of 17 minutes and a sleep efficiency of 90.4%. Mild sleep disordered breathing (AHI=8) is noted  based on a 4% hypopnea desaturation criteria. The patient slept supine 61.4% of the night based on valid recording time of 6.33  hours. When considering more subtle measures of sleep disordered breathing, the overall respiratory disturbance index is also  moderate(RDI=17) based on a 1% hypopnea desaturation criteria with confirmation  by surrogate arousal indicators. The  apneas/hypopneas are accompanied by minimal oxygen desaturation (percent time below 90% SpO2: 0.2%, Min SpO2: 87.6%). The  average desaturation across all sleep disordered breathing events is 2.6%. Snoring occurs for 0.9% (30 dB) of the study. The mean  pulse rate is 78 BPM, with frequent pulse rate variability (48 events with >= 6 BPM increase/decrease per hour).  TREATMENT CONSIDERATIONS: Consider trial of Auto-titrating CPAP 6-20 cm, mask of patient's choice, and heated  humidification. If patient has difficulty with CPAP adherence or ongoing ALTAGRACIA symptoms or despite CPAP adherence, then consider  an in-lab titration sleep study in order to determine optimal fixed CPAP setting. Alternatively consider oral appliance fitted by a  dentist specializing in these devices, or surgical consultation for uvulopalatopharyngoplasty (UPPP) for treatment of obstructive sleep  apnea.  Plan:   1.MRI brain, MRV, MRI C spine   2.Given prescription for prophylactic medication - Topamax. I discussed side effects of the medications. We will start at a lower dose. I asked her to stop the medication if she notices serious adverse effects like psychosis and seek immediate medical attention at an ER.   Breakthrough headache - naratriptan   Multiple alternative treatment options were offered to the patient  4. Patient education. Discussed prevention and treatment of migraine headaches. Discussed sleep hygiene, healthy diet, importance of minimizing caffeine intake to a maximum of 100-200 mg /day, importance of avoiding foods with MSG, Nutrasweet, and Aspartame.   Provided hand outs on this.   5. Refrain from over counter pain medications. Discussed medication overuse headache.   6. Occipital neuralgia - If medical management is ineffective may consider occipital nerve blocks.   7. I urged the patient to keep a headache calender.   F/up in [facility] neuro clinic     Patient verbalized understanding to plan. I  answered all her questions to her satisfaction.

## 2022-02-28 ENCOUNTER — OFFICE VISIT (OUTPATIENT)
Dept: PSYCHIATRY | Facility: CLINIC | Age: 52
End: 2022-02-28
Payer: MEDICARE

## 2022-02-28 DIAGNOSIS — T74.22XS SEXUAL ABUSE OF CHILD, SEQUELA: ICD-10-CM

## 2022-02-28 DIAGNOSIS — Z63.9 FAMILY DYNAMICS PROBLEM: ICD-10-CM

## 2022-02-28 DIAGNOSIS — F39 UNSPECIFIED MOOD (AFFECTIVE) DISORDER: Primary | ICD-10-CM

## 2022-02-28 DIAGNOSIS — F43.10 POST TRAUMATIC STRESS DISORDER (PTSD): ICD-10-CM

## 2022-02-28 DIAGNOSIS — Z98.84 HISTORY OF ROUX-EN-Y GASTRIC BYPASS: ICD-10-CM

## 2022-02-28 DIAGNOSIS — F41.9 ANXIETY: ICD-10-CM

## 2022-02-28 DIAGNOSIS — F44.5 PSEUDOSEIZURE: ICD-10-CM

## 2022-02-28 DIAGNOSIS — T74.12XS PHYSICAL ABUSE OF CHILD, SEQUELA: ICD-10-CM

## 2022-02-28 PROCEDURE — 99499 RISK ADDL DX/OHS AUDIT: ICD-10-PCS | Mod: S$GLB,,, | Performed by: PSYCHIATRY & NEUROLOGY

## 2022-02-28 PROCEDURE — 3072F LOW RISK FOR RETINOPATHY: CPT | Mod: HCNC,CPTII,S$GLB, | Performed by: PSYCHIATRY & NEUROLOGY

## 2022-02-28 PROCEDURE — 3072F PR LOW RISK FOR RETINOPATHY: ICD-10-PCS | Mod: HCNC,CPTII,S$GLB, | Performed by: PSYCHIATRY & NEUROLOGY

## 2022-02-28 PROCEDURE — 3044F HG A1C LEVEL LT 7.0%: CPT | Mod: HCNC,CPTII,S$GLB, | Performed by: PSYCHIATRY & NEUROLOGY

## 2022-02-28 PROCEDURE — 3044F PR MOST RECENT HEMOGLOBIN A1C LEVEL <7.0%: ICD-10-PCS | Mod: HCNC,CPTII,S$GLB, | Performed by: PSYCHIATRY & NEUROLOGY

## 2022-02-28 PROCEDURE — 3066F NEPHROPATHY DOC TX: CPT | Mod: HCNC,CPTII,S$GLB, | Performed by: PSYCHIATRY & NEUROLOGY

## 2022-02-28 PROCEDURE — 99999 PR PBB SHADOW E&M-EST. PATIENT-LVL I: ICD-10-PCS | Mod: PBBFAC,HCNC,, | Performed by: PSYCHIATRY & NEUROLOGY

## 2022-02-28 PROCEDURE — 3061F NEG MICROALBUMINURIA REV: CPT | Mod: HCNC,CPTII,S$GLB, | Performed by: PSYCHIATRY & NEUROLOGY

## 2022-02-28 PROCEDURE — 99999 PR PBB SHADOW E&M-EST. PATIENT-LVL I: CPT | Mod: PBBFAC,HCNC,, | Performed by: PSYCHIATRY & NEUROLOGY

## 2022-02-28 PROCEDURE — 3066F PR DOCUMENTATION OF TREATMENT FOR NEPHROPATHY: ICD-10-PCS | Mod: HCNC,CPTII,S$GLB, | Performed by: PSYCHIATRY & NEUROLOGY

## 2022-02-28 PROCEDURE — 99499 UNLISTED E&M SERVICE: CPT | Mod: S$GLB,,, | Performed by: PSYCHIATRY & NEUROLOGY

## 2022-02-28 PROCEDURE — 99215 OFFICE O/P EST HI 40 MIN: CPT | Mod: HCNC,S$GLB,, | Performed by: PSYCHIATRY & NEUROLOGY

## 2022-02-28 PROCEDURE — 3061F PR NEG MICROALBUMINURIA RESULT DOCUMENTED/REVIEW: ICD-10-PCS | Mod: HCNC,CPTII,S$GLB, | Performed by: PSYCHIATRY & NEUROLOGY

## 2022-02-28 PROCEDURE — 99215 PR OFFICE/OUTPT VISIT, EST, LEVL V, 40-54 MIN: ICD-10-PCS | Mod: HCNC,S$GLB,, | Performed by: PSYCHIATRY & NEUROLOGY

## 2022-02-28 RX ORDER — ARIPIPRAZOLE 2 MG/1
2 TABLET ORAL DAILY
Qty: 30 TABLET | Refills: 2 | Status: SHIPPED | OUTPATIENT
Start: 2022-02-28 | End: 2022-06-12

## 2022-02-28 SDOH — SOCIAL DETERMINANTS OF HEALTH (SDOH): PROBLEM RELATED TO PRIMARY SUPPORT GROUP, UNSPECIFIED: Z63.9

## 2022-02-28 NOTE — PATIENT INSTRUCTIONS
"  PLAN:     FOLLOW UP   3-28-22 / D Post MD asked  to  "open up " appt slot at noon for pt as she coming to [place] for [facility] appt and pt lives [place]    [facility]:  see after visit summary / added abilify remain cymbalta    Social Work: says plans to follow up with DAWIT Metz LCSW / has seen ROSENDO Almodovar in past as well tho pt says she would like to follow upn with Ms Metz and she will set up appt    References:     Relaxation stress reduction workbook: MARICEL Blanton PhD ( used: $7-10)    Feeling Good Website: Harshad Qiu MD / www.[employer] website (free) / doron. PODCASTS    Anxiety &  phobia workbook by NATI Guajardo PhD  (web retailers: used: $ 7-10)    [place]: Path to Better Sleep : https://www.veterantraining.[place].gov/insomnia/ (free)       Pt expressed appreciation for the visit today and did not have further question at this time though pt  was still informed to:     Call  if problems.    Call / Report Side Effects to Psyc MD     Encouraged to follow up with primary care / Gen Med MD for continued monitoring of general health and wellness.    Understanding was expressed; and no further concerns nor questions were raised at this time.     remember healthy self care:   eat right  attempt adequate rest   HANDWASHING / encourage such doron. During this corona virus time   walk or light exercise within reason and as your general med team approves  read or explore any of reference materials / homework mentioned  reach out (I.e.,  connect with)  others who nuture and bring out best in you  avoid risky behaviors  keep your appointments  IF you  cannot make your appt THEN please call or go online to reschedule.  avoid  alcohol and illicit substances.  Look for the positive.  All is often relative-seek balance  Call sooner if needed : [phone]   Call 911 or go to Emergency Room  (ER)  if any acute concerns   "
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
## 2022-03-07 ENCOUNTER — OUTPATIENT CASE MANAGEMENT (OUTPATIENT)
Dept: ADMINISTRATIVE | Facility: OTHER | Age: 52
End: 2022-03-07
Payer: MEDICARE

## 2022-03-07 NOTE — PROGRESS NOTES
Outpatient Care Management  Plan of Care Follow Up Visit    Patient: Jadyn Chance  MRN: [id number]  Date of Service: 03/07/2022  Completed by: Michelle Arrieta RN  Referral Date: 12/29/2021  Program: Case Management (High Risk)    Reason for Visit   Patient presents with    Update Plan Of Care       Brief Summary: Phone contact with Mrs Durán and several pain control care plan tasks were reviewed and she agreed to read literature mailed by this CM and have follow up contact from this CM in about 2 weeks.     Patient Summary     Involvement of Care:  Do I have permission to speak with other family members about your care?       Patient Reported Labs & Vitals:  1.  Any Patient Reported Labs & Vitals?     2.  Patient Reported Blood Pressure:     3.  Patient Reported Pulse:     4.  Patient Reported Weight (Kg):     5.  Patient Reported Blood Glucose (mg/dl):       Medical and social history was reviewed with patient and/or caregiver.     Clinical Assessment     Reviewed and provided basic information on available community resources for mental health, transportation, wellness resources, and palliative care programs with patient and/or caregiver.     Complex Care Plan     Care plan was discussed and completed today with input from patient and/or caregiver.    Patient Instructions     Instructions were provided via the [employer] patient resources and are available for the patient to view on the patient portal.    Next Steps: Continue to discuss pain management and fall prevention. Michelle Arrieta RN    No follow-ups on file.    Todays OPCM Self-Management Care Plan was developed with the patients/caregivers input and was based on identified barriers from todays assessment.  Goals were written today with the patient/caregiver and the patient has agreed to work towards these goals to improve his/her overall well-being. Patient verbalized understanding of the care plan, goals, and all of today's instructions.  Encouraged patient/caregiver to communicate with his/her physician and health care team about health conditions and the treatment plan.  Provided my contact information today and encouraged patient/caregiver to call me with any questions as needed.

## 2022-03-21 ENCOUNTER — OUTPATIENT CASE MANAGEMENT (OUTPATIENT)
Dept: ADMINISTRATIVE | Facility: OTHER | Age: 52
End: 2022-03-21
Payer: MEDICARE

## 2022-03-21 NOTE — PROGRESS NOTES
Outpatient Care Management  Plan of Care Follow Up Visit    Patient: Jadyn Chance  MRN: [id number]  Date of Service: 03/21/2022  Completed by: Michelle Arrieta RN  Referral Date: 12/29/2021  Program: Case Management (High Risk)    No chief complaint on file.      Brief Summary: Phone contact with pt today and several fall prevention care plan tasks were reviewed and she agreed to follow up contact from this CM after upcoming MRI's.    Patient Summary     Involvement of Care:  Do I have permission to speak with other family members about your care?       Patient Reported Labs & Vitals:  1.  Any Patient Reported Labs & Vitals?     2.  Patient Reported Blood Pressure:     3.  Patient Reported Pulse:     4.  Patient Reported Weight (Kg):     5.  Patient Reported Blood Glucose (mg/dl):       Medical and social history was reviewed with patient and/or caregiver.     Clinical Assessment     Reviewed and provided basic information on available community resources for mental health, transportation, wellness resources, and palliative care programs with patient and/or caregiver.     Complex Care Plan     Care plan was discussed and completed today with input from patient and/or caregiver.    Patient Instructions     Instructions were provided via the [employer] patient resources and are available for the patient to view on the patient portal.    Next Steps: Review home safety including clear pathways, no loose rugs or slippery bathtubs without non-slip mat. Plan to d/c from OPCM if no new problems. Michelle Arrieta RN    No follow-ups on file.    Todays OPCM Self-Management Care Plan was developed with the patients/caregivers input and was based on identified barriers from [facility] assessment.  Goals were written today with the patient/caregiver and the patient has agreed to work towards these goals to improve his/her overall well-being. Patient verbalized understanding of the care plan, goals, and all of today's  instructions. Encouraged patient/caregiver to communicate with his/her physician and health care team about health conditions and the treatment plan.  Provided my contact information today and encouraged patient/caregiver to call me with any questions as needed.

## 2022-03-27 DIAGNOSIS — F41.9 ANXIETY: ICD-10-CM

## 2022-03-27 DIAGNOSIS — G47.00 INSOMNIA, UNSPECIFIED TYPE: ICD-10-CM

## 2022-03-28 ENCOUNTER — HOSPITAL ENCOUNTER (OUTPATIENT)
Dept: RADIOLOGY | Facility: HOSPITAL | Age: 52
Discharge: HOME | End: 2022-03-28
Attending: PSYCHIATRY & NEUROLOGY
Payer: MEDICARE

## 2022-03-28 ENCOUNTER — TELEPHONE (OUTPATIENT)
Dept: SLEEP MEDICINE | Facility: CLINIC | Age: 52
End: 2022-03-28
Payer: MEDICARE

## 2022-03-28 DIAGNOSIS — H93.A9 PULSATILE TINNITUS, UNSPECIFIED EAR: ICD-10-CM

## 2022-03-28 DIAGNOSIS — R42 DIZZINESS AND GIDDINESS: ICD-10-CM

## 2022-03-28 DIAGNOSIS — M54.2 CERVICALGIA: ICD-10-CM

## 2022-03-28 PROCEDURE — 70544 MR ANGIOGRAPHY HEAD W/O DYE: CPT | Mod: TC

## 2022-03-28 PROCEDURE — 70551 MRI BRAIN STEM W/O DYE: CPT | Mod: TC

## 2022-03-28 PROCEDURE — 72141 MRI NECK SPINE W/O DYE: CPT | Mod: 26,,, | Performed by: RADIOLOGY

## 2022-03-28 PROCEDURE — 72141 MRI CERVICAL SPINE WITHOUT CONTRAST: ICD-10-PCS | Mod: 26,,, | Performed by: RADIOLOGY

## 2022-03-28 PROCEDURE — 72141 MRI NECK SPINE W/O DYE: CPT | Mod: TC

## 2022-03-28 PROCEDURE — 70551 MRI BRAIN STEM W/O DYE: CPT | Mod: 26,,, | Performed by: RADIOLOGY

## 2022-03-28 PROCEDURE — 70551 MRI BRAIN WITHOUT CONTRAST: ICD-10-PCS | Mod: 26,,, | Performed by: RADIOLOGY

## 2022-03-28 RX ORDER — ALPRAZOLAM 0.5 MG/1
TABLET ORAL
Qty: 70 TABLET | Refills: 3 | Status: SHIPPED | OUTPATIENT
Start: 2022-03-28 | End: 2022-04-04 | Stop reason: SDUPTHER

## 2022-03-28 NOTE — TELEPHONE ENCOUNTER
Cedric Mahoney,     Please inform Jadyn Chance that I have refilled her Xanax, but annual visit is due and will be needed for her next refill. She can see me virtually or f2f.      Thank you,

## 2022-03-30 ENCOUNTER — PATIENT MESSAGE (OUTPATIENT)
Dept: SLEEP MEDICINE | Facility: CLINIC | Age: 52
End: 2022-03-30
Payer: MEDICARE

## 2022-03-31 ENCOUNTER — TELEPHONE (OUTPATIENT)
Dept: SLEEP MEDICINE | Facility: CLINIC | Age: 52
End: 2022-03-31
Payer: MEDICARE

## 2022-04-04 ENCOUNTER — OFFICE VISIT (OUTPATIENT)
Dept: SLEEP MEDICINE | Facility: CLINIC | Age: 52
End: 2022-04-04
Payer: MEDICARE

## 2022-04-04 ENCOUNTER — OUTPATIENT CASE MANAGEMENT (OUTPATIENT)
Dept: ADMINISTRATIVE | Facility: OTHER | Age: 52
End: 2022-04-04
Payer: MEDICARE

## 2022-04-04 DIAGNOSIS — G47.33 OSA (OBSTRUCTIVE SLEEP APNEA): Primary | ICD-10-CM

## 2022-04-04 DIAGNOSIS — G47.00 INSOMNIA, UNSPECIFIED TYPE: ICD-10-CM

## 2022-04-04 DIAGNOSIS — F41.9 ANXIETY: ICD-10-CM

## 2022-04-04 PROCEDURE — 3066F PR DOCUMENTATION OF TREATMENT FOR NEPHROPATHY: ICD-10-PCS | Mod: CPTII,95,, | Performed by: PSYCHIATRY & NEUROLOGY

## 2022-04-04 PROCEDURE — 3072F PR LOW RISK FOR RETINOPATHY: ICD-10-PCS | Mod: CPTII,95,, | Performed by: PSYCHIATRY & NEUROLOGY

## 2022-04-04 PROCEDURE — 3066F NEPHROPATHY DOC TX: CPT | Mod: CPTII,95,, | Performed by: PSYCHIATRY & NEUROLOGY

## 2022-04-04 PROCEDURE — 3061F NEG MICROALBUMINURIA REV: CPT | Mod: CPTII,95,, | Performed by: PSYCHIATRY & NEUROLOGY

## 2022-04-04 PROCEDURE — 99214 OFFICE O/P EST MOD 30 MIN: CPT | Mod: 95,,, | Performed by: PSYCHIATRY & NEUROLOGY

## 2022-04-04 PROCEDURE — 3061F PR NEG MICROALBUMINURIA RESULT DOCUMENTED/REVIEW: ICD-10-PCS | Mod: CPTII,95,, | Performed by: PSYCHIATRY & NEUROLOGY

## 2022-04-04 PROCEDURE — 3072F LOW RISK FOR RETINOPATHY: CPT | Mod: CPTII,95,, | Performed by: PSYCHIATRY & NEUROLOGY

## 2022-04-04 PROCEDURE — 99214 PR OFFICE/OUTPT VISIT, EST, LEVL IV, 30-39 MIN: ICD-10-PCS | Mod: 95,,, | Performed by: PSYCHIATRY & NEUROLOGY

## 2022-04-04 PROCEDURE — 3044F PR MOST RECENT HEMOGLOBIN A1C LEVEL <7.0%: ICD-10-PCS | Mod: CPTII,95,, | Performed by: PSYCHIATRY & NEUROLOGY

## 2022-04-04 PROCEDURE — 3044F HG A1C LEVEL LT 7.0%: CPT | Mod: CPTII,95,, | Performed by: PSYCHIATRY & NEUROLOGY

## 2022-04-04 RX ORDER — ALPRAZOLAM 0.5 MG/1
TABLET ORAL
Qty: 70 TABLET | Refills: 3 | Status: SHIPPED | OUTPATIENT
Start: 2022-04-04 | End: 2022-04-14 | Stop reason: SDUPTHER

## 2022-04-04 RX ORDER — DOXEPIN HYDROCHLORIDE 50 MG/1
CAPSULE ORAL
Qty: 60 CAPSULE | Refills: 11 | Status: SHIPPED | OUTPATIENT
Start: 2022-04-04 | End: 2023-03-23 | Stop reason: SDUPTHER

## 2022-04-04 NOTE — PROGRESS NOTES
Outpatient Care Management  Plan of Care Follow Up Visit    Patient: Jadyn Chance  MRN: [id number]  Date of Service: 04/04/2022  Completed by: Michelle Arrieta RN  Referral Date: 12/29/2021  Program: Case Management (High Risk)    Reason for Visit   Patient presents with    Update Plan Of Care       Brief Summary: Brief phone contact today as she was scheduled for a virtual appt with her sleep provider in about 15 minutes. She agreed to get a follow up appt scheduled with her neuro provider and have follow up from this CM in about 4 weeks.     Patient Summary     Involvement of Care:  Do I have permission to speak with other family members about your care?       Patient Reported Labs & Vitals:  1.  Any Patient Reported Labs & Vitals?     2.  Patient Reported Blood Pressure:     3.  Patient Reported Pulse:     4.  Patient Reported Weight (Kg):     5.  Patient Reported Blood Glucose (mg/dl):       Medical and social history was reviewed with patient and/or caregiver.     Clinical Assessment     Reviewed and provided basic information on available community resources for mental health, transportation, wellness resources, and palliative care programs with patient and/or caregiver.     Complex Care Plan     Care plan was discussed and completed today with input from patient and/or caregiver.    Patient Instructions     Instructions were provided via the [facility] patient resources and are available for the patient to view on the patient portal.    Next Steps: Review remaining care plan tasks and plan to d/c from OPCM. Michelle Arrieta RN    Todays OPC Self-Management Care Plan was developed with the patients/caregivers input and was based on identified barriers from todays assessment.  Goals were written today with the patient/caregiver and the patient has agreed to work towards these goals to improve his/her overall well-being. Patient verbalized understanding of the care plan, goals, and all of today's  instructions. Encouraged patient/caregiver to communicate with his/her physician and health care team about health conditions and the treatment plan.  Provided my contact information today and encouraged patient/caregiver to call me with any questions as needed.

## 2022-04-04 NOTE — PROGRESS NOTES
The patient location is: home  The chief complaint leading to consultation is: new patient evaluation  Visit type: audiovisual  Total time spent with patient: 30 minutes  Each patient to whom he or she provides medical services by telemedicine is:  (1) informed of the relationship between the physician and patient and the respective role of any other health care provider with respect to management of the patient; and (2) notified that he or she may decline to receive medical services by telemedicine and may withdraw from such care at any time.          EPWORTH SLEEPINESS SCALE TOTAL SCORE  4/4/2022 10/22/2020 8/19/2020 5/25/2018   Total score 3 2 0 0       Jadyn Chance is a 51 y.o. female seen today for CPAP follow up. Last seen on 3/4/2021  She has been doing better since last time - had HST - positive for mild-moderate ALTAGRACIA, has been compliant with APAP use till November when she came down with migrainus. She also started using dentures prior to that and wondering if jaw tension could have caused headaches.  Prior to that had a fall.  Many MRI of the brain and neck were done - no structural reason for headaches found.  Headaches seem to get better. Trying to get back to CPAP use.  Continue Xanax  0.5 mg - 2-2.5 pills at night and Doxepin.      The patient reports improved sleep continuity and daytime sleepiness on PAP. ESS today is 3/24.  Denies break through snoring.  No dry mouth.   No aerophagia or air hunger. No significant mask leaks and discomfort.    APAP 5 to 18; 90% was 12  Compliance Report  Initial compliance period 07/24/2021 - 08/22/2021  Compliance met Yes  Compliance percentage 70%  Payor Standard  Usage 07/24/2021 - 08/22/2021  Usage days 29/30 days (97%)  >= 4 hours 21 days (70%)  < 4 hours 8 days (27%)  Usage hours 153 hours 31 minutes  Average usage (total days) 5 hours 7 minutes  Average usage (days used) 5 hours 18 minutes  Median usage (days used) 5 hours 49 minutes  Total  used hours (value since last reset - 08/22/2021) 152 hours  AirSense 10 AutoSet  Serial number [id number]  Mode AutoSet  Min Pressure 5 cmH2O  Max Pressure 18 cmH2O  EPR Fulltime  EPR level 3  Response Standard  Therapy  Pressure - cmH2O Median: 6.5 95th percentile: 10.0 Maximum: 11.6  Leaks - L/min Median: 8.2 95th percentile: 46.2 Maximum: 108.9  Events per hour AI: 2.9 HI: 0.6 AHI: 3.5  Apnea Index Central: 1.1 Obstructive: 0.5 Unknown: 1.3  ____  Try to do relaxing activities and journaling.  Avoid going to bed when not sleepy; tries to exercise. No caffeine in the afternoon    Neuropathic pain is now well controlled on Gabapentin (prescribed by Dr. Ponce); stopped Lyrica.    Bedtime: 10:30  Sleep latency: 1 hr with meds  Nocturnal awakenings:3-4 Returns to sleep in 5 min  Wake up time: 7  Not napping  No ETOH  Not a smoker      SLEEP STUDIES: HST4/21: Significant Obstructive sleep apnea (ALTARGACIA) with AHI (apnea hypopnea Index) of 8 ; RDI of 15and SaO2 of 87.6%      Medications pertinent to sleep disorders: cutting down on oxycodone; never taking Oxycodone with Xanax.  Xanax  0.5 mg -  2-2.5 pills  And Doxepin 50 mg 30 min before bed. Lyrical helps with Neurolpathy  Previously tried medications:  PHYSICAL EXAM:    No Vital Signs were taken during this virtual visit.      Using [facility]: yes      ASSESSMENT:    1. Insomnia NEC. Multi-factorial -  Anxiety and depression likely play a role. Good control on combination of Xanax and Doxepin. Neuropathic pain is interrupting her sleep.   2. Small fiber neuropathy ; possible PLMD without infrequent  RLS symptoms. - stopped Clonazepam with great improvement in daily alertness. No significant urge to move her legs, however severe pain and paresthesias from small fiber neuropathy keep her up at night.  Now doing well on Gabapentin  3.Sleep apnea.  Ongoing interrupted sleep up to 4-5 times per night despite good sleep hygiene, mild snoring. Concurrent diabetis which can  be aggravated by untreated  sleep disordered breathing. Benefiting from CPAP use in terms of sleep continuity and daytime sleepiness. Has been compliant with APAP use up until November when she has developed headaches that preclude her from wearing any straps around her head. Trying to getbask to the usage; finding that wearing dentures makes it easier for her to tolerate the mask.       PLAN:  Continue APAP - it was recommended to try using with Nohemy mask (the straps go around the ears with that mask) - will place the order on file - for [facility].   She will try wearing dentures at night since it seems to help with headaches.  She is not a candidate for OA (oral appliance) for ALTAGRACIA    Continue Xanax  0.5 mg - 2-2.5 pills at night and Doxepin. Refilled.  No longer taking - taking Gabapentin instead.   Once her sleep continuity improves, if she still feels tired during the day, will  Consider PSG to evaluate for PLMD        More than 15 minutes of this 30 minutes visit was spent in counseling: and coordination of care.      Precautions: The patient was advised to abstain from driving should he feel sleepy or drowsy.     Follow up: MD in 12 months    Thank you for allowing me the opportunity to participate in the care of your patient.    This visit summary will be sent to referring provider via inbasket

## 2022-04-06 ENCOUNTER — PATIENT MESSAGE (OUTPATIENT)
Dept: OTHER | Facility: OTHER | Age: 52
End: 2022-04-06
Payer: MEDICARE

## 2022-04-11 NOTE — PROGRESS NOTES
"Subjective:      Patient ID: Jadyn Chance is a 51 y.o. female.    Chief Complaint: Follow-up      HPI  Here for follow up of medical problems.  Abilify has helped with cymbalta, doing very well now.  Likes counselor.  Still with HAs, on HC per pain doctor.  On christiano/topamax, amerge does not help.  Due to pain, says not hungry, lost 5# more, total 12#.  AC breakfast , no lows.  No f/c/sw/cough.  No cp/sob/palp.  BMs normal.    Updated/ annual due 1/23A:  HM:  10/21 fluvax, 5/21 covid vaccines, 1/19 [id number], 8/14 [id number], 10/20 Td, 12/10 TDaP, 10/21 MMG/Gyn, Pain Dr. Mcgregor, 6/13 Cscope rep 10y, 1/21 Eye Dr. Delacruz.     Review of Systems   Constitutional: Negative for chills, diaphoresis and fever.   Respiratory: Negative for cough and shortness of breath.    Cardiovascular: Negative for chest pain, palpitations and leg swelling.   Gastrointestinal: Negative for blood in stool, constipation, diarrhea, nausea and vomiting.   Genitourinary: Negative for dysuria, frequency and hematuria.   Psychiatric/Behavioral: The patient is not nervous/anxious.          Objective:   /74   Pulse 101   Temp 97.8 °F (36.6 °C) (Temporal)   Ht 5' 3" (1.6 m)   Wt 60.7 kg (133 lb 13.1 oz)   LMP 12/04/2014   SpO2 96%   BMI 23.71 kg/m²     Physical Exam  Constitutional:       Appearance: She is well-developed.   Neck:      Thyroid: No thyroid mass.      Vascular: No carotid bruit.   Cardiovascular:      Rate and Rhythm: Normal rate and regular rhythm.      Heart sounds: No murmur heard.    No friction rub. No gallop.   Pulmonary:      Effort: Pulmonary effort is normal.      Breath sounds: Normal breath sounds. No wheezing or rales.   Abdominal:      General: Bowel sounds are normal.      Palpations: Abdomen is soft. There is no mass.      Tenderness: There is no abdominal tenderness.   Musculoskeletal:      Cervical back: Neck supple.   Lymphadenopathy:      Cervical: No cervical adenopathy.   Neurological: "      Mental Status: She is alert and oriented to person, place, and time.             Assessment:       1. Weight loss    2. Anxiety    3. Convulsions, unspecified convulsion type    4. Pseudoseizure    5. Type 2 diabetes mellitus without complication, with long-term current use of insulin    6. Post traumatic stress disorder (PTSD)    7. Chronic intractable headache, unspecified headache type    8. Hyperlipidemia associated with type 2 diabetes mellitus          Plan:     Jadyn was seen today for follow-up.    Diagnoses and all orders for this visit:    Weight loss- 12# total, attribs to head pain.    Anxiety- doing better, cont meds and counseling.    Convulsions, unspecified convulsion type vs Pseudoseizure, on gabapentin 300TID, no other.    Type 2 diabetes mellitus without complication, with long-term current use of insulin- decrease lantus to 8u from 11u, cotn metformin.  Lab now and   -     Hemoglobin A1C; Future; Expected date: 04/19/2022    Post traumatic stress disorder (PTSD)    Chronic intractable headache, unspecified headache type- f/u Neuro, hopefully virtual soon.  -     Ambulatory referral/consult to Neurology; Future    Hyperlipidemia associated with type 2 diabetes mellitus    Vitamin B12 deficiency- on weekly B12 inj, check mauro.  -     Vitamin B12; Future    Vitamin D deficiency- on 50K 3x weekly, check lab.  -     Vitamin D; Future    Lightheaded  -     Iron and TIBC; Future  -     Ferritin; Future  -     CBC Auto Differential; Future    Other iron deficiency anemia   -     Iron and TIBC; Future  -     Ferritin; Future    Labs now.  RTC 3 mo with gHb now.

## 2022-04-14 DIAGNOSIS — E11.69 HYPERLIPIDEMIA ASSOCIATED WITH TYPE 2 DIABETES MELLITUS: ICD-10-CM

## 2022-04-14 DIAGNOSIS — G47.00 INSOMNIA, UNSPECIFIED TYPE: ICD-10-CM

## 2022-04-14 DIAGNOSIS — F41.9 ANXIETY: ICD-10-CM

## 2022-04-14 DIAGNOSIS — R11.0 NAUSEA: ICD-10-CM

## 2022-04-14 DIAGNOSIS — Z29.9 PREVENTIVE MEASURE: ICD-10-CM

## 2022-04-14 DIAGNOSIS — E78.5 HYPERLIPIDEMIA ASSOCIATED WITH TYPE 2 DIABETES MELLITUS: ICD-10-CM

## 2022-04-14 RX ORDER — ONDANSETRON 4 MG/1
4 TABLET, FILM COATED ORAL EVERY 6 HOURS
Qty: 30 TABLET | Refills: 3 | Status: SHIPPED | OUTPATIENT
Start: 2022-04-14 | End: 2022-05-19 | Stop reason: SDUPTHER

## 2022-04-14 RX ORDER — ALPRAZOLAM 0.5 MG/1
TABLET ORAL
Qty: 70 TABLET | Refills: 3 | Status: SHIPPED | OUTPATIENT
Start: 2022-04-14 | End: 2022-07-17 | Stop reason: SDUPTHER

## 2022-04-14 RX ORDER — GABAPENTIN 300 MG/1
600 CAPSULE ORAL 3 TIMES DAILY
Qty: 540 CAPSULE | Refills: 1 | Status: SHIPPED | OUTPATIENT
Start: 2022-04-14 | End: 2022-05-19 | Stop reason: SDUPTHER

## 2022-04-14 RX ORDER — CYCLOBENZAPRINE HCL 10 MG
TABLET ORAL
Qty: 90 TABLET | Refills: 5 | Status: SHIPPED | OUTPATIENT
Start: 2022-04-14 | End: 2022-09-05 | Stop reason: SDUPTHER

## 2022-04-14 RX ORDER — TOPIRAMATE 25 MG/1
TABLET ORAL
Qty: 120 TABLET | Refills: 5 | Status: SHIPPED | OUTPATIENT
Start: 2022-04-14 | End: 2022-05-19 | Stop reason: SDUPTHER

## 2022-04-14 RX ORDER — NARATRIPTAN 2.5 MG/1
TABLET ORAL
Qty: 9 TABLET | Refills: 12 | Status: SHIPPED | OUTPATIENT
Start: 2022-04-14 | End: 2022-05-19 | Stop reason: SDUPTHER

## 2022-04-14 RX ORDER — DULOXETIN HYDROCHLORIDE 30 MG/1
90 CAPSULE, DELAYED RELEASE ORAL DAILY
Qty: 270 CAPSULE | Refills: 3 | Status: SHIPPED | OUTPATIENT
Start: 2022-04-14 | End: 2022-11-01

## 2022-04-14 RX ORDER — MELOXICAM 7.5 MG/1
TABLET ORAL
Qty: 60 TABLET | Refills: 5 | Status: SHIPPED | OUTPATIENT
Start: 2022-04-14 | End: 2022-05-19 | Stop reason: SDUPTHER

## 2022-04-14 NOTE — TELEPHONE ENCOUNTER
Care Due:                  Date            Visit Type   Department     Provider  --------------------------------------------------------------------------------                                EP -                              PRIMARY      [facility] FAMILY    Mikayla Huber  Last Visit: 01-      CARE ([facility])   MEDICINE       [place]                              [facility]                              PRIMARY      [facility] FAMILY    Mikayal Huber  Next Visit: 04-      CARE ([facility])   [facility]                                                            Last  Test          Frequency    Reason                     Performed    Due Date  --------------------------------------------------------------------------------    HBA1C.......  6 months...  insulin, metFORMIN.......  01- 07-    Powered by [employer]. Reference number: [id number].   4/14/2022 12:55:43 PM CDT

## 2022-04-19 ENCOUNTER — LAB VISIT (OUTPATIENT)
Dept: LAB | Facility: HOSPITAL | Age: 52
End: 2022-04-19
Payer: MEDICARE

## 2022-04-19 ENCOUNTER — OFFICE VISIT (OUTPATIENT)
Dept: FAMILY MEDICINE | Facility: CLINIC | Age: 52
End: 2022-04-19
Payer: MEDICARE

## 2022-04-19 VITALS
TEMPERATURE: 98 F | BODY MASS INDEX: 23.71 KG/M2 | DIASTOLIC BLOOD PRESSURE: 74 MMHG | OXYGEN SATURATION: 96 % | WEIGHT: 133.81 LBS | HEART RATE: 101 BPM | HEIGHT: 63 IN | SYSTOLIC BLOOD PRESSURE: 118 MMHG

## 2022-04-19 DIAGNOSIS — Z79.4 TYPE 2 DIABETES MELLITUS WITHOUT COMPLICATION, WITH LONG-TERM CURRENT USE OF INSULIN: Chronic | ICD-10-CM

## 2022-04-19 DIAGNOSIS — E11.9 TYPE 2 DIABETES MELLITUS WITHOUT COMPLICATION, WITH LONG-TERM CURRENT USE OF INSULIN: Chronic | ICD-10-CM

## 2022-04-19 DIAGNOSIS — D50.8 OTHER IRON DEFICIENCY ANEMIA: ICD-10-CM

## 2022-04-19 DIAGNOSIS — F41.9 ANXIETY: ICD-10-CM

## 2022-04-19 DIAGNOSIS — F44.5 PSEUDOSEIZURE: ICD-10-CM

## 2022-04-19 DIAGNOSIS — E11.69 HYPERLIPIDEMIA ASSOCIATED WITH TYPE 2 DIABETES MELLITUS: ICD-10-CM

## 2022-04-19 DIAGNOSIS — F43.10 POST TRAUMATIC STRESS DISORDER (PTSD): ICD-10-CM

## 2022-04-19 DIAGNOSIS — R51.9 CHRONIC INTRACTABLE HEADACHE, UNSPECIFIED HEADACHE TYPE: ICD-10-CM

## 2022-04-19 DIAGNOSIS — G89.29 CHRONIC INTRACTABLE HEADACHE, UNSPECIFIED HEADACHE TYPE: ICD-10-CM

## 2022-04-19 DIAGNOSIS — E53.8 VITAMIN B12 DEFICIENCY: ICD-10-CM

## 2022-04-19 DIAGNOSIS — R56.9 CONVULSIONS, UNSPECIFIED CONVULSION TYPE: ICD-10-CM

## 2022-04-19 DIAGNOSIS — R63.4 WEIGHT LOSS: Primary | ICD-10-CM

## 2022-04-19 DIAGNOSIS — R42 LIGHTHEADED: ICD-10-CM

## 2022-04-19 DIAGNOSIS — E78.5 HYPERLIPIDEMIA ASSOCIATED WITH TYPE 2 DIABETES MELLITUS: ICD-10-CM

## 2022-04-19 DIAGNOSIS — E55.9 VITAMIN D DEFICIENCY: ICD-10-CM

## 2022-04-19 PROBLEM — F33.41 RECURRENT MAJOR DEPRESSIVE DISORDER, IN PARTIAL REMISSION: Status: ACTIVE | Noted: 2019-01-07

## 2022-04-19 PROCEDURE — 99214 PR OFFICE/OUTPT VISIT, EST, LEVL IV, 30-39 MIN: ICD-10-PCS | Mod: S$GLB,,, | Performed by: INTERNAL MEDICINE

## 2022-04-19 PROCEDURE — 1159F MED LIST DOCD IN RCRD: CPT | Mod: CPTII,S$GLB,, | Performed by: INTERNAL MEDICINE

## 2022-04-19 PROCEDURE — 3074F SYST BP LT 130 MM HG: CPT | Mod: CPTII,S$GLB,, | Performed by: INTERNAL MEDICINE

## 2022-04-19 PROCEDURE — 3008F PR BODY MASS INDEX (BMI) DOCUMENTED: ICD-10-PCS | Mod: CPTII,S$GLB,, | Performed by: INTERNAL MEDICINE

## 2022-04-19 PROCEDURE — 3061F NEG MICROALBUMINURIA REV: CPT | Mod: CPTII,S$GLB,, | Performed by: INTERNAL MEDICINE

## 2022-04-19 PROCEDURE — 99999 PR PBB SHADOW E&M-EST. PATIENT-LVL V: CPT | Mod: PBBFAC,,, | Performed by: INTERNAL MEDICINE

## 2022-04-19 PROCEDURE — 99499 UNLISTED E&M SERVICE: CPT | Mod: HCNC,S$GLB,, | Performed by: INTERNAL MEDICINE

## 2022-04-19 PROCEDURE — 3066F NEPHROPATHY DOC TX: CPT | Mod: CPTII,S$GLB,, | Performed by: INTERNAL MEDICINE

## 2022-04-19 PROCEDURE — 99499 RISK ADDL DX/OHS AUDIT: ICD-10-PCS | Mod: HCNC,S$GLB,, | Performed by: INTERNAL MEDICINE

## 2022-04-19 PROCEDURE — 3066F PR DOCUMENTATION OF TREATMENT FOR NEPHROPATHY: ICD-10-PCS | Mod: CPTII,S$GLB,, | Performed by: INTERNAL MEDICINE

## 2022-04-19 PROCEDURE — 1159F PR MEDICATION LIST DOCUMENTED IN MEDICAL RECORD: ICD-10-PCS | Mod: CPTII,S$GLB,, | Performed by: INTERNAL MEDICINE

## 2022-04-19 PROCEDURE — 3044F PR MOST RECENT HEMOGLOBIN A1C LEVEL <7.0%: ICD-10-PCS | Mod: CPTII,S$GLB,, | Performed by: INTERNAL MEDICINE

## 2022-04-19 PROCEDURE — 99999 PR PBB SHADOW E&M-EST. PATIENT-LVL V: ICD-10-PCS | Mod: PBBFAC,,, | Performed by: INTERNAL MEDICINE

## 2022-04-19 PROCEDURE — 3008F BODY MASS INDEX DOCD: CPT | Mod: CPTII,S$GLB,, | Performed by: INTERNAL MEDICINE

## 2022-04-19 PROCEDURE — 3072F LOW RISK FOR RETINOPATHY: CPT | Mod: CPTII,S$GLB,, | Performed by: INTERNAL MEDICINE

## 2022-04-19 PROCEDURE — 3061F PR NEG MICROALBUMINURIA RESULT DOCUMENTED/REVIEW: ICD-10-PCS | Mod: CPTII,S$GLB,, | Performed by: INTERNAL MEDICINE

## 2022-04-19 PROCEDURE — 3078F DIAST BP <80 MM HG: CPT | Mod: CPTII,S$GLB,, | Performed by: INTERNAL MEDICINE

## 2022-04-19 PROCEDURE — 3072F PR LOW RISK FOR RETINOPATHY: ICD-10-PCS | Mod: CPTII,S$GLB,, | Performed by: INTERNAL MEDICINE

## 2022-04-19 PROCEDURE — 99214 OFFICE O/P EST MOD 30 MIN: CPT | Mod: S$GLB,,, | Performed by: INTERNAL MEDICINE

## 2022-04-19 PROCEDURE — 3078F PR MOST RECENT DIASTOLIC BLOOD PRESSURE < 80 MM HG: ICD-10-PCS | Mod: CPTII,S$GLB,, | Performed by: INTERNAL MEDICINE

## 2022-04-19 PROCEDURE — 3044F HG A1C LEVEL LT 7.0%: CPT | Mod: CPTII,S$GLB,, | Performed by: INTERNAL MEDICINE

## 2022-04-19 PROCEDURE — 3074F PR MOST RECENT SYSTOLIC BLOOD PRESSURE < 130 MM HG: ICD-10-PCS | Mod: CPTII,S$GLB,, | Performed by: INTERNAL MEDICINE

## 2022-04-20 ENCOUNTER — PATIENT MESSAGE (OUTPATIENT)
Dept: FAMILY MEDICINE | Facility: CLINIC | Age: 52
End: 2022-04-20
Payer: MEDICARE

## 2022-04-26 ENCOUNTER — PATIENT MESSAGE (OUTPATIENT)
Dept: ADMINISTRATIVE | Facility: HOSPITAL | Age: 52
End: 2022-04-26
Payer: MEDICARE

## 2022-04-27 ENCOUNTER — PATIENT MESSAGE (OUTPATIENT)
Dept: NEUROLOGY | Facility: CLINIC | Age: 52
End: 2022-04-27
Payer: MEDICARE

## 2022-05-02 ENCOUNTER — OUTPATIENT CASE MANAGEMENT (OUTPATIENT)
Dept: ADMINISTRATIVE | Facility: OTHER | Age: 52
End: 2022-05-02
Payer: MEDICARE

## 2022-05-02 NOTE — LETTER
May 2, 2022    Jadyn Chance  [address]  [place] [address]             [facility]  [address]  [place] [address] Dear Leeanna,      I work with [facility] Outpatient Case Management Department. I have been unsuccessful at reaching you recently since we spoke on 4/4/22. If you require any future assistance or if any new concerns or problems arise, please call me.     The Outpatient Case Management Department can be reached at [phone] from 8 AM to 4:30 PM Monday thru Friday.    [facility] also has a program called [facility]) with a nurse available 24/7 to answer questions or provide medical advice for any non-emergent symptoms you may have. That number is [phone].     Kindest Regards,        Michelle Arrieta RN  Outpatient Case Management  [phone]

## 2022-05-03 ENCOUNTER — PATIENT MESSAGE (OUTPATIENT)
Dept: OTHER | Facility: OTHER | Age: 52
End: 2022-05-03
Payer: MEDICARE

## 2022-05-09 NOTE — PROGRESS NOTES
Outpatient Care Management  Plan of Care Follow Up Visit    Patient: Jadyn Chance  MRN: [id number]  Date of Service: 05/02/2022  Completed by: Michelle Arrieta RN  Referral Date: 12/29/2021  Program: Case Management (High Risk)    Reason for Visit   Patient presents with    OPCM RN First Follow-Up Attempt    Update Plan Of Care    Case Closure       Brief Summary: Phone contact today for follow up and d/c from [facility]. Mrs Durán is being followed by pain mgmt, PCP and psych and agreed to call the appt desk today to schedule a follow up with her neuro provider. She voiced understanding and agreement with d/c from [facility].    Patient Summary     Involvement of Care:  Do I have permission to speak with other family members about your care?       Patient Reported Labs & Vitals:  1.  Any Patient Reported Labs & Vitals?     2.  Patient Reported Blood Pressure:     3.  Patient Reported Pulse:     4.  Patient Reported Weight (Kg):     5.  Patient Reported Blood Glucose (mg/dl):       Medical and social history was reviewed with patient and/or caregiver.     Clinical Assessment     Reviewed and provided basic information on available community resources for mental health, transportation, wellness resources, and palliative care programs with patient and/or caregiver.     Complex Care Plan     Care plan was discussed and completed today with input from patient and/or caregiver.    Patient Instructions     Instructions were provided via the [facility] patient resources and are available for the patient to view on the patient portal.    Next Steps: D/C from [facility] today. Michelle Arrieta RN    Follow up in about 1 week (around 5/9/2022) for address care plan tasks.    Today[facility] Self-Management Care Plan was developed with the patients/caregivers input and was based on identified barriers from todays assessment.  Goals were written today with the patient/caregiver and the patient has agreed to work towards these goals to  improve his/her overall well-being. Patient verbalized understanding of the care plan, goals, and all of today's instructions. Encouraged patient/caregiver to communicate with his/her physician and health care team about health conditions and the treatment plan.  Provided my contact information today and encouraged patient/caregiver to call me with any questions as needed.

## 2022-07-01 ENCOUNTER — PATIENT MESSAGE (OUTPATIENT)
Dept: FAMILY MEDICINE | Facility: CLINIC | Age: 52
End: 2022-07-01
Payer: MEDICARE

## 2022-07-01 RX ORDER — DOXYCYCLINE 100 MG/1
100 CAPSULE ORAL 2 TIMES DAILY
Qty: 14 CAPSULE | Refills: 0 | Status: SHIPPED | OUTPATIENT
Start: 2022-07-01 | End: 2022-07-08

## 2022-07-26 ENCOUNTER — PATIENT MESSAGE (OUTPATIENT)
Dept: OTHER | Facility: OTHER | Age: 52
End: 2022-07-26
Payer: MEDICARE

## 2022-08-01 ENCOUNTER — TELEPHONE (OUTPATIENT)
Dept: NEUROLOGY | Facility: CLINIC | Age: 52
End: 2022-08-01
Payer: MEDICARE

## 2022-08-01 NOTE — TELEPHONE ENCOUNTER
----- Message from Jamie Mo MD sent at 7/31/2022 10:07 AM CDT -----    I see someone scheduled this patient with me in August for 30 min visit. At 10 am. Please make it one hour visit. This is Dr. Laboy patient and she is new for me. I need 1 hour considering I don't know this patient.     Make her visit  10 to 11 am and move next patient to 11 am.     Thank you.

## 2022-08-03 DIAGNOSIS — F39 UNSPECIFIED MOOD (AFFECTIVE) DISORDER: ICD-10-CM

## 2022-08-05 ENCOUNTER — TELEPHONE (OUTPATIENT)
Dept: PSYCHIATRY | Facility: CLINIC | Age: 52
End: 2022-08-05
Payer: MEDICARE

## 2022-08-05 DIAGNOSIS — F39 UNSPECIFIED MOOD (AFFECTIVE) DISORDER: Primary | ICD-10-CM

## 2022-08-05 RX ORDER — ARIPIPRAZOLE 2 MG/1
2 TABLET ORAL DAILY
Qty: 30 TABLET | Refills: 0 | OUTPATIENT
Start: 2022-08-05 | End: 2022-11-03

## 2022-08-05 RX ORDER — ARIPIPRAZOLE 2 MG/1
2 TABLET ORAL DAILY
Qty: 90 TABLET | Refills: 0 | Status: SHIPPED | OUTPATIENT
Start: 2022-08-05 | End: 2022-09-10

## 2022-08-05 NOTE — TELEPHONE ENCOUNTER
ESTABLISHED PT    Called opt in regards to refill request / abilify  2mg     initially rejected Rx refill request tho I later saw  That she herself called in asking ; I had initially thought just automated request.     As such I did approve and send to [employer] which I see is where she requested.     I also called her  / no answer tho left message telling her that I would vbe asking med Asst to set up appt  ([facility]) whih I did ask desk to set up for Oct 5  2pm telehealth.     And noted IF she has any issues to re contact this dept and IF actue call 911 or go to ER  D Post MD

## 2022-08-05 NOTE — PROGRESS NOTES
ESTABLISHED PT    initially rejected Rx refill request tho I later saw  That she herself called in asking ; I had initially thought just automated request.    As such I did approve and send to [employer] which I see is where she requested.    I also called her  / no answer tho left message telling her that I would vbe asking med Asst to set up appt  ([facility]) whih I did ask desk to set up for Oct 5  2pm telehealth.    And noted IF she has any issues to re contact this dept and IF actue call 911 or go to ER  D Post MD     Initial SW/CM Assessment/Plan of Care Note      Baseline Assessment  31 year old year old admitted 12/13/19 as Inpatient for labor and delivery.  Pt is a SURROGATE. Prior to admission patient was living with Spouse/significant other, Children and  residing at House.  Patient does not  have a Power of  for Healthcare-not interested in information at this time. Patient’s Primary Care Provider is Andrea Higgins MD.      Met with pt this morning. Spouse also present. Pt is doing well this date. Pt does not have any questions or concerns at this time. Pt hopes to discharge home tomorrow.  Pt given SW business card for future reference.      Intended parents:  Hermelindo Quesada and Vin Quesada  [address] [facility] [address]  [address] [facility]  Phone number: [phone]  Email: [email or web address].     Feeding:  Infant will leave the [place] with intended parents as soon as court process is completed-no date specified at this time. Intended parents plan on formula feeding the infant.       Intended parents are planning on residing locally in a rental house.  They also have their 2 year old twins here.    They will be seeing Dr. Rizzo locally for follow up.      Pediatrician at home:  Pelon Bowman 810/304  [address] [facility]     Met with intended fathers yesterday afternoon, they do not have any questions or concerns at this time. Business card for  given for future reference.      Infant's Name: Genny Gomez     Medical History  History - past medical        Past Medical History:   Diagnosis Date   • History of abnormal cervical Pap smear     • Raynaud's syndrome           Agency/Support  Type of Services Prior to Hospitalization: None  Support Systems: Friends/neighbors, Family members  Home Devices/Equipment: Breast pump  Mobility Assist Devices: None  Sensory Support Devices: Eyeglasses      Insurance  Primary: [facility]-infant's will be  added to this insurance  Secondary: [employer] [facility] COOPERATIVE EXCHANGE     Barriers to Discharge  Identified Barriers to Discharge/Transition Planning: No barriers      Plan  /CM - Recommendations for Discharge:  Home with Intended Parents  Anticipate patient will not need post-hospital services.   Anticipate patient can return to the environment from which patient entered the hospital.   Anticipate patient can provide self-care at discharge.     Refer to /CM Flowsheet for Goals and objective data.

## 2022-08-22 ENCOUNTER — LAB VISIT (OUTPATIENT)
Dept: LAB | Facility: HOSPITAL | Age: 52
End: 2022-08-22
Attending: INTERNAL MEDICINE
Payer: MEDICARE

## 2022-08-22 ENCOUNTER — OFFICE VISIT (OUTPATIENT)
Dept: NEUROLOGY | Facility: CLINIC | Age: 52
End: 2022-08-22
Payer: MEDICARE

## 2022-08-22 ENCOUNTER — OFFICE VISIT (OUTPATIENT)
Dept: OPHTHALMOLOGY | Facility: CLINIC | Age: 52
End: 2022-08-22
Payer: MEDICARE

## 2022-08-22 VITALS
HEIGHT: 63 IN | RESPIRATION RATE: 16 BRPM | BODY MASS INDEX: 25.2 KG/M2 | DIASTOLIC BLOOD PRESSURE: 68 MMHG | SYSTOLIC BLOOD PRESSURE: 100 MMHG | HEART RATE: 97 BPM | WEIGHT: 142.19 LBS

## 2022-08-22 DIAGNOSIS — G89.29 CHRONIC INTRACTABLE HEADACHE, UNSPECIFIED HEADACHE TYPE: Primary | ICD-10-CM

## 2022-08-22 DIAGNOSIS — E11.9 DIABETES MELLITUS TYPE 2 WITHOUT RETINOPATHY: Primary | ICD-10-CM

## 2022-08-22 DIAGNOSIS — H52.13 MYOPIA WITH ASTIGMATISM AND PRESBYOPIA, BILATERAL: ICD-10-CM

## 2022-08-22 DIAGNOSIS — F41.9 ANXIETY: ICD-10-CM

## 2022-08-22 DIAGNOSIS — H52.203 MYOPIA WITH ASTIGMATISM AND PRESBYOPIA, BILATERAL: ICD-10-CM

## 2022-08-22 DIAGNOSIS — Z79.4 TYPE 2 DIABETES MELLITUS WITHOUT COMPLICATION, WITH LONG-TERM CURRENT USE OF INSULIN: Chronic | ICD-10-CM

## 2022-08-22 DIAGNOSIS — H52.4 MYOPIA WITH ASTIGMATISM AND PRESBYOPIA, BILATERAL: ICD-10-CM

## 2022-08-22 DIAGNOSIS — E11.9 TYPE 2 DIABETES MELLITUS WITHOUT COMPLICATION, WITH LONG-TERM CURRENT USE OF INSULIN: Chronic | ICD-10-CM

## 2022-08-22 DIAGNOSIS — G47.33 OSA (OBSTRUCTIVE SLEEP APNEA): ICD-10-CM

## 2022-08-22 DIAGNOSIS — R51.9 CHRONIC INTRACTABLE HEADACHE, UNSPECIFIED HEADACHE TYPE: Primary | ICD-10-CM

## 2022-08-22 DIAGNOSIS — H25.13 NUCLEAR SCLEROSIS, BILATERAL: ICD-10-CM

## 2022-08-22 LAB
ESTIMATED AVG GLUCOSE: 169 MG/DL (ref 68–131)
HBA1C MFR BLD: 7.5 % (ref 4–5.6)

## 2022-08-22 PROCEDURE — 3008F BODY MASS INDEX DOCD: CPT | Mod: CPTII,S$GLB,, | Performed by: PSYCHIATRY & NEUROLOGY

## 2022-08-22 PROCEDURE — 92014 COMPRE OPH EXAM EST PT 1/>: CPT | Mod: S$GLB,,, | Performed by: OPTOMETRIST

## 2022-08-22 PROCEDURE — 1159F MED LIST DOCD IN RCRD: CPT | Mod: CPTII,S$GLB,, | Performed by: OPTOMETRIST

## 2022-08-22 PROCEDURE — 92015 PR REFRACTION: ICD-10-PCS | Mod: S$GLB,,, | Performed by: OPTOMETRIST

## 2022-08-22 PROCEDURE — 3061F PR NEG MICROALBUMINURIA RESULT DOCUMENTED/REVIEW: ICD-10-PCS | Mod: CPTII,S$GLB,, | Performed by: OPTOMETRIST

## 2022-08-22 PROCEDURE — 2023F DILAT RTA XM W/O RTNOPTHY: CPT | Mod: CPTII,S$GLB,, | Performed by: OPTOMETRIST

## 2022-08-22 PROCEDURE — 3051F PR MOST RECENT HEMOGLOBIN A1C LEVEL 7.0 - < 8.0%: ICD-10-PCS | Mod: CPTII,S$GLB,, | Performed by: PSYCHIATRY & NEUROLOGY

## 2022-08-22 PROCEDURE — 1160F RVW MEDS BY RX/DR IN RCRD: CPT | Mod: CPTII,S$GLB,, | Performed by: PSYCHIATRY & NEUROLOGY

## 2022-08-22 PROCEDURE — 99999 PR PBB SHADOW E&M-EST. PATIENT-LVL IV: CPT | Mod: PBBFAC,,, | Performed by: OPTOMETRIST

## 2022-08-22 PROCEDURE — 99999 PR PBB SHADOW E&M-EST. PATIENT-LVL IV: ICD-10-PCS | Mod: PBBFAC,,, | Performed by: PSYCHIATRY & NEUROLOGY

## 2022-08-22 PROCEDURE — 92015 DETERMINE REFRACTIVE STATE: CPT | Mod: S$GLB,,, | Performed by: OPTOMETRIST

## 2022-08-22 PROCEDURE — 99215 OFFICE O/P EST HI 40 MIN: CPT | Mod: S$GLB,,, | Performed by: PSYCHIATRY & NEUROLOGY

## 2022-08-22 PROCEDURE — 36415 COLL VENOUS BLD VENIPUNCTURE: CPT | Performed by: INTERNAL MEDICINE

## 2022-08-22 PROCEDURE — 3061F NEG MICROALBUMINURIA REV: CPT | Mod: CPTII,S$GLB,, | Performed by: OPTOMETRIST

## 2022-08-22 PROCEDURE — 3066F PR DOCUMENTATION OF TREATMENT FOR NEPHROPATHY: ICD-10-PCS | Mod: CPTII,S$GLB,, | Performed by: OPTOMETRIST

## 2022-08-22 PROCEDURE — 3066F NEPHROPATHY DOC TX: CPT | Mod: CPTII,S$GLB,, | Performed by: PSYCHIATRY & NEUROLOGY

## 2022-08-22 PROCEDURE — 3074F SYST BP LT 130 MM HG: CPT | Mod: CPTII,S$GLB,, | Performed by: PSYCHIATRY & NEUROLOGY

## 2022-08-22 PROCEDURE — 1160F PR REVIEW ALL MEDS BY PRESCRIBER/CLIN PHARMACIST DOCUMENTED: ICD-10-PCS | Mod: CPTII,S$GLB,, | Performed by: OPTOMETRIST

## 2022-08-22 PROCEDURE — 1159F MED LIST DOCD IN RCRD: CPT | Mod: CPTII,S$GLB,, | Performed by: PSYCHIATRY & NEUROLOGY

## 2022-08-22 PROCEDURE — 3074F PR MOST RECENT SYSTOLIC BLOOD PRESSURE < 130 MM HG: ICD-10-PCS | Mod: CPTII,S$GLB,, | Performed by: PSYCHIATRY & NEUROLOGY

## 2022-08-22 PROCEDURE — 99999 PR PBB SHADOW E&M-EST. PATIENT-LVL IV: CPT | Mod: PBBFAC,,, | Performed by: PSYCHIATRY & NEUROLOGY

## 2022-08-22 PROCEDURE — 3052F PR MOST RECENT HEMOGLOBIN A1C LEVEL 8.0 - < 9.0%: ICD-10-PCS | Mod: CPTII,S$GLB,, | Performed by: OPTOMETRIST

## 2022-08-22 PROCEDURE — 3066F NEPHROPATHY DOC TX: CPT | Mod: CPTII,S$GLB,, | Performed by: OPTOMETRIST

## 2022-08-22 PROCEDURE — 3061F NEG MICROALBUMINURIA REV: CPT | Mod: CPTII,S$GLB,, | Performed by: PSYCHIATRY & NEUROLOGY

## 2022-08-22 PROCEDURE — 99215 PR OFFICE/OUTPT VISIT, EST, LEVL V, 40-54 MIN: ICD-10-PCS | Mod: S$GLB,,, | Performed by: PSYCHIATRY & NEUROLOGY

## 2022-08-22 PROCEDURE — 1159F PR MEDICATION LIST DOCUMENTED IN MEDICAL RECORD: ICD-10-PCS | Mod: CPTII,S$GLB,, | Performed by: PSYCHIATRY & NEUROLOGY

## 2022-08-22 PROCEDURE — 1159F PR MEDICATION LIST DOCUMENTED IN MEDICAL RECORD: ICD-10-PCS | Mod: CPTII,S$GLB,, | Performed by: OPTOMETRIST

## 2022-08-22 PROCEDURE — 3066F PR DOCUMENTATION OF TREATMENT FOR NEPHROPATHY: ICD-10-PCS | Mod: CPTII,S$GLB,, | Performed by: PSYCHIATRY & NEUROLOGY

## 2022-08-22 PROCEDURE — 83036 HEMOGLOBIN GLYCOSYLATED A1C: CPT | Performed by: INTERNAL MEDICINE

## 2022-08-22 PROCEDURE — 99999 PR PBB SHADOW E&M-EST. PATIENT-LVL IV: ICD-10-PCS | Mod: PBBFAC,,, | Performed by: OPTOMETRIST

## 2022-08-22 PROCEDURE — 3072F PR LOW RISK FOR RETINOPATHY: ICD-10-PCS | Mod: CPTII,S$GLB,, | Performed by: PSYCHIATRY & NEUROLOGY

## 2022-08-22 PROCEDURE — 3008F PR BODY MASS INDEX (BMI) DOCUMENTED: ICD-10-PCS | Mod: CPTII,S$GLB,, | Performed by: PSYCHIATRY & NEUROLOGY

## 2022-08-22 PROCEDURE — 1160F PR REVIEW ALL MEDS BY PRESCRIBER/CLIN PHARMACIST DOCUMENTED: ICD-10-PCS | Mod: CPTII,S$GLB,, | Performed by: PSYCHIATRY & NEUROLOGY

## 2022-08-22 PROCEDURE — 1160F RVW MEDS BY RX/DR IN RCRD: CPT | Mod: CPTII,S$GLB,, | Performed by: OPTOMETRIST

## 2022-08-22 PROCEDURE — 92014 PR EYE EXAM, EST PATIENT,COMPREHESV: ICD-10-PCS | Mod: S$GLB,,, | Performed by: OPTOMETRIST

## 2022-08-22 PROCEDURE — 3061F PR NEG MICROALBUMINURIA RESULT DOCUMENTED/REVIEW: ICD-10-PCS | Mod: CPTII,S$GLB,, | Performed by: PSYCHIATRY & NEUROLOGY

## 2022-08-22 PROCEDURE — 3052F HG A1C>EQUAL 8.0%<EQUAL 9.0%: CPT | Mod: CPTII,S$GLB,, | Performed by: OPTOMETRIST

## 2022-08-22 PROCEDURE — 3072F LOW RISK FOR RETINOPATHY: CPT | Mod: CPTII,S$GLB,, | Performed by: PSYCHIATRY & NEUROLOGY

## 2022-08-22 PROCEDURE — 2023F PR DILATED RETINAL EXAM W/O EVID OF RETINOPATHY: ICD-10-PCS | Mod: CPTII,S$GLB,, | Performed by: OPTOMETRIST

## 2022-08-22 PROCEDURE — 3051F HG A1C>EQUAL 7.0%<8.0%: CPT | Mod: CPTII,S$GLB,, | Performed by: PSYCHIATRY & NEUROLOGY

## 2022-08-22 PROCEDURE — 3078F PR MOST RECENT DIASTOLIC BLOOD PRESSURE < 80 MM HG: ICD-10-PCS | Mod: CPTII,S$GLB,, | Performed by: PSYCHIATRY & NEUROLOGY

## 2022-08-22 PROCEDURE — 3078F DIAST BP <80 MM HG: CPT | Mod: CPTII,S$GLB,, | Performed by: PSYCHIATRY & NEUROLOGY

## 2022-08-22 NOTE — PROGRESS NOTES
HPI     Diabetic Eye Exam     Comments: Yearly exam              Comments     Yearly diabetic eye exam    Dm since 1989  No sx's          Last edited by Ami Dutta on 8/22/2022  2:00 PM. (History)            Assessment /Plan     For exam results, see Encounter Report.    Diabetes mellitus type 2 without retinopathy  There was no diabetic retinopathy present in either eye today.   Recommended that pt continue care with PCP and/or specialists regarding diabetes.  Follow-up dilated eye exam recommended in 12 months, sooner with any vision changes or new concerns.    Nuclear sclerosis, bilateral  Cataracts not significantly affecting activities of daily living and therefore surgery is not indicated at this time.   Will continue to monitor over the next 12 months. Pt to call or RTC with any significant change in vision prior to next visit.     Myopia with astigmatism and presbyopia, bilateral  Eyeglass Final Rx     Eyeglass Final Rx       Sphere Cylinder Axis Add    Right -0.25 +1.00 005 +2.00    Left -0.25 +0.75 165 +2.00    Expiration Date: 8/22/2023            OTC Readers Okay to use    RTC 1 yr for dilated eye exam or PRN if any problems.   Discussed above and answered questions.

## 2022-08-22 NOTE — PATIENT INSTRUCTIONS
Headache much improved:    Continue below medication that was prescribed by Dr. Laboy for headache    --Topamax 50 mg daily  -- Breakthrough headache - naratriptan     Prescribed by PCP and pain management   --Gabapentin increased from 300 mg TID to 600 mg TID    --Cymbalta 30 mg TID   --Flexeril 10 mg PRN   --Xanax 1 mg at night   --Abilify 2 mg daily   --Oxycodone PRN   --Phenergan PRN     -Continue CPAP as recommended   -Recommend seeing her pain management/PCP and address polypharmacy. (She thinks she is overmedicated)  -Imaging reviewed with her.   -Refrain from over counter pain medications. Discussed medication overuse headache.   -I urged the patient to keep a headache calender  -Patient education. Discussed prevention and treatment of migraine headaches. Discussed sleep hygiene, healthy diet, importance of minimizing caffeine intake to a maximum of 100-200 mg /day, importance of avoiding foods with MSG, Nutrasweet, and Aspartame.     -Follow up with me in November

## 2022-08-22 NOTE — PROGRESS NOTES
Patient Name: Jadyn Chance  MRN: [id number]    CC: Headache     Interval History: (8/22/2022)  She is seen by Dr. Laboy for headache in February 2022.  For detailed history please see below note.  Today in my clinic she reported that she fell last week and had small bruise on her right side of the skull.  She said that she woke up in the morning and just gave up on her legs.  With this fall she denied any dizziness, blurry vision, double vision, palpitation, sweating, chest pain, shortness of breath, trouble finding words, or loss of consciousness.  She is completely aware of this fall and did not lose her consciousness.  She said that her headache has greatly improved.  Initially the headache severity was in between 7-10.  Today she reported 2-3/10.  She did not reported any change in her headaches as stated in  history from January 2022.  She also reported that her headache gets much better after sleep.  She denies any nausea or vomiting.  She denies any photophobia phonophobia.  She is seen by a pain management and her primary care and made some changes in her existing medications.  MRI brain and MRV is negative for any acute intracranial pathology.  Brain my clinic she denies any dizziness, double vision, blurry vision, trouble talking, numbness, focal weakness, or loss of consciousness.    HPI: from 02/22/2022:     Headache history:    Age of onset -  Nov 2021, after a possible fall. Pt not clear   Location - bitemple, biocciptal  Nature of pain -  Throbbing   Prodrome - no   Aura -  No   Duration of headache - > 4 hrs   Time to peak intensity - n/a   Pain scale - range of intensity -  7-10/10  Frequency -  Daily   Status Migrainosus history - no   Time of day of most headaches- anytime      Associated symptoms with the headache:   Blurred vision   Dizziness   Nausea/vomitting     Cluster headache symptoms: none   Symptoms of increased intracranial pressure:   Whooshing sounds - yes   Visual  spots/blotches  - yes      Headache Triggers: unknown      Other comorbid conditions:  Anxiety - yes   Motion sickness symptom - yes      Treatment history:  Resolution of headache with sleep - yes   Meds tried:  Takes oxycodone and xanax 3 times a day   Doxepin, Gabapentin, Cymbalta - not help   Not using CPAP   fioricet - not help   imitrex - flushing   ONB - helped 1 week      Headache risk factors:   H/o TBI  - no   H/o Meningitis  - no   F/h Aneurysms  - no     Headache burden:   In the last three months:  Days missed from work = 0  Days unable to perform activities of daily living = 0  Days unable to attend social activities = 0     ROS:   Review of Systems   Constitutional: Negative for malaise/fatigue. Negative for weight loss.   HENT: Negative for hearing loss.   Eyes: Negative for blurred vision and double vision.   Respiratory: Negative for shortness of breath and stridor.   Cardiovascular: Negative for chest pain and palpitations.   Gastrointestinal: Negative for nausea, vomiting and constipation.   Genitourinary: Negative for frequency. Negative for urgency.   Musculoskeletal: Negative for joint pain. Negative for myalgias and falls.   Skin: Negative for rash.    Neurological: Negative for dizziness and tremors. Negative for focal weakness and seizures.   Endo/Heme/Allergies: Does not bruise/bleed easily.   Psychiatric/Behavioral: Negative for memory loss. Negative for depression and hallucinations. The patient is not nervous/anxious.    Past Medical History  Past Medical History:   Diagnosis Date    Abnormal Pap smear 1991    bx was negative, per pt    Anemia     Anxiety     B12 deficiency     Blood transfusion     multiple     Chronic LBP     Degenerative disc disease     l spine    Diabetes mellitus     Diabetic neuropathy     General anesthetics causing adverse effect in therapeutic use     delayed emergence    Mixed hyperlipidemia 11/18/2013    PONV (postoperative nausea and vomiting)      RLS (restless legs syndrome)     Seizures     Sleep apnea     with CPAP    Vitamin D deficiency disease      Medications    Current Outpatient Medications:     ALPRAZolam (XANAX) 0.5 MG tablet, Take 1 tablet by mouth 3 times a day as needed for anxiety. Please allow early refill., Disp: 70 tablet, Rfl: 3    ARIPiprazole (ABILIFY) 2 MG Tab, Take 1 tablet (2 mg total) by mouth once daily., Disp: 90 tablet, Rfl: 0    BD ALCOHOL SWABS PadM, , Disp: , Rfl:     blood sugar diagnostic Strp, To check BG 3 times daily, to use with insurance preferred meter    DxE11.9, Disp: 200 strip, Rfl: 3    blood-glucose meter Misc, Use to check blood glucose 3 times daily (Patient taking differently: Use to check blood glucose 3 times daily), Disp: 1 each, Rfl: 0    cyanocobalamin 1,000 mcg/mL injection, Inject 1 mL (1,000 mcg total) into the skin every 28 days., Disp: 10 mL, Rfl: 11    cyclobenzaprine (FLEXERIL) 10 MG tablet, Take 1 (one) tablet by mouth three times a day as needed. May cause drowsiness, use caution/muscle spasms, Disp: 90 tablet, Rfl: 5    cyclobenzaprine (FLEXERIL) 10 MG tablet, take 1 tablet by mouth 3 times daily as needed, Disp: 90 tablet, Rfl: 5    cyclobenzaprine (FLEXERIL) 10 MG tablet, Take 1 tablet (10 mg total) by mouth 3 (three) times daily as needed May cause drowsiness, use caution/ muscle spasms., Disp: 270 tablet, Rfl: 3    doxepin (SINEQUAN) 50 MG capsule, Take 1 capsule by mouth at bedtime as needed for insomnia. If 1 pill is not effective, increase the dose to 2 pills., Disp: 60 capsule, Rfl: 11    DULoxetine (CYMBALTA) 30 MG capsule, Take 3 capsules (90 mg total) by mouth once daily., Disp: 270 capsule, Rfl: 3    ergocalciferol (VITAMIN D2) 50,000 unit Cap, Take 1 capsule (50,000 Units total) by mouth 3 (three) times a week., Disp: 36 capsule, Rfl: 3    gabapentin (NEURONTIN) 300 MG capsule, Take 2 capsules (600 mg total) by mouth 3 (three) times daily., Disp: 540 capsule, Rfl:  "1    HYDROcodone-acetaminophen (NORCO)  mg per tablet, take 1 tablet by mouth every 12 hours as needed for pain, Disp: 28 tablet, Rfl: 0    HYDROcodone-acetaminophen (NORCO)  mg per tablet, Take 1 tablet by mouth every 12 (twelve) hours as needed for pain., Disp: 60 tablet, Rfl: 0    HYDROcodone-acetaminophen (NORCO)  mg per tablet, Take 1 tablet by mouth every 12 hours as needed for pain, Disp: 60 tablet, Rfl: 0    insulin detemir U-100 (LEVEMIR FLEXTOUCH) 100 unit/mL (3 mL) SubQ InPn pen, Inject 12 Units into the skin once daily., Disp: 9 mL, Rfl: 1    lancets 30 gauge Misc, To check blood glucose 3 times daily (Patient taking differently: To check blood glucose 3 times daily), Disp: 200 each, Rfl: 3    meloxicam (MOBIC) 7.5 MG tablet, take 1 tablet by mouth once a day as directed take with largest meal of day/ for arthritis joint pain, Disp: 30 tablet, Rfl: 5    meloxicam (MOBIC) 7.5 MG tablet, Take 1 (one) tablet by mouth twice a day as needed for pain. Take with largest meal of the day for arthritis and joint pain, Disp: 60 tablet, Rfl: 5    meloxicam (MOBIC) 7.5 MG tablet, Take 1 tablet (7.5 mg total) by mouth once daily as directed take with largest meal of the day/ for arthritis., Disp: 90 tablet, Rfl: 3    metFORMIN (GLUCOPHAGE-XR) 500 MG ER 24hr tablet, Take 2 tablets (1,000 mg total) by mouth 2 (two) times daily with meals., Disp: 360 tablet, Rfl: 1    naratriptan (AMERGE) 2.5 MG tablet, Take 1 tablet (2.5 mg) at onset of headache, may repeat in 4 hours if needed, Disp: 9 tablet, Rfl: 12    ondansetron (ZOFRAN) 4 MG tablet, Take 1 tablet (4 mg total) by mouth every 6 (six) hours., Disp: 30 tablet, Rfl: 3    oxyCODONE-acetaminophen (PERCOCET)  mg per tablet, Take 1 tablet by mouth 2 (two) times daily as needed for pain., Disp: 60 tablet, Rfl: 0    pen needle, diabetic 32 gauge x 5/32" Ndle, Use 1 needle once daily., Disp: 100 each, Rfl: 3    promethazine (PHENERGAN) 25 " "MG tablet, Take 1 tablet (25 mg total) by mouth 2 (two) times daily as needed for Nausea., Disp: 40 tablet, Rfl: 6    syringe with needle (SYRINGE 3CC/25GX1") 3 mL 25 gauge x 1" Syrg, For vitamin B12 injection, Disp: 100 Syringe, Rfl: 1    topiramate (TOPAMAX) 25 MG tablet, Take 1 tablet twice a day for 1 week, then take1 tablet in the morning and two at night for 1 week, then take 2 tablets twice daily, Disp: 120 tablet, Rfl: 5    topiramate (TOPAMAX) 50 MG tablet, Take 1 tablet by mouth twice daily. Start after completing 25mg tablet prescription, Disp: 60 tablet, Rfl: 5    Allergies  Review of patient's allergies indicates:   Allergen Reactions    Demerol [meperidine] Hallucinations    Lamictal [lamotrigine] Rash     Rash to face in chart 2014 or 15     Penicillins Other (See Comments)     Patient cannot recall specific reaction, reports she has been listing this as an allergy since childhood.    Bactrim [sulfamethoxazole-trimethoprim]     Ciprofloxacin (bulk)     Codeine Nausea And Vomiting    Levetiracetam     Toradol [ketorolac]     Tramadol      seizures    Morpholine analogues Diarrhea, Itching and Nausea And Vomiting       Social History  Social History     Socioeconomic History    Marital status: Significant Other    Number of children: 4   Tobacco Use    Smoking status: Never Smoker    Smokeless tobacco: Never Used   Substance and Sexual Activity    Alcohol use: No    Drug use: No    Sexual activity: Not Currently     Partners: Male     Birth control/protection: Surgical     Comment: BTL; mut monog       Family History  Family History   Problem Relation Age of Onset    Diabetes Mother     Cataracts Mother     Glaucoma Maternal Aunt     Blindness Maternal Aunt     Breast cancer Neg Hx     Colon cancer Neg Hx     Thrombophilia Neg Hx      Vitals:    08/22/22 1001   BP: 100/68   Pulse: 97   Resp: 16     Physical Exam  LMP 12/04/2014     General appearance: Well-developed, " well-groomed.     Neurologic Exam: The patient is awake, alert and oriented. Language is fluent. Recent and remote memory are normal. Attention span and concentration are normal. Fund of knowledge is appropriate.     Cranial nerves: pupils are round and reactive to light and accommodation. Visual fields are full to confrontation. Ocular motility is full in all cardinal positions of gaze. Facial sensation is normal to pinprick and light touch. Corneal reflexes are present bilaterally. Facial activation is symmetric. Hearing is normal bilaterally. Palate elevates symmetrically and gag reflex is intact bilaterally. Shoulder elevation is symmetric and full strength bilaterally. Tongue is midline and neck rotation strength is normal bilaterally. Neck range of motion is normal.     Motor examination of all extremities demonstrates normal bulk and tone in all four limbs. There are no atrophy or fasciculations. Strength is 5/5 in the upper and lower extremities bilaterally without pronator drift.     Sensory examination is normal to pinprick, vibration and proprioception in the upper and lower extremities bilaterally.     Deep tendon reflexes are 2+ and symmetric in the upper and lower extremities bilaterally. Toes are mute bilaterally.     Gait: Normal gait.    Coordination: Finger to nose and heel to shin testing is normal in both upper and lower extremities. Rapid alternating movements are normal in both upper and lower extremities.     General exam  Cardiovascular: regular rate and rhythm with no murmurs, rubs or gallops. There are no carotid or vertebral artery bruits. Pulses in both upper and lower extremities are symmetric. There is no peripheral edema.   Head and neck: no cervical lymphadenopathy    Lab and Test Results    WBC   Date Value Ref Range Status   04/19/2022 5.33 3.90 - 12.70 K/uL Final   01/13/2022 4.88 3.90 - 12.70 K/uL Final   12/15/2021 5.03 3.90 - 12.70 K/uL Final     Hemoglobin   Date Value Ref  Range Status   04/19/2022 13.2 12.0 - 16.0 g/dL Final   01/13/2022 13.0 12.0 - 16.0 g/dL Final   12/15/2021 12.7 12.0 - 16.0 g/dL Final     Hematocrit   Date Value Ref Range Status   04/19/2022 40.5 37.0 - 48.5 % Final   01/13/2022 41.7 37.0 - 48.5 % Final   12/15/2021 41.1 37.0 - 48.5 % Final     Platelets   Date Value Ref Range Status   04/19/2022 257 150 - 450 K/uL Final   01/13/2022 277 150 - 450 K/uL Final   12/15/2021 270 150 - 450 K/uL Final     Glucose   Date Value Ref Range Status   01/13/2022 319 (H) 70 - 110 mg/dL Final   12/15/2021 336 (H) 70 - 110 mg/dL Final   11/24/2021 186 (H) 70 - 110 mg/dL Final     Sodium   Date Value Ref Range Status   01/13/2022 136 136 - 145 mmol/L Final   12/15/2021 136 136 - 145 mmol/L Final   11/24/2021 140 136 - 145 mmol/L Final     Potassium   Date Value Ref Range Status   01/13/2022 5.3 (H) 3.5 - 5.1 mmol/L Final     Comment:     *No Visible Hemolysis   12/15/2021 4.9 3.5 - 5.1 mmol/L Final   11/24/2021 4.3 3.5 - 5.1 mmol/L Final     Chloride   Date Value Ref Range Status   01/13/2022 100 95 - 110 mmol/L Final   12/15/2021 102 95 - 110 mmol/L Final   11/24/2021 106 95 - 110 mmol/L Final     CO2   Date Value Ref Range Status   01/13/2022 27 23 - 29 mmol/L Final   12/15/2021 22 (L) 23 - 29 mmol/L Final   11/24/2021 20 (L) 23 - 29 mmol/L Final     BUN   Date Value Ref Range Status   01/13/2022 10 6 - 20 mg/dL Final   12/15/2021 19 6 - 20 mg/dL Final   11/24/2021 16 6 - 20 mg/dL Final     Creatinine   Date Value Ref Range Status   01/13/2022 0.9 0.5 - 1.4 mg/dL Final   12/15/2021 0.8 0.5 - 1.4 mg/dL Final   11/24/2021 0.8 0.5 - 1.4 mg/dL Final     Calcium   Date Value Ref Range Status   01/13/2022 9.2 8.7 - 10.5 mg/dL Final   12/15/2021 8.9 8.7 - 10.5 mg/dL Final   11/24/2021 8.5 (L) 8.7 - 10.5 mg/dL Final     Magnesium   Date Value Ref Range Status   07/17/2017 1.4 (L) 1.6 - 2.6 mg/dL Final   04/01/2015 1.1 (L) 1.6 - 2.6 mg/dL Final   03/31/2015 1.2 (L) 1.6 - 2.6 mg/dL Final      Phosphorus   Date Value Ref Range Status   04/01/2015 4.3 2.7 - 4.5 mg/dL Final   03/31/2015 4.0 2.7 - 4.5 mg/dL Final   03/30/2015 3.8 2.7 - 4.5 mg/dL Final     Alkaline Phosphatase   Date Value Ref Range Status   01/13/2022 116 55 - 135 U/L Final   12/15/2021 130 55 - 135 U/L Final   11/24/2021 158 (H) 55 - 135 U/L Final     ALT   Date Value Ref Range Status   01/13/2022 47 (H) 10 - 44 U/L Final   12/15/2021 49 (H) 10 - 44 U/L Final   11/24/2021 49 (H) 10 - 44 U/L Final     AST   Date Value Ref Range Status   01/13/2022 37 10 - 40 U/L Final   12/15/2021 26 10 - 40 U/L Final   11/24/2021 27 10 - 40 U/L Final     Images:     MRI and MRV Brain:  Impression:     1.  Unremarkable noncontrast brain MRI.  2.  Dural venous sinuses are patent.  Asymmetrically smaller left transverse sinus compared to the right, a nonspecific finding which may be seen as a normal variant.    MRI C Spine:  Impression:     Mild degenerative disc disease at C6-C7 and C7-T1 with posterior disc protrusions resulting in mild spinal canal stenosis at these levels.  No cervical spine neural foraminal stenosis.    Other Tests    Assessment and Plan    Jadyn Chance is a 52 y.o. female presented to my clinic for the evaluation of headache. She is Dr Laboy patient and seen by him in February 2022.  He prescribed Topamax and naratriptan for breakthrough headaches.  He also ordered MRI brain and MRA brain during his last visit which showed no acute intracranial pathology.  Today she reported great improvement in her headaches.She did not reported any change in her headaches as stated in  history from January 2022.  I think her headache etiology is multifactorial which includes possible occipital neuralgia, facet joint disease of cervical region and medication overuse headache.  She is on bunch of medications and she thinks she is overmedicated.  I think one of the problem that her primary care and pain management has to address is  polypharmacy.  I had a long discussion with her regarding her headache and medications.  After long discussion we decided not to change any of her medication considering her headaches are much improved.  I think the best next step is to visit her primary care and pain management and address the issue of polypharmacy.    Continue below medication that was prescribed by Dr. Laboy for headache     --Topamax 50 mg daily  -- Breakthrough headache - naratriptan     Prescribed by PCP and pain management   --Gabapentin increased from 300 mg TID to 600 mg TID    --Cymbalta 30 mg TID   --Flexeril 10 mg PRN   --Xanax 1 mg at night   --Abilify 2 mg daily   --Oxycodone PRN   --Phenergan PRN     -Continue CPAP as recommended   -Recommend seeing her pain management/PCP and address polypharmacy. (She thinks she is overmedicated)  -Imaging reviewed with her.   -Refrain from over counter pain medications. Discussed medication overuse headache.   -I urged the patient to keep a headache calender  -Patient education. Discussed prevention and treatment of migraine headaches. Discussed sleep hygiene, healthy diet, importance of minimizing caffeine intake to a maximum of 100-200 mg /day, importance of avoiding foods with MSG, Nutrasweet, and Aspartame.     --Follow up in neurology clinic with me in November     Jamie Mo MD  Neurology  [facility]

## 2022-08-24 ENCOUNTER — PATIENT OUTREACH (OUTPATIENT)
Dept: ADMINISTRATIVE | Facility: HOSPITAL | Age: 52
End: 2022-08-24
Payer: MEDICARE

## 2022-08-24 NOTE — PROGRESS NOTES
NATALIA STATIN DM REPORT: Left voicemail for patient to return our call. Patient is currently under Dr. Yates, however due to change with [place] she will need to establish care with new PCP. I do not see where patient is on statin medication. No allergy or intolerance per chart.

## 2022-09-14 DIAGNOSIS — E11.69 HYPERLIPIDEMIA ASSOCIATED WITH TYPE 2 DIABETES MELLITUS: ICD-10-CM

## 2022-09-14 DIAGNOSIS — E78.5 HYPERLIPIDEMIA ASSOCIATED WITH TYPE 2 DIABETES MELLITUS: ICD-10-CM

## 2022-09-14 DIAGNOSIS — Z29.9 PREVENTIVE MEASURE: ICD-10-CM

## 2022-09-14 RX ORDER — TOPIRAMATE 25 MG/1
TABLET ORAL
Qty: 120 TABLET | Refills: 5 | Status: SHIPPED | OUTPATIENT
Start: 2022-09-14 | End: 2022-10-13

## 2022-09-14 RX ORDER — GABAPENTIN 300 MG/1
600 CAPSULE ORAL 3 TIMES DAILY
Qty: 540 CAPSULE | Refills: 1 | Status: SHIPPED | OUTPATIENT
Start: 2022-09-14 | End: 2022-11-02

## 2022-09-14 NOTE — TELEPHONE ENCOUNTER
No new care gaps identified.  [facility] Embedded Care Gaps. Reference number: [id number]. 9/14/2022   3:23:06 PM CDT

## 2022-09-21 NOTE — PROGRESS NOTES
L/M for pt to schedule Repeat HGBA1c       Valeri HAHN LPN Care Coordinator  Care Coordination Department  [facility]  [phone]    September 21, 2022      Patient: Ovi Gale   YOB: 2010       To Whom it May Concern:    Ovi Gale has received treatment at this office on the following dates: 9/21/22. He tested negative for COVID-19 infection, Influenza and Streptococcus. Treatment was provided for his allergy symptoms. They may resume school on 9/22/22.     If you have any questions or concerns, please don't hesitate to call.      Sincerely,         David Luke MD      Medical information is confidential and cannot be disclosed without the written consent of the patient or his representative.    CC:   No Recipients

## 2022-09-23 NOTE — TELEPHONE ENCOUNTER
----- Message from Francisco Glez sent at 9/23/2022 10:23 AM CDT -----  Contact: [facility] ([facility] pharmacy)  Type:  Pharmacy Calling to Clarify an RX    Name of Caller:[facility]   Pharmacy Name: [facility]   Prescription Name: ALPRAZolam (XANAX) 0.5 MG    What do they need to clarify?: there was an issue with the delivery, and they are needing a verbal authorization.   Best Call Back Number: [phone]   Additional Information:  They would like for a short supply to be approved at pt local pharmacy as well.   [facility]- [address] , [place], [place] (phone# [phone])

## 2022-09-29 ENCOUNTER — PATIENT MESSAGE (OUTPATIENT)
Dept: SLEEP MEDICINE | Facility: CLINIC | Age: 52
End: 2022-09-29
Payer: MEDICARE

## 2022-09-29 DIAGNOSIS — G47.00 INSOMNIA, UNSPECIFIED TYPE: Primary | ICD-10-CM

## 2022-09-29 RX ORDER — LEMBOREXANT 5 MG/1
TABLET, FILM COATED ORAL
Qty: 30 TABLET | Refills: 3 | Status: SHIPPED | OUTPATIENT
Start: 2022-09-29 | End: 2022-12-27

## 2022-09-29 RX ORDER — ZALEPLON 5 MG/1
CAPSULE ORAL
Qty: 60 CAPSULE | Refills: 1 | Status: SHIPPED | OUTPATIENT
Start: 2022-09-29 | End: 2023-02-06

## 2022-09-29 NOTE — PROGRESS NOTES
Since Ms. Chance's pain specialist does not want her to take BDZ anymore, but BDZRA are not contraindicated, will order Sonata.  If it causes side effects, I will try for DAyvigo

## 2022-10-03 ENCOUNTER — TELEPHONE (OUTPATIENT)
Dept: PHARMACY | Facility: CLINIC | Age: 52
End: 2022-10-03
Payer: MEDICARE

## 2022-10-07 ENCOUNTER — PATIENT OUTREACH (OUTPATIENT)
Dept: ADMINISTRATIVE | Facility: HOSPITAL | Age: 52
End: 2022-10-07
Payer: MEDICARE

## 2022-10-07 NOTE — PROGRESS NOTES
Human Statin Diabetes Report.    Pt does not have an allergy/intolerance to statin documented and is not currently taking a statin.  Pt was a pt of Dr Yates, who is no longer doing primary care.  Assisted to schedule est care/annual exam for 10/13/22.  Note placed to review and consider a statin.

## 2022-10-13 ENCOUNTER — OFFICE VISIT (OUTPATIENT)
Dept: INTERNAL MEDICINE | Facility: CLINIC | Age: 52
End: 2022-10-13
Payer: MEDICARE

## 2022-10-13 VITALS
TEMPERATURE: 99 F | BODY MASS INDEX: 25.9 KG/M2 | OXYGEN SATURATION: 99 % | HEIGHT: 63 IN | SYSTOLIC BLOOD PRESSURE: 120 MMHG | WEIGHT: 146.19 LBS | RESPIRATION RATE: 20 BRPM | HEART RATE: 93 BPM | DIASTOLIC BLOOD PRESSURE: 76 MMHG

## 2022-10-13 DIAGNOSIS — E55.9 VITAMIN D DEFICIENCY: ICD-10-CM

## 2022-10-13 DIAGNOSIS — Z12.31 ENCOUNTER FOR SCREENING MAMMOGRAM FOR MALIGNANT NEOPLASM OF BREAST: Primary | ICD-10-CM

## 2022-10-13 DIAGNOSIS — Z12.11 SCREEN FOR COLON CANCER: ICD-10-CM

## 2022-10-13 DIAGNOSIS — Z79.4 TYPE 2 DIABETES MELLITUS WITHOUT COMPLICATION, WITH LONG-TERM CURRENT USE OF INSULIN: Chronic | ICD-10-CM

## 2022-10-13 DIAGNOSIS — E53.8 VITAMIN B12 DEFICIENCY: ICD-10-CM

## 2022-10-13 DIAGNOSIS — E11.9 TYPE 2 DIABETES MELLITUS WITHOUT COMPLICATION, WITH LONG-TERM CURRENT USE OF INSULIN: Chronic | ICD-10-CM

## 2022-10-13 PROCEDURE — 1159F MED LIST DOCD IN RCRD: CPT | Mod: CPTII,S$GLB,, | Performed by: FAMILY MEDICINE

## 2022-10-13 PROCEDURE — 99999 PR PBB SHADOW E&M-EST. PATIENT-LVL V: CPT | Mod: PBBFAC,,, | Performed by: FAMILY MEDICINE

## 2022-10-13 PROCEDURE — 99214 PR OFFICE/OUTPT VISIT, EST, LEVL IV, 30-39 MIN: ICD-10-PCS | Mod: S$GLB,,, | Performed by: FAMILY MEDICINE

## 2022-10-13 PROCEDURE — 3051F HG A1C>EQUAL 7.0%<8.0%: CPT | Mod: CPTII,S$GLB,, | Performed by: FAMILY MEDICINE

## 2022-10-13 PROCEDURE — 3074F PR MOST RECENT SYSTOLIC BLOOD PRESSURE < 130 MM HG: ICD-10-PCS | Mod: CPTII,S$GLB,, | Performed by: FAMILY MEDICINE

## 2022-10-13 PROCEDURE — 3078F PR MOST RECENT DIASTOLIC BLOOD PRESSURE < 80 MM HG: ICD-10-PCS | Mod: CPTII,S$GLB,, | Performed by: FAMILY MEDICINE

## 2022-10-13 PROCEDURE — 3051F PR MOST RECENT HEMOGLOBIN A1C LEVEL 7.0 - < 8.0%: ICD-10-PCS | Mod: CPTII,S$GLB,, | Performed by: FAMILY MEDICINE

## 2022-10-13 PROCEDURE — 3074F SYST BP LT 130 MM HG: CPT | Mod: CPTII,S$GLB,, | Performed by: FAMILY MEDICINE

## 2022-10-13 PROCEDURE — 3072F PR LOW RISK FOR RETINOPATHY: ICD-10-PCS | Mod: CPTII,S$GLB,, | Performed by: FAMILY MEDICINE

## 2022-10-13 PROCEDURE — 3078F DIAST BP <80 MM HG: CPT | Mod: CPTII,S$GLB,, | Performed by: FAMILY MEDICINE

## 2022-10-13 PROCEDURE — 3066F PR DOCUMENTATION OF TREATMENT FOR NEPHROPATHY: ICD-10-PCS | Mod: CPTII,S$GLB,, | Performed by: FAMILY MEDICINE

## 2022-10-13 PROCEDURE — 3061F NEG MICROALBUMINURIA REV: CPT | Mod: CPTII,S$GLB,, | Performed by: FAMILY MEDICINE

## 2022-10-13 PROCEDURE — 99999 PR PBB SHADOW E&M-EST. PATIENT-LVL V: ICD-10-PCS | Mod: PBBFAC,,, | Performed by: FAMILY MEDICINE

## 2022-10-13 PROCEDURE — 3072F LOW RISK FOR RETINOPATHY: CPT | Mod: CPTII,S$GLB,, | Performed by: FAMILY MEDICINE

## 2022-10-13 PROCEDURE — 3061F PR NEG MICROALBUMINURIA RESULT DOCUMENTED/REVIEW: ICD-10-PCS | Mod: CPTII,S$GLB,, | Performed by: FAMILY MEDICINE

## 2022-10-13 PROCEDURE — 99214 OFFICE O/P EST MOD 30 MIN: CPT | Mod: S$GLB,,, | Performed by: FAMILY MEDICINE

## 2022-10-13 PROCEDURE — 1159F PR MEDICATION LIST DOCUMENTED IN MEDICAL RECORD: ICD-10-PCS | Mod: CPTII,S$GLB,, | Performed by: FAMILY MEDICINE

## 2022-10-13 PROCEDURE — 3066F NEPHROPATHY DOC TX: CPT | Mod: CPTII,S$GLB,, | Performed by: FAMILY MEDICINE

## 2022-10-13 RX ORDER — PRAVASTATIN SODIUM 20 MG/1
20 TABLET ORAL DAILY
Qty: 90 TABLET | Refills: 3 | Status: SHIPPED | OUTPATIENT
Start: 2022-10-13 | End: 2023-02-06 | Stop reason: SDUPTHER

## 2022-10-13 NOTE — PROGRESS NOTES
Subjective:      Patient ID: Jadyn Chance is a 52 y.o. female.    Chief Complaint: Establish Care    HPI  DM a1c better.above 7. Wide range in sugars. No signif. Low gluc; metform inc to 1000mg/day about a month ago    Migraine hx utd neurolo    Chronc pain pain mgmt; required getting off bnzos. Off since     Chr anxiety Dr Lai overdue    on long term disability sec to [employer], cubital tunn with long term neuropathy affecting sensation r hand  Past Medical History:   Diagnosis Date    Abnormal Pap smear     bx was negative, per pt    Anemia     Anxiety     B12 deficiency     Blood transfusion     multiple     Chronic LBP     Degenerative disc disease     l spine    Diabetes mellitus     Diabetic neuropathy     General anesthetics causing adverse effect in therapeutic use     delayed emergence    Mixed hyperlipidemia 2013    PONV (postoperative nausea and vomiting)     RLS (restless legs syndrome)     Seizures     Sleep apnea     with CPAP    Vitamin D deficiency disease       Past Surgical History:   Procedure Laterality Date    ANUS SURGERY      AUGMENTATION OF BREAST      saline    BELT ABDOMINOPLASTY      tummy tuck    BREAST SURGERY  2008    breast augmentation    CARPAL TUNNEL RELEASE Right 2021    Procedure: RELEASE, CARPAL TUNNEL;  Surgeon: Tyler Victor MD;  Location: [facility] OR;  Service: Orthopedics;  Laterality: Right;  right carpal tunnel release and right cubital tunnel release with possible anterior transposition ulnar nerve     SECTION      x2    CHOLECYSTECTOMY      mikel      EXCISIONAL HEMORRHOIDECTOMY      GASTRIC BYPASS      HIP FRACTURE SURGERY      left hip ORIF    MOUTH SURGERY      revision of JJ anastomosis  2/27/15    small bowel resection  2015    TUBAL LIGATION      ULNAR TUNNEL RELEASE Right 2021    Procedure: RELEASE, CUBITAL TUNNEL;  Surgeon: Tyler Victor MD;  Location: [facility] OR;  Service: Orthopedics;  Laterality:  Right;  right carpal tunnel release and right cubital tunnel release with possible anterior transposition ulnar nerve      Social History     Socioeconomic History    Marital status: Significant Other    Number of children: 4   Tobacco Use    Smoking status: Never    Smokeless tobacco: Never   Substance and Sexual Activity    Alcohol use: No    Drug use: No    Sexual activity: Not Currently     Partners: Male     Birth control/protection: Surgical     Comment: BTL; mut monog   Social History Narrative    Lives in [place]     Social Determinants of Health     Financial Resource Strain: Medium Risk    Difficulty of Paying Living Expenses: Somewhat hard   Food Insecurity: Unknown    Worried About Running Out of Food in the Last Year: Patient refused    Ran Out of Food in the Last Year: Never true   Transportation Needs: Unmet Transportation Needs    Lack of Transportation (Medical): Yes    Lack of Transportation (Non-Medical): Yes   Physical Activity: Inactive    Days of Exercise per Week: 0 days    Minutes of Exercise per Session: 0 min   Social Connections: Unknown    Frequency of Communication with Friends and Family: Once a week    Frequency of Social Gatherings with Friends and Family: Never    Active Member of Clubs or Organizations: No    Attends Club or Organization Meetings: 1 to 4 times per year    Marital Status:    Housing Stability: High Risk    Unable to Pay for Housing in the Last Year: Yes    Number of Places Lived in the Last Year: 1    Unstable Housing in the Last Year: No      Family History   Problem Relation Age of Onset    Diabetes Mother     Cataracts Mother     Glaucoma Maternal Aunt     Blindness Maternal Aunt     Breast cancer Neg Hx     Colon cancer Neg Hx     Thrombophilia Neg Hx       Review of Systems        Objective:     Physical Exam  Assessment:         ICD-10-CM ICD-9-CM   1. Encounter for screening mammogram for malignant neoplasm of breast  Z12.31 V76.12   2. Type 2  diabetes mellitus without complication, with long-term current use of insulin  E11.9 250.00    Z79.4 V58.67   3. Vitamin B12 deficiency  E53.8 266.2   4. Screen for colon cancer  Z12.11 V76.51   5. Vitamin D deficiency  E55.9 268.9      Plan:      Lab and follow up after in 3 months  F/u  Post   Statin indic d/wd. Side effects and dosing discussed include myalgia and stopping if occurs  F/u neuro when due    Encounter for screening mammogram for malignant neoplasm of breast  -     Mammo Digital Screening Bilat w/ Helio; Future; Expected date: 10/20/2022    Type 2 diabetes mellitus without complication, with long-term current use of insulin  -     Hemoglobin A1C; Future; Expected date: 01/11/2023  -     Comprehensive Metabolic Panel; Future; Expected date: 01/13/2023  -     Lipid Panel; Future; Expected date: 01/11/2023  -     Microalbumin/Creatinine Ratio, Urine; Future; Expected date: 01/13/2023  -     Ambulatory referral/consult to Diabetes Education; Future; Expected date: 10/20/2022    Vitamin B12 deficiency  -     Vitamin B12; Future; Expected date: 01/13/2023    Screen for colon cancer  -     Ambulatory referral/consult to Endo Procedure ; Future; Expected date: 10/14/2022    Vitamin D deficiency  -     Vitamin D; Future; Expected date: 01/11/2023    Other orders  -     pravastatin (PRAVACHOL) 20 MG tablet; Take 1 tablet (20 mg total) by mouth once daily.  Dispense: 90 tablet; Refill: 3         Add: on long term disability sec to [employer], cubital tunn with long term neuropathy affecting sensation r hand in additionto chronic issues above and not recomm/ready to return to work

## 2022-10-18 ENCOUNTER — HOSPITAL ENCOUNTER (OUTPATIENT)
Dept: PREADMISSION TESTING | Facility: HOSPITAL | Age: 52
Discharge: HOME | End: 2022-10-18
Payer: MEDICARE

## 2022-10-18 DIAGNOSIS — Z12.11 SCREEN FOR COLON CANCER: Primary | ICD-10-CM

## 2022-10-18 RX ORDER — POLYETHYLENE GLYCOL 3350, SODIUM SULFATE ANHYDROUS, SODIUM BICARBONATE, SODIUM CHLORIDE, POTASSIUM CHLORIDE 236; 22.74; 6.74; 5.86; 2.97 G/4L; G/4L; G/4L; G/4L; G/4L
4 POWDER, FOR SOLUTION ORAL ONCE
Qty: 4000 ML | Refills: 0 | Status: SHIPPED | OUTPATIENT
Start: 2022-10-18 | End: 2022-10-18

## 2022-10-22 DIAGNOSIS — Z29.9 PREVENTIVE MEASURE: ICD-10-CM

## 2022-10-22 DIAGNOSIS — E78.5 HYPERLIPIDEMIA ASSOCIATED WITH TYPE 2 DIABETES MELLITUS: ICD-10-CM

## 2022-10-22 DIAGNOSIS — E11.69 HYPERLIPIDEMIA ASSOCIATED WITH TYPE 2 DIABETES MELLITUS: ICD-10-CM

## 2022-10-22 NOTE — TELEPHONE ENCOUNTER
Care Due:                  Date            Visit Type   Department     Provider  --------------------------------------------------------------------------------                                EP -                              PRIMARY      [facility] INTER[facility]  Last Visit: 10-      [facility] ([facility])   PARTH Archer                              EP -                              PRIMARY      [facility] INTERNAL  Next Visit: 01-      [facility] ([facility])   PARTH Archer                                                            Last  Test          Frequency    Reason                     Performed    Due Date  --------------------------------------------------------------------------------    CMP.........  12 months..  DULoxetine, LEVEMIR,       01- 01-                             metFORMIN, pravastatin...    Lipid Panel.  12 months..  pravastatin..............  01- 01-    [facility] Embedded Care Gaps. Reference number: [id number]. 10/22/2022   12:57:27 PM CDT

## 2022-11-01 ENCOUNTER — PATIENT OUTREACH (OUTPATIENT)
Dept: ADMINISTRATIVE | Facility: HOSPITAL | Age: 52
End: 2022-11-01
Payer: MEDICARE

## 2022-11-01 ENCOUNTER — OFFICE VISIT (OUTPATIENT)
Dept: NEUROLOGY | Facility: CLINIC | Age: 52
End: 2022-11-01
Payer: MEDICARE

## 2022-11-01 VITALS
OXYGEN SATURATION: 98 % | SYSTOLIC BLOOD PRESSURE: 112 MMHG | RESPIRATION RATE: 16 BRPM | HEART RATE: 103 BPM | HEIGHT: 63 IN | BODY MASS INDEX: 26.64 KG/M2 | DIASTOLIC BLOOD PRESSURE: 73 MMHG | WEIGHT: 150.38 LBS

## 2022-11-01 DIAGNOSIS — G89.29 CHRONIC INTRACTABLE HEADACHE, UNSPECIFIED HEADACHE TYPE: Primary | ICD-10-CM

## 2022-11-01 DIAGNOSIS — M54.81 OCCIPITAL NEURALGIA, UNSPECIFIED LATERALITY: ICD-10-CM

## 2022-11-01 DIAGNOSIS — R51.9 CHRONIC INTRACTABLE HEADACHE, UNSPECIFIED HEADACHE TYPE: Primary | ICD-10-CM

## 2022-11-01 PROCEDURE — 1159F PR MEDICATION LIST DOCUMENTED IN MEDICAL RECORD: ICD-10-PCS | Mod: CPTII,S$GLB,, | Performed by: PSYCHIATRY & NEUROLOGY

## 2022-11-01 PROCEDURE — 1159F MED LIST DOCD IN RCRD: CPT | Mod: CPTII,S$GLB,, | Performed by: PSYCHIATRY & NEUROLOGY

## 2022-11-01 PROCEDURE — 99999 PR PBB SHADOW E&M-EST. PATIENT-LVL V: ICD-10-PCS | Mod: PBBFAC,,, | Performed by: PSYCHIATRY & NEUROLOGY

## 2022-11-01 PROCEDURE — 1160F PR REVIEW ALL MEDS BY PRESCRIBER/CLIN PHARMACIST DOCUMENTED: ICD-10-PCS | Mod: CPTII,S$GLB,, | Performed by: PSYCHIATRY & NEUROLOGY

## 2022-11-01 PROCEDURE — 99213 PR OFFICE/OUTPT VISIT, EST, LEVL III, 20-29 MIN: ICD-10-PCS | Mod: S$GLB,,, | Performed by: PSYCHIATRY & NEUROLOGY

## 2022-11-01 PROCEDURE — 3051F PR MOST RECENT HEMOGLOBIN A1C LEVEL 7.0 - < 8.0%: ICD-10-PCS | Mod: CPTII,S$GLB,, | Performed by: PSYCHIATRY & NEUROLOGY

## 2022-11-01 PROCEDURE — 3066F NEPHROPATHY DOC TX: CPT | Mod: CPTII,S$GLB,, | Performed by: PSYCHIATRY & NEUROLOGY

## 2022-11-01 PROCEDURE — 3072F LOW RISK FOR RETINOPATHY: CPT | Mod: CPTII,S$GLB,, | Performed by: PSYCHIATRY & NEUROLOGY

## 2022-11-01 PROCEDURE — 3066F PR DOCUMENTATION OF TREATMENT FOR NEPHROPATHY: ICD-10-PCS | Mod: CPTII,S$GLB,, | Performed by: PSYCHIATRY & NEUROLOGY

## 2022-11-01 PROCEDURE — 3078F PR MOST RECENT DIASTOLIC BLOOD PRESSURE < 80 MM HG: ICD-10-PCS | Mod: CPTII,S$GLB,, | Performed by: PSYCHIATRY & NEUROLOGY

## 2022-11-01 PROCEDURE — 3078F DIAST BP <80 MM HG: CPT | Mod: CPTII,S$GLB,, | Performed by: PSYCHIATRY & NEUROLOGY

## 2022-11-01 PROCEDURE — 3074F PR MOST RECENT SYSTOLIC BLOOD PRESSURE < 130 MM HG: ICD-10-PCS | Mod: CPTII,S$GLB,, | Performed by: PSYCHIATRY & NEUROLOGY

## 2022-11-01 PROCEDURE — 1160F RVW MEDS BY RX/DR IN RCRD: CPT | Mod: CPTII,S$GLB,, | Performed by: PSYCHIATRY & NEUROLOGY

## 2022-11-01 PROCEDURE — 3072F PR LOW RISK FOR RETINOPATHY: ICD-10-PCS | Mod: CPTII,S$GLB,, | Performed by: PSYCHIATRY & NEUROLOGY

## 2022-11-01 PROCEDURE — 3061F PR NEG MICROALBUMINURIA RESULT DOCUMENTED/REVIEW: ICD-10-PCS | Mod: CPTII,S$GLB,, | Performed by: PSYCHIATRY & NEUROLOGY

## 2022-11-01 PROCEDURE — 3061F NEG MICROALBUMINURIA REV: CPT | Mod: CPTII,S$GLB,, | Performed by: PSYCHIATRY & NEUROLOGY

## 2022-11-01 PROCEDURE — 3051F HG A1C>EQUAL 7.0%<8.0%: CPT | Mod: CPTII,S$GLB,, | Performed by: PSYCHIATRY & NEUROLOGY

## 2022-11-01 PROCEDURE — 3074F SYST BP LT 130 MM HG: CPT | Mod: CPTII,S$GLB,, | Performed by: PSYCHIATRY & NEUROLOGY

## 2022-11-01 PROCEDURE — 99213 OFFICE O/P EST LOW 20 MIN: CPT | Mod: S$GLB,,, | Performed by: PSYCHIATRY & NEUROLOGY

## 2022-11-01 PROCEDURE — 99999 PR PBB SHADOW E&M-EST. PATIENT-LVL V: CPT | Mod: PBBFAC,,, | Performed by: PSYCHIATRY & NEUROLOGY

## 2022-11-01 RX ORDER — DICLOFENAC SODIUM 50 MG/1
50 TABLET, DELAYED RELEASE ORAL 2 TIMES DAILY
COMMUNITY
Start: 2022-04-26 | End: 2023-02-06

## 2022-11-01 RX ORDER — POLYETHYLENE GLYCOL-3350 AND ELECTROLYTES 236; 6.74; 5.86; 2.97; 22.74 G/274.31G; G/274.31G; G/274.31G; G/274.31G; G/274.31G
POWDER, FOR SOLUTION ORAL
COMMUNITY
Start: 2022-10-19 | End: 2022-12-27 | Stop reason: ALTCHOICE

## 2022-11-01 NOTE — PATIENT INSTRUCTIONS
Now off below medications.  -- Topamax 50 mg daily  -- Breakthrough headache - naratriptan   -- Xanax 1 mg at night   -- Abilify 2 mg daily   -- Cymbalta 30 mg TID     Taking below medications.   --Gabapentin 600 mg TID    --Flexeril 10 mg PRN   --Oxycodone PRN   --Phenergan PRN     -Continue CPAP as recommended   -Refrain from over counter pain medications. Discussed medication overuse headache.   -I urged the patient to keep a headache calender  -Patient education. Discussed prevention and treatment of migraine headaches. Discussed sleep hygiene, healthy diet, importance of minimizing caffeine intake to a maximum of 100-200 mg /day, importance of avoiding foods with MSG, Nutrasweet, and Aspartame.     --Follow up in neurology clinic with me in March

## 2022-11-01 NOTE — PROGRESS NOTES
Patient Name: Jadyn Chance  MRN: [id number]    CC: Headache     Interval History: (11/1/2022)  HA is much improved and greatly decrease in frequency and intensity.Reported no new neurological complains. She stopped few medications on her own and felt great improvement in her HA and fogginess. In my clinic she denies any dizziness, double vision, blurry vision, trouble talking, numbness, focal weakness, or loss of consciousness.    Interval History: (8/22/2022)   She is seen by Dr. Laboy for headache in February 2022.  For detailed history please see below note.  Today in my clinic she reported that she fell last week and had small bruise on her right side of the skull.  She said that she woke up in the morning and just gave up on her legs.  With this fall she denied any dizziness, blurry vision, double vision, palpitation, sweating, chest pain, shortness of breath, trouble finding words, or loss of consciousness.  She is completely aware of this fall and did not lose her consciousness.  She said that her headache has greatly improved.  Initially the headache severity was in between 7-10.  Today she reported 2-3/10.  She did not reported any change in her headaches as stated in  history from January 2022.  She also reported that her headache gets much better after sleep.  She denies any nausea or vomiting.  She denies any photophobia phonophobia.  She is seen by a pain management and her primary care and made some changes in her existing medications.  MRI brain and MRV is negative for any acute intracranial pathology.  In my clinic she denies any dizziness, double vision, blurry vision, trouble talking, numbness, focal weakness, or loss of consciousness.    HPI: from 02/22/2022:     Headache history:    Age of onset -  Nov 2021, after a possible fall. Pt not clear   Location - [place], biocciptal  Nature of pain -  Throbbing   Prodrome - no   Aura -  No   Duration of headache - > 4 hrs   Time to peak  intensity - n/a   Pain scale - range of intensity -  7-10/10  Frequency -  Daily   Status Migrainosus history - no   Time of day of most headaches- anytime      Associated symptoms with the headache:   Blurred vision   Dizziness   Nausea/vomitting     Cluster headache symptoms: none   Symptoms of increased intracranial pressure:   Whooshing sounds - yes   Visual spots/blotches  - yes      Headache Triggers: unknown      Other comorbid conditions:  Anxiety - yes   Motion sickness symptom - yes      Treatment history:  Resolution of headache with sleep - yes   Meds tried:  Takes oxycodone and xanax 3 times a day   Doxepin, Gabapentin, Cymbalta - not help   Not using CPAP   fioricet - not help   imitrex - flushing   ONB - helped 1 week      Headache risk factors:   H/o TBI  - no   H/o Meningitis  - no   F/h Aneurysms  - no     Headache burden:   In the last three months:  Days missed from work = 0  Days unable to perform activities of daily living = 0  Days unable to attend social activities = 0     ROS:   Review of Systems   Constitutional: Negative for malaise/fatigue. Negative for weight loss.   HENT: Negative for hearing loss.   Eyes: Negative for blurred vision and double vision.   Respiratory: Negative for shortness of breath and stridor.   Cardiovascular: Negative for chest pain and palpitations.   Gastrointestinal: Negative for nausea, vomiting and constipation.   Genitourinary: Negative for frequency. Negative for urgency.   Musculoskeletal: Negative for joint pain. Negative for myalgias and falls.   Skin: Negative for rash.    Neurological: Negative for dizziness and tremors. Negative for focal weakness and seizures.   Endo/Heme/Allergies: Does not bruise/bleed easily.   Psychiatric/Behavioral: Negative for memory loss. Negative for depression and hallucinations. The patient is not nervous/anxious.    Past Medical History  Past Medical History:   Diagnosis Date    Abnormal Pap smear 1991    bx was negative,  per pt    Anemia     Anxiety     B12 deficiency     Blood transfusion     multiple     Chronic LBP     Degenerative disc disease     l spine    Diabetes mellitus     Diabetic neuropathy     General anesthetics causing adverse effect in therapeutic use     delayed emergence    Mixed hyperlipidemia 11/18/2013    PONV (postoperative nausea and vomiting)     RLS (restless legs syndrome)     Seizures     Sleep apnea     with CPAP    Vitamin D deficiency disease      Medications    Current Outpatient Medications:     BD ALCOHOL SWABS PadM, , Disp: , Rfl:     blood sugar diagnostic Strp, To check BG 3 times daily, to use with insurance preferred meter    [id number], Disp: 200 strip, Rfl: 3    blood-glucose meter Misc, Use to check blood glucose 3 times daily (Patient taking differently: Use to check blood glucose 3 times daily), Disp: 1 each, Rfl: 0    cyanocobalamin 1,000 mcg/mL injection, Inject 1 mL (1,000 mcg total) into the skin every 28 days., Disp: 10 mL, Rfl: 11    cyclobenzaprine (FLEXERIL) 10 MG tablet, Take 1 (one) tablet by mouth three times a day as needed. May cause drowsiness, use caution/muscle spasms, Disp: 90 tablet, Rfl: 5    diclofenac (VOLTAREN) 50 MG EC tablet, 50 mg., Disp: , Rfl:     doxepin (SINEQUAN) 50 MG capsule, Take 1 capsule by mouth at bedtime as needed for insomnia. If 1 pill is not effective, increase the dose to 2 pills., Disp: 60 capsule, Rfl: 11    ergocalciferol (VITAMIN D2) 50,000 unit Cap, Take 1 capsule (50,000 Units total) by mouth 3 (three) times a week., Disp: 36 capsule, Rfl: 3    gabapentin (NEURONTIN) 300 MG capsule, Take 2 capsules (600 mg total) by mouth 3 (three) times daily., Disp: 540 capsule, Rfl: 1    lancets 30 gauge Misc, To check blood glucose 3 times daily (Patient taking differently: To check blood glucose 3 times daily), Disp: 200 each, Rfl: 3    lemborexant (DAYVIGO) 5 mg Tab, Take 1 tablet by mouth nightly at bedtime as needed for insomnia, Disp: 30 tablet, Rfl:  "3    LEVEMIR FLEXTOUCH U-100 INSULN 100 unit/mL (3 mL) InPn pen, INJECT 12 UNITS INTO THE SKIN ONCE DAILY., Disp: 15 mL, Rfl: 1    metFORMIN (GLUCOPHAGE-XR) 500 MG ER 24hr tablet, Take 2 tablets (1,000 mg total) by mouth 2 (two) times daily with meals., Disp: 360 tablet, Rfl: 1    ondansetron (ZOFRAN) 4 MG tablet, Take 1 tablet (4 mg total) by mouth every 6 (six) hours., Disp: 30 tablet, Rfl: 3    oxyCODONE-acetaminophen (PERCOCET)  mg per tablet, Take 1 tablet by mouth 2 (two) times a day as needed for pain, Disp: 60 tablet, Rfl: 0    oxyCODONE-acetaminophen (PERCOCET)  mg per tablet, take 1 tablet by mouth twice a day as needed for pain, Disp: 60 tablet, Rfl: 0    pen needle, diabetic 32 gauge x 5/32" Ndle, Use 1 needle once daily., Disp: 100 each, Rfl: 3    pravastatin (PRAVACHOL) 20 MG tablet, Take 1 tablet (20 mg total) by mouth once daily., Disp: 90 tablet, Rfl: 3    promethazine (PHENERGAN) 25 MG tablet, Take 1 tablet (25 mg total) by mouth 2 (two) times daily as needed for Nausea., Disp: 40 tablet, Rfl: 6    syringe with needle (SYRINGE 3CC/25GX1") 3 mL 25 gauge x 1" Syrg, For vitamin B12 injection, Disp: 100 Syringe, Rfl: 1    zaleplon (SONATA) 5 MG Cap, Taje 1 capsule by mouth nightly as needed for insomnia. If 1 pill is not effective, increase to 2 pills., Disp: 60 capsule, Rfl: 1    GAVILYTE-G 236-22.74-6.74 -5.86 gram suspension, , Disp: , Rfl:     naratriptan (AMERGE) 2.5 MG tablet, Take 1 tablet (2.5 mg) at onset of headache, may repeat in 4 hours if needed, Disp: 9 tablet, Rfl: 12    Allergies  Review of patient's allergies indicates:   Allergen Reactions    Demerol [meperidine] Hallucinations    Lamictal [lamotrigine] Rash     Rash to face in chart 2014 or 15     Penicillins Other (See Comments)     Patient cannot recall specific reaction, reports she has been listing this as an allergy since childhood.    Bactrim [sulfamethoxazole-trimethoprim]     Ciprofloxacin (bulk)     Codeine " Nausea And Vomiting    Levetiracetam     Toradol [ketorolac]     Tramadol      seizures    Morpholine analogues Diarrhea, Itching and Nausea And Vomiting       Social History  Social History     Socioeconomic History    Marital status: Significant Other    Number of children: 4   Tobacco Use    Smoking status: Never    Smokeless tobacco: Never   Substance and Sexual Activity    Alcohol use: No    Drug use: No    Sexual activity: Not Currently     Partners: Male     Birth control/protection: Surgical     Comment: BTL; mut monog   Social History Narrative    ** Merged History Encounter **         Lives in [place]     Social Determinants of Health     Financial Resource Strain: Medium Risk    Difficulty of Paying Living Expenses: Somewhat hard   Food Insecurity: Unknown    Worried About Running Out of Food in the Last Year: Patient refused    Ran Out of Food in the Last Year: Never true   Transportation Needs: Unmet Transportation Needs    Lack of Transportation (Medical): Yes    Lack of Transportation (Non-Medical): Yes   Physical Activity: Inactive    Days of Exercise per Week: 0 days    Minutes of Exercise per Session: 0 min   Social Connections: Unknown    Frequency of Communication with Friends and Family: Once a week    Frequency of Social Gatherings with Friends and Family: Never    Active Member of Clubs or Organizations: No    Attends Club or Organization Meetings: 1 to 4 times per year    Marital Status:    Housing Stability: High Risk    Unable to Pay for Housing in the Last Year: Yes    Number of Places Lived in the Last Year: 1    Unstable Housing in the Last Year: No       Family History  Family History   Problem Relation Age of Onset    Diabetes Mother     Cataracts Mother     Glaucoma Maternal Aunt     Blindness Maternal Aunt     Breast cancer Neg Hx     Colon cancer Neg Hx     Thrombophilia Neg Hx      Vitals:    11/01/22 0953   BP: 112/73   Pulse: 103   Resp: 16     Physical Exam  BP  "112/73   Pulse 103   Resp 16   Ht 5' 3" (1.6 m)   Wt 68.2 kg (150 lb 5.7 oz)   LMP 12/04/2014   SpO2 98%   BMI 26.63 kg/m²     General appearance: Well-developed, well-groomed.     Neurologic Exam: The patient is awake, alert and oriented. Language is fluent. Recent and remote memory are normal. Attention span and concentration are normal. Fund of knowledge is appropriate.     Cranial nerves: pupils are round and reactive to light and accommodation. Visual fields are full to confrontation. Ocular motility is full in all cardinal positions of gaze. Facial sensation is normal to pinprick and light touch. Corneal reflexes are present bilaterally. Facial activation is symmetric. Hearing is normal bilaterally. Palate elevates symmetrically and gag reflex is intact bilaterally. Shoulder elevation is symmetric and full strength bilaterally. Tongue is midline and neck rotation strength is normal bilaterally. Neck range of motion is normal.     Motor examination of all extremities demonstrates normal bulk and tone in all four limbs. There are no atrophy or fasciculations. Strength is 5/5 in the upper and lower extremities bilaterally without pronator drift.     Sensory examination is normal to pinprick, vibration and proprioception in the upper and lower extremities bilaterally.     Deep tendon reflexes are 2+ and symmetric in the upper and lower extremities bilaterally. Toes are mute bilaterally.     Gait: Normal gait.    Coordination: Finger to nose and heel to shin testing is normal in both upper and lower extremities. Rapid alternating movements are normal in both upper and lower extremities.     General exam  Cardiovascular: regular rate and rhythm with no murmurs, rubs or gallops. There are no carotid or vertebral artery bruits. Pulses in both upper and lower extremities are symmetric. There is no peripheral edema.   Head and neck: no cervical lymphadenopathy    Lab and Test Results    WBC   Date Value Ref Range " Status   04/19/2022 5.33 3.90 - 12.70 K/uL Final   01/13/2022 4.88 3.90 - 12.70 K/uL Final   12/15/2021 5.03 3.90 - 12.70 K/uL Final     Hemoglobin   Date Value Ref Range Status   04/19/2022 13.2 12.0 - 16.0 g/dL Final   01/13/2022 13.0 12.0 - 16.0 g/dL Final   12/15/2021 12.7 12.0 - 16.0 g/dL Final     Hematocrit   Date Value Ref Range Status   04/19/2022 40.5 37.0 - 48.5 % Final   01/13/2022 41.7 37.0 - 48.5 % Final   12/15/2021 41.1 37.0 - 48.5 % Final     Platelets   Date Value Ref Range Status   04/19/2022 257 150 - 450 K/uL Final   01/13/2022 277 150 - 450 K/uL Final   12/15/2021 270 150 - 450 K/uL Final     Glucose   Date Value Ref Range Status   01/13/2022 319 (H) 70 - 110 mg/dL Final   12/15/2021 336 (H) 70 - 110 mg/dL Final   11/24/2021 186 (H) 70 - 110 mg/dL Final     Sodium   Date Value Ref Range Status   01/13/2022 136 136 - 145 mmol/L Final   12/15/2021 136 136 - 145 mmol/L Final   11/24/2021 140 136 - 145 mmol/L Final     Potassium   Date Value Ref Range Status   01/13/2022 5.3 (H) 3.5 - 5.1 mmol/L Final     Comment:     *No Visible Hemolysis   12/15/2021 4.9 3.5 - 5.1 mmol/L Final   11/24/2021 4.3 3.5 - 5.1 mmol/L Final     Chloride   Date Value Ref Range Status   01/13/2022 100 95 - 110 mmol/L Final   12/15/2021 102 95 - 110 mmol/L Final   11/24/2021 106 95 - 110 mmol/L Final     CO2   Date Value Ref Range Status   01/13/2022 27 23 - 29 mmol/L Final   12/15/2021 22 (L) 23 - 29 mmol/L Final   11/24/2021 20 (L) 23 - 29 mmol/L Final     BUN   Date Value Ref Range Status   01/13/2022 10 6 - 20 mg/dL Final   12/15/2021 19 6 - 20 mg/dL Final   11/24/2021 16 6 - 20 mg/dL Final     Creatinine   Date Value Ref Range Status   01/13/2022 0.9 0.5 - 1.4 mg/dL Final   12/15/2021 0.8 0.5 - 1.4 mg/dL Final   11/24/2021 0.8 0.5 - 1.4 mg/dL Final     Calcium   Date Value Ref Range Status   01/13/2022 9.2 8.7 - 10.5 mg/dL Final   12/15/2021 8.9 8.7 - 10.5 mg/dL Final   11/24/2021 8.5 (L) 8.7 - 10.5 mg/dL Final      Magnesium   Date Value Ref Range Status   07/17/2017 1.4 (L) 1.6 - 2.6 mg/dL Final   04/01/2015 1.1 (L) 1.6 - 2.6 mg/dL Final   03/31/2015 1.2 (L) 1.6 - 2.6 mg/dL Final     Phosphorus   Date Value Ref Range Status   04/01/2015 4.3 2.7 - 4.5 mg/dL Final   03/31/2015 4.0 2.7 - 4.5 mg/dL Final   03/30/2015 3.8 2.7 - 4.5 mg/dL Final     Alkaline Phosphatase   Date Value Ref Range Status   01/13/2022 116 55 - 135 U/L Final   12/15/2021 130 55 - 135 U/L Final   11/24/2021 158 (H) 55 - 135 U/L Final     ALT   Date Value Ref Range Status   01/13/2022 47 (H) 10 - 44 U/L Final   12/15/2021 49 (H) 10 - 44 U/L Final   11/24/2021 49 (H) 10 - 44 U/L Final     AST   Date Value Ref Range Status   01/13/2022 37 10 - 40 U/L Final   12/15/2021 26 10 - 40 U/L Final   11/24/2021 27 10 - 40 U/L Final     Images:     MRI and MRV Brain:  Impression:     1.  Unremarkable noncontrast brain MRI.  2.  Dural venous sinuses are patent.  Asymmetrically smaller left transverse sinus compared to the right, a nonspecific finding which may be seen as a normal variant.    MRI C Spine:  Impression:     Mild degenerative disc disease at C6-C7 and C7-T1 with posterior disc protrusions resulting in mild spinal canal stenosis at these levels.  No cervical spine neural foraminal stenosis.    Assessment and Plan    Jadyn Chance is a 52 y.o. female presented to my clinic for the evaluation of headache. She is Dr Laboy patient and seen by him in February 2022.  He prescribed Topamax and naratriptan for breakthrough headaches.  He also ordered MRI brain and MRA brain during his last visit which showed no acute intracranial pathology.  Today she reported great improvement in her headaches.She did not reported any change in her headaches as stated in  history from January 2022.  I think her headache etiology is multifactorial which includes possible occipital neuralgia, facet joint disease of cervical region and medication overuse headache.   She is on bunch of medications and she thinks she is overmedicated.  I think one of the problem that her primary care and pain management has to address is polypharmacy.  I had a long discussion with her regarding her headache and medications.  After long discussion we decided not to change any of her medication considering her headaches are much improved.  I think the best next step is to visit her primary care and pain management and address the issue of polypharmacy.    Interval assessment and plan:(11/1/2022)  Her HA are much improved. 1-2 times a week. She stopped few medications on her own and felt great improvement in her HA and fogginess.        Now off below medication.  -- Off Topamax 50 mg daily  -- Off Breakthrough headache - naratriptan   -- Off Xanax 1 mg at night   -- Off Abilify 2 mg daily   -- Off Cymbalta 30 mg TID     Still taking below medications.   --Gabapentin increased from 300 mg TID to 600 mg TID    --Flexeril 10 mg PRN   --Oxycodone PRN   --Phenergan PRN     -Continue CPAP as recommended   -Refrain from over counter pain medications. Discussed medication overuse headache.   -I urged the patient to keep a headache calender  -Patient education. Discussed prevention and treatment of migraine headaches. Discussed sleep hygiene, healthy diet, importance of minimizing caffeine intake to a maximum of 100-200 mg /day, importance of avoiding foods with MSG, Nutrasweet, and Aspartame.     --Follow up in neurology clinic with me in March 13th at 11 am     Jamie Mo MD  Neurology  [facility]

## 2022-11-02 RX ORDER — TOPIRAMATE 25 MG/1
TABLET ORAL
Qty: 360 TABLET | OUTPATIENT
Start: 2022-11-02

## 2022-11-02 RX ORDER — TOPIRAMATE 50 MG/1
50 TABLET, FILM COATED ORAL 2 TIMES DAILY
Qty: 180 TABLET | Refills: 4 | Status: SHIPPED | OUTPATIENT
Start: 2022-11-02 | End: 2023-02-06

## 2022-11-02 RX ORDER — GABAPENTIN 300 MG/1
CAPSULE ORAL
Qty: 540 CAPSULE | Refills: 1 | Status: SHIPPED | OUTPATIENT
Start: 2022-11-02 | End: 2023-08-22 | Stop reason: SDUPTHER

## 2022-11-19 ENCOUNTER — HOSPITAL ENCOUNTER (EMERGENCY)
Facility: HOSPITAL | Age: 52
Discharge: HOME | End: 2022-11-19
Attending: EMERGENCY MEDICINE
Payer: MEDICARE

## 2022-11-19 VITALS
SYSTOLIC BLOOD PRESSURE: 119 MMHG | OXYGEN SATURATION: 97 % | HEART RATE: 96 BPM | TEMPERATURE: 99 F | WEIGHT: 146.19 LBS | RESPIRATION RATE: 17 BRPM | DIASTOLIC BLOOD PRESSURE: 66 MMHG | BODY MASS INDEX: 25.89 KG/M2

## 2022-11-19 DIAGNOSIS — R11.2 NAUSEA AND VOMITING: ICD-10-CM

## 2022-11-19 DIAGNOSIS — E86.0 DEHYDRATION: ICD-10-CM

## 2022-11-19 DIAGNOSIS — R19.7 DIARRHEA, UNSPECIFIED TYPE: ICD-10-CM

## 2022-11-19 DIAGNOSIS — R73.9 HYPERGLYCEMIA: Primary | ICD-10-CM

## 2022-11-19 LAB
ALBUMIN SERPL BCP-MCNC: 3.8 G/DL (ref 3.5–5.2)
ALP SERPL-CCNC: 105 U/L (ref 55–135)
ALT SERPL W/O P-5'-P-CCNC: 90 U/L (ref 10–44)
ANION GAP SERPL CALC-SCNC: 14 MMOL/L (ref 8–16)
AST SERPL-CCNC: 49 U/L (ref 10–40)
B-OH-BUTYR BLD STRIP-SCNC: 0 MMOL/L (ref 0–0.5)
BACTERIA #/AREA URNS HPF: NORMAL /HPF
BASOPHILS # BLD AUTO: 0.08 K/UL (ref 0–0.2)
BASOPHILS NFR BLD: 0.9 % (ref 0–1.9)
BILIRUB SERPL-MCNC: 1.1 MG/DL (ref 0.1–1)
BILIRUB UR QL STRIP: NEGATIVE
BUN SERPL-MCNC: 23 MG/DL (ref 6–20)
CALCIUM SERPL-MCNC: 8.9 MG/DL (ref 8.7–10.5)
CHLORIDE SERPL-SCNC: 101 MMOL/L (ref 95–110)
CLARITY UR: CLEAR
CO2 SERPL-SCNC: 19 MMOL/L (ref 23–29)
COLOR UR: YELLOW
CREAT SERPL-MCNC: 1 MG/DL (ref 0.5–1.4)
DIFFERENTIAL METHOD: ABNORMAL
EOSINOPHIL # BLD AUTO: 0.1 K/UL (ref 0–0.5)
EOSINOPHIL NFR BLD: 1.2 % (ref 0–8)
ERYTHROCYTE [DISTWIDTH] IN BLOOD BY AUTOMATED COUNT: 13 % (ref 11.5–14.5)
EST. GFR  (NO RACE VARIABLE): >60 ML/MIN/1.73 M^2
GLUCOSE SERPL-MCNC: 444 MG/DL (ref 70–110)
GLUCOSE UR QL STRIP: ABNORMAL
HCT VFR BLD AUTO: 39 % (ref 37–48.5)
HGB BLD-MCNC: 13.1 G/DL (ref 12–16)
HGB UR QL STRIP: NEGATIVE
IMM GRANULOCYTES # BLD AUTO: 0.03 K/UL (ref 0–0.04)
IMM GRANULOCYTES NFR BLD AUTO: 0.4 % (ref 0–0.5)
KETONES UR QL STRIP: NEGATIVE
LEUKOCYTE ESTERASE UR QL STRIP: NEGATIVE
LYMPHOCYTES # BLD AUTO: 1.5 K/UL (ref 1–4.8)
LYMPHOCYTES NFR BLD: 17.4 % (ref 18–48)
MCH RBC QN AUTO: 30.8 PG (ref 27–31)
MCHC RBC AUTO-ENTMCNC: 33.6 G/DL (ref 32–36)
MCV RBC AUTO: 92 FL (ref 82–98)
MICROSCOPIC COMMENT: NORMAL
MONOCYTES # BLD AUTO: 0.5 K/UL (ref 0.3–1)
MONOCYTES NFR BLD: 5.6 % (ref 4–15)
NEUTROPHILS # BLD AUTO: 6.4 K/UL (ref 1.8–7.7)
NEUTROPHILS NFR BLD: 74.5 % (ref 38–73)
NITRITE UR QL STRIP: NEGATIVE
NRBC BLD-RTO: 0 /100 WBC
PH UR STRIP: 6 [PH] (ref 5–8)
PLATELET # BLD AUTO: 268 K/UL (ref 150–450)
PMV BLD AUTO: 11.2 FL (ref 9.2–12.9)
POCT GLUCOSE: 438 MG/DL (ref 70–110)
POTASSIUM SERPL-SCNC: 4.8 MMOL/L (ref 3.5–5.1)
PROT SERPL-MCNC: 7.1 G/DL (ref 6–8.4)
PROT UR QL STRIP: NEGATIVE
RBC # BLD AUTO: 4.25 M/UL (ref 4–5.4)
SODIUM SERPL-SCNC: 134 MMOL/L (ref 136–145)
SP GR UR STRIP: 1.01 (ref 1–1.03)
URN SPEC COLLECT METH UR: ABNORMAL
UROBILINOGEN UR STRIP-ACNC: NEGATIVE EU/DL
WBC # BLD AUTO: 8.56 K/UL (ref 3.9–12.7)
YEAST URNS QL MICRO: NORMAL

## 2022-11-19 PROCEDURE — 96374 THER/PROPH/DIAG INJ IV PUSH: CPT

## 2022-11-19 PROCEDURE — 85025 COMPLETE CBC W/AUTO DIFF WBC: CPT | Performed by: NURSE PRACTITIONER

## 2022-11-19 PROCEDURE — 93005 ELECTROCARDIOGRAM TRACING: CPT

## 2022-11-19 PROCEDURE — 82010 KETONE BODYS QUAN: CPT | Performed by: NURSE PRACTITIONER

## 2022-11-19 PROCEDURE — 81000 URINALYSIS NONAUTO W/SCOPE: CPT | Performed by: NURSE PRACTITIONER

## 2022-11-19 PROCEDURE — 80053 COMPREHEN METABOLIC PANEL: CPT | Performed by: NURSE PRACTITIONER

## 2022-11-19 PROCEDURE — 25000003 PHARM REV CODE 250: Performed by: NURSE PRACTITIONER

## 2022-11-19 PROCEDURE — 93010 ELECTROCARDIOGRAM REPORT: CPT | Mod: ,,, | Performed by: INTERNAL MEDICINE

## 2022-11-19 PROCEDURE — 96361 HYDRATE IV INFUSION ADD-ON: CPT

## 2022-11-19 PROCEDURE — 99285 EMERGENCY DEPT VISIT HI MDM: CPT | Mod: 25

## 2022-11-19 PROCEDURE — 63600175 PHARM REV CODE 636 W HCPCS: Performed by: EMERGENCY MEDICINE

## 2022-11-19 PROCEDURE — 93010 EKG 12-LEAD: ICD-10-PCS | Mod: ,,, | Performed by: INTERNAL MEDICINE

## 2022-11-19 RX ORDER — ONDANSETRON 2 MG/ML
4 INJECTION INTRAMUSCULAR; INTRAVENOUS
Status: COMPLETED | OUTPATIENT
Start: 2022-11-19 | End: 2022-11-19

## 2022-11-19 RX ORDER — SODIUM CHLORIDE 9 MG/ML
1000 INJECTION, SOLUTION INTRAVENOUS
Status: COMPLETED | OUTPATIENT
Start: 2022-11-19 | End: 2022-11-19

## 2022-11-19 RX ORDER — DIPHENOXYLATE HYDROCHLORIDE AND ATROPINE SULFATE 2.5; .025 MG/1; MG/1
1 TABLET ORAL 4 TIMES DAILY PRN
Qty: 11 TABLET | Refills: 0 | Status: SHIPPED | OUTPATIENT
Start: 2022-11-19 | End: 2022-11-29

## 2022-11-19 RX ADMIN — ONDANSETRON 4 MG: 2 INJECTION INTRAMUSCULAR; INTRAVENOUS at 05:11

## 2022-11-19 RX ADMIN — SODIUM CHLORIDE 1000 ML: 0.9 INJECTION, SOLUTION INTRAVENOUS at 04:11

## 2022-11-19 NOTE — ED PROVIDER NOTES
Encounter Date: 11/19/2022       History     Chief Complaint   Patient presents with    Hyperglycemia     Takes metformin and Levemir, unable to get glucose down, + diarrhea, and vomiting.     Patient comes in complaining of elevated blood sugar for the last day.  States she feels nauseated, but this normally happens when she has a high blood sugar.  Denies any other complaints.     The history is provided by the patient.   Review of patient's allergies indicates:   Allergen Reactions    Demerol [meperidine] Hallucinations    Lamictal [lamotrigine] Rash     Rash to face in chart 2014 or 15     Penicillins Other (See Comments)     Patient cannot recall specific reaction, reports she has been listing this as an allergy since childhood.    Bactrim [sulfamethoxazole-trimethoprim]     Ciprofloxacin (bulk)     Codeine Nausea And Vomiting    Levetiracetam     Toradol [ketorolac]     Tramadol      seizures    Morpholine analogues Diarrhea, Itching and Nausea And Vomiting     Past Medical History:   Diagnosis Date    Abnormal Pap smear 1991    bx was negative, per pt    Anemia     Anxiety     B12 deficiency     Blood transfusion     multiple     Chronic LBP     Degenerative disc disease     l spine    Diabetes mellitus     Diabetic neuropathy     General anesthetics causing adverse effect in therapeutic use     delayed emergence    Mixed hyperlipidemia 11/18/2013    PONV (postoperative nausea and vomiting)     RLS (restless legs syndrome)     Seizures     Sleep apnea     with CPAP    Vitamin D deficiency disease      Past Surgical History:   Procedure Laterality Date    ANUS SURGERY      AUGMENTATION OF BREAST  2008    saline    BELT ABDOMINOPLASTY      tummy [facility]    BREAST SURGERY  2008    breast augmentation    CARPAL TUNNEL RELEASE Right 9/9/2021    Procedure: RELEASE, CARPAL TUNNEL;  Surgeon: Tyler Victor MD;  Location: [facility];  Service: Orthopedics;  Laterality: Right;  right carpal tunnel release and right  cubital tunnel release with possible anterior transposition ulnar nerve     SECTION      x2    CHOLECYSTECTOMY      mikel      EXCISIONAL HEMORRHOIDECTOMY      GASTRIC BYPASS      HIP FRACTURE SURGERY      left hip ORIF    MOUTH SURGERY      revision of JJ anastomosis  2/27/15    small bowel resection  2015    TUBAL LIGATION      ULNAR TUNNEL RELEASE Right 2021    Procedure: RELEASE, CUBITAL TUNNEL;  Surgeon: Tyler Victor MD;  Location: [facility];  Service: Orthopedics;  Laterality: Right;  right carpal tunnel release and right cubital tunnel release with possible anterior transposition ulnar nerve     Family History   Problem Relation Age of Onset    Diabetes Mother     Cataracts Mother     Glaucoma Maternal Aunt     Blindness Maternal Aunt     Breast cancer Neg Hx     Colon cancer Neg Hx     Thrombophilia Neg Hx      Social History     Tobacco Use    Smoking status: Never    Smokeless tobacco: Never   Substance Use Topics    Alcohol use: No    Drug use: No     Review of Systems   Constitutional:  Negative for fever.   HENT:  Negative for sore throat.    Respiratory:  Negative for shortness of breath.    Cardiovascular:  Negative for chest pain.   Gastrointestinal:  Positive for diarrhea and nausea.   Genitourinary:  Negative for dysuria.   Musculoskeletal:  Negative for back pain.   Skin:  Negative for rash.   Neurological:  Negative for weakness.   Hematological:  Does not bruise/bleed easily.     Physical Exam     Initial Vitals [22 1532]   BP Pulse Resp Temp SpO2   109/67 104 20 98.5 °F (36.9 °C) 97 %      MAP       --         Physical Exam    Nursing note and vitals reviewed.  Constitutional: She appears well-developed and well-nourished. No distress.   HENT:   Head: Normocephalic and atraumatic.   Mouth/Throat: Oropharynx is clear and moist.   Eyes: Conjunctivae and EOM are normal. Pupils are equal, round, and reactive to light.   Neck: Neck supple.   Normal range of  motion.  Cardiovascular:  Normal rate, regular rhythm and normal heart sounds.     Exam reveals no gallop and no friction rub.       No murmur heard.  Pulmonary/Chest: Breath sounds normal. No respiratory distress. She has no wheezes. She has no rhonchi. She has no rales.   Abdominal: Abdomen is soft. Bowel sounds are normal. She exhibits no distension and no mass. There is no abdominal tenderness. There is no rebound and no guarding.   Musculoskeletal:         General: No tenderness or edema. Normal range of motion.      Cervical back: Normal range of motion and neck supple.     Neurological: She is alert and oriented to person, place, and time. She has normal strength.   Skin: Skin is warm and dry. No rash noted.   Psychiatric: She has a normal mood and affect. Thought content normal.       ED Course   Procedures  Labs Reviewed   CBC W/ AUTO DIFFERENTIAL - Abnormal; Notable for the following components:       Result Value    Gran % 74.5 (*)     Lymph % 17.4 (*)     All other components within normal limits   COMPREHENSIVE METABOLIC PANEL - Abnormal; Notable for the following components:    Sodium 134 (*)     CO2 19 (*)     Glucose 444 (*)     BUN 23 (*)     Total Bilirubin 1.1 (*)     AST 49 (*)     ALT 90 (*)     All other components within normal limits   URINALYSIS, REFLEX TO URINE CULTURE - Abnormal; Notable for the following components:    Glucose, UA 3+ (*)     All other components within normal limits    Narrative:     Specimen Source->Urine   POCT GLUCOSE - Abnormal; Notable for the following components:    POCT Glucose 438 (*)     All other components within normal limits   BETA - HYDROXYBUTYRATE, SERUM   URINALYSIS MICROSCOPIC    Narrative:     Specimen Source->Urine     EKG Readings: (Independently Interpreted)   Rhythm: Normal Sinus Rhythm. Heart Rate: 95. Ectopy: No Ectopy. Conduction: Normal. ST Segments: Normal ST Segments. T Waves: Normal. Clinical Impression: Normal Sinus Rhythm     Imaging  Results              X-Ray Chest AP Portable (Final result)  Result time 11/19/22 17:00:01      Final result by Charanjit Corcoran MD (11/19/22 17:00:01)                   Impression:      No acute abnormality.      Electronically signed by: Charanjit Corcoran  Date:    11/19/2022  Time:    17:00               Narrative:    EXAMINATION:  XR CHEST AP PORTABLE    CLINICAL HISTORY:  hyperglycemia;    TECHNIQUE:  Single frontal view of the chest was performed.    COMPARISON:  Multiple priors.    FINDINGS:  The lungs are clear, with normal appearance of pulmonary vasculature and no pleural effusion or pneumothorax.    The cardiac silhouette is normal in size. The hilar and mediastinal contours are unremarkable.    Bones are intact.                                       Medications   0.9%  NaCl infusion (1,000 mLs Intravenous New Bag 11/19/22 1624)   ondansetron injection 4 mg (4 mg Intravenous Given 11/19/22 1702)      ED Vital Signs:  Vitals:    11/19/22 1532   BP: 109/67   Pulse: 104   Resp: 20   Temp: 98.5 °F (36.9 °C)   TempSrc: Oral   SpO2: 97%   Weight: 66.3 kg (146 lb 2.6 oz)         Abnormal Lab Results:  Labs Reviewed   CBC W/ AUTO DIFFERENTIAL - Abnormal; Notable for the following components:       Result Value    Gran % 74.5 (*)     Lymph % 17.4 (*)     All other components within normal limits   COMPREHENSIVE METABOLIC PANEL - Abnormal; Notable for the following components:    Sodium 134 (*)     CO2 19 (*)     Glucose 444 (*)     BUN 23 (*)     Total Bilirubin 1.1 (*)     AST 49 (*)     ALT 90 (*)     All other components within normal limits   URINALYSIS, REFLEX TO URINE CULTURE - Abnormal; Notable for the following components:    Glucose, UA 3+ (*)     All other components within normal limits    Narrative:     Specimen Source->Urine   POCT GLUCOSE - Abnormal; Notable for the following components:    POCT Glucose 438 (*)     All other components within normal limits   BETA - HYDROXYBUTYRATE, SERUM   URINALYSIS  MICROSCOPIC    Narrative:     Specimen Source->Urine          All Lab Results:  Results for orders placed or performed during the hospital encounter of 11/19/22   CBC auto differential   Result Value Ref Range    WBC 8.56 3.90 - 12.70 K/uL    RBC 4.25 4.00 - 5.40 M/uL    Hemoglobin 13.1 12.0 - 16.0 g/dL    Hematocrit 39.0 37.0 - 48.5 %    MCV 92 82 - 98 fL    MCH 30.8 27.0 - 31.0 pg    MCHC 33.6 32.0 - 36.0 g/dL    RDW 13.0 11.5 - 14.5 %    Platelets 268 150 - 450 K/uL    MPV 11.2 9.2 - 12.9 fL    Immature Granulocytes 0.4 0.0 - 0.5 %    Gran # (ANC) 6.4 1.8 - 7.7 K/uL    Immature Grans (Abs) 0.03 0.00 - 0.04 K/uL    Lymph # 1.5 1.0 - 4.8 K/uL    Mono # 0.5 0.3 - 1.0 K/uL    Eos # 0.1 0.0 - 0.5 K/uL    Baso # 0.08 0.00 - 0.20 K/uL    nRBC 0 0 /100 WBC    Gran % 74.5 (H) 38.0 - 73.0 %    Lymph % 17.4 (L) 18.0 - 48.0 %    Mono % 5.6 4.0 - 15.0 %    Eosinophil % 1.2 0.0 - 8.0 %    Basophil % 0.9 0.0 - 1.9 %    Differential Method Automated    Comprehensive metabolic panel   Result Value Ref Range    Sodium 134 (L) 136 - 145 mmol/L    Potassium 4.8 3.5 - 5.1 mmol/L    Chloride 101 95 - 110 mmol/L    CO2 19 (L) 23 - 29 mmol/L    Glucose 444 (H) 70 - 110 mg/dL    BUN 23 (H) 6 - 20 mg/dL    Creatinine 1.0 0.5 - 1.4 mg/dL    Calcium 8.9 8.7 - 10.5 mg/dL    Total Protein 7.1 6.0 - 8.4 g/dL    Albumin 3.8 3.5 - 5.2 g/dL    Total Bilirubin 1.1 (H) 0.1 - 1.0 mg/dL    Alkaline Phosphatase 105 55 - 135 U/L    AST 49 (H) 10 - 40 U/L    ALT 90 (H) 10 - 44 U/L    Anion Gap 14 8 - 16 mmol/L    eGFR >60 >60 mL/min/1.73 m^2   Beta - Hydroxybutyrate, Serum   Result Value Ref Range    Beta-Hydroxybutyrate 0.0 0.0 - 0.5 mmol/L   Urinalysis, Reflex to Urine Culture Urine, Clean Catch    Specimen: Urine   Result Value Ref Range    Specimen UA Urine, Clean Catch     Color, UA Yellow Yellow, Straw, Miriam    Appearance, UA Clear Clear    pH, UA 6.0 5.0 - 8.0    Specific Gravity, UA 1.010 1.005 - 1.030    Protein, UA Negative Negative     Glucose, UA 3+ (A) Negative    Ketones, UA Negative Negative    Bilirubin (UA) Negative Negative    Occult Blood UA Negative Negative    Nitrite, UA Negative Negative    Urobilinogen, UA Negative <2.0 EU/dL    Leukocytes, UA Negative Negative   Urinalysis Microscopic   Result Value Ref Range    Bacteria Rare None-Occ /hpf    Yeast, UA None None    Microscopic Comment SEE COMMENT    POCT glucose   Result Value Ref Range    POCT Glucose 438 (H) 70 - 110 mg/dL     *Note: Due to a large number of results and/or encounters for the requested time period, some results have not been displayed. A complete set of results can be found in Results Review.           Imaging Results:  Imaging Results              X-Ray Chest AP Portable (Final result)  Result time 11/19/22 17:00:01      Final result by Charanjit Corcoran MD (11/19/22 17:00:01)                   Impression:      No acute abnormality.      Electronically signed by: Charanjit Corcoran  Date:    11/19/2022  Time:    17:00               Narrative:    EXAMINATION:  XR CHEST AP PORTABLE    CLINICAL HISTORY:  hyperglycemia;    TECHNIQUE:  Single frontal view of the chest was performed.    COMPARISON:  Multiple priors.    FINDINGS:  The lungs are clear, with normal appearance of pulmonary vasculature and no pleural effusion or pneumothorax.    The cardiac silhouette is normal in size. The hilar and mediastinal contours are unremarkable.    Bones are intact.                                         The Emergency Provider reviewed the vital signs and test results, which are outlined above.    ED Discussions:  5:07 PM: Reassessed pt at this time.  Pt states her condition has improved at this time. Discussed with pt all pertinent ED information and results. Discussed pt dx of hyperglycemia and plan of tx. Gave pt all f/u and return to the ED instructions. All questions and concerns were addressed at this time. Pt expresses understanding of information and instructions, and is  comfortable with plan to discharge. Pt is stable for discharge.                                Clinical Impression:   Final diagnoses:  [R11.2] Nausea and vomiting  [R73.9] Hyperglycemia (Primary)  [E86.0] Dehydration  [R19.7] Diarrhea, unspecified type        ED Disposition Condition    Discharge Stable          ED Prescriptions       Medication Sig Dispense Start Date End Date Auth. Provider    diphenoxylate-atropine 2.5-0.025 mg (LOMOTIL) 2.5-0.025 mg per tablet Take 1 tablet by mouth 4 (four) times daily as needed for Diarrhea. 11 tablet 11/19/2022 11/29/2022 Antoine Elena MD          Follow-up Information       Follow up With Specialties Details Why Contact Info    Rajiv Archer MD Family Medicine   [address]  [place] [address] [phone]               Antoine Elena MD  11/19/22 3269

## 2022-11-19 NOTE — FIRST PROVIDER EVALUATION
Medical screening examination initiated.  I have conducted a focused provider triage encounter, findings are as follows:    Brief history of present illness:  52-year-old female with complaint of vomiting and diarrhea and elevated blood sugar for 2 days.  Also reports headache.  No fever chills.    There were no vitals filed for this visit.    Pertinent physical exam:  [id number]

## 2022-11-20 ENCOUNTER — PATIENT MESSAGE (OUTPATIENT)
Dept: SLEEP MEDICINE | Facility: CLINIC | Age: 52
End: 2022-11-20
Payer: MEDICARE

## 2022-11-21 DIAGNOSIS — G47.00 INSOMNIA, UNSPECIFIED TYPE: Primary | ICD-10-CM

## 2022-11-22 RX ORDER — LEMBOREXANT 10 MG/1
TABLET, FILM COATED ORAL
Qty: 30 TABLET | Refills: 5 | Status: SHIPPED | OUTPATIENT
Start: 2022-11-22 | End: 2022-12-07 | Stop reason: SDUPTHER

## 2022-11-28 ENCOUNTER — ANESTHESIA EVENT (OUTPATIENT)
Dept: ENDOSCOPY | Facility: HOSPITAL | Age: 52
End: 2022-11-28
Payer: MEDICARE
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
## 2022-11-29 ENCOUNTER — TELEPHONE (OUTPATIENT)
Dept: PREADMISSION TESTING | Facility: HOSPITAL | Age: 52
End: 2022-11-29
Payer: MEDICARE

## 2022-11-29 NOTE — TELEPHONE ENCOUNTER
----- Message from Kristen Gómez MA sent at 11/28/2022  4:05 PM CST -----  Contact: [phone]    ----- Message -----  From: Rasheed Guzman  Sent: 11/28/2022   3:35 PM CST  To: [facility] Staff    Pt is needing to cancel her procedure appt. Please call pt back at [phone]. Thanks KB

## 2022-11-30 ENCOUNTER — ANESTHESIA (OUTPATIENT)
Dept: ENDOSCOPY | Facility: HOSPITAL | Age: 52
End: 2022-11-30
Payer: MEDICARE

## 2022-12-07 DIAGNOSIS — G47.00 INSOMNIA, UNSPECIFIED TYPE: ICD-10-CM

## 2022-12-07 RX ORDER — LEMBOREXANT 10 MG/1
TABLET, FILM COATED ORAL
Qty: 30 TABLET | Refills: 5 | Status: SHIPPED | OUTPATIENT
Start: 2022-12-07 | End: 2023-01-24 | Stop reason: SDUPTHER

## 2022-12-12 ENCOUNTER — PATIENT MESSAGE (OUTPATIENT)
Dept: INTERNAL MEDICINE | Facility: CLINIC | Age: 52
End: 2022-12-12
Payer: MEDICARE

## 2022-12-14 ENCOUNTER — OFFICE VISIT (OUTPATIENT)
Dept: INTERNAL MEDICINE | Facility: CLINIC | Age: 52
End: 2022-12-14
Payer: MEDICARE

## 2022-12-14 ENCOUNTER — LAB VISIT (OUTPATIENT)
Dept: LAB | Facility: HOSPITAL | Age: 52
End: 2022-12-14
Payer: MEDICARE

## 2022-12-14 VITALS
SYSTOLIC BLOOD PRESSURE: 118 MMHG | BODY MASS INDEX: 26.17 KG/M2 | DIASTOLIC BLOOD PRESSURE: 70 MMHG | TEMPERATURE: 98 F | OXYGEN SATURATION: 96 % | HEART RATE: 103 BPM | HEIGHT: 63 IN | WEIGHT: 147.69 LBS

## 2022-12-14 DIAGNOSIS — R33.9 URINE RETENTION: ICD-10-CM

## 2022-12-14 DIAGNOSIS — R30.0 DYSURIA: ICD-10-CM

## 2022-12-14 DIAGNOSIS — N30.00 ACUTE CYSTITIS WITHOUT HEMATURIA: ICD-10-CM

## 2022-12-14 DIAGNOSIS — B37.31 YEAST VAGINITIS: ICD-10-CM

## 2022-12-14 DIAGNOSIS — R30.0 DYSURIA: Primary | ICD-10-CM

## 2022-12-14 DIAGNOSIS — R39.12 WEAK URINE STREAM: ICD-10-CM

## 2022-12-14 LAB
BACTERIA #/AREA URNS HPF: ABNORMAL /HPF
BILIRUB UR QL STRIP: NEGATIVE
CLARITY UR: ABNORMAL
COLOR UR: YELLOW
GLUCOSE UR QL STRIP: ABNORMAL
HGB UR QL STRIP: NEGATIVE
KETONES UR QL STRIP: NEGATIVE
LEUKOCYTE ESTERASE UR QL STRIP: NEGATIVE
MICROSCOPIC COMMENT: ABNORMAL
NITRITE UR QL STRIP: NEGATIVE
PH UR STRIP: 6 [PH] (ref 5–8)
PROT UR QL STRIP: NEGATIVE
SP GR UR STRIP: <=1.005 (ref 1–1.03)
SQUAMOUS #/AREA URNS HPF: 20 /HPF
URN SPEC COLLECT METH UR: ABNORMAL
WBC #/AREA URNS HPF: 3 /HPF (ref 0–5)
YEAST URNS QL MICRO: ABNORMAL

## 2022-12-14 PROCEDURE — 3066F NEPHROPATHY DOC TX: CPT | Mod: HCNC,CPTII,S$GLB, | Performed by: PHYSICIAN ASSISTANT

## 2022-12-14 PROCEDURE — 3072F LOW RISK FOR RETINOPATHY: CPT | Mod: HCNC,CPTII,S$GLB, | Performed by: PHYSICIAN ASSISTANT

## 2022-12-14 PROCEDURE — 3074F PR MOST RECENT SYSTOLIC BLOOD PRESSURE < 130 MM HG: ICD-10-PCS | Mod: HCNC,CPTII,S$GLB, | Performed by: PHYSICIAN ASSISTANT

## 2022-12-14 PROCEDURE — 3078F DIAST BP <80 MM HG: CPT | Mod: HCNC,CPTII,S$GLB, | Performed by: PHYSICIAN ASSISTANT

## 2022-12-14 PROCEDURE — 3066F PR DOCUMENTATION OF TREATMENT FOR NEPHROPATHY: ICD-10-PCS | Mod: HCNC,CPTII,S$GLB, | Performed by: PHYSICIAN ASSISTANT

## 2022-12-14 PROCEDURE — 3008F BODY MASS INDEX DOCD: CPT | Mod: HCNC,CPTII,S$GLB, | Performed by: PHYSICIAN ASSISTANT

## 2022-12-14 PROCEDURE — 99214 PR OFFICE/OUTPT VISIT, EST, LEVL IV, 30-39 MIN: ICD-10-PCS | Mod: HCNC,S$GLB,, | Performed by: PHYSICIAN ASSISTANT

## 2022-12-14 PROCEDURE — 87086 URINE CULTURE/COLONY COUNT: CPT | Mod: HCNC | Performed by: PHYSICIAN ASSISTANT

## 2022-12-14 PROCEDURE — 1159F PR MEDICATION LIST DOCUMENTED IN MEDICAL RECORD: ICD-10-PCS | Mod: HCNC,CPTII,S$GLB, | Performed by: PHYSICIAN ASSISTANT

## 2022-12-14 PROCEDURE — 3074F SYST BP LT 130 MM HG: CPT | Mod: HCNC,CPTII,S$GLB, | Performed by: PHYSICIAN ASSISTANT

## 2022-12-14 PROCEDURE — 3051F PR MOST RECENT HEMOGLOBIN A1C LEVEL 7.0 - < 8.0%: ICD-10-PCS | Mod: HCNC,CPTII,S$GLB, | Performed by: PHYSICIAN ASSISTANT

## 2022-12-14 PROCEDURE — 81000 URINALYSIS NONAUTO W/SCOPE: CPT | Mod: HCNC | Performed by: PHYSICIAN ASSISTANT

## 2022-12-14 PROCEDURE — 1159F MED LIST DOCD IN RCRD: CPT | Mod: HCNC,CPTII,S$GLB, | Performed by: PHYSICIAN ASSISTANT

## 2022-12-14 PROCEDURE — 3061F PR NEG MICROALBUMINURIA RESULT DOCUMENTED/REVIEW: ICD-10-PCS | Mod: HCNC,CPTII,S$GLB, | Performed by: PHYSICIAN ASSISTANT

## 2022-12-14 PROCEDURE — 99214 OFFICE O/P EST MOD 30 MIN: CPT | Mod: HCNC,S$GLB,, | Performed by: PHYSICIAN ASSISTANT

## 2022-12-14 PROCEDURE — 3061F NEG MICROALBUMINURIA REV: CPT | Mod: HCNC,CPTII,S$GLB, | Performed by: PHYSICIAN ASSISTANT

## 2022-12-14 PROCEDURE — 99999 PR PBB SHADOW E&M-EST. PATIENT-LVL V: ICD-10-PCS | Mod: PBBFAC,HCNC,, | Performed by: PHYSICIAN ASSISTANT

## 2022-12-14 PROCEDURE — 3078F PR MOST RECENT DIASTOLIC BLOOD PRESSURE < 80 MM HG: ICD-10-PCS | Mod: HCNC,CPTII,S$GLB, | Performed by: PHYSICIAN ASSISTANT

## 2022-12-14 PROCEDURE — 3072F PR LOW RISK FOR RETINOPATHY: ICD-10-PCS | Mod: HCNC,CPTII,S$GLB, | Performed by: PHYSICIAN ASSISTANT

## 2022-12-14 PROCEDURE — 3051F HG A1C>EQUAL 7.0%<8.0%: CPT | Mod: HCNC,CPTII,S$GLB, | Performed by: PHYSICIAN ASSISTANT

## 2022-12-14 PROCEDURE — 99999 PR PBB SHADOW E&M-EST. PATIENT-LVL V: CPT | Mod: PBBFAC,HCNC,, | Performed by: PHYSICIAN ASSISTANT

## 2022-12-14 PROCEDURE — 3008F PR BODY MASS INDEX (BMI) DOCUMENTED: ICD-10-PCS | Mod: HCNC,CPTII,S$GLB, | Performed by: PHYSICIAN ASSISTANT

## 2022-12-14 RX ORDER — NITROFURANTOIN 25; 75 MG/1; MG/1
100 CAPSULE ORAL 2 TIMES DAILY
Qty: 14 CAPSULE | Refills: 0 | Status: SHIPPED | OUTPATIENT
Start: 2022-12-14 | End: 2022-12-21

## 2022-12-14 RX ORDER — FLUCONAZOLE 150 MG/1
150 TABLET ORAL
Qty: 2 TABLET | Refills: 0 | Status: SHIPPED | OUTPATIENT
Start: 2022-12-14 | End: 2022-12-30
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
## 2022-12-15 LAB
BACTERIA UR CULT: NORMAL
BACTERIA UR CULT: NORMAL

## 2022-12-22 ENCOUNTER — HOSPITAL ENCOUNTER (EMERGENCY)
Facility: HOSPITAL | Age: 52
Discharge: HOME | End: 2022-12-22
Attending: EMERGENCY MEDICINE
Payer: MEDICARE

## 2022-12-22 VITALS
SYSTOLIC BLOOD PRESSURE: 112 MMHG | DIASTOLIC BLOOD PRESSURE: 70 MMHG | HEART RATE: 88 BPM | RESPIRATION RATE: 20 BRPM | BODY MASS INDEX: 26.49 KG/M2 | HEIGHT: 63 IN | TEMPERATURE: 98 F | WEIGHT: 149.5 LBS | OXYGEN SATURATION: 96 %

## 2022-12-22 DIAGNOSIS — R10.9 RIGHT FLANK PAIN: ICD-10-CM

## 2022-12-22 DIAGNOSIS — N10 ACUTE PYELONEPHRITIS: Primary | ICD-10-CM

## 2022-12-22 LAB
ALBUMIN SERPL BCP-MCNC: 4 G/DL (ref 3.5–5.2)
ALP SERPL-CCNC: 89 U/L (ref 55–135)
ALT SERPL W/O P-5'-P-CCNC: 50 U/L (ref 10–44)
ANION GAP SERPL CALC-SCNC: 14 MMOL/L (ref 8–16)
AST SERPL-CCNC: 30 U/L (ref 10–40)
BACTERIA #/AREA URNS HPF: NORMAL /HPF
BASOPHILS # BLD AUTO: 0.05 K/UL (ref 0–0.2)
BASOPHILS NFR BLD: 0.7 % (ref 0–1.9)
BILIRUB SERPL-MCNC: 4 MG/DL (ref 0.1–1)
BILIRUB UR QL STRIP: NEGATIVE
BUN SERPL-MCNC: 18 MG/DL (ref 6–20)
CALCIUM SERPL-MCNC: 8.9 MG/DL (ref 8.7–10.5)
CHLORIDE SERPL-SCNC: 105 MMOL/L (ref 95–110)
CLARITY UR: CLEAR
CO2 SERPL-SCNC: 20 MMOL/L (ref 23–29)
COLOR UR: YELLOW
CREAT SERPL-MCNC: 0.7 MG/DL (ref 0.5–1.4)
DIFFERENTIAL METHOD: ABNORMAL
EOSINOPHIL # BLD AUTO: 0.2 K/UL (ref 0–0.5)
EOSINOPHIL NFR BLD: 2.5 % (ref 0–8)
ERYTHROCYTE [DISTWIDTH] IN BLOOD BY AUTOMATED COUNT: 19.1 % (ref 11.5–14.5)
EST. GFR  (NO RACE VARIABLE): >60 ML/MIN/1.73 M^2
GLUCOSE SERPL-MCNC: 204 MG/DL (ref 70–110)
GLUCOSE UR QL STRIP: ABNORMAL
HCT VFR BLD AUTO: 32.5 % (ref 37–48.5)
HGB BLD-MCNC: 10.7 G/DL (ref 12–16)
HGB UR QL STRIP: NEGATIVE
IMM GRANULOCYTES # BLD AUTO: 0.03 K/UL (ref 0–0.04)
IMM GRANULOCYTES NFR BLD AUTO: 0.4 % (ref 0–0.5)
KETONES UR QL STRIP: NEGATIVE
LEUKOCYTE ESTERASE UR QL STRIP: NEGATIVE
LIPASE SERPL-CCNC: 18 U/L (ref 4–60)
LYMPHOCYTES # BLD AUTO: 1.9 K/UL (ref 1–4.8)
LYMPHOCYTES NFR BLD: 25.5 % (ref 18–48)
MCH RBC QN AUTO: 31.3 PG (ref 27–31)
MCHC RBC AUTO-ENTMCNC: 32.9 G/DL (ref 32–36)
MCV RBC AUTO: 95 FL (ref 82–98)
MICROSCOPIC COMMENT: NORMAL
MONOCYTES # BLD AUTO: 0.5 K/UL (ref 0.3–1)
MONOCYTES NFR BLD: 6.5 % (ref 4–15)
NEUTROPHILS # BLD AUTO: 4.9 K/UL (ref 1.8–7.7)
NEUTROPHILS NFR BLD: 64.4 % (ref 38–73)
NITRITE UR QL STRIP: POSITIVE
NRBC BLD-RTO: 0 /100 WBC
PH UR STRIP: 6 [PH] (ref 5–8)
PLATELET # BLD AUTO: 244 K/UL (ref 150–450)
PMV BLD AUTO: 10.8 FL (ref 9.2–12.9)
POCT GLUCOSE: 229 MG/DL (ref 70–110)
POTASSIUM SERPL-SCNC: 4.2 MMOL/L (ref 3.5–5.1)
PROT SERPL-MCNC: 7.2 G/DL (ref 6–8.4)
PROT UR QL STRIP: NEGATIVE
RBC # BLD AUTO: 3.42 M/UL (ref 4–5.4)
SODIUM SERPL-SCNC: 139 MMOL/L (ref 136–145)
SP GR UR STRIP: >1.03 (ref 1–1.03)
SQUAMOUS #/AREA URNS HPF: 12 /HPF
URN SPEC COLLECT METH UR: ABNORMAL
UROBILINOGEN UR STRIP-ACNC: ABNORMAL EU/DL
WBC # BLD AUTO: 7.56 K/UL (ref 3.9–12.7)
WBC #/AREA URNS HPF: 4 /HPF (ref 0–5)
YEAST URNS QL MICRO: NORMAL

## 2022-12-22 PROCEDURE — 96374 THER/PROPH/DIAG INJ IV PUSH: CPT | Mod: HCNC

## 2022-12-22 PROCEDURE — 83690 ASSAY OF LIPASE: CPT | Mod: HCNC | Performed by: NURSE PRACTITIONER

## 2022-12-22 PROCEDURE — 85025 COMPLETE CBC W/AUTO DIFF WBC: CPT | Mod: HCNC | Performed by: NURSE PRACTITIONER

## 2022-12-22 PROCEDURE — 99284 EMERGENCY DEPT VISIT MOD MDM: CPT | Mod: 25,HCNC

## 2022-12-22 PROCEDURE — 63600175 PHARM REV CODE 636 W HCPCS: Mod: HCNC | Performed by: EMERGENCY MEDICINE

## 2022-12-22 PROCEDURE — 25000003 PHARM REV CODE 250: Mod: HCNC | Performed by: EMERGENCY MEDICINE

## 2022-12-22 PROCEDURE — 96375 TX/PRO/DX INJ NEW DRUG ADDON: CPT | Mod: HCNC

## 2022-12-22 PROCEDURE — 82962 GLUCOSE BLOOD TEST: CPT | Mod: HCNC

## 2022-12-22 PROCEDURE — 80053 COMPREHEN METABOLIC PANEL: CPT | Mod: HCNC | Performed by: NURSE PRACTITIONER

## 2022-12-22 PROCEDURE — 81000 URINALYSIS NONAUTO W/SCOPE: CPT | Mod: HCNC | Performed by: NURSE PRACTITIONER

## 2022-12-22 RX ORDER — CEFDINIR 300 MG/1
300 CAPSULE ORAL 2 TIMES DAILY
Qty: 20 CAPSULE | Refills: 0 | Status: SHIPPED | OUTPATIENT
Start: 2022-12-22 | End: 2023-01-01

## 2022-12-22 RX ORDER — CEFTRIAXONE 1 G/1
1 INJECTION, POWDER, FOR SOLUTION INTRAMUSCULAR; INTRAVENOUS
Status: DISCONTINUED | OUTPATIENT
Start: 2022-12-22 | End: 2022-12-22

## 2022-12-22 RX ORDER — HYDROMORPHONE HYDROCHLORIDE 1 MG/ML
1 INJECTION, SOLUTION INTRAMUSCULAR; INTRAVENOUS; SUBCUTANEOUS
Status: COMPLETED | OUTPATIENT
Start: 2022-12-22 | End: 2022-12-22

## 2022-12-22 RX ORDER — HYDROMORPHONE HYDROCHLORIDE 1 MG/ML
1 INJECTION, SOLUTION INTRAMUSCULAR; INTRAVENOUS; SUBCUTANEOUS
Status: DISCONTINUED | OUTPATIENT
Start: 2022-12-22 | End: 2022-12-22

## 2022-12-22 RX ADMIN — HYDROMORPHONE HYDROCHLORIDE 1 MG: 1 INJECTION, SOLUTION INTRAMUSCULAR; INTRAVENOUS; SUBCUTANEOUS at 06:12

## 2022-12-22 RX ADMIN — CEFTRIAXONE 1 G: 1 INJECTION, POWDER, FOR SOLUTION INTRAMUSCULAR; INTRAVENOUS at 06:12

## 2022-12-22 NOTE — FIRST PROVIDER EVALUATION
"Medical screening examination initiated.  I have conducted a focused provider triage encounter, findings are as follows:    Brief history of present illness:  abdominal pain. Reports given Macrobid for uti with no improvement     Vitals:    12/22/22 1503   BP: 111/71   BP Location: Right arm   Patient Position: Sitting   Pulse: 104   Resp: 16   Temp: 98.7 °F (37.1 °C)   TempSrc: Oral   SpO2: (!) 92%   Weight: 67.8 kg (149 lb 7.6 oz)   Height: 5' 3" (1.6 m)       Pertinent physical exam:  nad    Brief workup plan:  labs, imaging as needed     Preliminary workup initiated; this workup will be continued and followed by the physician or advanced practice provider that is assigned to the patient when roomed.  "

## 2022-12-23 NOTE — ED PROVIDER NOTES
SCRIBE #1 NOTE: I, Kahlil Banda/Ifrah Sinclair, am scribing for, and in the presence of, Ignacio Watt MD. I have scribed the entire note.       History     Chief Complaint   Patient presents with    Flank Pain     Pt CO R sided flank pain and dysuria x3 wks. Pt being treated for UTI on abx but not getting better. CBG 229mg/dl in triage     Review of patient's allergies indicates:   Allergen Reactions    Demerol [meperidine] Hallucinations    Lamictal [lamotrigine] Rash     Rash to face in chart 2014 or 15     Penicillins Other (See Comments)     Patient cannot recall specific reaction, reports she has been listing this as an allergy since childhood.    Bactrim [sulfamethoxazole-trimethoprim]     Ciprofloxacin (bulk)     Codeine Nausea And Vomiting    Levetiracetam     Toradol [ketorolac]     Tramadol      seizures    Morpholine analogues Diarrhea, Itching and Nausea And Vomiting         History of Present Illness     HPI    12/22/2022, 6:09 PM  History obtained from the patient      History of Present Illness: Jadyn Chance is a 52 y.o. female patient with a PMHx of DM, HLD who presents to the Emergency Department for evaluation of R-sided flank pain which onset 3 weeks PTA. Symptoms are constant and moderate in severity. No mitigating or exacerbating factors reported. No associated sxs reported. Patient denies any fever, chills, SOB, weakness, congestion and all other sxs at this time. Prior Tx includes antibiotics for UTI, but Pt has not experienced any relief. No further complaints or concerns at this time.       Arrival mode: Personal vehicle    PCP: Vikash Baig MD        Past Medical History:  Past Medical History:   Diagnosis Date    Abnormal Pap smear 1991    bx was negative, per pt    Anemia     Anxiety     B12 deficiency     Blood transfusion     multiple     Chronic LBP     Degenerative disc disease     l spine    Diabetes mellitus     Diabetic neuropathy     General anesthetics  causing adverse effect in therapeutic use     delayed emergence    Mixed hyperlipidemia 2013    PONV (postoperative nausea and vomiting)     RLS (restless legs syndrome)     Seizures     Sleep apnea     with CPAP    Vitamin D deficiency disease        Past Surgical History:  Past Surgical History:   Procedure Laterality Date    ANUS SURGERY      AUGMENTATION OF BREAST  2008    saline    BELT ABDOMINOPLASTY      tummy tuck    BREAST SURGERY  2008    breast augmentation    CARPAL TUNNEL RELEASE Right 2021    Procedure: RELEASE, CARPAL TUNNEL;  Surgeon: Tyler Victor MD;  Location: [facility];  Service: Orthopedics;  Laterality: Right;  right carpal tunnel release and right cubital tunnel release with possible anterior transposition ulnar nerve     SECTION      x2    CHOLECYSTECTOMY      mikel      EXCISIONAL HEMORRHOIDECTOMY      GASTRIC BYPASS      HIP FRACTURE SURGERY      left hip ORIF    MOUTH SURGERY      revision of JJ anastomosis  2/27/15    small bowel resection  2015    TUBAL LIGATION      ULNAR TUNNEL RELEASE Right 2021    Procedure: RELEASE, CUBITAL TUNNEL;  Surgeon: Tyler Victor MD;  Location: [facility] OR;  Service: Orthopedics;  Laterality: Right;  right carpal tunnel release and right cubital tunnel release with possible anterior transposition ulnar nerve         Family History:  Family History   Problem Relation Age of Onset    Diabetes Mother     Cataracts Mother     Glaucoma Maternal Aunt     Blindness Maternal Aunt     Breast cancer Neg Hx     Colon cancer Neg Hx     Thrombophilia Neg Hx        Social History:  Social History     Tobacco Use    Smoking status: Never    Smokeless tobacco: Never   Substance and Sexual Activity    Alcohol use: No    Drug use: No    Sexual activity: Not Currently     Partners: Male     Birth control/protection: Surgical     Comment: BTL; mut monog        Review of Systems     Review of Systems   Constitutional:  Negative for chills  and fever.   HENT:  Negative for congestion and sore throat.    Respiratory:  Negative for shortness of breath.    Cardiovascular:  Negative for chest pain.   Gastrointestinal:  Negative for diarrhea, nausea and vomiting.   Genitourinary:  Positive for flank pain. Negative for dysuria.   Musculoskeletal:  Negative for back pain.   Skin:  Negative for rash.   Neurological:  Negative for dizziness and weakness.   Hematological:  Does not bruise/bleed easily.   All other systems reviewed and are negative.     Physical Exam     Initial Vitals [12/22/22 1503]   BP Pulse Resp Temp SpO2   111/71 104 16 98.7 °F (37.1 °C) (!) 92 %      MAP       --          Physical Exam  Nursing Notes and Vital Signs Reviewed.  Constitutional: Patient is in mild distress. Well-developed and well-nourished.  Head: Atraumatic. Normocephalic.  Eyes: PERRL. EOM intact. Conjunctivae are not pale. No scleral icterus.  ENT: Mucous membranes are moist.   Neck: Supple. Full ROM.  Cardiovascular: Regular rate. Regular rhythm. No murmurs, rubs, or gallops. Distal pulses are 2+ and symmetric.  Pulmonary/Chest: No respiratory distress. Clear to auscultation bilaterally. No wheezing or rales.  Abdominal: Soft and non-distended.  There is no tenderness.  No rebound, guarding, or rigidity. Good bowel sounds.  Genitourinary: R flank tenderness.  Musculoskeletal: Moves all extremities. No obvious deformities. No edema.   Skin: Warm and dry.  Neurological:  Alert, awake, and appropriate.  Normal speech.  No acute focal neurological deficits are appreciated.  Psychiatric: Normal affect. Good eye contact. Appropriate in content.     ED Course   Procedures  ED Vital Signs:  Vitals:    12/22/22 1503 12/22/22 1650 12/22/22 1850 12/22/22 1853   BP: 111/71 115/72 112/70    Pulse: 104 98 88    Resp: 16 18 20 20   Temp: 98.7 °F (37.1 °C) 98.6 °F (37 °C) 98.3 °F (36.8 °C)    TempSrc: Oral Oral Oral    SpO2: (!) 92% 95% 96%    Weight: 67.8 kg (149 lb 7.6 oz)     "  Height: 5' 3" (1.6 m)          Abnormal Lab Results:  Labs Reviewed   CBC W/ AUTO DIFFERENTIAL - Abnormal; Notable for the following components:       Result Value    RBC 3.42 (*)     Hemoglobin 10.7 (*)     Hematocrit 32.5 (*)     MCH 31.3 (*)     RDW 19.1 (*)     All other components within normal limits   COMPREHENSIVE METABOLIC PANEL - Abnormal; Notable for the following components:    CO2 20 (*)     Glucose 204 (*)     Total Bilirubin 4.0 (*)     ALT 50 (*)     All other components within normal limits   URINALYSIS, REFLEX TO URINE CULTURE - Abnormal; Notable for the following components:    Specific Gravity, UA >1.030 (*)     Glucose, UA 4+ (*)     Nitrite, UA Positive (*)     Urobilinogen, UA 2.0-3.0 (*)     All other components within normal limits    Narrative:     Specimen Source->Urine   POCT GLUCOSE - Abnormal; Notable for the following components:    POCT Glucose 229 (*)     All other components within normal limits   LIPASE   URINALYSIS MICROSCOPIC    Narrative:     Specimen Source->Urine        All Lab Results:  Results for orders placed or performed during the hospital encounter of 12/22/22   CBC auto differential   Result Value Ref Range    WBC 7.56 3.90 - 12.70 K/uL    RBC 3.42 (L) 4.00 - 5.40 M/uL    Hemoglobin 10.7 (L) 12.0 - 16.0 g/dL    Hematocrit 32.5 (L) 37.0 - 48.5 %    MCV 95 82 - 98 fL    MCH 31.3 (H) 27.0 - 31.0 pg    MCHC 32.9 32.0 - 36.0 g/dL    RDW 19.1 (H) 11.5 - 14.5 %    Platelets 244 150 - 450 K/uL    MPV 10.8 9.2 - 12.9 fL    Immature Granulocytes 0.4 0.0 - 0.5 %    Gran # (ANC) 4.9 1.8 - 7.7 K/uL    Immature Grans (Abs) 0.03 0.00 - 0.04 K/uL    Lymph # 1.9 1.0 - 4.8 K/uL    Mono # 0.5 0.3 - 1.0 K/uL    Eos # 0.2 0.0 - 0.5 K/uL    Baso # 0.05 0.00 - 0.20 K/uL    nRBC 0 0 /100 WBC    Gran % 64.4 38.0 - 73.0 %    Lymph % 25.5 18.0 - 48.0 %    Mono % 6.5 4.0 - 15.0 %    Eosinophil % 2.5 0.0 - 8.0 %    Basophil % 0.7 0.0 - 1.9 %    Differential Method Automated    Comprehensive " metabolic panel   Result Value Ref Range    Sodium 139 136 - 145 mmol/L    Potassium 4.2 3.5 - 5.1 mmol/L    Chloride 105 95 - 110 mmol/L    CO2 20 (L) 23 - 29 mmol/L    Glucose 204 (H) 70 - 110 mg/dL    BUN 18 6 - 20 mg/dL    Creatinine 0.7 0.5 - 1.4 mg/dL    Calcium 8.9 8.7 - 10.5 mg/dL    Total Protein 7.2 6.0 - 8.4 g/dL    Albumin 4.0 3.5 - 5.2 g/dL    Total Bilirubin 4.0 (H) 0.1 - 1.0 mg/dL    Alkaline Phosphatase 89 55 - 135 U/L    AST 30 10 - 40 U/L    ALT 50 (H) 10 - 44 U/L    Anion Gap 14 8 - 16 mmol/L    eGFR >60 >60 mL/min/1.73 m^2   Lipase   Result Value Ref Range    Lipase 18 4 - 60 U/L   Urinalysis, Reflex to Urine Culture Urine, Clean Catch    Specimen: Urine   Result Value Ref Range    Specimen UA Urine, Clean Catch     Color, UA Yellow Yellow, Straw, Miriam    Appearance, UA Clear Clear    pH, UA 6.0 5.0 - 8.0    Specific Gravity, UA >1.030 (A) 1.005 - 1.030    Protein, UA Negative Negative    Glucose, UA 4+ (A) Negative    Ketones, UA Negative Negative    Bilirubin (UA) Negative Negative    Occult Blood UA Negative Negative    Nitrite, UA Positive (A) Negative    Urobilinogen, UA 2.0-3.0 (A) <2.0 EU/dL    Leukocytes, UA Negative Negative   Urinalysis Microscopic   Result Value Ref Range    WBC, UA 4 0 - 5 /hpf    Bacteria None None-Occ /hpf    Yeast, UA None None    Squam Epithel, UA 12 /hpf    Microscopic Comment SEE COMMENT    POCT glucose   Result Value Ref Range    POCT Glucose 229 (H) 70 - 110 mg/dL     *Note: Due to a large number of results and/or encounters for the requested time period, some results have not been displayed. A complete set of results can be found in Results Review.        Imaging Results:  Imaging Results    None                 The Emergency Provider reviewed the vital signs and test results, which are outlined above.     ED Discussion       6:20 PM: Reassessed pt at this time. Discussed with pt all pertinent ED information and results. Discussed pt dx and plan of tx. Gave  pt all f/u and return to the ED instructions. All questions and concerns were addressed at this time. Pt expresses understanding of information and instructions, and is comfortable with plan to discharge. Pt is stable for discharge.    I discussed with patient and/or family/caretaker that evaluation in the ED does not suggest any emergent or life threatening medical conditions requiring immediate intervention beyond what was provided in the ED, and I believe patient is safe for discharge.  Regardless, an unremarkable evaluation in the ED does not preclude the development or presence of a serious of life threatening condition. As such, patient was instructed to return immediately for any worsening or change in current symptoms.        Medical Decision Making:   Clinical Tests:   Lab Tests: Ordered and Reviewed         ED Medication(s):  Medications   cefTRIAXone (ROCEPHIN) 1 g in dextrose 5 % in water (D5W) 5 % 50 mL IVPB (MB+) (0 g Intravenous Stopped 12/22/22 1907)   HYDROmorphone injection 1 mg (1 mg Intravenous Given 12/22/22 1853)       Discharge Medication List as of 12/22/2022  6:19 PM        START taking these medications    Details   cefdinir (OMNICEF) 300 MG capsule Take 1 capsule (300 mg total) by mouth 2 (two) times daily. for 10 days, Starting Thu 12/22/2022, Until Sun 1/1/2023, Print              Follow-up Information       Vikash Baig MD. Schedule an appointment as soon as possible for a visit in 1 week.    Specialty: Internal Medicine  Contact information:  [address]  [place] [address]  [phone]               [facility] - Emergency Dept..    Specialty: Emergency Medicine  Why: As needed, If symptoms worsen  Contact information:  [address] [facility] [address] [phone]                               Scribe Attestation:   Scribe #1: I performed the above scribed service and the documentation accurately describes the services I performed. I attest to  the accuracy of the note.     Attending:   Physician Attestation Statement for Scribe #1: I, Ignacio Watt MD, personally performed the services described in this documentation, as scribed by Kahlil Banda/Ifrah Sinclair, in my presence, and it is both accurate and complete.           Clinical Impression       ICD-10-CM ICD-9-CM   1. Acute pyelonephritis  N10 590.10   2. Right flank pain  R10.9 789.09       Disposition:   Disposition: Discharged  Condition: Stable      Ignacio Watt MD  12/24/22 1929

## 2022-12-27 ENCOUNTER — HOSPITAL ENCOUNTER (EMERGENCY)
Facility: HOSPITAL | Age: 52
Discharge: HOME | End: 2022-12-27
Attending: EMERGENCY MEDICINE
Payer: MEDICARE

## 2022-12-27 VITALS
TEMPERATURE: 98 F | BODY MASS INDEX: 27.38 KG/M2 | DIASTOLIC BLOOD PRESSURE: 63 MMHG | OXYGEN SATURATION: 98 % | HEART RATE: 93 BPM | RESPIRATION RATE: 16 BRPM | WEIGHT: 154.56 LBS | SYSTOLIC BLOOD PRESSURE: 132 MMHG

## 2022-12-27 DIAGNOSIS — R11.2 NAUSEA AND VOMITING, UNSPECIFIED VOMITING TYPE: Primary | ICD-10-CM

## 2022-12-27 DIAGNOSIS — R73.9 HYPERGLYCEMIA: ICD-10-CM

## 2022-12-27 LAB
ALBUMIN SERPL BCP-MCNC: 3.8 G/DL (ref 3.5–5.2)
ALP SERPL-CCNC: 88 U/L (ref 55–135)
ALT SERPL W/O P-5'-P-CCNC: 47 U/L (ref 10–44)
ANION GAP SERPL CALC-SCNC: 16 MMOL/L (ref 8–16)
AST SERPL-CCNC: 30 U/L (ref 10–40)
BACTERIA #/AREA URNS HPF: NORMAL /HPF
BASOPHILS # BLD AUTO: 0.05 K/UL (ref 0–0.2)
BASOPHILS NFR BLD: 0.8 % (ref 0–1.9)
BILIRUB SERPL-MCNC: 2.9 MG/DL (ref 0.1–1)
BILIRUB UR QL STRIP: NEGATIVE
BUN SERPL-MCNC: 14 MG/DL (ref 6–20)
CALCIUM SERPL-MCNC: 8.4 MG/DL (ref 8.7–10.5)
CHLORIDE SERPL-SCNC: 103 MMOL/L (ref 95–110)
CLARITY UR: CLEAR
CO2 SERPL-SCNC: 19 MMOL/L (ref 23–29)
COLOR UR: YELLOW
CREAT SERPL-MCNC: 0.9 MG/DL (ref 0.5–1.4)
DIFFERENTIAL METHOD: ABNORMAL
EOSINOPHIL # BLD AUTO: 0.2 K/UL (ref 0–0.5)
EOSINOPHIL NFR BLD: 3.8 % (ref 0–8)
ERYTHROCYTE [DISTWIDTH] IN BLOOD BY AUTOMATED COUNT: 21.2 % (ref 11.5–14.5)
EST. GFR  (NO RACE VARIABLE): >60 ML/MIN/1.73 M^2
GLUCOSE SERPL-MCNC: 459 MG/DL (ref 70–110)
GLUCOSE UR QL STRIP: ABNORMAL
HCT VFR BLD AUTO: 31.3 % (ref 37–48.5)
HGB BLD-MCNC: 9.7 G/DL (ref 12–16)
HGB UR QL STRIP: NEGATIVE
IMM GRANULOCYTES # BLD AUTO: 0.01 K/UL (ref 0–0.04)
IMM GRANULOCYTES NFR BLD AUTO: 0.2 % (ref 0–0.5)
KETONES UR QL STRIP: NEGATIVE
LEUKOCYTE ESTERASE UR QL STRIP: NEGATIVE
LYMPHOCYTES # BLD AUTO: 1.4 K/UL (ref 1–4.8)
LYMPHOCYTES NFR BLD: 22.6 % (ref 18–48)
MCH RBC QN AUTO: 33.3 PG (ref 27–31)
MCHC RBC AUTO-ENTMCNC: 31 G/DL (ref 32–36)
MCV RBC AUTO: 108 FL (ref 82–98)
MICROSCOPIC COMMENT: NORMAL
MONOCYTES # BLD AUTO: 0.5 K/UL (ref 0.3–1)
MONOCYTES NFR BLD: 8 % (ref 4–15)
NEUTROPHILS # BLD AUTO: 4.1 K/UL (ref 1.8–7.7)
NEUTROPHILS NFR BLD: 64.6 % (ref 38–73)
NITRITE UR QL STRIP: NEGATIVE
NRBC BLD-RTO: 0 /100 WBC
PH UR STRIP: 6 [PH] (ref 5–8)
PLATELET # BLD AUTO: 250 K/UL (ref 150–450)
PMV BLD AUTO: 11.2 FL (ref 9.2–12.9)
POCT GLUCOSE: 285 MG/DL (ref 70–110)
POTASSIUM SERPL-SCNC: 4 MMOL/L (ref 3.5–5.1)
PROT SERPL-MCNC: 6.8 G/DL (ref 6–8.4)
PROT UR QL STRIP: NEGATIVE
RBC # BLD AUTO: 2.91 M/UL (ref 4–5.4)
SODIUM SERPL-SCNC: 138 MMOL/L (ref 136–145)
SP GR UR STRIP: 1.03 (ref 1–1.03)
SQUAMOUS #/AREA URNS HPF: 0 /HPF
URN SPEC COLLECT METH UR: ABNORMAL
UROBILINOGEN UR STRIP-ACNC: NEGATIVE EU/DL
WBC # BLD AUTO: 6.34 K/UL (ref 3.9–12.7)
YEAST URNS QL MICRO: NORMAL

## 2022-12-27 PROCEDURE — 99284 EMERGENCY DEPT VISIT MOD MDM: CPT | Mod: 25,HCNC

## 2022-12-27 PROCEDURE — 81000 URINALYSIS NONAUTO W/SCOPE: CPT | Mod: HCNC | Performed by: NURSE PRACTITIONER

## 2022-12-27 PROCEDURE — 82962 GLUCOSE BLOOD TEST: CPT | Mod: HCNC

## 2022-12-27 PROCEDURE — 96360 HYDRATION IV INFUSION INIT: CPT | Mod: HCNC

## 2022-12-27 PROCEDURE — 80053 COMPREHEN METABOLIC PANEL: CPT | Mod: HCNC | Performed by: NURSE PRACTITIONER

## 2022-12-27 PROCEDURE — 85025 COMPLETE CBC W/AUTO DIFF WBC: CPT | Mod: HCNC | Performed by: NURSE PRACTITIONER

## 2022-12-27 PROCEDURE — 25000003 PHARM REV CODE 250: Mod: HCNC | Performed by: EMERGENCY MEDICINE

## 2022-12-27 PROCEDURE — 25000003 PHARM REV CODE 250: Mod: HCNC | Performed by: NURSE PRACTITIONER

## 2022-12-27 RX ORDER — ONDANSETRON 4 MG/1
4 TABLET, ORALLY DISINTEGRATING ORAL
Status: COMPLETED | OUTPATIENT
Start: 2022-12-27 | End: 2022-12-27

## 2022-12-27 RX ORDER — NARATRIPTAN 2.5 MG/1
1 TABLET ORAL ONCE AS NEEDED
COMMUNITY
Start: 2022-12-01 | End: 2023-02-06

## 2022-12-27 RX ORDER — ONDANSETRON 4 MG/1
4 TABLET, ORALLY DISINTEGRATING ORAL EVERY 8 HOURS PRN
Qty: 15 TABLET | Refills: 0 | Status: SHIPPED | OUTPATIENT
Start: 2022-12-27 | End: 2023-02-06 | Stop reason: SDUPTHER

## 2022-12-27 RX ORDER — OXYCODONE AND ACETAMINOPHEN 10; 325 MG/1; MG/1
1 TABLET ORAL
Status: COMPLETED | OUTPATIENT
Start: 2022-12-27 | End: 2022-12-27

## 2022-12-27 RX ADMIN — SODIUM CHLORIDE 1000 ML: 9 INJECTION, SOLUTION INTRAVENOUS at 03:12

## 2022-12-27 RX ADMIN — ONDANSETRON 4 MG: 4 TABLET, ORALLY DISINTEGRATING ORAL at 09:12

## 2022-12-27 RX ADMIN — OXYCODONE AND ACETAMINOPHEN 1 TABLET: 10; 325 TABLET ORAL at 09:12

## 2022-12-27 NOTE — FIRST PROVIDER EVALUATION
Medical screening examination initiated.  I have conducted a focused provider triage encounter, findings are as follows:    Brief history of present illness:  Patient diagnosed with pyelonephritis on Saturday, states that she is been vomiting her medicine since being prescribed the medicine.  Denies any relief of symptoms.    Vitals:    12/27/22 1350   BP: 122/70   BP Location: Right arm   Patient Position: Sitting   Pulse: 102   Resp: 16   Temp: 97.8 °F (36.6 °C)   TempSrc: Oral   SpO2: 96%   Weight: 70.1 kg (154 lb 8.7 oz)       Pertinent physical exam:      Brief workup plan:      Preliminary workup initiated; this workup will be continued and followed by the physician or advanced practice provider that is assigned to the patient when roomed.

## 2022-12-28 NOTE — ED PROVIDER NOTES
SCRIBE #1 NOTE: I, Kahlil Banda, am scribing for, and in the presence of, Harshad Mayse MD. I have scribed the entire note.       History     Chief Complaint   Patient presents with    Vomiting     Pt. C/o not being able to keep the antibiotic down she was RX'D for kidney infections. Pt also states she has had 2 falls over the last few days due to her legs giving out. Pt states she vomits every time she has taken it since Saturday      Review of patient's allergies indicates:   Allergen Reactions    Demerol [meperidine] Hallucinations    Lamictal [lamotrigine] Rash     Rash to face in chart 2014 or 15     Penicillins Other (See Comments)     Patient cannot recall specific reaction, reports she has been listing this as an allergy since childhood.    Bactrim [sulfamethoxazole-trimethoprim]     Ciprofloxacin (bulk)     Codeine Nausea And Vomiting    Levetiracetam     Toradol [ketorolac]     Tramadol      seizures    Morpholine analogues Diarrhea, Itching and Nausea And Vomiting         History of Present Illness     HPI    12/27/2022, 9:19 PM  History obtained from the patient      History of Present Illness: Jadyn Chance is a 52 y.o. female patient with a PMHx of DM, seizures, HLD, anemia, vitamin D and B12 deficiency who presents to the Emergency Department for evaluation of Emesis which onset gradually since Saturday, taking a new antibiotic prescribed for kidney infection. Symptoms are constant and moderate in severity. No mitigating or exacerbating factors reported. Associated sxs include leg weakness. Patient denies any SOB, CP, dysuria, fever, and all other sxs at this time. Prior Tx includes prescribed antibiotics. No further complaints or concerns at this time.       Arrival mode:Personal vehicle    PCP: Vikash Baig MD        Past Medical History:  Past Medical History:   Diagnosis Date    Abnormal Pap smear 1991    bx was negative, per pt    Anemia     Anxiety     B12 deficiency     Blood  transfusion     multiple     Chronic LBP     Degenerative disc disease     l spine    Diabetes mellitus     Diabetic neuropathy     General anesthetics causing adverse effect in therapeutic use     delayed emergence    Mixed hyperlipidemia 2013    PONV (postoperative nausea and vomiting)     RLS (restless legs syndrome)     Seizures     Sleep apnea     with CPAP    Vitamin D deficiency disease        Past Surgical History:  Past Surgical History:   Procedure Laterality Date    ANUS SURGERY      AUGMENTATION OF BREAST  2008    saline    BELT ABDOMINOPLASTY      tummy tuck    BREAST SURGERY  2008    breast augmentation    CARPAL TUNNEL RELEASE Right 2021    Procedure: RELEASE, CARPAL TUNNEL;  Surgeon: Tyler Victor MD;  Location: [facility] OR;  Service: Orthopedics;  Laterality: Right;  right carpal tunnel release and right cubital tunnel release with possible anterior transposition ulnar nerve     SECTION      x2    CHOLECYSTECTOMY      mikel      EXCISIONAL HEMORRHOIDECTOMY      GASTRIC BYPASS      HIP FRACTURE SURGERY      left hip ORIF    MOUTH SURGERY      revision of JJ anastomosis  2/27/15    small bowel resection  2015    TUBAL LIGATION      ULNAR TUNNEL RELEASE Right 2021    Procedure: RELEASE, CUBITAL TUNNEL;  Surgeon: Tyler Victor MD;  Location: [facility] OR;  Service: Orthopedics;  Laterality: Right;  right carpal tunnel release and right cubital tunnel release with possible anterior transposition ulnar nerve         Family History:  Family History   Problem Relation Age of Onset    Diabetes Mother     Cataracts Mother     Glaucoma Maternal Aunt     Blindness Maternal Aunt     Breast cancer Neg Hx     Colon cancer Neg Hx     Thrombophilia Neg Hx        Social History:  Social History     Tobacco Use    Smoking status: Never    Smokeless tobacco: Never   Substance and Sexual Activity    Alcohol use: No    Drug use: No    Sexual activity: Not Currently     Partners: Male      Birth control/protection: Surgical     Comment: BTL; mut monog        Review of Systems     Review of Systems   Constitutional:  Negative for chills and fever.   HENT:  Negative for congestion and sore throat.    Respiratory:  Negative for cough and shortness of breath.    Cardiovascular:  Negative for chest pain.   Gastrointestinal:  Positive for vomiting. Negative for abdominal pain and diarrhea.   Genitourinary:  Negative for dysuria.   Musculoskeletal:  Negative for back pain.   Skin:  Negative for rash.   Neurological:  Positive for weakness (legs). Negative for dizziness.   Hematological:  Does not bruise/bleed easily.   All other systems reviewed and are negative.     Physical Exam     Initial Vitals [12/27/22 1350]   BP Pulse Resp Temp SpO2   122/70 102 16 97.8 °F (36.6 °C) 96 %      MAP       --          Physical Exam  Nursing Notes and Vital Signs Reviewed.  Constitutional: Patient is in no acute distress. Well-developed and well-nourished.  Head: Atraumatic. Normocephalic.  Eyes: PERRL. EOM intact. Conjunctivae are not pale. No scleral icterus.  ENT: Mucous membranes are moist. Oropharynx is clear and symmetric.    Neck: Supple. Full ROM. No lymphadenopathy.  Cardiovascular: Regular rate. Regular rhythm. No murmurs, rubs, or gallops. Distal pulses are 2+ and symmetric.   Pulmonary/Chest: No respiratory distress. Clear to auscultation bilaterally. No wheezing or rales.  Abdominal: Soft and non-distended.  There is no tenderness.  No rebound, guarding, or rigidity. Good bowel sounds.  Genitourinary: No CVA tenderness  Musculoskeletal: Moves all extremities. No obvious deformities. No edema. No calf tenderness.  Skin: Warm and dry.  Neurological:  Alert, awake, and appropriate.  Normal speech.  No acute focal neurological deficits are appreciated.  Psychiatric: Normal affect. Good eye contact. Appropriate in content.     ED Course   Procedures  ED Vital Signs:  Vitals:    12/27/22 1350 12/27/22 2051  12/27/22 2136   BP: 122/70 132/63    Pulse: 102 93    Resp: 16 18 16   Temp: 97.8 °F (36.6 °C)     TempSrc: Oral     SpO2: 96% 98%    Weight: 70.1 kg (154 lb 8.7 oz)         Abnormal Lab Results:  Labs Reviewed   CBC W/ AUTO DIFFERENTIAL - Abnormal; Notable for the following components:       Result Value    RBC 2.91 (*)     Hemoglobin 9.7 (*)     Hematocrit 31.3 (*)      (*)     MCH 33.3 (*)     MCHC 31.0 (*)     RDW 21.2 (*)     All other components within normal limits   COMPREHENSIVE METABOLIC PANEL - Abnormal; Notable for the following components:    CO2 19 (*)     Glucose 459 (*)     Calcium 8.4 (*)     Total Bilirubin 2.9 (*)     ALT 47 (*)     All other components within normal limits    Narrative:       GLU  critical result(s) called and verbal readback obtained from   GLENN CRABTREE RN by MS8 12/27/2022 14:41   URINALYSIS, REFLEX TO URINE CULTURE - Abnormal; Notable for the following components:    Glucose, UA 4+ (*)     All other components within normal limits    Narrative:     Specimen Source->Urine   POCT GLUCOSE - Abnormal; Notable for the following components:    POCT Glucose 285 (*)     All other components within normal limits   URINALYSIS MICROSCOPIC    Narrative:     Specimen Source->Urine        All Lab Results:  Results for orders placed or performed during the hospital encounter of 12/27/22   CBC auto differential   Result Value Ref Range    WBC 6.34 3.90 - 12.70 K/uL    RBC 2.91 (L) 4.00 - 5.40 M/uL    Hemoglobin 9.7 (L) 12.0 - 16.0 g/dL    Hematocrit 31.3 (L) 37.0 - 48.5 %     (H) 82 - 98 fL    MCH 33.3 (H) 27.0 - 31.0 pg    MCHC 31.0 (L) 32.0 - 36.0 g/dL    RDW 21.2 (H) 11.5 - 14.5 %    Platelets 250 150 - 450 K/uL    MPV 11.2 9.2 - 12.9 fL    Immature Granulocytes 0.2 0.0 - 0.5 %    Gran # (ANC) 4.1 1.8 - 7.7 K/uL    Immature Grans (Abs) 0.01 0.00 - 0.04 K/uL    Lymph # 1.4 1.0 - 4.8 K/uL    Mono # 0.5 0.3 - 1.0 K/uL    Eos # 0.2 0.0 - 0.5 K/uL    Baso # 0.05 0.00 - 0.20 K/uL     nRBC 0 0 /100 WBC    Gran % 64.6 38.0 - 73.0 %    Lymph % 22.6 18.0 - 48.0 %    Mono % 8.0 4.0 - 15.0 %    Eosinophil % 3.8 0.0 - 8.0 %    Basophil % 0.8 0.0 - 1.9 %    Differential Method Automated    Comprehensive metabolic panel   Result Value Ref Range    Sodium 138 136 - 145 mmol/L    Potassium 4.0 3.5 - 5.1 mmol/L    Chloride 103 95 - 110 mmol/L    CO2 19 (L) 23 - 29 mmol/L    Glucose 459 (HH) 70 - 110 mg/dL    BUN 14 6 - 20 mg/dL    Creatinine 0.9 0.5 - 1.4 mg/dL    Calcium 8.4 (L) 8.7 - 10.5 mg/dL    Total Protein 6.8 6.0 - 8.4 g/dL    Albumin 3.8 3.5 - 5.2 g/dL    Total Bilirubin 2.9 (H) 0.1 - 1.0 mg/dL    Alkaline Phosphatase 88 55 - 135 U/L    AST 30 10 - 40 U/L    ALT 47 (H) 10 - 44 U/L    Anion Gap 16 8 - 16 mmol/L    eGFR >60 >60 mL/min/1.73 m^2   Urinalysis, Reflex to Urine Culture Urine, Clean Catch    Specimen: Urine   Result Value Ref Range    Specimen UA Urine, Clean Catch     Color, UA Yellow Yellow, Straw, Miriam    Appearance, UA Clear Clear    pH, UA 6.0 5.0 - 8.0    Specific Gravity, UA 1.030 1.005 - 1.030    Protein, UA Negative Negative    Glucose, UA 4+ (A) Negative    Ketones, UA Negative Negative    Bilirubin (UA) Negative Negative    Occult Blood UA Negative Negative    Nitrite, UA Negative Negative    Urobilinogen, UA Negative <2.0 EU/dL    Leukocytes, UA Negative Negative   Urinalysis Microscopic   Result Value Ref Range    Bacteria None None-Occ /hpf    Yeast, UA None None    Squam Epithel, UA 0 /hpf    Microscopic Comment SEE COMMENT    POCT glucose   Result Value Ref Range    POCT Glucose 285 (H) 70 - 110 mg/dL     *Note: Due to a large number of results and/or encounters for the requested time period, some results have not been displayed. A complete set of results can be found in Results Review.        Imaging Results:  Imaging Results    None                   The Emergency Provider reviewed the vital signs and test results, which are outlined above.     ED Discussion     9:19 PM:  Reassessed pt at this time.  Discussed with pt all pertinent ED information and results. Discussed pt dx and plan of tx. Gave pt all f/u and return to the ED instructions. All questions and concerns were addressed at this time. Pt expresses understanding of information and instructions, and is comfortable with plan to discharge. Pt is stable for discharge.    I discussed with patient and/or family/caretaker that evaluation in the ED does not suggest any emergent or life threatening medical conditions requiring immediate intervention beyond what was provided in the ED, and I believe patient is safe for discharge.  Regardless, an unremarkable evaluation in the ED does not preclude the development or presence of a serious of life threatening condition. As such, patient was instructed to return immediately for any worsening or change in current symptoms.          Medical Decision Making:   Clinical Tests:   Lab Tests: Ordered and Reviewed         ED Medication(s):  Medications   sodium chloride 0.9% bolus 1,000 mL 1,000 mL (0 mLs Intravenous Stopped 12/27/22 1715)   ondansetron disintegrating tablet 4 mg (4 mg Oral Given 12/27/22 2137)   oxyCODONE-acetaminophen  mg per tablet 1 tablet (1 tablet Oral Given 12/27/22 2136)       Discharge Medication List as of 12/27/2022  9:20 PM        START taking these medications    Details   ondansetron (ZOFRAN-ODT) 4 MG TbDL Take 1 tablet (4 mg total) by mouth every 8 (eight) hours as needed (nausea)., Starting Tue 12/27/2022, Print              Follow-up Information       Schedule an appointment as soon as possible for a visit  with Vikash Baig MD.    Specialty: Internal Medicine  Contact information:  [address]  [place] [address] [phone]               [facility] - Emergency Dept..    Specialty: Emergency Medicine  Why: As needed, If symptoms worsen  Contact information:  [address] [facility] [address] [phone]                                Scribe Attestation:   Scribe #1: I performed the above scribed service and the documentation accurately describes the services I performed. I attest to the accuracy of the note.     Attending:   Physician Attestation Statement for Scribe #1: I, Harshad Mayes MD, personally performed the services described in this documentation, as scribed by Kahlil Banda, in my presence, and it is both accurate and complete.           Clinical Impression       ICD-10-CM ICD-9-CM   1. Nausea and vomiting, unspecified vomiting type  R11.2 787.01   2. Hyperglycemia  R73.9 790.29       Disposition:   Disposition: Discharged  Condition: Stable      Harshad Mayes MD  12/30/22 0058

## 2023-01-03 NOTE — PROGRESS NOTES
Colonoscopy PAT call completed.   Prep reviewed. Begin low fiber diet 5 days prior to procedure date. Begin clear liquids when wake up on 01/08, at least 1 - 2 gallons. No red or purple coloring. No pulp. OTC meds discussed. Golytely at 6pm/5a then nothing by mouth until after procedure.  No gum, mints, lozenges, cigarettes, tobacco, etc.   Updated prep instructions sent via  portal.   Call if not clear. Dress comfortable. No jewelry/piercings.   [facility], [address], next to [facility].   Pt verbalized understanding of all prep instructions and denies questions.   Please call for any questions prior to procedure date. [phone]

## 2023-01-18 ENCOUNTER — CLINICAL SUPPORT (OUTPATIENT)
Dept: DIABETES | Facility: CLINIC | Age: 53
End: 2023-01-18
Payer: MEDICARE

## 2023-01-18 ENCOUNTER — LAB VISIT (OUTPATIENT)
Dept: LAB | Facility: HOSPITAL | Age: 53
End: 2023-01-18
Attending: FAMILY MEDICINE
Payer: MEDICARE

## 2023-01-18 VITALS — BODY MASS INDEX: 25.19 KG/M2 | WEIGHT: 142.19 LBS

## 2023-01-18 DIAGNOSIS — E11.9 TYPE 2 DIABETES MELLITUS WITHOUT COMPLICATION, WITH LONG-TERM CURRENT USE OF INSULIN: Chronic | ICD-10-CM

## 2023-01-18 DIAGNOSIS — Z79.4 TYPE 2 DIABETES MELLITUS WITHOUT COMPLICATION, WITH LONG-TERM CURRENT USE OF INSULIN: Chronic | ICD-10-CM

## 2023-01-18 DIAGNOSIS — E55.9 VITAMIN D DEFICIENCY: ICD-10-CM

## 2023-01-18 DIAGNOSIS — E53.8 VITAMIN B12 DEFICIENCY: ICD-10-CM

## 2023-01-18 PROCEDURE — G0108 PR DIAB MANAGE TRN  PER INDIV: ICD-10-PCS | Mod: HCNC,S$GLB,, | Performed by: DIETITIAN, REGISTERED

## 2023-01-18 PROCEDURE — 82607 VITAMIN B-12: CPT | Mod: HCNC | Performed by: FAMILY MEDICINE

## 2023-01-18 PROCEDURE — 36415 COLL VENOUS BLD VENIPUNCTURE: CPT | Mod: HCNC | Performed by: FAMILY MEDICINE

## 2023-01-18 PROCEDURE — G0108 DIAB MANAGE TRN  PER INDIV: HCPCS | Mod: HCNC,S$GLB,, | Performed by: DIETITIAN, REGISTERED

## 2023-01-18 PROCEDURE — 83036 HEMOGLOBIN GLYCOSYLATED A1C: CPT | Mod: HCNC | Performed by: FAMILY MEDICINE

## 2023-01-18 PROCEDURE — 99999 PR PBB SHADOW E&M-EST. PATIENT-LVL IV: CPT | Mod: PBBFAC,HCNC,, | Performed by: DIETITIAN, REGISTERED

## 2023-01-18 PROCEDURE — 80053 COMPREHEN METABOLIC PANEL: CPT | Mod: HCNC | Performed by: FAMILY MEDICINE

## 2023-01-18 PROCEDURE — 80061 LIPID PANEL: CPT | Mod: HCNC | Performed by: FAMILY MEDICINE

## 2023-01-18 PROCEDURE — 82306 VITAMIN D 25 HYDROXY: CPT | Mod: HCNC | Performed by: FAMILY MEDICINE

## 2023-01-18 PROCEDURE — 99999 PR PBB SHADOW E&M-EST. PATIENT-LVL IV: ICD-10-PCS | Mod: PBBFAC,HCNC,, | Performed by: DIETITIAN, REGISTERED

## 2023-01-18 NOTE — PROGRESS NOTES
Diabetes Care Specialist Progress Note  Author: Kiesha Hearn RD, CDE  Date: 1/18/2023    Program Intake  Reason for Diabetes Program Visit:: Initial Diabetes Assessment  Type of Intervention:: Individual  Education: Self-Management Skill Review  Current diabetes risk level:: moderate  In the last 12 months, have you:: used emergency room services  Was the ER or hospital admission related to diabetes?: Yes    Lab Results   Component Value Date    HGBA1C 7.5 (H) 08/22/2022       Clinical    Patient Health Rating  Compared to other people your age, how would you rate your health?: Fair    Problem Review  Active comorbidities affecting diabetes self-care.: yes (HLD, Hx iron defn, VitD defn, Vit B12 defn, Hx gastric bypass 2008 w/ weight 429, PTSD)    Clinical Assessment  Current Diabetes Treatment: Diet, Exercise, Oral Medication, Insulin (Metformin xr 500mg 2tab twice daily, Jardiance 25mg 1tab daily, Levemir 15units daily.)  Have you ever experienced hypoglycemia (low blood sugar)?: yes  In the last month, how often have you experienced low blood sugar?:  (irregular freq, reports symptoms w/ BG 90s & treats using oj)  Are you able to tell when your blood sugar is low?: Yes  What symptoms do you experience?: Shaky  Have you ever been hospitalized because your blood sugar was too low?: no  How do you treat hypoglycemia (low blood sugar)?: 1/2 can soda/fruit juice  Have you ever experienced hyperglycemia (high blood sugar)?: yes  In the last month, how often have you experienced high blood sugar?: once a day  Are you able to tell when your blood sugar is high?: Yes  What are your symptoms?: nausea (headaches)  Have you ever been hospitalized because your blood sugar was high?: yes (see comments)    Medication Information  How many days a week do you miss your medications?: 3 or more (jardiance - pt reports obtaining new pill organizer to assist her in remembering to dose)  Do you use a pill box or medication chart  to help you manage your medications?: Pill box  Do you sometimes have difficulty refilling your medications?: No  Medication adherence impacting ability to self-manage diabetes?: Yes    Labs  Do you have regular lab work to monitor your medications?: Yes  Type of Regular Lab Work: A1c, Cholesterol, Microalbumin, CBC, BMP  Where do you get your labs drawn?: [facility]  Lab Compliance Barriers: No    Nutritional Status  Diet:  (Pt reports using bariatric meal plan but reports irregular meals. She usually limits carb intake and occs has higher amts at family socials such as birthday party.)  Meal Plan 24 Hour Recall: Breakfast, Lunch, Dinner, Snack  Meal Plan 24 Hour Recall - Breakfast: 1/2 chix breast, 1/2c peas - water  Meal Plan 24 Hour Recall - Lunch: none  Meal Plan 24 Hour Recall - Dinner: bkd fish, clam chowder w/ potatoes 2c, zucchni OR 1c lasange, couple bites cake (family member bday but generally avoids)- water  Meal Plan 24 Hour Recall - Snack: rare snacks: maya: water, sf, reg tea 1x/wk  Change in appetite?: Yes (decreased since bariatric surgery)  Recent Changes in Weight: Weight Loss  Was weight loss intentional or unintentional?: Intentional (~12lbs past few weeks)  Current nutritional status an area of need that is impacting patient's ability to self-manage diabetes?: No    Additional Social History    Support  Does anyone support you with your diabetes care?: no  Is Support an area impacting ability to self-manage diabetes?: No    Access to Mass Media & Technology  Does the patient have access to any of the following devices or technologies?: Smart phone, Internet Access  Media or technology needs impacting ability to self-manage diabetes?: No    Cognitive/Behavioral Health  Alert and Oriented: Yes  Difficulty Thinking: No  Requires Prompting: No  Requires assistance for routine expression?: No  Cognitive or behavioral barriers impacting ability to self-manage diabetes?: No    Culture/Taoism  Culture or  Congregation beliefs that may impact ability to access healthcare: No    Communication  Language preference: English  Hearing Problems: No  Vision Problems: Yes  Vision problem type:: Decreased Vision  Vision Assistance: Glasses  Communication needs impacting ability to self-manage diabetes?: No    Health Literacy  Preferred Learning Method: Face to Face, Reading Materials, Video  How often do you need to have someone help you read instructions, pamphlets, or written material from your doctor or pharmacy?: Never  Health literacy needs impacting ability to self-manage diabetes?: No      Diabetes Self-Management Skills Assessment    Diabetes Disease Process/Treatment Options  Patient/caregiver able to state what happens when someone has diabetes.: yes  Patient/caregiver knows what type of diabetes they have.: no  Diabetes Type : Type II  Patient/caregiver able to identify at least three signs and symptoms of diabetes.: yes  Identified signs and symptoms:: fatigue, increased thirst, frequent urination (headaches)  Diabetes Disease Process/Treatment Options: Skills Assessment Completed: Yes  Assessment indicates:: Knowledge deficit, Instruction Needed  Area of need?: Yes    Nutrition/Healthy Eating  Challenges to healthy eating::  (pt describes greatest barrier to be decreased appetite; also, enjoys strawberry lemonade but tries to limit 1x/wk)  Method of carbohydrate measurement::  (avoids sugar maya, tries to follow bariatric meal plan)  Patient can identify foods that impact blood sugar.: yes  Patient-identified foods:: starchy vegetables (corn, peas, beans), starches (bread, pasta, rice, cereal), fruit/fruit juice, soda, sweets, milk, yogurt  Nutrition/Healthy Eating Skills Assessment Completed:: Yes  Assessment indicates:: Instruction Needed, Knowledge deficit  Area of need?: Yes    Physical Activity/Exercise  Patient's daily activity level::  (none structured but planning to start Silver Sneakers)  Patient can  identify forms of physical activity.: yes  Stated forms of physical activity:: any movement performed by muscles that uses energy  Patient can identify reasons why exercise/physical activity is important in diabetes management.: yes  Identified reasons:: lowers blood glucose, blood pressure, and cholesterol  Physical Activity/Exercise Skills Assessment Completed: : Yes  Assessment indicates:: Adequate understanding  Area of need?: Yes    Medications  Patient is able to describe current diabetes management routine.: yes  Diabetes management routine:: diet, exercise, insulin, oral medications  Patient is able to identify current diabetes medications, dosages, and appropriate timing of medications.: yes  Patient understands the purpose of the medications taken for diabetes.: yes  Patient reports problems or concerns with current medication regimen.: yes  Medication regimen problems/concerns:: does not feel like regimen is working  Medication Skills Assessment Completed:: Yes  Assessment indicates:: Adequate understanding  Area of need?: Yes    Home Blood Glucose Monitoring  Patient states that blood sugar is checked at home daily.: yes  Monitoring Method:: home glucometer  How often do you check your blood sugar?: Twice a day  When you check what is your typical blood sugar range? : per digital diabetes records, [facility] BG , pp   Blood glucose logs:: yes, encouraged to bring logs to provider visits  Blood glucose logs reviewed today?: yes  Home Blood Glucose Monitoring Skills Assessment Completed: : Yes  Assessment indicates:: Instruction Needed  Area of need?: Yes    Acute Complications  Patient is able to identify types of acute complications: Yes  Patient Identified:: Hypoglycemia, Hyperglycemia  Patient is able to state the basic meaning of hypoglycemia?: Yes  Able to state the blood sugar range for hypoglycemia?: yes  Patient stated range:: pt reports symptoms BG 90s  Patient can identify general symptoms  of hypoglycemia: yes  Patient identified:: shakiness  Able to state proper treatment of hypoglycemia?: yes  Patient identified:: 1/2 can soda/fruit juice  Patient is able to state the basic meaning of hyperglycemia?: Yes  Able to state the blood sugar range for hyperglycemia?: yes  Patient stated range:: 300s  Patient able to state proper treatment of hyperglycemia?: yes  Patient identified:: monitor blood sugar, take medication as recommended, increase water intake  Acute Complications Skills Assessment Completed: : Yes  Assessment indicates:: Adequate understanding  Area of need?: No    Chronic Complications  Chronic Complications Skills Assessment Completed: : No  Deferred due to:: Time    Psychosocial/Coping  Patient can identify ways of coping with chronic disease.: yes  Patient-stated ways of coping with chronic disease:: counseling/actively seeing behavioral professional, support from loved ones  Psychosocial/Coping Skills Assessment Completed: : Yes  Assessment indicates:: Adequate understanding  Area of need?: No    Assessment Summary and Plan  Based on today's diabetes care assessment, the following areas of need were identified:      Social 1/18/2023   Support No   Access to Mass Media/Tech No   Cognitive/Behavioral Health No   Culture/Mormonism No   Communication No   Health Literacy No        Clinical 1/18/2023   Medication Adherence Yes - encouraged pt's use of new pill organizer to assist with dosing consistency; coord diabetes PHILLIP visit for additional medical mgmt eval.   Lab Compliance No   Nutritional Status No        Diabetes Self-Management Skills 1/18/2023   Diabetes Disease Process/Treatment Options Yes  Reviewed diabetes pathophysiology, different types of diabetes, signs and symptoms, risk factors and treatment guidelines.    Nutrition/Healthy Eating Yes - see care plan   Physical Activity/Exercise Yes  Discussed importance of daily physical activity with review of benefits, methods,  guidelines and precautions.   Medication Yes  Provided review of current diabetes medication action, dosage, frequency, side effects, and storage guidelines. Discussed guidelines for preventing, detecting and treating hypoglycemia and hyperglycemia. Reviewed the importance of meal and medication timing with diabetes mediations for prevention of acute complications and maximum drug benefit.  Message to diabetes PHILLIP nurse team to assist w/ visit coordination. Pt would benefit from Mark Pro study, C-peptide and further medical mgmt.    Home Blood Glucose Monitoring Yes - see care plan   Acute Complications No   Psychosocial/Coping No      Today's interventions were provided through individual discussion, instruction, and written materials were provided.    Patient verbalized understanding of instruction and written materials.  Pt was able to return back demonstration of instructions today. Patient understood key points, needs reinforcement and further instruction.     Diabetes Self-Management Care Plan    Today's Diabetes Self-Management Care Plan was developed with Jadyn's input. Jadyn has agreed to work toward the following goal(s) to improve his/her overall diabetes control.      Care Plan: Diabetes Management   Updates made since 12/19/2022 12:00 AM        Problem: Blood Glucose Self-Monitoring         Goal: Patient agrees to check and record blood sugars 3-4 times per day (fst, acl, acd, bed).    Start Date: 1/18/2023   Expected End Date: 1/18/2024   Priority: High   Barriers: No Barriers Identified   Note:    Pt agrees to restart and increase freq of BG testing using [employer] meter. Encouraged her to maintain contact with [facility] [employer] team for support. Reviewed benefits, applications CGM systems.        Task: Reviewed the importance of self-monitoring blood glucose and keeping logs. Completed 1/18/2023        Task: Provided patient with blood glucose logs, reviewed appropriate timing and frequency to  SMBG, education on parameters on when to notify provider and advised patient to bring logs to all appts with PCP/Endocrinologist/Diabetes Care Specialist. Completed 1/18/2023        Task: Discussed ways to minimize pain when monitoring blood glucose. Completed 1/18/2023        Problem: Healthy Eating         Goal: Eat 5 small meals daily - manage carb 15-30grams/meal w/ protein at each meal.    Start Date: 1/18/2023   Expected End Date: 1/18/2024   Priority: Medium   Barriers: Other (comments)   Note:    Reviewed modified bariatric meal plan based upon pt's current food intake volume. Emphasis on meal consistency & food variety for BG control, nutrition adequacy and overall health. Encouraged pt to continue w/ bariatric vitamin supplementation (vitD, iron, multivit, calcium, b12).       Task: Reviewed the sources and role of Carbohydrate, Protein, and Fat and how each nutrient impacts blood sugar. Completed 1/18/2023        Task: Provided visual examples using dry measuring cups, food models, and other familiar objects such as computer mouse, deck or cards, tennis ball etc. to help with visualization of portions. Completed 1/18/2023        Task: Recommended replacing beverages containing high sugar content with noncaloric/sugar free options and/or water. Completed 1/18/2023        Task: Review the importance of balancing carbohydrates with each meal using portion control techniques to count servings of carbohydrate and label reading to identify serving size and amount of total carbs per serving. Completed 1/18/2023        Task: Provided Sample plate method and reviewed the use of the plate to estimate amounts of carbohydrate per meal. Completed 1/18/2023          Follow Up Plan   Follow up in about 3 weeks (around 2/8/2023).  - Review digital BG records  - Eval goal progress    Today's care plan and follow up schedule was discussed with patient.  Jadyn verbalized understanding of the care plan, goals, and  agrees to follow up plan.        The patient was encouraged to communicate with his/her health care provider/physician and care team regarding his/her condition(s) and treatment.  I provided the patient with my contact information today and encouraged to contact me via phone or [facility] Patient Portal as needed.     Length of Visit   Total Time: 60 Minutes

## 2023-01-18 NOTE — Clinical Note
Hi good afternoon, I was trying to book a new pt visit for pt on p'jun mami but the mami was blocked. Would you please assist in contacting pt to coord NP visit for either Anuja or Nahum? Thanks so much! Kiesha

## 2023-01-19 ENCOUNTER — TELEPHONE (OUTPATIENT)
Dept: DIABETES | Facility: CLINIC | Age: 53
End: 2023-01-19
Payer: MEDICARE

## 2023-01-19 LAB
25(OH)D3+25(OH)D2 SERPL-MCNC: 26 NG/ML (ref 30–96)
ALBUMIN SERPL BCP-MCNC: 4.4 G/DL (ref 3.5–5.2)
ALP SERPL-CCNC: 114 U/L (ref 55–135)
ALT SERPL W/O P-5'-P-CCNC: 80 U/L (ref 10–44)
ANION GAP SERPL CALC-SCNC: 12 MMOL/L (ref 8–16)
AST SERPL-CCNC: 57 U/L (ref 10–40)
BILIRUB SERPL-MCNC: 1.1 MG/DL (ref 0.1–1)
BUN SERPL-MCNC: 29 MG/DL (ref 6–20)
CALCIUM SERPL-MCNC: 9.3 MG/DL (ref 8.7–10.5)
CHLORIDE SERPL-SCNC: 101 MMOL/L (ref 95–110)
CHOLEST SERPL-MCNC: 155 MG/DL (ref 120–199)
CHOLEST/HDLC SERPL: 2.3 {RATIO} (ref 2–5)
CO2 SERPL-SCNC: 22 MMOL/L (ref 23–29)
CREAT SERPL-MCNC: 1.1 MG/DL (ref 0.5–1.4)
EST. GFR  (NO RACE VARIABLE): >60 ML/MIN/1.73 M^2
ESTIMATED AVG GLUCOSE: 134 MG/DL (ref 68–131)
GLUCOSE SERPL-MCNC: 279 MG/DL (ref 70–110)
HBA1C MFR BLD: 6.3 % (ref 4–5.6)
HDLC SERPL-MCNC: 66 MG/DL (ref 40–75)
HDLC SERPL: 42.6 % (ref 20–50)
LDLC SERPL CALC-MCNC: 41.8 MG/DL (ref 63–159)
NONHDLC SERPL-MCNC: 89 MG/DL
POTASSIUM SERPL-SCNC: 4.2 MMOL/L (ref 3.5–5.1)
PROT SERPL-MCNC: 7.5 G/DL (ref 6–8.4)
SODIUM SERPL-SCNC: 135 MMOL/L (ref 136–145)
TRIGL SERPL-MCNC: 236 MG/DL (ref 30–150)
VIT B12 SERPL-MCNC: 381 PG/ML (ref 210–950)

## 2023-01-19 NOTE — TELEPHONE ENCOUNTER
----- Message from Nahum Power PA-C sent at 1/19/2023 10:47 AM CST -----  Please schedule [facility] new pt appt     ----- Message -----  From: Kiesha Hearn RD, CDE  Sent: 1/18/2023   2:10 PM CST  To: Nahum Power PA-C, #    Hi good afternoon, I was trying to book a new pt visit for pt on p'jun [facility] but the mami was blocked. Would you please assist in contacting pt to coord NP visit for either Anuja or Nahum? Thanks so much! Kiesha

## 2023-01-19 NOTE — TELEPHONE ENCOUNTER
Called pt and scheduled NP appt with torres.       Pt would like to know if she left her glucose meter at visit yesterday with Kiesha?

## 2023-01-19 NOTE — TELEPHONE ENCOUNTER
Called and spoke to pt to let her now we did not find her meter here at [facility] and Kiesha did not pick it up. She will check around her home again, to see if she over looked it.

## 2023-01-20 ENCOUNTER — HOSPITAL ENCOUNTER (EMERGENCY)
Facility: HOSPITAL | Age: 53
Discharge: HOME | End: 2023-01-20
Attending: EMERGENCY MEDICINE
Payer: MEDICARE

## 2023-01-20 VITALS
BODY MASS INDEX: 26.95 KG/M2 | HEART RATE: 98 BPM | TEMPERATURE: 98 F | SYSTOLIC BLOOD PRESSURE: 124 MMHG | OXYGEN SATURATION: 97 % | RESPIRATION RATE: 18 BRPM | WEIGHT: 152.13 LBS | DIASTOLIC BLOOD PRESSURE: 60 MMHG
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
## 2023-01-20 DIAGNOSIS — S69.91XA INJURY OF RIGHT WRIST, INITIAL ENCOUNTER: Primary | ICD-10-CM

## 2023-01-20 PROCEDURE — 29125 APPL SHORT ARM SPLINT STATIC: CPT | Mod: HCNC,RT

## 2023-01-20 PROCEDURE — 99283 EMERGENCY DEPT VISIT LOW MDM: CPT | Mod: 25,HCNC

## 2023-01-20 PROCEDURE — 25000003 PHARM REV CODE 250: Mod: HCNC | Performed by: REGISTERED NURSE

## 2023-01-20 RX ORDER — OXYCODONE AND ACETAMINOPHEN 10; 325 MG/1; MG/1
1 TABLET ORAL
Status: COMPLETED | OUTPATIENT
Start: 2023-01-20 | End: 2023-01-20

## 2023-01-20 RX ADMIN — OXYCODONE AND ACETAMINOPHEN 1 TABLET: 10; 325 TABLET ORAL at 01:01

## 2023-01-20 NOTE — ED PROVIDER NOTES
Encounter Date: 1/20/2023       History     Chief Complaint   Patient presents with    Hand Pain     R hand pain that radiates up forearm.     Fall     Tripped and fell over yesterday. Pt denies hitting head/LOC     52-year-old female presents emergency department with complaints of right hand and wrist pain.  Patient reports tripping over her dog earlier today and landing on her right wrist.  Patient denies any loss of consciousness, head injury, neck pain, back pain or any other symptoms at this time.    The history is provided by the patient.   Review of patient's allergies indicates:   Allergen Reactions    Demerol [meperidine] Hallucinations    Lamictal [lamotrigine] Rash     Rash to face in chart 2014 or 15     Penicillins Other (See Comments)     Patient cannot recall specific reaction, reports she has been listing this as an allergy since childhood.    Bactrim [sulfamethoxazole-trimethoprim]     Ciprofloxacin (bulk)     Codeine Nausea And Vomiting    Levetiracetam     Toradol [ketorolac]     Tramadol      seizures    Morpholine analogues Diarrhea, Itching and Nausea And Vomiting     Past Medical History:   Diagnosis Date    Abnormal Pap smear 1991    bx was negative, per pt    Anemia     Anxiety     B12 deficiency     Blood transfusion     multiple     Chronic LBP     Degenerative disc disease     l spine    Diabetes mellitus     Diabetic neuropathy     General anesthetics causing adverse effect in therapeutic use     delayed emergence    Mixed hyperlipidemia 11/18/2013    PONV (postoperative nausea and vomiting)     RLS (restless legs syndrome)     Seizures     Sleep apnea     with CPAP    Vitamin D deficiency disease      Past Surgical History:   Procedure Laterality Date    ANUS SURGERY      AUGMENTATION OF BREAST  2008    saline    BELT ABDOMINOPLASTY      tummy [facility]    BREAST SURGERY  2008    breast augmentation    CARPAL TUNNEL RELEASE Right 9/9/2021    Procedure: RELEASE, CARPAL TUNNEL;  Surgeon:  Tyler Victor MD;  Location: [facility] OR;  Service: Orthopedics;  Laterality: Right;  right carpal tunnel release and right cubital tunnel release with possible anterior transposition ulnar nerve     SECTION      x2    CHOLECYSTECTOMY      mikel      EXCISIONAL HEMORRHOIDECTOMY      GASTRIC BYPASS      HIP FRACTURE SURGERY      left hip ORIF    MOUTH SURGERY      revision of JJ anastomosis  2/27/15    small bowel resection  2015    TUBAL LIGATION      ULNAR TUNNEL RELEASE Right 2021    Procedure: RELEASE, CUBITAL TUNNEL;  Surgeon: Tyler Victor MD;  Location: [facility] OR;  Service: Orthopedics;  Laterality: Right;  right carpal tunnel release and right cubital tunnel release with possible anterior transposition ulnar nerve     Family History   Problem Relation Age of Onset    Diabetes Mother     Cataracts Mother     Glaucoma Maternal Aunt     Blindness Maternal Aunt     Breast cancer Neg Hx     Colon cancer Neg Hx     Thrombophilia Neg Hx      Social History     Tobacco Use    Smoking status: Never    Smokeless tobacco: Never   Substance Use Topics    Alcohol use: No    Drug use: No     Review of Systems   Constitutional:  Negative for fever.   HENT:  Negative for sore throat.    Respiratory:  Negative for shortness of breath.    Cardiovascular:  Negative for chest pain.   Gastrointestinal:  Negative for nausea.   Genitourinary:  Negative for dysuria.   Musculoskeletal:  Positive for arthralgias. Negative for back pain.        +right wrist pain   Skin:  Negative for rash.   Neurological:  Negative for weakness.   Hematological:  Does not bruise/bleed easily.   All other systems reviewed and are negative.    Physical Exam     Initial Vitals [23 1153]   BP Pulse Resp Temp SpO2   124/60 98 20 97.5 °F (36.4 °C) 97 %      MAP       --         Physical Exam    Constitutional: She appears well-developed and well-nourished. She is not diaphoretic. No distress.   HENT:   Head: Normocephalic  and atraumatic.   Eyes: Conjunctivae and EOM are normal. Pupils are equal, round, and reactive to light.   Neck: Neck supple.   Normal range of motion.  Cardiovascular:  Normal rate, regular rhythm and normal heart sounds.           No murmur heard.  Pulmonary/Chest: Breath sounds normal. No respiratory distress. She has no wheezes. She has no rales.   Abdominal: Abdomen is soft. Bowel sounds are normal. There is no abdominal tenderness. There is no rebound and no guarding.   Musculoskeletal:         General: No edema. Normal range of motion.      Right wrist: Tenderness and bony tenderness present. No swelling, deformity, snuff box tenderness or crepitus. Normal range of motion. Normal pulse.      Cervical back: Normal range of motion and neck supple.     Neurological: She is alert and oriented to person, place, and time. No cranial nerve deficit. GCS score is 15. GCS eye subscore is 4. GCS verbal subscore is 5. GCS motor subscore is 6.   Skin: Skin is warm and dry. Capillary refill takes less than 2 seconds.   Psychiatric: She has a normal mood and affect. Thought content normal.       ED Course   Splint Application    Date/Time: 1/20/2023 12:58 PM  Performed by: Anish Vinson Jr., FNP  Authorized by: Ignacio Watt MD   Location details: right wrist  Splint type: thumb spica  Post-procedure: The splinted body part was neurovascularly unchanged following the procedure.  Patient tolerance: Patient tolerated the procedure well with no immediate complications      Labs Reviewed - No data to display       Imaging Results               X-Ray Wrist Complete Right (Final result)  Result time 01/20/23 12:30:28      Final result by Jesus Hurley MD (01/20/23 12:30:28)                   Impression:      As above.    This report was flagged in Epic as abnormal.      Electronically signed by: Jesus Hurley  Date:    01/20/2023  Time:    12:30               Narrative:    EXAMINATION:  XR WRIST COMPLETE 3 VIEWS  RIGHT    CLINICAL HISTORY:  Unspecified fall, initial encounter    TECHNIQUE:  PA, lateral, and oblique views of the right wrist were performed.    COMPARISON:  Left wrist radiograph 07/13/2021.    FINDINGS:  No fracture or dislocation.  Slightly attenuated appearance of the lunate, osteonecrosis not excluded.  Consider further evaluation as warranted.  Subchondral cystic change at the distal radius suggesting at least mild radiocarpal degenerative changes.  Mild/moderate thumb CMC degenerative changes.  No advanced arthrosis noted.  Soft tissues are unremarkable.                                       Medications   oxyCODONE-acetaminophen  mg per tablet 1 tablet (has no administration in time range)     Medical Decision Making:   Initial Assessment:   Right wrist injury after a trip and fall earlier today  Differential Diagnosis:   Wrist fracture  Distal radius fracture  Scaphoid fracture  Clinical Tests:   Radiological Study: Ordered and Reviewed  ED Management:  Irregularity noted on x-ray, advised patient to follow up with Orthopedics for further evaluation.  Patient is on pain management.  Thumb spica splint applied.                        Clinical Impression:   Final diagnoses:  [W19.XXXA] Fall  [S69.91XA] Injury of right wrist, initial encounter (Primary)        ED Disposition Condition    Discharge Stable          ED Prescriptions    None       Follow-up Information       Follow up With Specialties Details Why Contact Info    Vikash Baig MD Internal Medicine In 1 week  [id number]  [place] [address]  [phone]               Anish Vinson Jr., P  01/20/23 [id number]

## 2023-01-23 ENCOUNTER — TELEPHONE (OUTPATIENT)
Dept: SLEEP MEDICINE | Facility: CLINIC | Age: 53
End: 2023-01-23
Payer: MEDICARE

## 2023-01-23 NOTE — TELEPHONE ENCOUNTER
----- Message from Rachel Quintanilla sent at 1/23/2023  1:03 PM CST -----  Type: Patient Call Back    Who called: Self     What is the request in detail: Calling in regards about a Fax that was sent over 01/20 .. asking for a call back     Can the clinic reply by [facility]? No     Would the patient rather a call back or a response via [facility]? Call     Best call back number: [phone] (home)

## 2023-01-24 ENCOUNTER — TELEPHONE (OUTPATIENT)
Dept: DIABETES | Facility: CLINIC | Age: 53
End: 2023-01-24
Payer: MEDICARE

## 2023-01-24 DIAGNOSIS — G47.00 INSOMNIA, UNSPECIFIED TYPE: ICD-10-CM

## 2023-01-24 DIAGNOSIS — F51.09 OTHER INSOMNIA NOT DUE TO A SUBSTANCE OR KNOWN PHYSIOLOGICAL CONDITION: Primary | ICD-10-CM

## 2023-01-24 RX ORDER — LEMBOREXANT 10 MG/1
TABLET, FILM COATED ORAL
Qty: 30 TABLET | Refills: 5 | Status: SHIPPED | OUTPATIENT
Start: 2023-01-24 | End: 2023-04-14 | Stop reason: SDUPTHER

## 2023-01-27 ENCOUNTER — HOSPITAL ENCOUNTER (EMERGENCY)
Facility: HOSPITAL | Age: 53
Discharge: HOME | End: 2023-01-27
Attending: EMERGENCY MEDICINE
Payer: MEDICARE

## 2023-01-27 VITALS
DIASTOLIC BLOOD PRESSURE: 62 MMHG | TEMPERATURE: 98 F | SYSTOLIC BLOOD PRESSURE: 127 MMHG | BODY MASS INDEX: 25.77 KG/M2 | RESPIRATION RATE: 17 BRPM | OXYGEN SATURATION: 98 % | WEIGHT: 145.5 LBS | HEART RATE: 96 BPM

## 2023-01-27 DIAGNOSIS — S61.012A LACERATION OF LEFT THUMB WITHOUT FOREIGN BODY, NAIL DAMAGE STATUS UNSPECIFIED, INITIAL ENCOUNTER: Primary | ICD-10-CM

## 2023-01-27 PROCEDURE — 25000003 PHARM REV CODE 250: Mod: HCNC | Performed by: NURSE PRACTITIONER

## 2023-01-27 PROCEDURE — 99283 EMERGENCY DEPT VISIT LOW MDM: CPT | Mod: 25,HCNC

## 2023-01-27 RX ORDER — MUPIROCIN 20 MG/G
1 OINTMENT TOPICAL
Status: COMPLETED | OUTPATIENT
Start: 2023-01-27 | End: 2023-01-27

## 2023-01-27 RX ORDER — CLINDAMYCIN HYDROCHLORIDE 150 MG/1
450 CAPSULE ORAL 3 TIMES DAILY
Qty: 63 CAPSULE | Refills: 0 | Status: SHIPPED | OUTPATIENT
Start: 2023-01-27 | End: 2023-02-03

## 2023-01-27 RX ADMIN — MUPIROCIN 22 G: 20 OINTMENT TOPICAL at 02:01

## 2023-01-27 NOTE — ED PROVIDER NOTES
HISTORY     Chief Complaint   Patient presents with    Laceration     Left thumb laceration a few days ago. 3 stitches placed at a different ED. The stitches have come out on their own sooner than she was supposed to have them removed. Thumb is swollen and red.      Review of patient's allergies indicates:   Allergen Reactions    Demerol [meperidine] Hallucinations    Lamictal [lamotrigine] Rash     Rash to face in chart 2014 or 15     Penicillins Other (See Comments)     Patient cannot recall specific reaction, reports she has been listing this as an allergy since childhood.    Bactrim [sulfamethoxazole-trimethoprim]     Ciprofloxacin (bulk)     Codeine Nausea And Vomiting    Levetiracetam     Toradol [ketorolac]     Tramadol      seizures    Morpholine analogues Diarrhea, Itching and Nausea And Vomiting        HPI   52-year-old female presents the emergency department for wound evaluation to her left thumb.  Patient reports she was seen at another health care facility several days ago for laceration noted to her left thumb.  Patient reports she had 3 sutures placed and all 3 sutures have come out.  Patient reports she is coming here to have her wound evaluated.  Patient denies being on any prescription medicines since she left the hospital.  Patient reports her tetanus status is up-to-date.  Patient denies any fever, chills, chest pain, shortness of breath, back pain, abdominal pain, nausea, vomiting, and all other concerns at this time.    The history is provided by the patient. No  was used.      PCP: Vikash Baig MD     Past Medical History:  Past Medical History:   Diagnosis Date    Abnormal Pap smear 1991    bx was negative, per pt    Anemia     Anxiety     B12 deficiency     Blood transfusion     multiple     Chronic LBP     Degenerative disc disease     l spine    Diabetes mellitus     Diabetic neuropathy     General anesthetics causing adverse effect in therapeutic use      delayed emergence    Mixed hyperlipidemia 2013    PONV (postoperative nausea and vomiting)     RLS (restless legs syndrome)     Seizures     Sleep apnea     with CPAP    Vitamin D deficiency disease         Past Surgical History:  Past Surgical History:   Procedure Laterality Date    ANUS SURGERY      AUGMENTATION OF BREAST  2008    saline    BELT ABDOMINOPLASTY      tummy tuck    BREAST SURGERY  2008    breast augmentation    CARPAL TUNNEL RELEASE Right 2021    Procedure: RELEASE, CARPAL TUNNEL;  Surgeon: Tyler Victor MD;  Location: [facility] OR;  Service: Orthopedics;  Laterality: Right;  right carpal tunnel release and right cubital tunnel release with possible anterior transposition ulnar nerve     SECTION      x2    CHOLECYSTECTOMY      mikel      EXCISIONAL HEMORRHOIDECTOMY      GASTRIC BYPASS      HIP FRACTURE SURGERY      left hip ORIF    MOUTH SURGERY      revision of JJ anastomosis  2/27/15    small bowel resection  2015    TUBAL LIGATION      ULNAR TUNNEL RELEASE Right 2021    Procedure: RELEASE, CUBITAL TUNNEL;  Surgeon: Tyler Victor MD;  Location: [facility] OR;  Service: Orthopedics;  Laterality: Right;  right carpal tunnel release and right cubital tunnel release with possible anterior transposition ulnar nerve        Family History:  Family History   Problem Relation Age of Onset    Diabetes Mother     Cataracts Mother     Glaucoma Maternal Aunt     Blindness Maternal Aunt     Breast cancer Neg Hx     Colon cancer Neg Hx     Thrombophilia Neg Hx         Social History:  Social History     Tobacco Use    Smoking status: Never    Smokeless tobacco: Never   Substance and Sexual Activity    Alcohol use: No    Drug use: No    Sexual activity: Not Currently     Partners: Male     Birth control/protection: Surgical     Comment: BTL; mut monog         ROS   Review of Systems   Constitutional:  Negative for fever.   HENT:  Negative for sore throat.    Respiratory:  Negative  for shortness of breath.    Cardiovascular:  Negative for chest pain.   Gastrointestinal:  Negative for abdominal pain, nausea and vomiting.   Genitourinary:  Negative for dysuria.   Musculoskeletal:  Negative for back pain.   Skin:  Positive for wound. Negative for rash.   Neurological:  Negative for weakness.   Hematological:  Does not bruise/bleed easily.     PHYSICAL EXAM     Initial Vitals [01/27/23 1258]   BP Pulse Resp Temp SpO2   127/62 96 17 98.2 °F (36.8 °C) 98 %      MAP       --           Physical Exam    Nursing note and vitals reviewed.  Constitutional: She appears well-developed and well-nourished. She is not diaphoretic. No distress.   HENT:   Head: Normocephalic and atraumatic.   Eyes: Right eye exhibits no discharge. Left eye exhibits no discharge.   Neck: Neck supple.   Normal range of motion.  Cardiovascular:  Normal rate.           Pulmonary/Chest: No respiratory distress.   Abdominal: She exhibits no distension.   Musculoskeletal:         General: Normal range of motion.      Cervical back: Normal range of motion and neck supple.     Neurological: She is alert and oriented to person, place, and time. She has normal strength.   Skin: Skin is warm and dry.   Transverse wound opening noted to the mid dorsal aspect of the left thumb.  Patient has full range of motion.  There is slight erythema however there is no drainage coming from the wound.   Psychiatric: She has a normal mood and affect. Her behavior is normal. Thought content normal.        ED COURSE   Procedures  ED ONGOING VITALS:  Vitals:    01/27/23 1258   BP: 127/62   Pulse: 96   Resp: 17   Temp: 98.2 °F (36.8 °C)   TempSrc: Oral   SpO2: 98%   Weight: 66 kg (145 lb 8.1 oz)         ABNORMAL LAB VALUES:  Labs Reviewed - No data to display      ALL LAB VALUES:  none      RADIOLOGY STUDIES:  Imaging Results              X-Ray Finger 2 or More Views Left (Final result)  Result time 01/27/23 13:23:18      Final result by Kodak Deluca MD  (01/27/23 13:23:18)                   Impression:      Negative exam.      Electronically signed by: Kodak Deluca  Date:    01/27/2023  Time:    13:23               Narrative:    EXAMINATION:  XR FINGER 2 OR MORE VIEWS LEFT    CLINICAL HISTORY:  laceration of thumb;    TECHNIQUE:  Standard radiography performed.    COMPARISON:  None    FINDINGS:  No fracture, dislocation or foreign body is identified.                                                The above vital signs and test results have been reviewed by the emergency provider.     ED Medications:  Medications   mupirocin 2 % ointment 22 g (22 g Topical (Top) Given 1/27/23 1438)       Discharge Medication List as of 1/27/2023  2:27 PM        START taking these medications    Details   clindamycin (CLEOCIN) 150 MG capsule Take 3 capsules (450 mg total) by mouth 3 (three) times daily. for 7 days, Starting Fri 1/27/2023, Until Fri 2/3/2023, Print           Discharge Medications:  Discharge Medication List as of 1/27/2023  2:27 PM        START taking these medications    Details   clindamycin (CLEOCIN) 150 MG capsule Take 3 capsules (450 mg total) by mouth 3 (three) times daily. for 7 days, Starting Fri 1/27/2023, Until Fri 2/3/2023, Print             Follow-up Information       pcp of choice.    Why: As needed             [facility] - Emergency Dept..    Specialty: Emergency Medicine  Why: If symptoms worsen  Contact information:  [id number] [place] [id number] [phone]                          I discussed with patient and/or family/caretaker that evaluation in the ED does not suggest any emergent or life threatening medical conditions requiring immediate intervention beyond what was provided in the ED, and I believe patient is safe for discharge. Regardless, an unremarkable evaluation in the ED does not preclude the development or presence of a serious or life threatening condition. As such, patient was instructed to return immediately  for any worsening or change in current symptoms.      MEDICAL DECISION MAKING   Medical Decision Making:   Initial Assessment:   Patient presents to the ER for wound evaluation to her left thumb.  Patient had 3 sutures placed several days ago and reports a sutures have come out.  Differential Diagnosis:   Avulsion  Abrasion  Clinical Tests:   Radiological Study: Ordered and Reviewed  ED Management:  No acute findings noted on x-ray.  Will place patient on clindamycin prophylactically and have patient follow-up with her PCP or return to the ER for any worsening or concerns.             CLINICAL IMPRESSION       ICD-10-CM ICD-9-CM   1. Laceration of left thumb without foreign body, nail damage status unspecified, initial encounter  S61.012A 883.0       Disposition:   Disposition: Discharged  Condition: Stable       Hernandez Herrera NP  01/27/23 6091

## 2023-02-06 ENCOUNTER — OFFICE VISIT (OUTPATIENT)
Dept: INTERNAL MEDICINE | Facility: CLINIC | Age: 53
End: 2023-02-06
Payer: MEDICARE

## 2023-02-06 ENCOUNTER — TELEPHONE (OUTPATIENT)
Dept: INTERNAL MEDICINE | Facility: CLINIC | Age: 53
End: 2023-02-06
Payer: MEDICARE

## 2023-02-06 ENCOUNTER — CLINICAL SUPPORT (OUTPATIENT)
Dept: DIABETES | Facility: CLINIC | Age: 53
End: 2023-02-06
Payer: MEDICARE

## 2023-02-06 ENCOUNTER — HOSPITAL ENCOUNTER (OUTPATIENT)
Dept: RADIOLOGY | Facility: HOSPITAL | Age: 53
Discharge: HOME | End: 2023-02-06
Attending: INTERNAL MEDICINE
Payer: MEDICARE

## 2023-02-06 VITALS
DIASTOLIC BLOOD PRESSURE: 80 MMHG | BODY MASS INDEX: 24.61 KG/M2 | OXYGEN SATURATION: 97 % | HEIGHT: 63 IN | SYSTOLIC BLOOD PRESSURE: 116 MMHG | TEMPERATURE: 98 F | WEIGHT: 138.88 LBS | HEART RATE: 99 BPM

## 2023-02-06 VITALS — BODY MASS INDEX: 24.6 KG/M2 | WEIGHT: 138.88 LBS

## 2023-02-06 DIAGNOSIS — Z79.4 TYPE 2 DIABETES MELLITUS WITHOUT COMPLICATION, WITH LONG-TERM CURRENT USE OF INSULIN: Primary | Chronic | ICD-10-CM

## 2023-02-06 DIAGNOSIS — F44.5 PSEUDOSEIZURE: ICD-10-CM

## 2023-02-06 DIAGNOSIS — Z12.11 COLON CANCER SCREENING: ICD-10-CM

## 2023-02-06 DIAGNOSIS — Z79.4 TYPE 2 DIABETES MELLITUS WITHOUT COMPLICATION, WITH LONG-TERM CURRENT USE OF INSULIN: Chronic | ICD-10-CM

## 2023-02-06 DIAGNOSIS — E78.5 HYPERLIPIDEMIA ASSOCIATED WITH TYPE 2 DIABETES MELLITUS: ICD-10-CM

## 2023-02-06 DIAGNOSIS — Z12.31 ENCOUNTER FOR SCREENING MAMMOGRAM FOR MALIGNANT NEOPLASM OF BREAST: ICD-10-CM

## 2023-02-06 DIAGNOSIS — S69.92XA INJURY OF LEFT THUMB, INITIAL ENCOUNTER: ICD-10-CM

## 2023-02-06 DIAGNOSIS — Z12.31 ENCOUNTER FOR SCREENING MAMMOGRAM FOR BREAST CANCER: ICD-10-CM

## 2023-02-06 DIAGNOSIS — R56.9 CONVULSIONS, UNSPECIFIED CONVULSION TYPE: ICD-10-CM

## 2023-02-06 DIAGNOSIS — E11.9 TYPE 2 DIABETES MELLITUS WITHOUT COMPLICATION, WITH LONG-TERM CURRENT USE OF INSULIN: Primary | Chronic | ICD-10-CM

## 2023-02-06 DIAGNOSIS — F39 UNSPECIFIED MOOD (AFFECTIVE) DISORDER: ICD-10-CM

## 2023-02-06 DIAGNOSIS — Z00.00 ROUTINE GENERAL MEDICAL EXAMINATION AT A HEALTH CARE FACILITY: Primary | ICD-10-CM

## 2023-02-06 DIAGNOSIS — E11.69 HYPERLIPIDEMIA ASSOCIATED WITH TYPE 2 DIABETES MELLITUS: ICD-10-CM

## 2023-02-06 DIAGNOSIS — E53.8 VITAMIN B12 DEFICIENCY: ICD-10-CM

## 2023-02-06 DIAGNOSIS — E11.9 TYPE 2 DIABETES MELLITUS WITHOUT COMPLICATION, WITH LONG-TERM CURRENT USE OF INSULIN: Chronic | ICD-10-CM

## 2023-02-06 PROCEDURE — 99396 PREV VISIT EST AGE 40-64: CPT | Mod: HCNC,S$GLB,, | Performed by: INTERNAL MEDICINE

## 2023-02-06 PROCEDURE — 3066F NEPHROPATHY DOC TX: CPT | Mod: HCNC,CPTII,S$GLB, | Performed by: INTERNAL MEDICINE

## 2023-02-06 PROCEDURE — 99999 PR PBB SHADOW E&M-EST. PATIENT-LVL V: CPT | Mod: PBBFAC,HCNC,, | Performed by: INTERNAL MEDICINE

## 2023-02-06 PROCEDURE — 3079F PR MOST RECENT DIASTOLIC BLOOD PRESSURE 80-89 MM HG: ICD-10-PCS | Mod: HCNC,CPTII,S$GLB, | Performed by: INTERNAL MEDICINE

## 2023-02-06 PROCEDURE — 99999 PR PBB SHADOW E&M-EST. PATIENT-LVL V: ICD-10-PCS | Mod: PBBFAC,HCNC,, | Performed by: INTERNAL MEDICINE

## 2023-02-06 PROCEDURE — 3044F HG A1C LEVEL LT 7.0%: CPT | Mod: HCNC,CPTII,S$GLB, | Performed by: INTERNAL MEDICINE

## 2023-02-06 PROCEDURE — 3074F SYST BP LT 130 MM HG: CPT | Mod: HCNC,CPTII,S$GLB, | Performed by: INTERNAL MEDICINE

## 2023-02-06 PROCEDURE — 99396 PR PREVENTIVE VISIT,EST,40-64: ICD-10-PCS | Mod: HCNC,S$GLB,, | Performed by: INTERNAL MEDICINE

## 2023-02-06 PROCEDURE — 99213 PR OFFICE/OUTPT VISIT, EST, LEVL III, 20-29 MIN: ICD-10-PCS | Mod: 25,HCNC,S$GLB, | Performed by: INTERNAL MEDICINE

## 2023-02-06 PROCEDURE — 73140 X-RAY EXAM OF FINGER(S): CPT | Mod: TC,HCNC,LT

## 2023-02-06 PROCEDURE — 3008F PR BODY MASS INDEX (BMI) DOCUMENTED: ICD-10-PCS | Mod: HCNC,CPTII,S$GLB, | Performed by: INTERNAL MEDICINE

## 2023-02-06 PROCEDURE — G0108 DIAB MANAGE TRN  PER INDIV: HCPCS | Mod: HCNC,S$GLB,, | Performed by: DIETITIAN, REGISTERED

## 2023-02-06 PROCEDURE — 3061F NEG MICROALBUMINURIA REV: CPT | Mod: HCNC,CPTII,S$GLB, | Performed by: INTERNAL MEDICINE

## 2023-02-06 PROCEDURE — 3074F PR MOST RECENT SYSTOLIC BLOOD PRESSURE < 130 MM HG: ICD-10-PCS | Mod: HCNC,CPTII,S$GLB, | Performed by: INTERNAL MEDICINE

## 2023-02-06 PROCEDURE — 99213 OFFICE O/P EST LOW 20 MIN: CPT | Mod: 25,HCNC,S$GLB, | Performed by: INTERNAL MEDICINE

## 2023-02-06 PROCEDURE — 3008F BODY MASS INDEX DOCD: CPT | Mod: HCNC,CPTII,S$GLB, | Performed by: INTERNAL MEDICINE

## 2023-02-06 PROCEDURE — 3061F PR NEG MICROALBUMINURIA RESULT DOCUMENTED/REVIEW: ICD-10-PCS | Mod: HCNC,CPTII,S$GLB, | Performed by: INTERNAL MEDICINE

## 2023-02-06 PROCEDURE — 73140 X-RAY EXAM OF FINGER(S): CPT | Mod: 26,HCNC,LT, | Performed by: RADIOLOGY

## 2023-02-06 PROCEDURE — G0108 PR DIAB MANAGE TRN  PER INDIV: ICD-10-PCS | Mod: HCNC,S$GLB,, | Performed by: DIETITIAN, REGISTERED

## 2023-02-06 PROCEDURE — 99999 PR PBB SHADOW E&M-EST. PATIENT-LVL III: CPT | Mod: PBBFAC,HCNC,, | Performed by: DIETITIAN, REGISTERED

## 2023-02-06 PROCEDURE — 3066F PR DOCUMENTATION OF TREATMENT FOR NEPHROPATHY: ICD-10-PCS | Mod: HCNC,CPTII,S$GLB, | Performed by: INTERNAL MEDICINE

## 2023-02-06 PROCEDURE — 3044F PR MOST RECENT HEMOGLOBIN A1C LEVEL <7.0%: ICD-10-PCS | Mod: HCNC,CPTII,S$GLB, | Performed by: INTERNAL MEDICINE

## 2023-02-06 PROCEDURE — 99999 PR PBB SHADOW E&M-EST. PATIENT-LVL III: ICD-10-PCS | Mod: PBBFAC,HCNC,, | Performed by: DIETITIAN, REGISTERED

## 2023-02-06 PROCEDURE — 73140 XR FINGER 2 OR MORE VIEWS LEFT: ICD-10-PCS | Mod: 26,HCNC,LT, | Performed by: RADIOLOGY

## 2023-02-06 PROCEDURE — 3079F DIAST BP 80-89 MM HG: CPT | Mod: HCNC,CPTII,S$GLB, | Performed by: INTERNAL MEDICINE

## 2023-02-06 RX ORDER — INSULIN DETEMIR 100 [IU]/ML
15 INJECTION, SOLUTION SUBCUTANEOUS NIGHTLY
Qty: 15 ML | Refills: 2
Start: 2023-02-06 | End: 2023-04-18 | Stop reason: ALTCHOICE

## 2023-02-06 RX ORDER — METFORMIN HYDROCHLORIDE 500 MG/1
TABLET, EXTENDED RELEASE ORAL
Qty: 360 TABLET | Refills: 1 | Status: SHIPPED | OUTPATIENT
Start: 2023-02-06 | End: 2023-08-03 | Stop reason: SDUPTHER

## 2023-02-06 RX ORDER — CLINDAMYCIN HYDROCHLORIDE 300 MG/1
300 CAPSULE ORAL EVERY 6 HOURS
Qty: 21 CAPSULE | Refills: 0 | Status: SHIPPED | OUTPATIENT
Start: 2023-02-06 | End: 2023-06-07

## 2023-02-06 RX ORDER — PROMETHAZINE HYDROCHLORIDE 25 MG/1
25 TABLET ORAL 2 TIMES DAILY PRN
Qty: 40 TABLET | Refills: 6 | Status: SHIPPED | OUTPATIENT
Start: 2023-02-06 | End: 2023-06-27

## 2023-02-06 RX ORDER — PRAVASTATIN SODIUM 20 MG/1
20 TABLET ORAL DAILY
Qty: 90 TABLET | Refills: 3 | Status: SHIPPED | OUTPATIENT
Start: 2023-02-06 | End: 2023-08-03 | Stop reason: SDUPTHER

## 2023-02-06 NOTE — PATIENT INSTRUCTIONS
Check with your pharmacy regarding new shingles vaccine.      Covid vaccine phone number is [phone].
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
## 2023-02-06 NOTE — PROGRESS NOTES
Diabetes Care Specialist Progress Note  Author: Kiesha Hearn RD, CDE  Date: 2/6/2023    Program Intake  Reason for Diabetes Program Visit:: Intervention  Type of Intervention:: Individual  Education: Self-Management Skill Review  Current diabetes risk level:: moderate  In the last 12 months, have you:: used emergency room services  Was the ER or hospital admission related to diabetes?: Yes    Lab Results   Component Value Date    HGBA1C 6.3 (H) 01/18/2023       Clinical    Problem Review  Active comorbidities affecting diabetes self-care.:  (HLD, Hx iron defn, VitD defn, Vit B12 defn, Hx gastric bypass 2008 w/ weight 429, PTSD)    Clinical Assessment  Current Diabetes Treatment:  (Metformin xr 500mg 2tab twice daily, Jardiance 25mg 1tab daily, Levemir 15units daily.)  Have you ever experienced hypoglycemia (low blood sugar)?: no (none recent)  Have you ever experienced hyperglycemia (high blood sugar)?: no  In the last month, how often have you experienced high blood sugar?: more than once a week  Are you able to tell when your blood sugar is high?: Yes  What are your symptoms?: fatigue  Have you ever been hospitalized because your blood sugar was high?:  (none since prior visit)    Medication Information  How many days a week do you miss your medications?: Never  Do you sometimes have difficulty refilling your medications?: No  Medication adherence impacting ability to self-manage diabetes?: No    Labs  Do you have regular lab work to monitor your medications?: Yes  Type of Regular Lab Work: A1c  Where do you get your labs drawn?: [facility]  Lab Compliance Barriers: No    Nutritional Status  Diet:  (Pt reports using bariatric meal plan with improved meal patterns and protein intake. She dislikes or has developed adversion to pro shakes. Higher BG related to white [employer] bagel. Pt reports occs higher sat fat/sodium from fast foods.)  Meal Plan 24 Hour Recall - Breakfast: today [employer] fried chix biscuit OR  usually has leftover protein + veggie (grilled chix, grilled zucchini)  Meal Plan 24 Hour Recall - Lunch: bkd or air fried swai w/ salad or tomatoes/cucumbers  Meal Plan 24 Hour Recall - Dinner: shrimp w/ salad, cheese, ~1Tbsp altman OR few times per week meal w/ carb (1c pasta, shrimp/chix)  Meal Plan 24 Hour Recall - Snack: rare snack; maya: water, unsweetened tea  Recent Changes in Weight: Weight Loss  Was weight loss intentional or unintentional?: Intentional (~14 lbs past few weeks)  Current nutritional status an area of need that is impacting patient's ability to self-manage diabetes?: No    Diabetes Self-Management Skills Assessment    Nutrition/Healthy Eating  Challenges to healthy eating::  (none recent)  Method of carbohydrate measurement::  (improved consistency of meals/protein intake, visual estimation, limits fast foods/sug.maya)  Patient can identify foods that impact blood sugar.: yes  Patient-identified foods:: starchy vegetables (corn, peas, beans), starches (bread, pasta, rice, cereal), fruit/fruit juice, soda, sweets, milk, yogurt  Nutrition/Healthy Eating Skills Assessment Completed:: Yes  Assessment indicates:: Adequate understanding  Area of need?: No    Physical Activity/Exercise  Patient's daily activity level::  (Started outdoor walking 15ming daily.)  Stated forms of physical activity:: any movement performed by muscles that uses energy  Patient can identify reasons why exercise/physical activity is important in diabetes management.: yes  Identified reasons:: lowers blood glucose, blood pressure, and cholesterol (pt understands ADA targets)  Physical Activity/Exercise Skills Assessment Completed: : Yes  Assessment indicates:: Adequate understanding  Area of need?: No    Medications  Patient is able to describe current diabetes management routine.: yes  Diabetes management routine:: diet, exercise, insulin, oral medications  Patient is able to identify current diabetes medications, dosages, and  appropriate timing of medications.: yes  Patient understands the purpose of the medications taken for diabetes.: yes  Patient reports problems or concerns with current medication regimen.: yes  Medication regimen problems/concerns:: does not feel like regimen is working (pt has upcoming appt w/ diabetes NP)  Medication Skills Assessment Completed:: Yes  Assessment indicates:: Adequate understanding  Area of need?: Yes    Home Blood Glucose Monitoring  Patient states that blood sugar is checked at home daily.: yes  Monitoring Method:: home glucometer  When you check what is your typical blood sugar range? : per digital diabetes records, fst -137; pp dinner/bed 213-539  Blood glucose logs:: yes  Blood glucose logs reviewed today?: yes  Home Blood Glucose Monitoring Skills Assessment Completed: : Yes  Assessment indicates:: Adequate understanding  Area of need?: Yes    Assessment Summary and Plan  Based on today's diabetes care assessment, the following areas of need were identified:      Social 1/18/2023   Support No   Access to Mass Media/Tech No   Cognitive/Behavioral Health No   Culture/Anglican No   Communication No   Health Literacy No        Clinical 2/6/2023   Medication Adherence No   Lab Compliance No   Nutritional Status No        Diabetes Self-Management Skills 2/6/2023   Nutrition/Healthy Eating No   Physical Activity/Exercise No   Medication Yes - pt agrees to fu w/ provider Alexandra for medical mgmt   Home Blood Glucose Monitoring Yes - see care plan      Today's interventions were provided through individual discussion, instruction, and written materials were provided.    Patient verbalized understanding of instruction and written materials.  Pt was able to return back demonstration of instructions today. Patient understood key points, needs reinforcement and further instruction.     Diabetes Self-Management Care Plan:  Today's Diabetes Self-Management Care Plan was developed with Al  input. Jadyn has agreed to work toward the following goal(s) to improve his/her overall diabetes control.      Care Plan: Diabetes Management   Updates made since 1/7/2023 12:00 AM        Problem: Blood Glucose Self-Monitoring         Goal: Patient agrees to check and record blood sugars 3-4 times per day (fst, acl, acd, bed).    Start Date: 1/18/2023   Expected End Date: 1/18/2024   This Visit's Progress: On track   Priority: High   Barriers: No Barriers Identified   Note:    Encouraged BG testing progress and alternate testing times. Encouraged her to maintain contact with [facility] digital team for support. Noted pt interest in CGM, and she will discuss Rx more with diabetes PHILLIP.       Task: Reviewed the importance of self-monitoring blood glucose and keeping logs. Completed 1/18/2023        Task: Provided patient with blood glucose logs, reviewed appropriate timing and frequency to SMBG, education on parameters on when to notify provider and advised patient to bring logs to all appts with PCP/Endocrinologist/Diabetes Care Specialist. Completed 1/18/2023        Task: Discussed ways to minimize pain when monitoring blood glucose. Completed 1/18/2023        Problem: Healthy Eating         Goal: Eat 5 small meals daily - manage carb 15-30grams/meal w/ protein at each meal.    Start Date: 1/18/2023   Expected End Date: 1/18/2024   This Visit's Progress: On track   Priority: Medium   Barriers: No Barriers Identified   Note:    Encouraged continued use of modified bariatric meal w/ focus on meal consistency & food variety. Reviewed bariatric vitamin supplementation (vitD, iron, multivit, calcium, b12); pt reports plans to start Calcium supplementation.        Task: Reviewed the sources and role of Carbohydrate, Protein, and Fat and how each nutrient impacts blood sugar. Completed 1/18/2023        Task: Provided visual examples using dry measuring cups, food models, and other familiar objects such as computer mouse,  deck or cards, tennis ball etc. to help with visualization of portions. Completed 1/18/2023        Task: Recommended replacing beverages containing high sugar content with noncaloric/sugar free options and/or water. Completed 1/18/2023        Task: Review the importance of balancing carbohydrates with each meal using portion control techniques to count servings of carbohydrate and label reading to identify serving size and amount of total carbs per serving. Completed 1/18/2023        Task: Provided Sample plate method and reviewed the use of the plate to estimate amounts of carbohydrate per meal. Completed 1/18/2023        Problem: Physical Activity and Exercise         Goal: Patient agrees to increase physical activity to a goal 150min/wk - split session walking program.    Start Date: 2/6/2023   Expected End Date: 1/18/2024   This Visit's Progress: On track   Priority: Low   Barriers: No Barriers Identified   Note:    Encouraged progress.        Task: Discussed role of physical activity on reducing insulin resistance and improvement in overall glycemic control. Completed 2/6/2023        Task: Discussed role of physical activity as it relates to weight loss Completed 2/6/2023        Task: Offered suggestions on how patient could increase their regular physical activity Completed 2/6/2023          Follow Up Plan    Follow up in about 3 months (around 5/6/2023).  - Review digital BG records  - Eval goal progress  - Admin diabetes distress scale     Today's care plan and follow up schedule was discussed with patient.  Jadyn verbalized understanding of the care plan, goals, and agrees to follow up plan.        The patient was encouraged to communicate with his/her health care provider/physician and care team regarding his/her condition(s) and treatment.  I provided the patient with my contact information today and encouraged to contact me via phone or [facility] Patient Portal as needed.     Length of Visit   Total  Time: 60 Minutes

## 2023-02-07 DIAGNOSIS — Z00.00 ENCOUNTER FOR MEDICARE ANNUAL WELLNESS EXAM: ICD-10-CM

## 2023-02-09 DIAGNOSIS — Z00.00 ENCOUNTER FOR MEDICARE ANNUAL WELLNESS EXAM: ICD-10-CM

## 2023-02-12 PROBLEM — Z00.00 ROUTINE GENERAL MEDICAL EXAMINATION AT A HEALTH CARE FACILITY: Status: ACTIVE | Noted: 2023-02-12

## 2023-02-12 NOTE — ASSESSMENT & PLAN NOTE
Improved. Cont tx plan    Glucose was in the 400s on 10 mg of Jardiance.  Glucose readings improved after increase Jardiance from 10 mg to 25 mg.  She continues with Levemir 15 q.h.s. and Glucophage 1000 b.i.d..

## 2023-02-13 ENCOUNTER — OFFICE VISIT (OUTPATIENT)
Dept: UROLOGY | Facility: CLINIC | Age: 53
End: 2023-02-13
Payer: MEDICARE

## 2023-02-13 VITALS
SYSTOLIC BLOOD PRESSURE: 115 MMHG | WEIGHT: 138 LBS | BODY MASS INDEX: 24.45 KG/M2 | HEART RATE: 102 BPM | HEIGHT: 63 IN | DIASTOLIC BLOOD PRESSURE: 75 MMHG | TEMPERATURE: 97 F

## 2023-02-13 DIAGNOSIS — N32.0 BLADDER OUTFLOW OBSTRUCTION: Primary | ICD-10-CM

## 2023-02-13 PROBLEM — R39.12 WEAK URINE STREAM: Status: ACTIVE | Noted: 2023-02-13

## 2023-02-13 PROBLEM — R33.9 URINE RETENTION: Status: ACTIVE | Noted: 2023-02-13

## 2023-02-13 PROCEDURE — 3074F SYST BP LT 130 MM HG: CPT | Mod: HCNC,CPTII,S$GLB, | Performed by: UROLOGY

## 2023-02-13 PROCEDURE — 1159F PR MEDICATION LIST DOCUMENTED IN MEDICAL RECORD: ICD-10-PCS | Mod: HCNC,CPTII,S$GLB, | Performed by: UROLOGY

## 2023-02-13 PROCEDURE — 99999 PR PBB SHADOW E&M-EST. PATIENT-LVL V: ICD-10-PCS | Mod: PBBFAC,HCNC,, | Performed by: UROLOGY

## 2023-02-13 PROCEDURE — 3008F PR BODY MASS INDEX (BMI) DOCUMENTED: ICD-10-PCS | Mod: HCNC,CPTII,S$GLB, | Performed by: UROLOGY

## 2023-02-13 PROCEDURE — 3074F PR MOST RECENT SYSTOLIC BLOOD PRESSURE < 130 MM HG: ICD-10-PCS | Mod: HCNC,CPTII,S$GLB, | Performed by: UROLOGY

## 2023-02-13 PROCEDURE — 3061F PR NEG MICROALBUMINURIA RESULT DOCUMENTED/REVIEW: ICD-10-PCS | Mod: HCNC,CPTII,S$GLB, | Performed by: UROLOGY

## 2023-02-13 PROCEDURE — 3044F HG A1C LEVEL LT 7.0%: CPT | Mod: HCNC,CPTII,S$GLB, | Performed by: UROLOGY

## 2023-02-13 PROCEDURE — 99214 OFFICE O/P EST MOD 30 MIN: CPT | Mod: HCNC,S$GLB,, | Performed by: UROLOGY

## 2023-02-13 PROCEDURE — 3066F PR DOCUMENTATION OF TREATMENT FOR NEPHROPATHY: ICD-10-PCS | Mod: HCNC,CPTII,S$GLB, | Performed by: UROLOGY

## 2023-02-13 PROCEDURE — 3078F DIAST BP <80 MM HG: CPT | Mod: HCNC,CPTII,S$GLB, | Performed by: UROLOGY

## 2023-02-13 PROCEDURE — 1160F PR REVIEW ALL MEDS BY PRESCRIBER/CLIN PHARMACIST DOCUMENTED: ICD-10-PCS | Mod: HCNC,CPTII,S$GLB, | Performed by: UROLOGY

## 2023-02-13 PROCEDURE — 1159F MED LIST DOCD IN RCRD: CPT | Mod: HCNC,CPTII,S$GLB, | Performed by: UROLOGY

## 2023-02-13 PROCEDURE — 3078F PR MOST RECENT DIASTOLIC BLOOD PRESSURE < 80 MM HG: ICD-10-PCS | Mod: HCNC,CPTII,S$GLB, | Performed by: UROLOGY

## 2023-02-13 PROCEDURE — 3008F BODY MASS INDEX DOCD: CPT | Mod: HCNC,CPTII,S$GLB, | Performed by: UROLOGY

## 2023-02-13 PROCEDURE — 3066F NEPHROPATHY DOC TX: CPT | Mod: HCNC,CPTII,S$GLB, | Performed by: UROLOGY

## 2023-02-13 PROCEDURE — 99214 PR OFFICE/OUTPT VISIT, EST, LEVL IV, 30-39 MIN: ICD-10-PCS | Mod: HCNC,S$GLB,, | Performed by: UROLOGY

## 2023-02-13 PROCEDURE — 1160F RVW MEDS BY RX/DR IN RCRD: CPT | Mod: HCNC,CPTII,S$GLB, | Performed by: UROLOGY

## 2023-02-13 PROCEDURE — 3061F NEG MICROALBUMINURIA REV: CPT | Mod: HCNC,CPTII,S$GLB, | Performed by: UROLOGY

## 2023-02-13 PROCEDURE — 99999 PR PBB SHADOW E&M-EST. PATIENT-LVL V: CPT | Mod: PBBFAC,HCNC,, | Performed by: UROLOGY

## 2023-02-13 PROCEDURE — 3044F PR MOST RECENT HEMOGLOBIN A1C LEVEL <7.0%: ICD-10-PCS | Mod: HCNC,CPTII,S$GLB, | Performed by: UROLOGY

## 2023-02-13 RX ORDER — TAMSULOSIN HYDROCHLORIDE 0.4 MG/1
0.4 CAPSULE ORAL DAILY
Qty: 30 CAPSULE | Refills: 11 | Status: SHIPPED | OUTPATIENT
Start: 2023-02-13 | End: 2024-03-20 | Stop reason: SDUPTHER

## 2023-02-13 NOTE — PROGRESS NOTES
Chief Complaint:   Encounter Diagnosis   Name Primary?    Bladder outflow obstruction Yes       HPI:  52-year-old female who comes in to be evaluated for possible outflow obstruction.  Patient states that she is had this for some time now, it is taking her a long time to get all of her urine out.  She is tried no medications at this point, she is a very slow and trickling flow.  She states this is every time she voids now.  No gross hematuria, no history of smoking, she gets up 3-4 times per evening to void.  Going about every hour to during the daytime with increased frequency and urgency, but no evidence of incontinence.  No dysuria, she is controlled diabetes.  No other urological or gynecological history.  She has all of her female organs, no pelvic surgeries.  She has no evidence of constipation, no family history of urological cancers or stones.  As stated she has no leakage.    Allergies:  Demerol [meperidine], Lamictal [lamotrigine], Penicillins, Bactrim [sulfamethoxazole-trimethoprim], Ciprofloxacin (bulk), Codeine, Levetiracetam, Toradol [ketorolac], Tramadol, and Morpholine analogues    Medications:  has a current medication list which includes the following prescription(s): bd alcohol swabs, blood sugar diagnostic, blood-glucose meter, cyanocobalamin, cyclobenzaprine, doxepin, droplet pen needle, empagliflozin, ergocalciferol, gabapentin, levemir flextouch u-100 insuln, lancets, dayvigo, metformin, naloxone, ondansetron, oxycodone-acetaminophen, pravastatin, promethazine, syringe with needle, clindamycin, and [DISCONTINUED] fluoxetine.    Review of Systems:  General: No fever, chills, fatigability, or weight loss.  Skin: No rashes, itching, or changes in color or texture of skin.  Chest: Denies GAMING, cyanosis, wheezing, cough, and sputum production.  Abdomen: Appetite fine. No weight loss. Denies diarrhea, abdominal pain, hematemesis, or blood in stool.  Musculoskeletal: No joint stiffness or swelling.  Denies back pain.  : As above.  All other review of systems negative.    PMH:   has a past medical history of Abnormal Pap smear (), Anemia, Anxiety, B12 deficiency, Blood transfusion, Chronic LBP, Degenerative disc disease, Diabetes mellitus, Diabetic neuropathy, General anesthetics causing adverse effect in therapeutic use, Mixed hyperlipidemia (2013), PONV (postoperative nausea and vomiting), RLS (restless legs syndrome), Seizures, Sleep apnea, and Vitamin D deficiency disease.    PSH:   has a past surgical history that includes Gastric bypass; Hip fracture surgery; Belt abdominoplasty;  section; Excisional hemorrhoidectomy; Anus surgery; Tubal ligation; Cholecystectomy; Mouth surgery; mikel; small bowel resection (2015); revision of JJ anastomosis (2/27/15); Breast surgery (); Augmentation of breast (); Carpal tunnel release (Right, 2021); and Ulnar tunnel release (Right, 2021).    FamHx: family history includes Blindness in her maternal aunt; Cataracts in her mother; Diabetes in her mother; Glaucoma in her maternal aunt.    SocHx:  reports that she has never smoked. She has never used smokeless tobacco. She reports that she does not drink alcohol and does not use drugs.      Physical Exam:  Vitals:    23 1307   BP: 115/75   Pulse: 102   Temp: 97.3 °F (36.3 °C)     General: A&Ox3, no apparent distress, no deformities  Neck: No masses, normal ROM  Lungs: normal inspiration, no use of accessory muscles  Heart: normal pulse, no arrhythmias  Abdomen: Soft, NT, ND, no masses, no hernias, no hepatosplenomegaly  Skin: The skin is warm and dry. No jaundice.  Ext: No c/c/e.    Labs/Studies:   PVR 17 ml     Impression/Plan:     Bladder outflow obstruction- due to clinical findings I am more concerned at this point due to possible bladder outflow obstruction.  Although if therapy fails to alleviate then this may be due to detrusor instability and hyperactivity.  Although  patient is having no evidence of incontinence.  Therefore will initially start with tamsulosin, I have informed her this may cause orthostatic hypotension or dizziness.  If she has any issues then she is to stop the medication and contact our office.  Re-evaluate in 6 weeks with a uroflow and postvoid residual.  In addition will attempt to get pelvic floor physical therapy in her hometown of [place], [place].  Call with any complaints, otherwise if this fails to assist we may need to pursue other therapeutic and diagnostic options as stated above.

## 2023-02-15 ENCOUNTER — HOSPITAL ENCOUNTER (OUTPATIENT)
Dept: PREADMISSION TESTING | Facility: HOSPITAL | Age: 53
Discharge: HOME | End: 2023-02-15
Attending: INTERNAL MEDICINE
Payer: MEDICARE

## 2023-02-15 ENCOUNTER — PATIENT MESSAGE (OUTPATIENT)
Dept: PREADMISSION TESTING | Facility: HOSPITAL | Age: 53
End: 2023-02-15

## 2023-02-15 DIAGNOSIS — Z12.31 ENCOUNTER FOR SCREENING MAMMOGRAM FOR BREAST CANCER: ICD-10-CM

## 2023-02-15 DIAGNOSIS — Z12.11 COLON CANCER SCREENING: ICD-10-CM

## 2023-02-20 ENCOUNTER — HOSPITAL ENCOUNTER (OUTPATIENT)
Dept: RADIOLOGY | Facility: HOSPITAL | Age: 53
Discharge: HOME | End: 2023-02-20
Attending: FAMILY MEDICINE
Payer: MEDICARE

## 2023-02-20 VITALS — WEIGHT: 136.69 LBS | BODY MASS INDEX: 24.22 KG/M2 | HEIGHT: 63 IN

## 2023-02-20 DIAGNOSIS — Z12.31 ENCOUNTER FOR SCREENING MAMMOGRAM FOR MALIGNANT NEOPLASM OF BREAST: ICD-10-CM

## 2023-02-20 PROCEDURE — 77067 MAMMO DIGITAL SCREENING BILAT WITH TOMO: ICD-10-PCS | Mod: 26,HCNC,, | Performed by: RADIOLOGY

## 2023-02-20 PROCEDURE — 77063 MAMMO DIGITAL SCREENING BILAT WITH TOMO: ICD-10-PCS | Mod: 26,HCNC,, | Performed by: RADIOLOGY

## 2023-02-20 PROCEDURE — 77063 BREAST TOMOSYNTHESIS BI: CPT | Mod: 26,HCNC,, | Performed by: RADIOLOGY

## 2023-02-20 PROCEDURE — 77067 SCR MAMMO BI INCL CAD: CPT | Mod: 26,HCNC,, | Performed by: RADIOLOGY

## 2023-02-20 PROCEDURE — 77067 SCR MAMMO BI INCL CAD: CPT | Mod: TC,HCNC

## 2023-02-24 ENCOUNTER — TELEPHONE (OUTPATIENT)
Dept: DIABETES | Facility: CLINIC | Age: 53
End: 2023-02-24
Payer: MEDICARE

## 2023-02-24 ENCOUNTER — OFFICE VISIT (OUTPATIENT)
Dept: DIABETES | Facility: CLINIC | Age: 53
End: 2023-02-24
Payer: MEDICARE

## 2023-02-24 DIAGNOSIS — E11.9 TYPE 2 DIABETES MELLITUS WITHOUT COMPLICATION, WITH LONG-TERM CURRENT USE OF INSULIN: Chronic | ICD-10-CM

## 2023-02-24 DIAGNOSIS — E78.5 HYPERLIPIDEMIA ASSOCIATED WITH TYPE 2 DIABETES MELLITUS: ICD-10-CM

## 2023-02-24 DIAGNOSIS — Z79.4 UNCONTROLLED TYPE 2 DIABETES MELLITUS WITH HYPERGLYCEMIA, WITH LONG-TERM CURRENT USE OF INSULIN: Primary | ICD-10-CM

## 2023-02-24 DIAGNOSIS — Z79.4 TYPE 2 DIABETES MELLITUS WITHOUT COMPLICATION, WITH LONG-TERM CURRENT USE OF INSULIN: Chronic | ICD-10-CM

## 2023-02-24 DIAGNOSIS — E11.69 HYPERLIPIDEMIA ASSOCIATED WITH TYPE 2 DIABETES MELLITUS: ICD-10-CM

## 2023-02-24 DIAGNOSIS — E11.65 UNCONTROLLED TYPE 2 DIABETES MELLITUS WITH HYPERGLYCEMIA, WITH LONG-TERM CURRENT USE OF INSULIN: Primary | ICD-10-CM

## 2023-02-24 PROCEDURE — 1160F PR REVIEW ALL MEDS BY PRESCRIBER/CLIN PHARMACIST DOCUMENTED: ICD-10-PCS | Mod: CPTII,95,, | Performed by: NURSE PRACTITIONER

## 2023-02-24 PROCEDURE — 1159F PR MEDICATION LIST DOCUMENTED IN MEDICAL RECORD: ICD-10-PCS | Mod: CPTII,95,, | Performed by: NURSE PRACTITIONER

## 2023-02-24 PROCEDURE — 3066F PR DOCUMENTATION OF TREATMENT FOR NEPHROPATHY: ICD-10-PCS | Mod: CPTII,95,, | Performed by: NURSE PRACTITIONER

## 2023-02-24 PROCEDURE — 3061F PR NEG MICROALBUMINURIA RESULT DOCUMENTED/REVIEW: ICD-10-PCS | Mod: CPTII,95,, | Performed by: NURSE PRACTITIONER

## 2023-02-24 PROCEDURE — 3061F NEG MICROALBUMINURIA REV: CPT | Mod: CPTII,95,, | Performed by: NURSE PRACTITIONER

## 2023-02-24 PROCEDURE — 3044F PR MOST RECENT HEMOGLOBIN A1C LEVEL <7.0%: ICD-10-PCS | Mod: CPTII,95,, | Performed by: NURSE PRACTITIONER

## 2023-02-24 PROCEDURE — 99204 PR OFFICE/OUTPT VISIT, NEW, LEVL IV, 45-59 MIN: ICD-10-PCS | Mod: 95,,, | Performed by: NURSE PRACTITIONER

## 2023-02-24 PROCEDURE — 1159F MED LIST DOCD IN RCRD: CPT | Mod: CPTII,95,, | Performed by: NURSE PRACTITIONER

## 2023-02-24 PROCEDURE — 99204 OFFICE O/P NEW MOD 45 MIN: CPT | Mod: 95,,, | Performed by: NURSE PRACTITIONER

## 2023-02-24 PROCEDURE — 3066F NEPHROPATHY DOC TX: CPT | Mod: CPTII,95,, | Performed by: NURSE PRACTITIONER

## 2023-02-24 PROCEDURE — 1160F RVW MEDS BY RX/DR IN RCRD: CPT | Mod: CPTII,95,, | Performed by: NURSE PRACTITIONER

## 2023-02-24 PROCEDURE — 3044F HG A1C LEVEL LT 7.0%: CPT | Mod: CPTII,95,, | Performed by: NURSE PRACTITIONER

## 2023-02-24 RX ORDER — PEN NEEDLE, DIABETIC 30 GX3/16"
NEEDLE, DISPOSABLE MISCELLANEOUS
Qty: 200 EACH | Refills: 5 | Status: SHIPPED | OUTPATIENT
Start: 2023-02-24

## 2023-02-24 RX ORDER — INSULIN ASPART 100 [IU]/ML
INJECTION, SOLUTION INTRAVENOUS; SUBCUTANEOUS
Qty: 15 ML | Refills: 5 | Status: SHIPPED | OUTPATIENT
Start: 2023-02-24

## 2023-02-24 NOTE — TELEPHONE ENCOUNTER
Patient was notified and informed. Pt verbalized understanding. No further questions/concerns at this time.     ----- Message from Anuja Mcmahan NP sent at 2/24/2023  3:53 PM CST -----  Please schedule patient a 6 week follow up visit with me, virtually. Let her know her Novolog was sent to [facility] [place] with pen needles and I'm sending instructions to her Mychart. If she wants the prescriptions to go to another pharmacy, let me know.

## 2023-02-24 NOTE — PATIENT INSTRUCTIONS
Labs when able - fasting, blood sugar needs to be under 225 when you have the labs drawn. I want to confirm whether we are dealing with type 1 or type 2.     -- Medications adjusted for today's visit include:    Decrease Levemir to 14 units.    Start Novolog/Humalog (whichever is preferred on your plan) per sliding scale as needed to correct a high blood sugar- do not use more often than every 4 hours.    150-200: 1 unit  201-250: 2 units  251-300: 3 units  301-350: 4 units  351-400: 5 units    Continue Jardiance and Metformin for now.    -- Limit carbs to no more than 30-45 grams with each meal. Never eat carbs by themselves, always add protein. Make snacks low carb or non-carb only.    -- Continue checking blood sugar 3 times daily: Fasting blood sugar and vary your next 2 readings: Before lunch, before supper, 2 hours after any meal, or bedtime.   -Blood sugar goals should be a fasting blood sugar between , and no blood sugars throughout the day over 180 is good, less than 160 better, less than 140 perfect.    --Carry glucose tablets/soft peppermints/regular juice or Coke product with you at all times to treat a low blood sugar episode (less than 70). If your blood sugar is between 50-70, Chew 4 tablets or drink 1/2 cup of juice or regular Coke product. If your blood sugar is below 50, double the treatment. Re-check blood sugar in 15 minutes. If still low, repeat this. Always call the clinic to give an update for any low blood sugar episodes.    --Exercise as tolerated: Goal 30 minutes/day, 5 days/week. Start slowly and increase as tolerated.    --Follow-up for your next visit in     --Please either drop off, fax, or MyChart your readings to me as needed.

## 2023-02-24 NOTE — Clinical Note
Please schedule patient a 6 week follow up visit with me, virtually. Let her know her Novolog was sent to [facility] [place] with pen needles and I'm sending instructions to her Mychart. If she wants the prescriptions to go to another pharmacy, let me know.

## 2023-02-24 NOTE — PROGRESS NOTES
The patient location is: [place]  The chief complaint leading to consultation is: diabetes management - establish care    Visit type: audiovisual    Face to Face time with patient: 20 minutes  45 minutes of total time spent on the encounter, which includes face to face time and non-face to face time preparing to see the patient (eg, review of tests), Obtaining and/or reviewing separately obtained history, Documenting clinical information in the electronic or other health record, Independently interpreting results (not separately reported) and communicating results to the patient/family/caregiver, or Care coordination (not separately reported).         Each patient to whom he or she provides medical services by telemedicine is:  (1) informed of the relationship between the physician and patient and the respective role of any other health care provider with respect to management of the patient; and (2) notified that he or she may decline to receive medical services by telemedicine and may withdraw from such care at any time.    Notes:      Subjective:         Patient ID: Jadyn Chance is a 52 y.o. female.  Patient's current PCP is Vikash Baig MD.     Chief Complaint: Diabetes Mellitus    HPI  Jadyn Chance is a 52 y.o. White female presenting for a new consult for diabetes. Patient has been diagnosed with diabetes for several years.  The patient was initially diagnosed with Type 2 diabetes mellitus based on the following criteria:   Received diabetes education: Yes-JERZY Matias     CURRENT DM MEDICATIONS:   Diabetes Medications               empagliflozin (JARDIANCE) 25 mg tablet Take 1 tablet (25 mg total) by mouth once daily.    insulin detemir U-100 (LEVEMIR FLEXTOUCH U-100 INSULN) 100 unit/mL (3 mL) InPn pen Inject 15 Units into the skin every evening.    metFORMIN (GLUCOPHAGE-XR) 500 MG ER 24hr tablet TAKE 2 TABLETS TWICE DAILY WITH MEALS    NOVOLOG FLEXPEN U-100 INSULIN 100  unit/mL (3 mL) InPn pen Inject 3 times per day per sliding scale - can use every 4 hours as needed to correct a high blood sugar. Starting today            Past failed treatment include: Actos, has taken Regular insulin in the past     Blood glucose testing Checking 2 times per day on average, [facility]- interested in CGM  Preferred lab: [facility]    Her blood sugar in the clinic today was:   Lab Results   Component Value Date    POCGLU 360 (A) 04/25/2018       Jadyn Chance presents today to discuss DM management.   Home blood sugar records: mg/dL    At age 17,pregnant with twins - diagnosed with gestational diabetes. Did not take anything for diabetes for years. Around 1994, started Metformin. ER in [place] started Jardiance- dose was increased from 10 to 25 mg - tolerating well from a  perspective.   Highest weight at 429 lbs. H/o gastric bypass January 2008. Has always been treated as a T2 diabetic but suspects she has had diabetes since childhood. Has never had formal lab testing. We discussed differences between T1 and T2 diabetes. She is agreeable to complete lab work.     She is interested in CGM - she would be a good candidate with significant glycemic variability and history of severe hypoglycemia in the past. She is starting MDI today.     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    Current diet:  Interested in learning to carb count  Activity Level:     Lab Results   Component Value Date    HGBA1C 6.3 (H) 01/18/2023    HGBA1C 7.5 (H) 08/22/2022    HGBA1C 8.6 (H) 04/19/2022       Any episodes of hypoglycemia? Yes-fasting  Hypoglycemia Unawareness? no   Severe hypoglycemia requiring 3rd party Yes-glucose of 38 in the past  Complications related to diabetes: none    STANDARDS OF CARE  Diabetes Management Status    Statin: Taking  ACE/ARB: Not taking    Screening or Prevention Patient's value Goal Complete/Controlled?   HgA1C Testing and Control   Lab Results    Component Value Date    HGBA1C 6.3 (H) 01/18/2023      Annually/Less than 8% Yes     Lipid profile : 01/18/2023 Annually Yes     LDL control Lab Results   Component Value Date    LDLCALC 41.8 (L) 01/18/2023    Annually/Less than 100 mg/dl  Yes     Nephropathy screening Lab Results   Component Value Date    LABMICR <5.0 01/18/2023     Lab Results   Component Value Date    PROTEINUA Negative 12/27/2022     No results found for: UTPCR   Annually Yes     Blood pressure BP Readings from Last 1 Encounters:   02/13/23 115/75    Less than 140/90 Yes     Dilated retinal exam : 08/22/2022 Annually Yes     Foot exam   : 01/20/2022 Annually No Deferred-video visit          Labs reviewed and are noted below.    Lab Results   Component Value Date    WBC 6.34 12/27/2022    HGB 9.7 (L) 12/27/2022    HCT 31.3 (L) 12/27/2022     12/27/2022    CHOL 155 01/18/2023    TRIG 236 (H) 01/18/2023    HDL 66 01/18/2023    LDLCALC 41.8 (L) 01/18/2023    ALT 80 (H) 01/18/2023    AST 57 (H) 01/18/2023     (L) 01/18/2023    K 4.2 01/18/2023     01/18/2023    ANIONGAP 12 01/18/2023    CREATININE 1.1 01/18/2023    ESTGFRAFRICA >60.0 01/13/2022    EGFRNONAA >60.0 01/13/2022    BUN 29 (H) 01/18/2023    CO2 22 (L) 01/18/2023    TSH 0.664 01/13/2022    INR 1.0 09/11/2020     (H) 01/18/2023     Lab Results   Component Value Date    TSH 0.664 01/13/2022    IRON 56 04/19/2022    TIBC 357 04/19/2022    FERRITIN 50 04/19/2022    AFIAZKWZ58 381 01/18/2023    PTH 71.0 09/10/2013    CALCIUM 9.3 01/18/2023    PHOS 4.3 04/01/2015     No results found for: CPEPTIDE  No results found for: GLUTAMICACID  Glucose   Date Value Ref Range Status   01/18/2023 279 (H) 70 - 110 mg/dL Final     Anion Gap   Date Value Ref Range Status   01/18/2023 12 8 - 16 mmol/L Final     eGFR if    Date Value Ref Range Status   01/13/2022 >60.0 >60 mL/min/1.73 m^2 Final     eGFR if non    Date Value Ref Range Status   01/13/2022  >60.0 >60 mL/min/1.73 m^2 Final     Comment:     Calculation used to obtain the estimated glomerular filtration  rate (eGFR) is the CKD-EPI equation.          The following portions of the patient's history were reviewed and updated as appropriate: allergies, current medications, past family history, past medical history, past social history, past surgical history, and problem list.    Review of patient's allergies indicates:   Allergen Reactions    Demerol [meperidine] Hallucinations    Lamictal [lamotrigine] Rash     Rash to face in chart 2014 or 15     Penicillins Other (See Comments)     Patient cannot recall specific reaction, reports she has been listing this as an allergy since childhood.    Bactrim [sulfamethoxazole-trimethoprim]     Ciprofloxacin (bulk)     Codeine Nausea And Vomiting    Levetiracetam     Toradol [ketorolac]     Tramadol      seizures    Morpholine analogues Diarrhea, Itching and Nausea And Vomiting     Social History     Socioeconomic History    Marital status: Significant Other    Number of children: 4   Tobacco Use    Smoking status: Never    Smokeless tobacco: Never   Substance and Sexual Activity    Alcohol use: No    Drug use: No    Sexual activity: Not Currently     Partners: Male     Birth control/protection: Surgical     Comment: BTL; mut monog   Social History Narrative    ** Merged History Encounter **         Lives in [place]     Social Determinants of Health     Financial Resource Strain: Medium Risk    Difficulty of Paying Living Expenses: Somewhat hard   Food Insecurity: Unknown    Worried About Running Out of Food in the Last Year: Patient refused    Ran Out of Food in the Last Year: Never true   Transportation Needs: Unmet Transportation Needs    Lack of Transportation (Medical): Yes    Lack of Transportation (Non-Medical): Yes   Physical Activity: Inactive    Days of Exercise per Week: 0 days    Minutes of Exercise per Session: 0 min   Social Connections: Unknown     Frequency of Communication with Friends and Family: Once a week    Frequency of Social Gatherings with Friends and Family: Never    Active Member of Clubs or Organizations: No    Attends Club or Organization Meetings: 1 to 4 times per year    Marital Status:    Housing Stability: High Risk    Unable to Pay for Housing in the Last Year: Yes    Number of Places Lived in the Last Year: 1    Unstable Housing in the Last Year: No     Past Medical History:   Diagnosis Date    Abnormal Pap smear 1991    bx was negative, per pt    Anemia     Anxiety     B12 deficiency     Blood transfusion     multiple     Chronic LBP     Degenerative disc disease     l spine    Diabetes mellitus     Diabetic neuropathy     General anesthetics causing adverse effect in therapeutic use     delayed emergence    Mixed hyperlipidemia 11/18/2013    PONV (postoperative nausea and vomiting)     RLS (restless legs syndrome)     Seizures     Sleep apnea     with CPAP    Vitamin D deficiency disease        REVIEW OF SYSTEMS:  Cardiovascular: History of HLD.  GI: Denies nausea,vomiting,constipation,or diarrhea.  : No CKD.  Neuro: Positive neuropathy.  PSYCH: No tobacco use.  ENDO: See HPI.        Objective:      There were no vitals filed for this visit.  RESPIRATORY: No respiratory distress  NEUROLOGIC: not assessed-virtual visit   PSYCHIATRIC: Alert & oriented x3. Normal mood and affect.  FOOT EXAMINATION: Deferred- video visit   Assessment:       1. Uncontrolled type 2 diabetes mellitus with hyperglycemia, with long-term current use of insulin    2. Hyperlipidemia associated with type 2 diabetes mellitus        Plan:   Uncontrolled type 2 diabetes mellitus with hyperglycemia, with long-term current use of insulin  -     Basic Metabolic Panel; Future; Expected date: 02/24/2023  -     Glutamic Acid Decarboxylase; Future; Expected date: 02/24/2023  -     Insulin Antibody; Future; Expected date: 02/24/2023  -     C-Peptide; Future; Expected  "date: 02/24/2023  -     NOVOLOG FLEXPEN U-100 INSULIN 100 unit/mL (3 mL) InPn pen; Inject 3 times per day per sliding scale - can use every 4 hours as needed to correct a high blood sugar.  Dispense: 15 mL; Refill: 5  -     pen needle, diabetic (DROPLET PEN NEEDLE) 32 gauge x 5/32" Ndle; Use with insulin 4 times daily as directed  Dispense: 200 each; Refill: 5    Hyperlipidemia associated with type 2 diabetes mellitus    Type 2 diabetes mellitus without complication, with long-term current use of insulin        - Follow up: 6 weeks    Portions of this note may have been created with voice recognition software. Occasional "wrong-word" or "sound-a-like" substitutions may have occurred due to the inherent limitations of voice recognition software. Please, read the note carefully and recognize, using context, where substitutions have occurred.             VINAY Mclaughlin-C [facility]     "

## 2023-02-27 NOTE — TELEPHONE ENCOUNTER
Called pt to let her know Dexcom G7 has been ordered and to be expecting a call form a 800 number over the next couple of days to set up shipping.

## 2023-02-27 NOTE — TELEPHONE ENCOUNTER
Heber Farooq G[facility]

## 2023-03-13 ENCOUNTER — OFFICE VISIT (OUTPATIENT)
Dept: NEUROLOGY | Facility: CLINIC | Age: 53
End: 2023-03-13
Payer: MEDICARE

## 2023-03-13 VITALS
SYSTOLIC BLOOD PRESSURE: 125 MMHG | WEIGHT: 142.19 LBS | HEART RATE: 99 BPM | RESPIRATION RATE: 16 BRPM | DIASTOLIC BLOOD PRESSURE: 73 MMHG | HEIGHT: 63 IN | BODY MASS INDEX: 25.2 KG/M2

## 2023-03-13 DIAGNOSIS — G44.86 CERVICOGENIC HEADACHE: ICD-10-CM

## 2023-03-13 DIAGNOSIS — R51.9 CHRONIC INTRACTABLE HEADACHE, UNSPECIFIED HEADACHE TYPE: Primary | ICD-10-CM

## 2023-03-13 DIAGNOSIS — G89.29 CHRONIC INTRACTABLE HEADACHE, UNSPECIFIED HEADACHE TYPE: Primary | ICD-10-CM

## 2023-03-13 PROCEDURE — 3078F DIAST BP <80 MM HG: CPT | Mod: HCNC,CPTII,S$GLB, | Performed by: PSYCHIATRY & NEUROLOGY

## 2023-03-13 PROCEDURE — 3044F PR MOST RECENT HEMOGLOBIN A1C LEVEL <7.0%: ICD-10-PCS | Mod: HCNC,CPTII,S$GLB, | Performed by: PSYCHIATRY & NEUROLOGY

## 2023-03-13 PROCEDURE — 3072F LOW RISK FOR RETINOPATHY: CPT | Mod: HCNC,CPTII,S$GLB, | Performed by: PSYCHIATRY & NEUROLOGY

## 2023-03-13 PROCEDURE — 3044F HG A1C LEVEL LT 7.0%: CPT | Mod: HCNC,CPTII,S$GLB, | Performed by: PSYCHIATRY & NEUROLOGY

## 2023-03-13 PROCEDURE — 3066F PR DOCUMENTATION OF TREATMENT FOR NEPHROPATHY: ICD-10-PCS | Mod: HCNC,CPTII,S$GLB, | Performed by: PSYCHIATRY & NEUROLOGY

## 2023-03-13 PROCEDURE — 1159F MED LIST DOCD IN RCRD: CPT | Mod: HCNC,CPTII,S$GLB, | Performed by: PSYCHIATRY & NEUROLOGY

## 2023-03-13 PROCEDURE — 3074F PR MOST RECENT SYSTOLIC BLOOD PRESSURE < 130 MM HG: ICD-10-PCS | Mod: HCNC,CPTII,S$GLB, | Performed by: PSYCHIATRY & NEUROLOGY

## 2023-03-13 PROCEDURE — 3078F PR MOST RECENT DIASTOLIC BLOOD PRESSURE < 80 MM HG: ICD-10-PCS | Mod: HCNC,CPTII,S$GLB, | Performed by: PSYCHIATRY & NEUROLOGY

## 2023-03-13 PROCEDURE — 99999 PR PBB SHADOW E&M-EST. PATIENT-LVL V: ICD-10-PCS | Mod: PBBFAC,HCNC,, | Performed by: PSYCHIATRY & NEUROLOGY

## 2023-03-13 PROCEDURE — 99213 OFFICE O/P EST LOW 20 MIN: CPT | Mod: HCNC,S$GLB,, | Performed by: PSYCHIATRY & NEUROLOGY

## 2023-03-13 PROCEDURE — 1159F PR MEDICATION LIST DOCUMENTED IN MEDICAL RECORD: ICD-10-PCS | Mod: HCNC,CPTII,S$GLB, | Performed by: PSYCHIATRY & NEUROLOGY

## 2023-03-13 PROCEDURE — 3066F NEPHROPATHY DOC TX: CPT | Mod: HCNC,CPTII,S$GLB, | Performed by: PSYCHIATRY & NEUROLOGY

## 2023-03-13 PROCEDURE — 1160F RVW MEDS BY RX/DR IN RCRD: CPT | Mod: HCNC,CPTII,S$GLB, | Performed by: PSYCHIATRY & NEUROLOGY

## 2023-03-13 PROCEDURE — 99999 PR PBB SHADOW E&M-EST. PATIENT-LVL V: CPT | Mod: PBBFAC,HCNC,, | Performed by: PSYCHIATRY & NEUROLOGY

## 2023-03-13 PROCEDURE — 1160F PR REVIEW ALL MEDS BY PRESCRIBER/CLIN PHARMACIST DOCUMENTED: ICD-10-PCS | Mod: HCNC,CPTII,S$GLB, | Performed by: PSYCHIATRY & NEUROLOGY

## 2023-03-13 PROCEDURE — 3008F BODY MASS INDEX DOCD: CPT | Mod: HCNC,CPTII,S$GLB, | Performed by: PSYCHIATRY & NEUROLOGY

## 2023-03-13 PROCEDURE — 3072F PR LOW RISK FOR RETINOPATHY: ICD-10-PCS | Mod: HCNC,CPTII,S$GLB, | Performed by: PSYCHIATRY & NEUROLOGY

## 2023-03-13 PROCEDURE — 3008F PR BODY MASS INDEX (BMI) DOCUMENTED: ICD-10-PCS | Mod: HCNC,CPTII,S$GLB, | Performed by: PSYCHIATRY & NEUROLOGY

## 2023-03-13 PROCEDURE — 3074F SYST BP LT 130 MM HG: CPT | Mod: HCNC,CPTII,S$GLB, | Performed by: PSYCHIATRY & NEUROLOGY

## 2023-03-13 PROCEDURE — 3061F NEG MICROALBUMINURIA REV: CPT | Mod: HCNC,CPTII,S$GLB, | Performed by: PSYCHIATRY & NEUROLOGY

## 2023-03-13 PROCEDURE — 99213 PR OFFICE/OUTPT VISIT, EST, LEVL III, 20-29 MIN: ICD-10-PCS | Mod: HCNC,S$GLB,, | Performed by: PSYCHIATRY & NEUROLOGY

## 2023-03-13 PROCEDURE — 3061F PR NEG MICROALBUMINURIA RESULT DOCUMENTED/REVIEW: ICD-10-PCS | Mod: HCNC,CPTII,S$GLB, | Performed by: PSYCHIATRY & NEUROLOGY

## 2023-03-13 NOTE — PATIENT INSTRUCTIONS
Continue below medication that was prescribed by Dr. Laboy for headache     --Topamax 50 mg daily  -- Breakthrough headache - naratriptan (Not required for last few months considering no HA)    Now off:  --Xanax 1 mg at night    --Abilify 2 mg daily    --Cymbalta 30 mg TID     Prescribed by PCP and pain management   --Weaning Gabapentin  --Flexeril 10 mg PRN   --Oxycodone PRN   --Phenergan PRN     -Continue CPAP as recommended   -Recommend seeing her pain management/PCP and address polypharmacy. (She thinks she is overmedicated)  -Refrain from over counter pain medications. Discussed medication overuse headache.   -Patient education. Discussed prevention and treatment of migraine headaches. Discussed sleep hygiene, healthy diet, importance of minimizing caffeine intake to a maximum of 100-200 mg /day, importance of avoiding foods with MSG, Nutrasweet, and Aspartame.     --Follow up in neurology clinic with me in 3-4 months

## 2023-03-13 NOTE — PROGRESS NOTES
Patient Name: Jadyn Chance  MRN: [id number]    CC: Headache     Interval History: (3/13/2023)  Headache is resolved. No HA for months. [facility] is addressed and she is feeling better. Reported no new symptoms. She denies any dizziness, double vision, blurry vision, trouble talking, numbness, focal weakness, or loss of consciousness.       Interval History: (8/22/2022)  She is seen by Dr. Laboy for headache in February 2022.  For detailed history please see below note.  Today in my clinic she reported that she fell last week and had small bruise on her right side of the skull.  She said that she woke up in the morning and just gave up on her legs.  With this fall she denied any dizziness, blurry vision, double vision, palpitation, sweating, chest pain, shortness of breath, trouble finding words, or loss of consciousness.  She is completely aware of this fall and did not lose her consciousness.  She said that her headache has greatly improved.  Initially the headache severity was in between 7-10.  Today she reported 2-3/10.  She did not reported any change in her headaches as stated in  history from January 2022.  She also reported that her headache gets much better after sleep.  She denies any nausea or vomiting.  She denies any photophobia phonophobia.  She is seen by a pain management and her primary care and made some changes in her existing medications.  MRI brain and MRV is negative for any acute intracranial pathology. She denies any dizziness, double vision, blurry vision, trouble talking, numbness, focal weakness, or loss of consciousness.    HPI: from 02/22/2022:     Headache history:    Age of onset -  Nov 2021, after a possible fall. Pt not clear   Location - bitemple, biocciptal  Nature of pain -  Throbbing   Prodrome - no   Aura -  No   Duration of headache - > 4 hrs   Time to peak intensity - n/a   Pain scale - range of intensity -  7-10/10  Frequency -  Daily   Status Migrainosus  history - no   Time of day of most headaches- anytime      Associated symptoms with the headache:   Blurred vision   Dizziness   Nausea/vomitting     Cluster headache symptoms: none   Symptoms of increased intracranial pressure:   Whooshing sounds - yes   Visual spots/blotches  - yes      Headache Triggers: unknown      Other comorbid conditions:  Anxiety - yes   Motion sickness symptom - yes      Treatment history:  Resolution of headache with sleep - yes   Meds tried:  Takes oxycodone and xanax 3 times a day   Doxepin, Gabapentin, Cymbalta - not help   Not using CPAP   fioricet - not help   imitrex - flushing   ONB - helped 1 week      Headache risk factors:   H/o TBI  - no   H/o Meningitis  - no   F/h Aneurysms  - no     Headache burden:   In the last three months:  Days missed from work = 0  Days unable to perform activities of daily living = 0  Days unable to attend social activities = 0     ROS:   Review of Systems   Constitutional: Negative for malaise/fatigue. Negative for weight loss.   HENT: Negative for hearing loss.   Eyes: Negative for blurred vision and double vision.   Respiratory: Negative for shortness of breath and stridor.   Cardiovascular: Negative for chest pain and palpitations.   Gastrointestinal: Negative for nausea, vomiting and constipation.   Genitourinary: Negative for frequency. Negative for urgency.   Musculoskeletal: Negative for joint pain. Negative for myalgias and falls.   Skin: Negative for rash.    Neurological: Negative for dizziness and tremors. Negative for focal weakness and seizures.   Endo/Heme/Allergies: Does not bruise/bleed easily.   Psychiatric/Behavioral: Negative for memory loss. Negative for depression and hallucinations. The patient is not nervous/anxious.    Past Medical History  Past Medical History:   Diagnosis Date    Abnormal Pap smear 1991    bx was negative, per pt    Anemia     Anxiety     B12 deficiency     Blood transfusion     multiple     Chronic LBP      Degenerative disc disease     l spine    Diabetes mellitus     Diabetic neuropathy     General anesthetics causing adverse effect in therapeutic use     delayed emergence    Mixed hyperlipidemia 11/18/2013    PONV (postoperative nausea and vomiting)     RLS (restless legs syndrome)     Seizures     Sleep apnea     with CPAP    Vitamin D deficiency disease      Medications    Current Outpatient Medications:     BD ALCOHOL SWABS PadM, Apply topically 3 (three) times daily., Disp: , Rfl:     blood sugar diagnostic Strp, To check BG 3 times daily, to use with insurance preferred meter    [id number], Disp: 200 strip, Rfl: 3    blood-glucose meter Misc, Use to check blood glucose 3 times daily (Patient taking differently: Use to check blood glucose 3 times daily), Disp: 1 each, Rfl: 0    clindamycin (CLEOCIN) 300 MG capsule, Take 1 capsule (300 mg total) by mouth every 6 (six) hours., Disp: 21 capsule, Rfl: 0    cyanocobalamin 1,000 mcg/mL injection, Inject 1 mL (1,000 mcg total) into the skin every 28 days., Disp: 10 mL, Rfl: 11    cyclobenzaprine (FLEXERIL) 10 MG tablet, TAKE 1 TABLET THREE TIMES DAILY AS NEEDED, Disp: 90 tablet, Rfl: 5    doxepin (SINEQUAN) 50 MG capsule, Take 1 capsule by mouth at bedtime as needed for insomnia. If 1 pill is not effective, increase the dose to 2 pills., Disp: 60 capsule, Rfl: 11    empagliflozin (JARDIANCE) 25 mg tablet, Take 1 tablet (25 mg total) by mouth once daily., Disp: 90 tablet, Rfl: 1    ergocalciferol (VITAMIN D2) 50,000 unit Cap, Take 1 capsule (50,000 Units total) by mouth 3 (three) times a week., Disp: 36 capsule, Rfl: 3    gabapentin (NEURONTIN) 300 MG capsule, TAKE 2 CAPSULES THREE TIMES DAILY, Disp: 540 capsule, Rfl: 1    insulin detemir U-100 (LEVEMIR FLEXTOUCH U-100 INSULN) 100 unit/mL (3 mL) InPn pen, Inject 15 Units into the skin every evening., Disp: 15 mL, Rfl: 2    lancets 30 gauge Misc, To check blood glucose 3 times daily (Patient taking differently: To check  "blood glucose 3 times daily), Disp: 200 each, Rfl: 3    lemborexant (DAYVIGO) 10 mg Tab, Take 1 tablet by mouth nightly as needed for insomnia, Disp: 30 tablet, Rfl: 5    metFORMIN (GLUCOPHAGE-XR) 500 MG ER 24hr tablet, TAKE 2 TABLETS TWICE DAILY WITH MEALS, Disp: 360 tablet, Rfl: 1    naloxone (NARCAN) 4 mg/actuation Spry, Use 1 spray into one nostril as directed as needed. Another spray may be given into the other nostril in 2 to 3 minutes until the patient responds. Use only in emergency if person is unconscious. Call 911, Disp: 2 each, Rfl: 0    naratriptan (AMERGE) 2.5 MG tablet, TAKE 1 TABLET (2.5 MG) AT ONSET OF HEADACHE, MAY REPEAT IN 4 HOURS IF NEEDED, Disp: 9 tablet, Rfl: 1    nitrofurantoin, macrocrystal-monohydrate, (MACROBID) 100 MG capsule, take one capsule by mouth twice a day for 5 days; must administer with a meal/food, Disp: 10 capsule, Rfl: 0    NOVOLOG FLEXPEN U-100 INSULIN 100 unit/mL (3 mL) InPn pen, Inject 3 times per day per sliding scale - can use every 4 hours as needed to correct a high blood sugar., Disp: 15 mL, Rfl: 5    ondansetron (ZOFRAN) 4 MG tablet, TAKE 1 TABLET EVERY 6 HOURS, Disp: 240 tablet, Rfl: 0    oxyCODONE-acetaminophen (PERCOCET)  mg per tablet, Take 1 tablet by mouth 2 (two) times daily as needed for pain., Disp: 60 tablet, Rfl: 0    oxyCODONE-acetaminophen (PERCOCET)  mg per tablet, Take 1 tablet by mouth twice a day as needed for pain, Disp: 60 tablet, Rfl: 0    pen needle, diabetic (DROPLET PEN NEEDLE) 32 gauge x 5/32" Ndle, Use with insulin 4 times daily as directed, Disp: 200 each, Rfl: 5    pravastatin (PRAVACHOL) 20 MG tablet, Take 1 tablet (20 mg total) by mouth once daily., Disp: 90 tablet, Rfl: 3    promethazine (PHENERGAN) 25 MG tablet, Take 1 tablet (25 mg total) by mouth 2 (two) times daily as needed for Nausea., Disp: 40 tablet, Rfl: 6    syringe with needle (SYRINGE 3CC/25GX1") 3 mL 25 gauge x 1" Syrg, For vitamin B12 injection, Disp: 100 " Syringe, Rfl: 1    tamsulosin (FLOMAX) 0.4 mg Cap, Take 1 capsule (0.4 mg total) by mouth once daily., Disp: 30 capsule, Rfl: 11    Allergies  Review of patient's allergies indicates:   Allergen Reactions    Demerol [meperidine] Hallucinations    Lamictal [lamotrigine] Rash     Rash to face in chart 2014 or 15     Penicillins Other (See Comments)     Patient cannot recall specific reaction, reports she has been listing this as an allergy since childhood.    Bactrim [sulfamethoxazole-trimethoprim]     Ciprofloxacin (bulk)     Codeine Nausea And Vomiting    Levetiracetam     Toradol [ketorolac]     Tramadol      seizures    Morpholine analogues Diarrhea, Itching and Nausea And Vomiting       Social History  Social History     Socioeconomic History    Marital status: Significant Other    Number of children: 4   Tobacco Use    Smoking status: Never    Smokeless tobacco: Never   Substance and Sexual Activity    Alcohol use: No    Drug use: No    Sexual activity: Not Currently     Partners: Male     Birth control/protection: Surgical     Comment: BTL; mut monog   Social History Narrative    ** Merged History Encounter **         Lives in [place]     Social Determinants of Health     Financial Resource Strain: Medium Risk    Difficulty of Paying Living Expenses: Somewhat hard   Food Insecurity: Unknown    Worried About Running Out of Food in the Last Year: Patient refused    Ran Out of Food in the Last Year: Never true   Transportation Needs: Unmet Transportation Needs    Lack of Transportation (Medical): Yes    Lack of Transportation (Non-Medical): Yes   Physical Activity: Inactive    Days of Exercise per Week: 0 days    Minutes of Exercise per Session: 0 min   Social Connections: Unknown    Frequency of Communication with Friends and Family: Once a week    Frequency of Social Gatherings with Friends and Family: Never    Active Member of Clubs or Organizations: No    Attends Club or Organization Meetings: 1 to 4  "times per year    Marital Status:    Housing Stability: High Risk    Unable to Pay for Housing in the Last Year: Yes    Number of Places Lived in the Last Year: 1    Unstable Housing in the Last Year: No       Family History  Family History   Problem Relation Age of Onset    Diabetes Mother     Cataracts Mother     Glaucoma Maternal Aunt     Blindness Maternal Aunt     Breast cancer Neg Hx     Colon cancer Neg Hx     Thrombophilia Neg Hx      Vitals:    03/13/23 1058   BP: 125/73   Pulse: 99   Resp: 16     Physical Exam  /73   Pulse 99   Resp 16   Ht 5' 3" (1.6 m)   Wt 64.5 kg (142 lb 3.2 oz)   LMP 11/01/2014 (Approximate)   BMI 25.19 kg/m²     General appearance: Well-developed, well-groomed.     Neurologic Exam: The patient is awake, alert and oriented. Language is fluent. Recent and remote memory are normal. Attention span and concentration are normal. Fund of knowledge is appropriate.     Cranial nerves: pupils are round and reactive to light and accommodation. Visual fields are full to confrontation. Ocular motility is full in all cardinal positions of gaze. Facial sensation is normal to pinprick and light touch. Corneal reflexes are present bilaterally. Facial activation is symmetric. Hearing is normal bilaterally. Palate elevates symmetrically and gag reflex is intact bilaterally. Shoulder elevation is symmetric and full strength bilaterally. Tongue is midline and neck rotation strength is normal bilaterally. Neck range of motion is normal.     Motor examination of all extremities demonstrates normal bulk and tone in all four limbs. There are no atrophy or fasciculations. Strength is 5/5 in the upper and lower extremities bilaterally without pronator drift.     Sensory examination is normal to pinprick, vibration and proprioception in the upper and lower extremities bilaterally.     Deep tendon reflexes are 2+ and symmetric in the upper and lower extremities bilaterally. Toes are mute " bilaterally.     Gait: Normal gait.    Coordination: Finger to nose and heel to shin testing is normal in both upper and lower extremities. Rapid alternating movements are normal in both upper and lower extremities.     General exam  Cardiovascular: regular rate and rhythm with no murmurs, rubs or gallops. There are no carotid or vertebral artery bruits. Pulses in both upper and lower extremities are symmetric. There is no peripheral edema.   Head and neck: no cervical lymphadenopathy    Lab and Test Results    WBC   Date Value Ref Range Status   12/27/2022 6.34 3.90 - 12.70 K/uL Final   12/22/2022 7.56 3.90 - 12.70 K/uL Final   11/19/2022 8.56 3.90 - 12.70 K/uL Final     Hemoglobin   Date Value Ref Range Status   12/27/2022 9.7 (L) 12.0 - 16.0 g/dL Final   12/22/2022 10.7 (L) 12.0 - 16.0 g/dL Final   11/19/2022 13.1 12.0 - 16.0 g/dL Final     Hematocrit   Date Value Ref Range Status   12/27/2022 31.3 (L) 37.0 - 48.5 % Final   12/22/2022 32.5 (L) 37.0 - 48.5 % Final   11/19/2022 39.0 37.0 - 48.5 % Final     Platelets   Date Value Ref Range Status   12/27/2022 250 150 - 450 K/uL Final   12/22/2022 244 150 - 450 K/uL Final   11/19/2022 268 150 - 450 K/uL Final     Glucose   Date Value Ref Range Status   01/18/2023 279 (H) 70 - 110 mg/dL Final   12/27/2022 459 (HH) 70 - 110 mg/dL Final     Comment:     GLU  critical result(s) called and verbal readback obtained from   GLENN CRABTREE RN by MS8 12/27/2022 14:41     12/22/2022 204 (H) 70 - 110 mg/dL Final     Sodium   Date Value Ref Range Status   01/18/2023 135 (L) 136 - 145 mmol/L Final   12/27/2022 138 136 - 145 mmol/L Final   12/22/2022 139 136 - 145 mmol/L Final     Potassium   Date Value Ref Range Status   01/18/2023 4.2 3.5 - 5.1 mmol/L Final   12/27/2022 4.0 3.5 - 5.1 mmol/L Final   12/22/2022 4.2 3.5 - 5.1 mmol/L Final     Chloride   Date Value Ref Range Status   01/18/2023 101 95 - 110 mmol/L Final   12/27/2022 103 95 - 110 mmol/L Final   12/22/2022 105 95 - 110  mmol/L Final     CO2   Date Value Ref Range Status   01/18/2023 22 (L) 23 - 29 mmol/L Final   12/27/2022 19 (L) 23 - 29 mmol/L Final   12/22/2022 20 (L) 23 - 29 mmol/L Final     BUN   Date Value Ref Range Status   01/18/2023 29 (H) 6 - 20 mg/dL Final   12/27/2022 14 6 - 20 mg/dL Final   12/22/2022 18 6 - 20 mg/dL Final     Creatinine   Date Value Ref Range Status   01/18/2023 1.1 0.5 - 1.4 mg/dL Final   12/27/2022 0.9 0.5 - 1.4 mg/dL Final   12/22/2022 0.7 0.5 - 1.4 mg/dL Final     Calcium   Date Value Ref Range Status   01/18/2023 9.3 8.7 - 10.5 mg/dL Final   12/27/2022 8.4 (L) 8.7 - 10.5 mg/dL Final   12/22/2022 8.9 8.7 - 10.5 mg/dL Final     Magnesium   Date Value Ref Range Status   07/17/2017 1.4 (L) 1.6 - 2.6 mg/dL Final   04/01/2015 1.1 (L) 1.6 - 2.6 mg/dL Final   03/31/2015 1.2 (L) 1.6 - 2.6 mg/dL Final     Phosphorus   Date Value Ref Range Status   04/01/2015 4.3 2.7 - 4.5 mg/dL Final   03/31/2015 4.0 2.7 - 4.5 mg/dL Final   03/30/2015 3.8 2.7 - 4.5 mg/dL Final     Alkaline Phosphatase   Date Value Ref Range Status   01/18/2023 114 55 - 135 U/L Final   12/27/2022 88 55 - 135 U/L Final   12/22/2022 89 55 - 135 U/L Final     ALT   Date Value Ref Range Status   01/18/2023 80 (H) 10 - 44 U/L Final   12/27/2022 47 (H) 10 - 44 U/L Final   12/22/2022 50 (H) 10 - 44 U/L Final     AST   Date Value Ref Range Status   01/18/2023 57 (H) 10 - 40 U/L Final   12/27/2022 30 10 - 40 U/L Final   12/22/2022 30 10 - 40 U/L Final     Images:     MRI and MRV Brain:  Impression:     1.  Unremarkable noncontrast brain MRI.  2.  Dural venous sinuses are patent.  Asymmetrically smaller left transverse sinus compared to the right, a nonspecific finding which may be seen as a normal variant.    MRI C Spine:  Impression:     Mild degenerative disc disease at C6-C7 and C7-T1 with posterior disc protrusions resulting in mild spinal canal stenosis at these levels.  No cervical spine neural foraminal stenosis.    Assessment and  Plan    Jadyn Chance is a 52 y.o. female presented to my clinic for the evaluation of headache. She is Dr Laboy patient and seen by him in February 2022.  He prescribed Topamax and naratriptan for breakthrough headaches.  He also ordered MRI brain and MRA brain during his last visit which showed no acute intracranial pathology.  Today she reported great improvement in her headaches.She did not reported any change in her headaches as stated in  history from January 2022.  I think her headache etiology is multifactorial which includes possible occipital neuralgia, facet joint disease of cervical region and medication overuse headache.  She is on bunch of medications and she thinks she is overmedicated.  I think one of the problem that her primary care and pain management has to address is polypharmacy.  I had a long discussion with her regarding her headache and medications.  After long discussion we decided not to change any of her medication considering her headaches are much improved.  I think the best next step is to visit her primary care and pain management and address the issue of polypharmacy.    3/13/2023:    Headache is resolved. No HA for months. [facility] is addressed and she is feeling better. Reported no new symptoms.    Continue below medication that was prescribed by Dr. Laboy for headache     --Topamax 50 mg daily  -- Breakthrough headache - naratriptan (Not required for last few months considering no HA)    Now off:  --Xanax 1 mg at night    --Abilify 2 mg daily    --Cymbalta 30 mg TID     Prescribed by PCP and pain management   --Weaning Gabapentin  --Flexeril 10 mg PRN   --Oxycodone PRN   --Phenergan PRN     -Continue CPAP as recommended   -Recommend seeing her pain management/PCP and address polypharmacy. (She thinks she is overmedicated)  -Refrain from over counter pain medications. Discussed medication overuse headache.   -Patient education. Discussed prevention and  treatment of migraine headaches. Discussed sleep hygiene, healthy diet, importance of minimizing caffeine intake to a maximum of 100-200 mg /day, importance of avoiding foods with MSG, Nutrasweet, and Aspartame.     --Follow up in neurology clinic with me in 3-4 months     Jamie Mo MD  Neurology  [facility]

## 2023-03-23 DIAGNOSIS — G47.00 INSOMNIA, UNSPECIFIED TYPE: ICD-10-CM

## 2023-03-23 RX ORDER — DOXEPIN HYDROCHLORIDE 50 MG/1
CAPSULE ORAL
Qty: 60 CAPSULE | Refills: 11 | Status: SHIPPED | OUTPATIENT
Start: 2023-03-23 | End: 2023-09-25 | Stop reason: SDUPTHER

## 2023-03-26 ENCOUNTER — NURSE TRIAGE (OUTPATIENT)
Dept: ADMINISTRATIVE | Facility: CLINIC | Age: 53
End: 2023-03-26
Payer: MEDICARE

## 2023-03-27 RX ORDER — POLYETHYLENE GLYCOL 3350, SODIUM SULFATE ANHYDROUS, SODIUM BICARBONATE, SODIUM CHLORIDE, POTASSIUM CHLORIDE 236; 22.74; 6.74; 5.86; 2.97 G/4L; G/4L; G/4L; G/4L; G/4L
4 POWDER, FOR SOLUTION ORAL ONCE
Qty: 4000 ML | Refills: 0 | Status: SHIPPED | OUTPATIENT
Start: 2023-03-27 | End: 2023-03-27

## 2023-03-27 NOTE — TELEPHONE ENCOUNTER
Scheduled for Colonoscopy in AM.   Has completed x4 ducolax and half of the golytly prep but has only had 1 BM, which is nowhere near clear, per pt. Has also taken an extra dose of dulcolax (x4 tabs).   Call out to MD Vargas for recommendation.   Advised to call in AM and either reschedule for a 2 day prep. Can also continue to monitor this evening to see if BMs increase and become clear, at which time she may  complete prep in AM and report as normal. Pt advised of both options and VU.     Reason for Disposition   [1] Caller has URGENT question or concern AND [2] triager unable to answer question    Additional Information   Negative: Shock suspected (e.g., cold/pale/clammy skin, too weak to stand, low BP, rapid pulse)   Negative: Difficult to awaken or acting confused (e.g., disoriented, slurred speech)   Negative: Passed out (i.e., lost consciousness, collapsed and was not responding)   Negative: Sounds like a life-threatening emergency to the triager   Negative: SEVERE abdomen pain (e.g., excruciating)   Negative: SEVERE rectal bleeding (large blood clots; on and off, or constant bleeding)   Negative: SEVERE dizziness (e.g., unable to stand, requires support to walk, feels like passing out now)   Negative: SEVERE vomiting (e.g., 6 or more times/day)   Negative: [1] MODERATE rectal bleeding (small blood clots, passing blood without stool, or toilet water turns red) AND [2] more than once   Negative: [1] MILD-MODERATE abdomen pain AND [2] constant AND [3] present > 2 hours   Negative: [1] Drinking very little AND [2] dehydration suspected (e.g., no urine > 12 hours, very dry mouth, very lightheaded)   Negative: Patient sounds very sick or weak to the triager   Negative: Fever > 100.4 F (38.0 C)   Negative: [1] Abdominal bloating, cramping, nausea, or vomiting while drinking bowel prep AND [2] CANNOT finish bowel prep AND [3] has tried recommended Care Advice    Protocols used: Colonoscopy Symptoms and  [id number]

## 2023-04-14 ENCOUNTER — PATIENT MESSAGE (OUTPATIENT)
Dept: SLEEP MEDICINE | Facility: CLINIC | Age: 53
End: 2023-04-14
Payer: MEDICARE

## 2023-04-14 DIAGNOSIS — G47.00 INSOMNIA, UNSPECIFIED TYPE: ICD-10-CM

## 2023-04-14 RX ORDER — LEMBOREXANT 10 MG/1
TABLET, FILM COATED ORAL
Qty: 30 TABLET | Refills: 5 | Status: SHIPPED | OUTPATIENT
Start: 2023-04-14 | End: 2023-09-15 | Stop reason: SDUPTHER

## 2023-04-18 ENCOUNTER — OFFICE VISIT (OUTPATIENT)
Dept: DIABETES | Facility: CLINIC | Age: 53
End: 2023-04-18
Payer: MEDICARE

## 2023-04-18 DIAGNOSIS — E11.69 HYPERLIPIDEMIA ASSOCIATED WITH TYPE 2 DIABETES MELLITUS: ICD-10-CM

## 2023-04-18 DIAGNOSIS — Z79.4 UNCONTROLLED TYPE 2 DIABETES MELLITUS WITH HYPERGLYCEMIA, WITH LONG-TERM CURRENT USE OF INSULIN: Primary | ICD-10-CM

## 2023-04-18 DIAGNOSIS — E11.65 UNCONTROLLED TYPE 2 DIABETES MELLITUS WITH HYPERGLYCEMIA, WITH LONG-TERM CURRENT USE OF INSULIN: Primary | ICD-10-CM

## 2023-04-18 DIAGNOSIS — E78.5 HYPERLIPIDEMIA ASSOCIATED WITH TYPE 2 DIABETES MELLITUS: ICD-10-CM

## 2023-04-18 PROCEDURE — 1160F RVW MEDS BY RX/DR IN RCRD: CPT | Mod: HCNC,CPTII,95, | Performed by: NURSE PRACTITIONER

## 2023-04-18 PROCEDURE — 1159F MED LIST DOCD IN RCRD: CPT | Mod: HCNC,CPTII,95, | Performed by: NURSE PRACTITIONER

## 2023-04-18 PROCEDURE — 3044F PR MOST RECENT HEMOGLOBIN A1C LEVEL <7.0%: ICD-10-PCS | Mod: HCNC,CPTII,95, | Performed by: NURSE PRACTITIONER

## 2023-04-18 PROCEDURE — 3061F PR NEG MICROALBUMINURIA RESULT DOCUMENTED/REVIEW: ICD-10-PCS | Mod: HCNC,CPTII,95, | Performed by: NURSE PRACTITIONER

## 2023-04-18 PROCEDURE — 1160F PR REVIEW ALL MEDS BY PRESCRIBER/CLIN PHARMACIST DOCUMENTED: ICD-10-PCS | Mod: HCNC,CPTII,95, | Performed by: NURSE PRACTITIONER

## 2023-04-18 PROCEDURE — 3044F HG A1C LEVEL LT 7.0%: CPT | Mod: HCNC,CPTII,95, | Performed by: NURSE PRACTITIONER

## 2023-04-18 PROCEDURE — 3066F PR DOCUMENTATION OF TREATMENT FOR NEPHROPATHY: ICD-10-PCS | Mod: HCNC,CPTII,95, | Performed by: NURSE PRACTITIONER

## 2023-04-18 PROCEDURE — 1159F PR MEDICATION LIST DOCUMENTED IN MEDICAL RECORD: ICD-10-PCS | Mod: HCNC,CPTII,95, | Performed by: NURSE PRACTITIONER

## 2023-04-18 PROCEDURE — 3066F NEPHROPATHY DOC TX: CPT | Mod: HCNC,CPTII,95, | Performed by: NURSE PRACTITIONER

## 2023-04-18 PROCEDURE — 3072F PR LOW RISK FOR RETINOPATHY: ICD-10-PCS | Mod: HCNC,CPTII,95, | Performed by: NURSE PRACTITIONER

## 2023-04-18 PROCEDURE — 3072F LOW RISK FOR RETINOPATHY: CPT | Mod: HCNC,CPTII,95, | Performed by: NURSE PRACTITIONER

## 2023-04-18 PROCEDURE — 3061F NEG MICROALBUMINURIA REV: CPT | Mod: HCNC,CPTII,95, | Performed by: NURSE PRACTITIONER

## 2023-04-18 PROCEDURE — 99214 PR OFFICE/OUTPT VISIT, EST, LEVL IV, 30-39 MIN: ICD-10-PCS | Mod: HCNC,95,, | Performed by: NURSE PRACTITIONER

## 2023-04-18 PROCEDURE — 99214 OFFICE O/P EST MOD 30 MIN: CPT | Mod: HCNC,95,, | Performed by: NURSE PRACTITIONER

## 2023-04-18 RX ORDER — INSULIN DEGLUDEC 100 U/ML
15 INJECTION, SOLUTION SUBCUTANEOUS DAILY
Qty: 5 PEN | Refills: 5 | Status: SHIPPED | OUTPATIENT
Start: 2023-04-18 | End: 2023-07-26 | Stop reason: SDUPTHER

## 2023-04-18 NOTE — Clinical Note
Labs are ordered- please schedule fasting at [facility] on 4/25. See if Dexcom G6 (all components) are any cheaper (G7 was too expensive)- send to [facility]. Schedule a 6 week follow up with me. Notes: Referred for Dexcom G6 at last visit.

## 2023-04-18 NOTE — PROGRESS NOTES
The patient location is: [place]  The chief complaint leading to consultation is: diabetes management follow up     Visit type: audiovisual    Face to Face time with patient: 15 minutes  30 minutes of total time spent on the encounter, which includes face to face time and non-face to face time preparing to see the patient (eg, review of tests), Obtaining and/or reviewing separately obtained history, Documenting clinical information in the electronic or other health record, Independently interpreting results (not separately reported) and communicating results to the patient/family/caregiver, or Care coordination (not separately reported).         Each patient to whom he or she provides medical services by telemedicine is:  (1) informed of the relationship between the physician and patient and the respective role of any other health care provider with respect to management of the patient; and (2) notified that he or she may decline to receive medical services by telemedicine and may withdraw from such care at any time.    Notes:      Subjective:         Patient ID: Jadyn hCance is a 52 y.o. female.  Patient's current PCP is Vikash Baig MD.     Chief Complaint: Diabetes Mellitus    HPI  Jadyn Chance is a 52 y.o. White female presenting for a follow up for diabetes. Patient has been diagnosed with type 2 diabetes for several years.  Received diabetes education: Yes-[facility], 2023    CURRENT DM MEDICATIONS:   Diabetes Medications               empagliflozin (JARDIANCE) 25 mg tablet Take 1 tablet (25 mg total) by mouth once daily.    insulin detemir U-100 (LEVEMIR FLEXTOUCH U-100 INSULN) 100 unit/mL (3 mL) InPn pen Inject 15 Units into the skin every evening. Will switch to Tresiba due to limited manual dexterity and high glycemic variability on     metFORMIN (GLUCOPHAGE-XR) 500 MG ER 24hr tablet TAKE 2 TABLETS TWICE DAILY WITH MEALS    NOVOLOG FLEXPEN U-100 INSULIN 100 unit/mL (3 mL) InPn pen  Inject 3 times per day per sliding scale - can use every 4 hours as needed to correct a high blood sugar.  : No units  131-180: 2 units  181-240: 4 units  241-300: 6 units  301-350: 8 units  350-400: 10 units             Past failed treatment include: Actos, has taken Regular insulin in the past. Levemir-having issues with pen due to limited manual dexterity.     Blood glucose testing is performed regularly. Patient is testing 2-3 times per day.  Meter: [employer] / Unfortunately, Dexcom G7 was too expensive- will see if Dexcom G6 or even Mark 2 is more affordable  Preferred lab: [facility]    Any episodes of hypoglycemia? Denies    Complications related to diabetes: none    Her blood sugar in the clinic today was:   Lab Results   Component Value Date    POCGLU 360 (A) 04/25/2018       Jadyn Chance presents today for follow up visit to discuss diabetes management. At last visit,  Referred for CGM - unfortunately too expensive. Will see if Dexcom G6 or Mark is too expensive. She started a sliding scale of Novolog that she found on the internet ( low dose scale-noted above).     Highest weight at 429 lbs. H/o gastric bypass January 2008. Has always been treated as a T2 diabetic but suspects she has had diabetes since childhood.High glycemic variability per eal records:  mg/dL. She never completed labs to confirm T1 vs T2 diabetes- she is agreeable to complete. Plan to stop SS today and initiate set dose + SS.    Currently with limited manual dexterity- medically necessary to switch to Tresiba.    Colonoscopy on 4/25. Would like to have labs drawn at [facility] on that day.    Current diet: Reviewed the diabetic diet   Activity Level:     Lab Results   Component Value Date    HGBA1C 6.3 (H) 01/18/2023    HGBA1C 7.5 (H) 08/22/2022    HGBA1C 8.6 (H) 04/19/2022       STANDARDS OF CARE  Diabetes Management Status    Statin: Taking  ACE/ARB: Not taking    Screening or Prevention Patient's value  Goal Complete/Controlled?   HgA1C Testing and Control   Lab Results   Component Value Date    HGBA1C 6.3 (H) 01/18/2023      Annually/Less than 8% Yes     Lipid profile : 01/18/2023 Annually Yes     LDL control Lab Results   Component Value Date    LDLCALC 41.8 (L) 01/18/2023    Annually/Less than 100 mg/dl  Yes     Nephropathy screening Lab Results   Component Value Date    LABMICR <5.0 01/18/2023     Lab Results   Component Value Date    PROTEINUA Negative 12/27/2022     No results found for: UTPCR   Annually Yes     Blood pressure BP Readings from Last 1 Encounters:   03/13/23 125/73    Less than 140/90 Yes     Dilated retinal exam : 08/22/2022 Annually Yes     Foot exam   : 01/20/2022 Annually No Deferred-video visit today.          Labs reviewed and are noted below.    Lab Results   Component Value Date    WBC 6.34 12/27/2022    HGB 9.7 (L) 12/27/2022    HCT 31.3 (L) 12/27/2022     12/27/2022    CHOL 155 01/18/2023    TRIG 236 (H) 01/18/2023    HDL 66 01/18/2023    LDLCALC 41.8 (L) 01/18/2023    ALT 80 (H) 01/18/2023    AST 57 (H) 01/18/2023     (L) 01/18/2023    K 4.2 01/18/2023     01/18/2023    ANIONGAP 12 01/18/2023    CREATININE 1.1 01/18/2023    ESTGFRAFRICA >60.0 01/13/2022    EGFRNONAA >60.0 01/13/2022    BUN 29 (H) 01/18/2023    CO2 22 (L) 01/18/2023    TSH 0.664 01/13/2022    INR 1.0 09/11/2020     (H) 01/18/2023     Lab Results   Component Value Date    TSH 0.664 01/13/2022    IRON 56 04/19/2022    TIBC 357 04/19/2022    FERRITIN 50 04/19/2022    YULWPYTW07 381 01/18/2023    PTH 71.0 09/10/2013    CALCIUM 9.3 01/18/2023    PHOS 4.3 04/01/2015     No results found for: CPEPTIDE  No results found for: GLUTAMICACID  Glucose   Date Value Ref Range Status   01/18/2023 279 (H) 70 - 110 mg/dL Final     Anion Gap   Date Value Ref Range Status   01/18/2023 12 8 - 16 mmol/L Final     eGFR if    Date Value Ref Range Status   01/13/2022 >60.0 >60 mL/min/1.73 m^2 Final      eGFR if non    Date Value Ref Range Status   01/13/2022 >60.0 >60 mL/min/1.73 m^2 Final     Comment:     Calculation used to obtain the estimated glomerular filtration  rate (eGFR) is the CKD-EPI equation.          The following portions of the patient's history were reviewed and updated as appropriate: allergies, current medications, past family history, past medical history, past social history, past surgical history, and problem list.    Review of patient's allergies indicates:   Allergen Reactions    Demerol [meperidine] Hallucinations    Lamictal [lamotrigine] Rash     Rash to face in chart 2014 or 15     Penicillins Other (See Comments)     Patient cannot recall specific reaction, reports she has been listing this as an allergy since childhood.    Bactrim [sulfamethoxazole-trimethoprim]     Ciprofloxacin (bulk)     Codeine Nausea And Vomiting    Levetiracetam     Toradol [ketorolac]     Tramadol      seizures    Morpholine analogues Diarrhea, Itching and Nausea And Vomiting     Social History     Socioeconomic History    Marital status: Significant Other    Number of children: 4   Tobacco Use    Smoking status: Never    Smokeless tobacco: Never   Substance and Sexual Activity    Alcohol use: No    Drug use: No    Sexual activity: Not Currently     Partners: Male     Birth control/protection: Surgical     Comment: BTL; mut monog   Social History Narrative    ** Merged History Encounter **         Lives in [place]     Social Determinants of Health     Financial Resource Strain: Medium Risk    Difficulty of Paying Living Expenses: Somewhat hard   Food Insecurity: Unknown    Worried About Running Out of Food in the Last Year: Patient refused    Ran Out of Food in the Last Year: Never true   Transportation Needs: Unmet Transportation Needs    Lack of Transportation (Medical): Yes    Lack of Transportation (Non-Medical): Yes   Physical Activity: Inactive    Days of Exercise per Week: 0 days     Minutes of Exercise per Session: 0 min   Social Connections: Unknown    Frequency of Communication with Friends and Family: Once a week    Frequency of Social Gatherings with Friends and Family: Never    Active Member of Clubs or Organizations: No    Attends Club or Organization Meetings: 1 to 4 times per year    Marital Status:    Housing Stability: High Risk    Unable to Pay for Housing in the Last Year: Yes    Number of Places Lived in the Last Year: 1    Unstable Housing in the Last Year: No     Past Medical History:   Diagnosis Date    Abnormal Pap smear 1991    bx was negative, per pt    Anemia     Anxiety     B12 deficiency     Blood transfusion     multiple     Chronic LBP     Degenerative disc disease     l spine    Diabetes mellitus     Diabetic neuropathy     General anesthetics causing adverse effect in therapeutic use     delayed emergence    Mixed hyperlipidemia 11/18/2013    PONV (postoperative nausea and vomiting)     RLS (restless legs syndrome)     Seizures     Sleep apnea     with CPAP    Vitamin D deficiency disease        REVIEW OF SYSTEMS:  Eyes No history of DR.  Cardiovascular: History of HLD.  GI: Denies nausea,vomiting,constipation,or diarrhea.  : No CKD.  Neuro: Positive neuropathy.  PSYCH: No tobacco use.  ENDO: See HPI.        Objective:      There were no vitals filed for this visit.  RESPIRATORY: No respiratory distress  NEUROLOGIC: not assessed-virtual visit.  PSYCHIATRIC: Alert & oriented x3. Normal mood and affect.  FOOT EXAMINATION: Deferred-virtual visit   Assessment:       1. Uncontrolled type 2 diabetes mellitus with hyperglycemia, with long-term current use of insulin    2. Hyperlipidemia associated with type 2 diabetes mellitus        Plan:   Uncontrolled type 2 diabetes mellitus with hyperglycemia, with long-term current use of insulin  -     TRESIBA FLEXTOUCH U-100 100 unit/mL (3 mL) insulin pen; Inject 15 Units into the skin once daily. Discontinue Levemir.   "Dispense: 5 pen; Refill: 5    Chronic-    Labs to be scheduled fasting on 4/25. We will see if Dexcom G6 is any cheaper for you.     -- Medications adjusted for today's visit include:    Stop Levemir. Start Tresiba at 15 units once daily.     Start Novolog 4 units before each main meal. Do not take Novolog if you skip the meal or if the meal contains zero carbs. Can add extra insulin based on sliding scale as needed based on the pre-meal blood sugar.     150-200: 1 unit  201-250: 2 units  251-300: 3 units  301-350: 4 units  351-400: 5 units    Continue Jardiance and Metformin for now.    Hyperlipidemia associated with type 2 diabetes mellitus    Chronic-Continue current.    - Follow up: 6 weeks    Portions of this note may have been created with voice recognition software. Occasional "wrong-word" or "sound-a-like" substitutions may have occurred due to the inherent limitations of voice recognition software. Please, read the note carefully and recognize, using context, where substitutions have occurred.             Sheri Ammons,FNP-C [facility]     "

## 2023-04-18 NOTE — PATIENT INSTRUCTIONS
Labs to be scheduled fasting on 4/25. We will see if Dexcom G6 is any cheaper for you.     -- Medications adjusted for today's visit include:    Stop Levemir. Start Tresiba at 15 units once daily.     Start Novolog 4 units before each main meal. Do not take Novolog if you skip the meal or if the meal contains zero carbs. Can add extra insulin based on sliding scale as needed based on the pre-meal blood sugar.     150-200: 1 unit  201-250: 2 units  251-300: 3 units  301-350: 4 units  351-400: 5 units    Continue Jardiance and Metformin for now.    -- Limit carbs to no more than 30-45 grams with each meal. Never eat carbs by themselves, always add protein. Make snacks low carb or non-carb only.    -- Continue checking blood sugar 3 times daily: Fasting blood sugar and vary your next 2 readings: Before lunch, before supper, 2 hours after any meal, or bedtime.   -Blood sugar goals should be a fasting blood sugar between , and no blood sugars throughout the day over 180 is good, less than 160 better, less than 140 perfect.    --Carry glucose tablets/soft peppermints/regular juice or Coke product with you at all times to treat a low blood sugar episode (less than 70). If your blood sugar is between 50-70, Chew 4 tablets or drink 1/2 cup of juice or regular Coke product. If your blood sugar is below 50, double the treatment. Re-check blood sugar in 15 minutes. If still low, repeat this. Always call the clinic to give an update for any low blood sugar episodes.    --Exercise as tolerated: Goal 30 minutes/day, 5 days/week. Start slowly and increase as tolerated.    --Follow-up for your next visit in 6 weeks.    --Please either drop off, fax, or MyChart your readings to me as needed.

## 2023-04-19 ENCOUNTER — TELEPHONE (OUTPATIENT)
Dept: DIABETES | Facility: CLINIC | Age: 53
End: 2023-04-19
Payer: MEDICARE

## 2023-05-15 PROBLEM — Z00.00 ROUTINE GENERAL MEDICAL EXAMINATION AT A HEALTH CARE FACILITY: Status: RESOLVED | Noted: 2023-02-12 | Resolved: 2023-05-15

## 2023-06-05 ENCOUNTER — HOSPITAL ENCOUNTER (OUTPATIENT)
Facility: HOSPITAL | Age: 53
Discharge: HOME | End: 2023-06-05
Attending: INTERNAL MEDICINE | Admitting: INTERNAL MEDICINE
Payer: MEDICARE

## 2023-06-05 DIAGNOSIS — Z12.11 ENCOUNTER FOR SCREENING COLONOSCOPY: Primary | ICD-10-CM

## 2023-06-05 LAB — POCT GLUCOSE: 162 MG/DL (ref 70–110)

## 2023-06-05 PROCEDURE — G0121 COLON CA SCRN NOT HI RSK IND: HCPCS | Performed by: INTERNAL MEDICINE

## 2023-06-05 PROCEDURE — 82962 GLUCOSE BLOOD TEST: CPT | Performed by: INTERNAL MEDICINE

## 2023-06-05 PROCEDURE — 37000008 HC ANESTHESIA 1ST 15 MINUTES: Performed by: INTERNAL MEDICINE

## 2023-06-05 PROCEDURE — G0121 COLON CA SCRN NOT HI RSK IND: ICD-10-PCS | Mod: ,,, | Performed by: INTERNAL MEDICINE

## 2023-06-05 PROCEDURE — G0121 COLON CA SCRN NOT HI RSK IND: HCPCS | Mod: ,,, | Performed by: INTERNAL MEDICINE

## 2023-06-05 PROCEDURE — 37000009 HC ANESTHESIA EA ADD 15 MINS: Performed by: INTERNAL MEDICINE

## 2023-06-05 PROCEDURE — 63600175 PHARM REV CODE 636 W HCPCS

## 2023-06-05 RX ORDER — SODIUM CHLORIDE, SODIUM LACTATE, POTASSIUM CHLORIDE, CALCIUM CHLORIDE 600; 310; 30; 20 MG/100ML; MG/100ML; MG/100ML; MG/100ML
INJECTION, SOLUTION INTRAVENOUS CONTINUOUS
Status: ACTIVE | OUTPATIENT
Start: 2023-06-05

## 2023-06-05 RX ORDER — SODIUM CHLORIDE, SODIUM LACTATE, POTASSIUM CHLORIDE, CALCIUM CHLORIDE 600; 310; 30; 20 MG/100ML; MG/100ML; MG/100ML; MG/100ML
INJECTION, SOLUTION INTRAVENOUS CONTINUOUS PRN
Status: DISCONTINUED | OUTPATIENT
Start: 2023-06-05 | End: 2023-06-05

## 2023-06-05 RX ORDER — PROPOFOL 10 MG/ML
VIAL (ML) INTRAVENOUS
Status: DISCONTINUED | OUTPATIENT
Start: 2023-06-05 | End: 2023-06-05

## 2023-06-05 RX ADMIN — PROPOFOL 60 MG: 10 INJECTION, EMULSION INTRAVENOUS at 08:06

## 2023-06-05 RX ADMIN — PROPOFOL 100 MG: 10 INJECTION, EMULSION INTRAVENOUS at 08:06

## 2023-06-05 RX ADMIN — PROPOFOL 50 MG: 10 INJECTION, EMULSION INTRAVENOUS at 08:06

## 2023-06-05 RX ADMIN — PROPOFOL 40 MG: 10 INJECTION, EMULSION INTRAVENOUS at 08:06

## 2023-06-05 RX ADMIN — PROPOFOL 30 MG: 10 INJECTION, EMULSION INTRAVENOUS at 08:06

## 2023-06-05 RX ADMIN — SODIUM CHLORIDE, SODIUM LACTATE, POTASSIUM CHLORIDE, AND CALCIUM CHLORIDE: 600; 310; 30; 20 INJECTION, SOLUTION INTRAVENOUS at 08:06

## 2023-06-05 NOTE — TRANSFER OF CARE
"Anesthesia Transfer of Care Note    Patient: Jadyn Chance    Procedure(s) Performed: Procedure(s) (LRB):  COLONOSCOPY (N/A)    Patient location: PACU    Anesthesia Type: MAC    Transport from OR: Transported from OR on room air with adequate spontaneous ventilation    Post pain: adequate analgesia    Post assessment: no apparent anesthetic complications    Post vital signs: stable    Level of consciousness: responds to stimulation    Nausea/Vomiting: no nausea/vomiting    Complications: none    Transfer of care protocol was followed      Last vitals:   Visit Vitals  /68 (BP Location: Left arm, Patient Position: Lying)   Pulse 89   Temp 36.3 °C (97.3 °F) (Temporal)   Resp 16   Ht 5' 3" (1.6 m)   Wt 61.2 kg (135 lb)   LMP 11/01/2014 (Approximate)   SpO2 99%   Breastfeeding No   BMI 23.91 kg/m²     "

## 2023-06-05 NOTE — DISCHARGE SUMMARY
[facility] ([facility])  Discharge Note  Short Stay    Procedure(s) (LRB):  COLONOSCOPY (N/A)      OUTCOME: Patient tolerated treatment/procedure well without complication and is now ready for discharge.    DISPOSITION: Home or Self Care    FINAL DIAGNOSIS:  Encounter for screening colonoscopy    FOLLOWUP: With primary care provider    DISCHARGE INSTRUCTIONS:  No discharge procedures on file.

## 2023-06-05 NOTE — H&P
Short Stay Endoscopy History and Physical    PCP - Vikash Baig MD    Procedure - Colonoscopy  ASA - 2  Mallampati - per anesthesia  History of Anesthesia problems - no  Family history Anesthesia problems -  no     HPI:  This is a 52 y.o. female here for evaluation of :   Active Hospital Problems    Diagnosis  POA    *Encounter for screening colonoscopy [Z12.11]  Not Applicable     Heart healthy diet, regular exercise, and regular use of sunscreen.   HM reviewed        Resolved Hospital Problems   No resolved problems to display.         Health Maintenance         Date Due Completion Date    Shingles Vaccine (1 of 2) Never done ---    COVID-19 Vaccine (4 - Pfizer series) 02/15/2022 12/21/2021    Colorectal Cancer Screening 05/18/2022 5/18/2017    Foot Exam 01/20/2023 1/20/2022    Override on 8/31/2015: Done    Override on 9/10/2014: Done    Eye Exam 08/22/2023 8/22/2022    Override on 10/8/2012: Done    Hemoglobin A1c 09/08/2023 3/8/2023    Diabetes Urine Screening 01/18/2024 1/18/2023    Lipid Panel 01/18/2024 1/18/2023    Mammogram 02/20/2024 2/20/2023    Override on 5/23/2013: Done    Override on 1/17/2012: Done    Low Dose Statin 03/13/2024 3/13/2023    Cervical Cancer Screening 10/19/2024 10/19/2021    Override on 9/1/2017: Done (GYN in [place].  Due for annual check)    Override on 1/3/2012: Done    TETANUS VACCINE 01/24/2033 1/24/2023    Pneumococcal Vaccines (Age 0-64) (3 - PPSV23 if available, else PCV20) 08/06/2034 1/2/2019              ROS:  CONSTITUTIONAL: Denies weight change,  fatigue, fevers, chills, night sweats.  CARDIOVASCULAR: Denies chest pain, shortness of breath, orthopnea and edema.  RESPIRATORY: Denies cough, hemoptysis, dyspnea, and wheezing.  GI: See HPI.    Medical History:   Past Medical History:   Diagnosis Date    Abnormal Pap smear 1991    bx was negative, per pt    Anemia     Anxiety     B12 deficiency     Blood transfusion     multiple     Chronic LBP     Degenerative disc  disease     l spine    Diabetes mellitus     Diabetic neuropathy     General anesthetics causing adverse effect in therapeutic use     delayed emergence    Mixed hyperlipidemia 2013    PONV (postoperative nausea and vomiting)     RLS (restless legs syndrome)     Seizures     Sleep apnea     with CPAP    Vitamin D deficiency disease        Surgical History:   Past Surgical History:   Procedure Laterality Date    ANUS SURGERY      AUGMENTATION OF BREAST  2008    saline    BELT ABDOMINOPLASTY      [facility]    BREAST SURGERY  2008    breast augmentation    CARPAL TUNNEL RELEASE Right 2021    Procedure: RELEASE, CARPAL TUNNEL;  Surgeon: Tyler Victor MD;  Location: [facility] OR;  Service: Orthopedics;  Laterality: Right;  right carpal tunnel release and right cubital tunnel release with possible anterior transposition ulnar nerve     SECTION      x2    CHOLECYSTECTOMY      mikel      EXCISIONAL HEMORRHOIDECTOMY      GASTRIC BYPASS      HIP FRACTURE SURGERY      left hip ORIF    MOUTH SURGERY      revision of JJ anastomosis  2015    small bowel resection  2015    TOTAL REDUCTION MAMMOPLASTY      TUBAL LIGATION      ULNAR TUNNEL RELEASE Right 2021    Procedure: RELEASE, CUBITAL TUNNEL;  Surgeon: Tyler Victor MD;  Location: [facility] OR;  Service: Orthopedics;  Laterality: Right;  right carpal tunnel release and right cubital tunnel release with possible anterior transposition ulnar nerve       Family History:   Family History   Problem Relation Age of Onset    Diabetes Mother     Cataracts Mother     Glaucoma Maternal Aunt     Blindness Maternal Aunt     Breast cancer Neg Hx     Colon cancer Neg Hx     Thrombophilia Neg Hx        Social History:   Social History     Tobacco Use    Smoking status: Never    Smokeless tobacco: Never   Substance Use Topics    Alcohol use: No    Drug use: No       Allergies:   Review of patient's allergies indicates:   Allergen Reactions    Demerol  "[meperidine] Hallucinations    Lamictal [lamotrigine] Rash     Rash to face in chart 2014 or 15     Penicillins Other (See Comments)     Patient cannot recall specific reaction, reports she has been listing this as an allergy since childhood.    Bactrim [sulfamethoxazole-trimethoprim]     Ciprofloxacin (bulk)     Codeine Nausea And Vomiting    Levetiracetam     Toradol [ketorolac]     Tramadol      seizures    Morpholine analogues Diarrhea, Itching and Nausea And Vomiting       Medications:   No current facility-administered medications on file prior to encounter.     Current Outpatient Medications on File Prior to Encounter   Medication Sig Dispense Refill    cyanocobalamin 1,000 mcg/mL injection Inject 1 mL (1,000 mcg total) into the skin every 28 days. 10 mL 11    ergocalciferol (VITAMIN D2) 50,000 unit Cap Take 1 capsule (50,000 Units total) by mouth 3 (three) times a week. 36 capsule 3    BD ALCOHOL SWABS PadM Apply topically 3 (three) times daily.      blood sugar diagnostic Strp To check BG 3 times daily, to use with insurance preferred meter    DxE11.9 200 strip 3    blood-glucose meter Misc Use to check blood glucose 3 times daily (Patient taking differently: Use to check blood glucose 3 times daily) 1 each 0    lancets 30 gauge Misc To check blood glucose 3 times daily (Patient taking differently: To check blood glucose 3 times daily) 200 each 3    syringe with needle (SYRINGE 3CC/25GX1") 3 mL 25 gauge x 1" Syrg For vitamin B12 injection 100 Syringe 1    [DISCONTINUED] fluoxetine (PROZAC) 40 MG capsule Take 40 mg by mouth once daily.         Physical Exam:  Vital Signs: There were no vitals filed for this visit.  General Appearance: Well appearing in no acute distress  ENT: OP clear  Chest: CTA B  CV: RRR, no m/r/g  Abd: s/nt/nd/nabs  Ext: no edema    Labs:Reviewed    IMP:  Active Hospital Problems    Diagnosis  POA    *Encounter for screening colonoscopy [Z12.11]  Not Applicable     Heart healthy diet, " regular exercise, and regular use of sunscreen.   HM reviewed        Resolved Hospital Problems   No resolved problems to display.         Plan:   I have explained the risks and benefits of colonoscopy to the patient including but not limited to bleeding, perforation, infection, and death. The patient wishes to proceed.

## 2023-06-05 NOTE — LETTER
Jadyn Chance  [address]  [place] [address]      [facility] ENDOSCOPY PATIENT INSTRUCTIONS    You are scheduled for a/an Colonoscopy (Procedure), on Tuesday(Day), 04/11/2023 (Date).   Please check in for your appointment at 1:00 PM.                          Your procedure will be performed at [facility] ([facility] The hospital is located at [address], off [address] ([place]). Once on [address], [facility] is the second building on the left. Check in for your procedure on the 1st floor. ([phone])      ALL PATIENTS:   ? Please plan to be at the hospital for 3 - 4 hours.    ? Use of Anesthesia requires you to have a responsible person to drive you to the hospital, stay while the procedure is being performed, assume responsibility for your care at discharge, and drive you home. You should not operate a vehicle, machinery or sign any legal documents until the next day. YOU MUST HAVE A RESPONSIBLE PERSON TO DRIVE YOU HOME.  ? Leave all valuables at home, including jewelry. (Piercings need to be removed) You will need to bring your 's license, medical insurance card, and a method of payment. You will be responsible for any co-payment at time of registration - Please reach out to Financial Services if you have any questions or concerns.    ? If biopsies need to be performed or a polyp needs to be removed, this may result in a change in the billing of your procedure and could impact your payment responsibility depending on your insurance provider.   ? Wear clothing appropriate for easy re-dressing after sedation.   ? Please bring a complete list of all medications you are taking.     MEDICATIONS:  ? BLOOD THINNING MEDICATION (Coumadin, Plavix, Eliquis, etc.):  o If you are on a blood thinning medication, our scheduling team will obtain clearance to hold from your prescribing physician. Please do not stop these medications until it is  approved by your provider. Our scheduling nurse will notify you an appropriate date and time to hold prior to your procedure.  o Coumadin, Plavix and Effient MUST be stopped 5 days prior to exam unless discussed with the doctor performing the test.    o Eliquis, Savaysa, Arixtra, and Xarelto MUST be stopped 2 days prior to exam unless discussed with the doctor performing the test.  ? WEIGHT LOSS MEDICATIONS - MUST be stopped 2 weeks prior to your appointment  ? BLOOD PRESSURE, HEART, SEIZURE, LUNG or PSYCHIATRIC MEDICATIONS you normally take in the morning, please take them the morning of your procedure.  This includes Inhalers.   o Please take these medications one hour prior to your arrival time with a small sip of water    DIABETIC PATIENTS:   ? Check blood sugar levels while following clear liquid diet on the day before the exam. Check again on the morning of the exam and as needed if you feel hypoglycemic (low blood sugar).     ? Do not take any diabetic medications (including insulin) the morning of the exam.    ? If your blood sugar goes below 70, you may drink 2 ounces of clear juice, soda or eat a piece of hard candy. Wait 15 minutes, then recheck your blood sugar. If it isn't going up, you may drink another 2 ounces of clear juice and contact the On-Call Nurse line at [phone].       [facility] GASTRIC BYPASS INSTRUCTIONS  Please use this page as a checklist for your preparation.    []ITEMS TO PURCHASE BEFORE YOUR PROCEDURE:  Please purchase the following items from your local pharmacy 7 DAYS prior to your appointment.   MiraLAX 14 oz. bottle- no prescription needed.   4 Dulcolax (bisacodyl) laxative tablets- no prescription needed, over the counter.   Gas X (simethicone) 125 mg capsules - no prescription needed, over the counter.    Dramamine (Meclizine) 25 mg tablet (for nausea)- no prescription needed, over the counter.   Golytely- A prescription is required and has been sent to your pharmacy.  "Will only fill to [facility] on bottle.    [] ONE WEEK PRIOR TO YOUR PROCEDURE:    MiraLAX: Mix 17 grams with an 8 oz. glass of water daily until the day before your procedure. Start low fiber diet (see attached)    []THREE (3) BEFORE YOUR PROCEDURE :(WHEN YOU WAKE) Begin a full liquid diet. Continue this diet tomorrow.  You may consume the following items when on a FULL liquid diet:   Fruit juices (including juices with pulp. No red or purple.)   Butter, oil, cream, custard, and pudding   Plain ice cream, frozen yogurt, and sherbet. (No red or purple.)   Fruit ices and popsicles (No red or purple.)   Sugar, honey, and syrups   Soup broth (bouillon, consommé, and strained cream soups. No solids.)   Sodas (Ginger Ale and Sprite. No red or purple.)   Boost, Ensure, Resource, and other liquid supplements   Tea or coffee with cream, milk, sugar, or honey    []THE DAY BEFORE YOUR PROCEDURE: (WHEN YOU WAKE) Begin a clear liquid diet.   You may consume the following items when on a CLEAR liquid diet:    Coffee, water, or tea. (We agree it's odd, but coffee and tea without milk or creamer is considered a clear liquid)    Clear carbonated beverages (soft drinks), ginger ale, sprite, 7up, sparkling water, etc. No "Energy" beverages.    Gelatin dessert, (JELLO) plain or fruit flavored. No red or purple coloring/No solid pieces of fruit.   Apple juice, white grape juice, or white cranberry juice. No pulp, no orange juice.    Gatorade, Powerade, lemonade, or limeade. No red or purple.    Clear, fat-free, beef or chicken broths, or bouillon.    Snowballs, popsicles, slushes. No red or purple coloring, no pulp.    Avoid any liquids not listed above.     []12:00 PM (the day before procedure):   Take 4 Dulcolax (bisacodyl) tablets with a glass of clear liquid.   Take 1 Gas X (simethicone) 125 mg capsule with a glass of clear liquid    []5:00 PM (the day before procedure), for prevention of nausea and " vomiting:   Take ½ of a tablet (12.5 mg) of Meclizine/ Dramamine every 6 hours as needed for nausea and/or vomiting. DO NOT TAKE MORE THAN 50 MG IN A 24 HOUR PERIOD.    []6:00 PM (the day before procedure):   Drink ½ of the Golytely Prep. This must be completed within 1 ½ hours    [] At   5: 00 AM (the day of procedure):   Drink second half of the Golytely Prep. This must be completed within 1 ½ hours.     After you complete the prep, you may not have anything else by mouth except for your medications, with a small sip of water. This includes no gum, mints, tobacco products.    Please follow these instructions to ensure you have a very good prep.  The goal is for stool to be CLEAR OR YELLOW liquid - NO BROWN.  Avoid having to repeat the procedure due to a poor prep!  Please call [phone] or [phone] if you continue to have brown stool or have any questions about your prep instructions.        LOW FIBER DIET  Seven (7) DAYS BEFORE YOUR PROCEDURE- Please start a Low Fiber Diet as below:  A good preparation (clean out) of the colon is necessary prior to having a colonoscopy in order to ensure that all areas of the colon can be seen without difficulty. It is helpful to avoid high fiber foods prior to having a colonoscopy as high fiber foods (especially seeds and nuts) are more difficult to completely clear out of the colon. We recommend avoiding high fiber foods for at least 7 days prior to your colonoscopy. Foods to Include In Your Diet:       Grain Products:    Enriched refined white bread, buns, bagels, English muffins   Plain cereals e.g., Cheerios, Cornflakes, Cream of Wheat, Rice Krispies, Special K   Tea biscuits, arrowroot cookies, soda crackers, lizzeth crackers, plain April toast and flour tortillas  White rice, refined pasta and noodles   Fruits:   peel fruits when possible  Fruit juices except prune juice   Soft fruits: apricots, banana (1/2), cantaloupe, canned fruit cocktail, grapes,   honeydew  melon, peaches, watermelon, citrus fruits, plums, pineapple   Sauces: Apple or apricot        Vegetables:    Vegetable juices    Tomato sauces    Potatoes (no skin)    Well-cooked and tender vegetables including alfalfa sprouts, spinach, beets, carrots, celery, cucumber, eggplant, lettuce, mushrooms, green/red peppers, squash, zucchini, onions, brussel sprouts, asparagus     Meat and Protein Choice:  Well-cooked, tender meat, fish and eggs            Foods to avoid:   ? Whole grain breads and pastas, corn bread or muffins, products made with whole grain products, bran, seeds, or nuts   ? Strong cheeses, yogurt containing fruit skins or seeds   ? Raw vegetables and pickles  ? All types of whole kernel corn   ? All beans, peas, and legumes   ? Fruit peels and hard fruits   ? Tough meat, meat with gristle   ? Crunchy peanut butter   ? Nuts, seeds and popcorn   ? Millet, buckwheat, flax, oatmeal, malika seeds   ? Dried fruits, berries, other fruits with pulp or seeds (including blueberries, raspberries, and blackberries)   ? Food containing chocolate, coconut   ? Juices with pulp     Avoiding the recommended foods is one part of helping to ensure that you have adequate preparation and that you do not need to have a repeat colonoscopy any sooner than what would be recommended by the colon cancer screening guidelines.    Please follow these instructions to ensure you have a very good prep.  The goal is for stool to be CLEAR OR YELLOW liquid - NO BROWN.  Avoid having to repeat the procedure due to a poor prep!  Please call [phone] or [phone] if you continue to have brown stool or have any questions about your prep instructions.    Questions regarding scheduling: Endoscopy Scheduling [phone] (M-F, 7:30am-4:30pm)   Questions regarding prep or procedure: (Ben[facility]) [facility] Pre-Op Nurse [phone] or [phone]   Questions regarding prep or procedure: [place] Pre-Op Nurse: [phone]   Questions about  Insurance/ Financial obligations: Financial Services [phone]   [facility] [phone] (M-F, 6:30am-3:00pm)   [facility] [phone] (M-F, 6:30am-3:00pm)            Questions requiring immediate assistance: [facility] On-Call Nurse Line [phone] (After Hours)

## 2023-06-05 NOTE — ANESTHESIA POSTPROCEDURE EVALUATION
Anesthesia Post Evaluation    Patient: Jadyn Chance    Procedure(s) Performed: Procedure(s) (LRB):  COLONOSCOPY (N/A)    Final Anesthesia Type: MAC      Patient location during evaluation: GI PACU  Patient participation: Yes- Able to Participate  Level of consciousness: awake  Post-procedure vital signs: reviewed and stable  Pain management: adequate  Airway patency: patent    PONV status at discharge: No PONV  Anesthetic complications: no      Cardiovascular status: blood pressure returned to baseline and hemodynamically stable  Respiratory status: unassisted and spontaneous ventilation  Hydration status: euvolemic  Follow-up not needed.              No case tracking events are documented in the log.      Pain/Terrence Score: No data recorded

## 2023-06-05 NOTE — PROVATION PATIENT INSTRUCTIONS
Discharge Summary/Instructions after an Endoscopic Procedure  Patient Name: Jadyn Chance  Patient MRN: [id number]  Patient YOB: 1970 Monday, June 5, 2023 María De La Torre MD  Dear patient,  As a result of recent federal legislation (The Federal Cures Act), you may   receive lab or pathology results from your procedure in your [facility]   account before your physician is able to contact you. Your physician or   their representative will relay the results to you with their   recommendations at their soonest availability.  Thank you,  RESTRICTIONS:  During your procedure today, you received medications for sedation.  These   medications may affect your judgment, balance and coordination.  Therefore,   for 24 hours, you have the following restrictions:   - DO NOT drive a car, operate machinery, make legal/financial decisions,   sign important papers or drink alcohol.    ACTIVITY:  Today: no heavy lifting, straining or running due to procedural   sedation/anesthesia.  The following day: return to full activity including work.  DIET:  Eat and drink normally unless instructed otherwise.     TREATMENT FOR COMMON SIDE EFFECTS:  - Mild abdominal pain, nausea, belching, bloating or excessive gas:  rest,   eat lightly and use a heating pad.  - Sore Throat: treat with throat lozenges and/or gargle with warm salt   water.  - Because air was used during the procedure, expelling large amounts of air   from your rectum or belching is normal.  - If a bowel prep was taken, you may not have a bowel movement for 1-3 days.    This is normal.  SYMPTOMS TO WATCH FOR AND REPORT TO YOUR PHYSICIAN:  1. Abdominal pain or bloating, other than gas cramps.  2. Chest pain.  3. Back pain.  4. Signs of infection such as: chills or fever occurring within 24 hours   after the procedure.  5. Rectal bleeding, which would show as bright red, maroon, or black stools.   (A tablespoon of blood from the rectum is not serious, especially  if   hemorrhoids are present.)  6. Vomiting.  7. Weakness or dizziness.  GO DIRECTLY TO THE NEAREST EMERGENCY ROOM IF YOU HAVE ANY OF THE FOLLOWING:      Difficulty breathing              Chills and/or fever over 101 F   Persistent vomiting and/or vomiting blood   Severe abdominal pain   Severe chest pain   Black, tarry stools   Bleeding- more than one tablespoon   Any other symptom or condition that you feel may need urgent attention  Your doctor recommends these additional instructions:  If any biopsies were taken, your doctors clinic will contact you in 1 to 2   weeks with any results.  - Discharge patient to home (via wheelchair).   - Resume previous diet.   - Continue present medications.   - Repeat colonoscopy in 1 year because the bowel preparation was suboptimal.     - Patient has a contact number available for emergencies.  The signs and   symptoms of potential delayed complications were discussed with the   patient.  Return to normal activities tomorrow.  Written discharge   instructions were provided to the patient.  For questions, problems or results please call your physician María De La Torre MD at Work:  [phone]  If you have any questions about the above instructions, call the GI   department at [phone] or call the endoscopy unit at [phone]   from 7am until 3 pm.  [facility], EMERGENCY ROOM PHONE NUMBER:   [phone]  IF A COMPLICATION OR EMERGENCY SITUATION ARISES AND YOU ARE UNABLE TO REACH   YOUR PHYSICIAN - GO DIRECTLY TO THE EMERGENCY ROOM.  I have read or have had read to me these discharge instructions for my   procedure and have received a written copy.  I understand these   instructions and will follow-up with my physician if I have any questions.     __________________________________       _____________________________________  Nurse Signature                                          Patient/Designated   Responsible Party  Signature  MD María Watson MD  6/5/2023 8:48:27 AM  PROVATION

## 2023-06-06 VITALS
HEART RATE: 82 BPM | BODY MASS INDEX: 23.92 KG/M2 | WEIGHT: 135 LBS | TEMPERATURE: 98 F | DIASTOLIC BLOOD PRESSURE: 73 MMHG | RESPIRATION RATE: 18 BRPM | OXYGEN SATURATION: 98 % | HEIGHT: 63 IN | SYSTOLIC BLOOD PRESSURE: 118 MMHG

## 2023-06-07 ENCOUNTER — OFFICE VISIT (OUTPATIENT)
Dept: DIABETES | Facility: CLINIC | Age: 53
End: 2023-06-07
Attending: INTERNAL MEDICINE
Payer: MEDICARE

## 2023-06-07 DIAGNOSIS — E11.69 HYPERLIPIDEMIA ASSOCIATED WITH TYPE 2 DIABETES MELLITUS: ICD-10-CM

## 2023-06-07 DIAGNOSIS — Z79.4 UNCONTROLLED TYPE 2 DIABETES MELLITUS WITH HYPERGLYCEMIA, WITH LONG-TERM CURRENT USE OF INSULIN: Primary | ICD-10-CM

## 2023-06-07 DIAGNOSIS — E78.5 HYPERLIPIDEMIA ASSOCIATED WITH TYPE 2 DIABETES MELLITUS: ICD-10-CM

## 2023-06-07 DIAGNOSIS — E11.65 UNCONTROLLED TYPE 2 DIABETES MELLITUS WITH HYPERGLYCEMIA, WITH LONG-TERM CURRENT USE OF INSULIN: Primary | ICD-10-CM

## 2023-06-07 PROCEDURE — 1160F RVW MEDS BY RX/DR IN RCRD: CPT | Mod: CPTII,95,, | Performed by: NURSE PRACTITIONER

## 2023-06-07 PROCEDURE — 3061F NEG MICROALBUMINURIA REV: CPT | Mod: CPTII,95,, | Performed by: NURSE PRACTITIONER

## 2023-06-07 PROCEDURE — 3044F PR MOST RECENT HEMOGLOBIN A1C LEVEL <7.0%: ICD-10-PCS | Mod: CPTII,95,, | Performed by: NURSE PRACTITIONER

## 2023-06-07 PROCEDURE — 1160F PR REVIEW ALL MEDS BY PRESCRIBER/CLIN PHARMACIST DOCUMENTED: ICD-10-PCS | Mod: CPTII,95,, | Performed by: NURSE PRACTITIONER

## 2023-06-07 PROCEDURE — 3072F LOW RISK FOR RETINOPATHY: CPT | Mod: CPTII,95,, | Performed by: NURSE PRACTITIONER

## 2023-06-07 PROCEDURE — 3066F PR DOCUMENTATION OF TREATMENT FOR NEPHROPATHY: ICD-10-PCS | Mod: CPTII,95,, | Performed by: NURSE PRACTITIONER

## 2023-06-07 PROCEDURE — 1159F PR MEDICATION LIST DOCUMENTED IN MEDICAL RECORD: ICD-10-PCS | Mod: CPTII,95,, | Performed by: NURSE PRACTITIONER

## 2023-06-07 PROCEDURE — 3044F HG A1C LEVEL LT 7.0%: CPT | Mod: CPTII,95,, | Performed by: NURSE PRACTITIONER

## 2023-06-07 PROCEDURE — 3072F PR LOW RISK FOR RETINOPATHY: ICD-10-PCS | Mod: CPTII,95,, | Performed by: NURSE PRACTITIONER

## 2023-06-07 PROCEDURE — 1159F MED LIST DOCD IN RCRD: CPT | Mod: CPTII,95,, | Performed by: NURSE PRACTITIONER

## 2023-06-07 PROCEDURE — 99214 OFFICE O/P EST MOD 30 MIN: CPT | Mod: 95,,, | Performed by: NURSE PRACTITIONER

## 2023-06-07 PROCEDURE — 3066F NEPHROPATHY DOC TX: CPT | Mod: CPTII,95,, | Performed by: NURSE PRACTITIONER

## 2023-06-07 PROCEDURE — 3061F PR NEG MICROALBUMINURIA RESULT DOCUMENTED/REVIEW: ICD-10-PCS | Mod: CPTII,95,, | Performed by: NURSE PRACTITIONER

## 2023-06-07 PROCEDURE — 99214 PR OFFICE/OUTPT VISIT, EST, LEVL IV, 30-39 MIN: ICD-10-PCS | Mod: 95,,, | Performed by: NURSE PRACTITIONER

## 2023-06-07 NOTE — PROGRESS NOTES
The patient location is: [place]  The chief complaint leading to consultation is: diabetes management follow up     Visit type: audiovisual    Face to Face time with patient: 18 minutes  30 minutes of total time spent on the encounter, which includes face to face time and non-face to face time preparing to see the patient (eg, review of tests), Obtaining and/or reviewing separately obtained history, Documenting clinical information in the electronic or other health record, Independently interpreting results (not separately reported) and communicating results to the patient/family/caregiver, or Care coordination (not separately reported).         Each patient to whom he or she provides medical services by telemedicine is:  (1) informed of the relationship between the physician and patient and the respective role of any other health care provider with respect to management of the patient; and (2) notified that he or she may decline to receive medical services by telemedicine and may withdraw from such care at any time.    Notes:      Subjective:         Patient ID: Jadyn Chance is a 52 y.o. female.  Patient's current PCP is Vikash Baig MD.     Chief Complaint: Diabetes Mellitus    HPI  Jadyn Chance is a 52 y.o. White female presenting for a follow up for diabetes. Patient has been diagnosed with type 2 diabetes for several years.  Received diabetes education: Yes-[facility], 2023    CURRENT DM MEDICATIONS:   Diabetes Medications               empagliflozin (JARDIANCE) 25 mg tablet Take 1 tablet (25 mg total) by mouth once daily.    metFORMIN (GLUCOPHAGE-XR) 500 MG ER 24hr tablet TAKE 2 TABLETS TWICE DAILY WITH MEALS    NOVOLOG FLEXPEN U-100 INSULIN 100 unit/mL (3 mL) InPn pen Inject 3 times per day per sliding scale - can use every 4 hours as needed to correct a high blood sugar.  : No units  131-180: 2 units  181-240: 4 units  241-300: 6 units  301-350: 8 units  350-400: 10 units     TRESIBA FLEXTOUCH U-100 100 unit/mL (3 mL) insulin pen Inject 15 Units into the skin once daily. Discontinue Levemir.           Past failed treatment include: Actos, has taken Regular insulin in the past. Levemir-having issues with pen due to limited manual dexterity.     Blood glucose testing is performed regularly. Patient is testing 2-3 times per day.  Meter: [facility] / Unfortunately, Dexcom G7 was too expensive  Preferred lab: [facility]    Any episodes of hypoglycemia? Denies    Complications related to diabetes: none    Her blood sugar in the clinic today was:   Lab Results   Component Value Date    POCGLU 360 (A) 04/25/2018       Jadyn Chance presents today for follow up visit to discuss diabetes management. Blood work planned for next month- will add formal antibody testing and c-peptide to these labs. Unfortunately, Dexcom was too expensive, as was Mark. Per [facility] records, high glycemic variability continues: Overall glucose range  mg/dL. Fasting Bgs are consistently < 130, but more variability present later in the day. She has not checked labs yet to check T1 vs T2 diabetes. She reports she feels well. She has been using the SS insulin consistently. Advised to start taking mealtime insulin ahead of meals with carbs where she knows her blood sugar will spike. She has labs planned next month.     She reports she was in the hospital with acidosis - she will obtain these records for me. May need to d/c Jardiance.    Highest weight at 429 lbs. H/o gastric bypass January 2008. Has always been treated as a T2 diabetic but suspects she has had diabetes since childhood.    Current diet: Reviewed the diabetic diet   Activity Level:     Lab Results   Component Value Date    HGBA1C 6.3 (H) 01/18/2023    HGBA1C 7.5 (H) 08/22/2022    HGBA1C 8.6 (H) 04/19/2022       STANDARDS OF CARE  Diabetes Management Status    Statin: Taking  ACE/ARB: Not taking    Screening or Prevention Patient's value Goal  Complete/Controlled?   HgA1C Testing and Control   Lab Results   Component Value Date    HGBA1C 6.3 (H) 01/18/2023      Annually/Less than 8% Yes     Lipid profile : 01/18/2023 Annually Yes     LDL control Lab Results   Component Value Date    LDLCALC 41.8 (L) 01/18/2023    Annually/Less than 100 mg/dl  Yes     Nephropathy screening Lab Results   Component Value Date    LABMICR <5.0 01/18/2023     Lab Results   Component Value Date    PROTEINUA Negative 12/27/2022     No results found for: UTPCR   Annually Yes     Blood pressure BP Readings from Last 1 Encounters:   06/05/23 118/73    Less than 140/90 Yes     Dilated retinal exam : 08/22/2022 Annually Yes     Foot exam   : 01/20/2022 Annually No Deferred-video visit today.          Labs reviewed and are noted below.    Lab Results   Component Value Date    WBC 6.34 12/27/2022    HGB 9.7 (L) 12/27/2022    HCT 31.3 (L) 12/27/2022     12/27/2022    CHOL 155 01/18/2023    TRIG 236 (H) 01/18/2023    HDL 66 01/18/2023    LDLCALC 41.8 (L) 01/18/2023    ALT 80 (H) 01/18/2023    AST 57 (H) 01/18/2023     (L) 01/18/2023    K 4.2 01/18/2023     01/18/2023    ANIONGAP 12 01/18/2023    CREATININE 1.1 01/18/2023    ESTGFRAFRICA >60.0 01/13/2022    EGFRNONAA >60.0 01/13/2022    BUN 29 (H) 01/18/2023    CO2 22 (L) 01/18/2023    TSH 0.664 01/13/2022    INR 1.0 09/11/2020     (H) 01/18/2023     Lab Results   Component Value Date    TSH 0.664 01/13/2022    IRON 56 04/19/2022    TIBC 357 04/19/2022    FERRITIN 50 04/19/2022    UKRKYRQT32 381 01/18/2023    PTH 71.0 09/10/2013    CALCIUM 9.3 01/18/2023    PHOS 4.3 04/01/2015     No results found for: CPEPTIDE  No results found for: GLUTAMICACID  Glucose   Date Value Ref Range Status   01/18/2023 279 (H) 70 - 110 mg/dL Final     Anion Gap   Date Value Ref Range Status   01/18/2023 12 8 - 16 mmol/L Final     eGFR if    Date Value Ref Range Status   01/13/2022 >60.0 >60 mL/min/1.73 m^2 Final      eGFR if non    Date Value Ref Range Status   01/13/2022 >60.0 >60 mL/min/1.73 m^2 Final     Comment:     Calculation used to obtain the estimated glomerular filtration  rate (eGFR) is the CKD-EPI equation.          The following portions of the patient's history were reviewed and updated as appropriate: allergies, current medications, past family history, past medical history, past social history, past surgical history, and problem list.    Review of patient's allergies indicates:   Allergen Reactions    Demerol [meperidine] Hallucinations    Lamictal [lamotrigine] Rash     Rash to face in chart 2014 or 15     Penicillins Other (See Comments)     Patient cannot recall specific reaction, reports she has been listing this as an allergy since childhood.    Bactrim [sulfamethoxazole-trimethoprim]     Ciprofloxacin (bulk)     Codeine Nausea And Vomiting    Levetiracetam     Toradol [ketorolac]     Tramadol      seizures    Morpholine analogues Diarrhea, Itching and Nausea And Vomiting     Social History     Socioeconomic History    Marital status: Significant Other    Number of children: 4   Tobacco Use    Smoking status: Never    Smokeless tobacco: Never   Substance and Sexual Activity    Alcohol use: No    Drug use: No    Sexual activity: Not Currently     Partners: Male     Birth control/protection: Surgical     Comment: BTL; mut monog   Social History Narrative    ** Merged History Encounter **         Lives in [place]     Social Determinants of Health     Financial Resource Strain: Medium Risk    Difficulty of Paying Living Expenses: Somewhat hard   Food Insecurity: Unknown    Worried About Running Out of Food in the Last Year: Patient refused    Ran Out of Food in the Last Year: Never true   Transportation Needs: Unmet Transportation Needs    Lack of Transportation (Medical): Yes    Lack of Transportation (Non-Medical): Yes   Physical Activity: Inactive    Days of Exercise per Week: 0 days     Minutes of Exercise per Session: 0 min   Social Connections: Unknown    Frequency of Communication with Friends and Family: Once a week    Frequency of Social Gatherings with Friends and Family: Never    Active Member of Clubs or Organizations: No    Attends Club or Organization Meetings: 1 to 4 times per year    Marital Status:    Housing Stability: High Risk    Unable to Pay for Housing in the Last Year: Yes    Number of Places Lived in the Last Year: 1    Unstable Housing in the Last Year: No     Past Medical History:   Diagnosis Date    Abnormal Pap smear 1991    bx was negative, per pt    Anemia     Anxiety     B12 deficiency     Blood transfusion     multiple     Chronic LBP     Degenerative disc disease     l spine    Diabetes mellitus     Diabetic neuropathy     General anesthetics causing adverse effect in therapeutic use     delayed emergence    Mixed hyperlipidemia 11/18/2013    PONV (postoperative nausea and vomiting)     RLS (restless legs syndrome)     Seizures     Sleep apnea     with CPAP    Vitamin D deficiency disease        REVIEW OF SYSTEMS:  Eyes No history of DR.  Cardiovascular: History of HLD.  GI: Denies nausea,vomiting,constipation,or diarrhea.  : No CKD.  Neuro: Positive neuropathy.  PSYCH: No tobacco use.  ENDO: See HPI.        Objective:      There were no vitals filed for this visit.  RESPIRATORY: No respiratory distress  NEUROLOGIC: not assessed-virtual visit.  PSYCHIATRIC: Alert & oriented x3. Normal mood and affect.  FOOT EXAMINATION: Deferred-virtual visit   Assessment:       1. Uncontrolled type 2 diabetes mellitus with hyperglycemia, with long-term current use of insulin    2. Hyperlipidemia associated with type 2 diabetes mellitus          Plan:   Jadyn was seen today for diabetes mellitus.    Diagnoses and all orders for this visit:    Uncontrolled type 2 diabetes mellitus with hyperglycemia, with long-term current use of insulin    Chronic-    -- Medications  "adjusted for today's visit include:    Continue Tresiba at 15 units once daily.     Start Novolog 4 units before each main meal that you know will cause a glucose spike > 180 when blood sugar is checked after 1-2 hours. Do not take Novolog if you skip the meal or if the meal contains zero carbs. Can add extra insulin based on sliding scale as needed based on the pre-meal blood sugar.     150-200: 1 unit  201-250: 2 units  251-300: 3 units  301-350: 4 units  351-400: 5 units    Continue Jardiance and Metformin for now.    Hyperlipidemia associated with type 2 diabetes mellitus    Chronic-Continue current.     - Follow up: 6 weeks    Portions of this note may have been created with voice recognition software. Occasional "wrong-word" or "sound-a-like" substitutions may have occurred due to the inherent limitations of voice recognition software. Please, read the note carefully and recognize, using context, where substitutions have occurred.               Sheri Ammons,FNP-C [facility]       "

## 2023-06-07 NOTE — PATIENT INSTRUCTIONS
-- Medications adjusted for today's visit include:    Continue Tresiba at 15 units once daily.     Start Novolog 4 units before each main meal that you know will cause a glucose spike > 180 when blood sugar is checked after 1-2 hours. Do not take Novolog if you skip the meal or if the meal contains zero carbs. Can add extra insulin based on sliding scale as needed based on the pre-meal blood sugar.     150-200: 1 unit  201-250: 2 units  251-300: 3 units  301-350: 4 units  351-400: 5 units    Continue Jardiance and Metformin for now.    -- Limit carbs to no more than 30-45 grams with each meal. Never eat carbs by themselves, always add protein. Make snacks low carb or non-carb only.    -- Continue checking blood sugar 3 times daily: Fasting blood sugar and vary your next 2 readings: Before lunch, before supper, 2 hours after any meal, or bedtime.   -Blood sugar goals should be a fasting blood sugar between , and no blood sugars throughout the day over 180 is good, less than 160 better, less than 140 perfect.    --Carry glucose tablets/soft peppermints/regular juice or Coke product with you at all times to treat a low blood sugar episode (less than 70). If your blood sugar is between 50-70, Chew 4 tablets or drink 1/2 cup of juice or regular Coke product. If your blood sugar is below 50, double the treatment. Re-check blood sugar in 15 minutes. If still low, repeat this. Always call the clinic to give an update for any low blood sugar episodes.    --Exercise as tolerated: Goal 30 minutes/day, 5 days/week. Start slowly and increase as tolerated.    --Follow-up for your next visit in 6 weeks.    --Please either drop off, fax, or MyChart your readings to me as needed.

## 2023-06-07 NOTE — Clinical Note
Please schedule a 6 week follow up visit with me. Notes: No devices. Please schedule C-peptide, Glutamic Acid Decarboxylase, and insulin antibody study with labs already scheduled for PCP later this month.

## 2023-06-14 RX ORDER — MELOXICAM 7.5 MG/1
TABLET ORAL
Qty: 60 TABLET | Refills: 0 | Status: SHIPPED | OUTPATIENT
Start: 2023-06-14

## 2023-06-14 NOTE — TELEPHONE ENCOUNTER
Refill Routing Note   Medication(s) are not appropriate for processing by [facility] for the following reason(s):      Medication outside of protocol  Responsible provider unclear    [facility] action(s):  Route None identified          Appointments  past 12m or future 3m with PCP    Date Provider   Last Visit   4/19/2022 Mikayla Yates MD   Next Visit   Visit date not found Mikayla Yates MD   ED visits in past 90 days: 0        Note composed:10:04 AM 06/14/2023

## 2023-06-16 ENCOUNTER — TELEPHONE (OUTPATIENT)
Dept: ADMINISTRATIVE | Facility: HOSPITAL | Age: 53
End: 2023-06-16
Payer: MEDICARE

## 2023-06-26 NOTE — TELEPHONE ENCOUNTER
Care Due:                  Date            Visit Type   Department     Provider  --------------------------------------------------------------------------------                                EP -                              PRIMARY      [facility] INTERNAL  Last Visit: 02-      CARE ([facility])   PARTH Baig                              EP -                              PRIMARY      [facility] INTERNAL  Next Visit: 08-      CARE ([facility])   PARTH Baig                                                            Last  Test          Frequency    Reason                     Performed    Due Date  --------------------------------------------------------------------------------    HBA1C.......  6 months...  empagliflozin, metFORMIN.  01- 07-    [facility] Embedded Care Due Messages. Reference number: [id number].   6/26/2023 5:15:56 PM CDT

## 2023-06-27 RX ORDER — PROMETHAZINE HYDROCHLORIDE 25 MG/1
TABLET ORAL
Qty: 40 TABLET | Refills: 0 | Status: SHIPPED | OUTPATIENT
Start: 2023-06-27 | End: 2023-08-03 | Stop reason: SDUPTHER

## 2023-06-28 ENCOUNTER — PES CALL (OUTPATIENT)
Dept: ADMINISTRATIVE | Facility: CLINIC | Age: 53
End: 2023-06-28
Payer: MEDICARE

## 2023-07-12 RX ORDER — NARATRIPTAN 2.5 MG/1
TABLET ORAL
Qty: 27 TABLET | Refills: 1 | Status: SHIPPED | OUTPATIENT
Start: 2023-07-12 | End: 2023-08-03

## 2023-07-12 NOTE — TELEPHONE ENCOUNTER
Refill Decision Note   Jadyn Chance  is requesting a refill authorization.  Brief Assessment and Rationale for Refill:  Approve     Medication Therapy Plan:         Comments:     Note composed:3:31 PM 07/12/2023

## 2023-07-12 NOTE — TELEPHONE ENCOUNTER
No care due was identified.  [facility] Embedded Care Due Messages. Reference number: [id number].   7/12/2023 3:30:21 AM CDT

## 2023-07-26 ENCOUNTER — OFFICE VISIT (OUTPATIENT)
Dept: DIABETES | Facility: CLINIC | Age: 53
End: 2023-07-26
Payer: MEDICARE

## 2023-07-26 DIAGNOSIS — E11.69 HYPERLIPIDEMIA ASSOCIATED WITH TYPE 2 DIABETES MELLITUS: ICD-10-CM

## 2023-07-26 DIAGNOSIS — E78.5 HYPERLIPIDEMIA ASSOCIATED WITH TYPE 2 DIABETES MELLITUS: ICD-10-CM

## 2023-07-26 DIAGNOSIS — E11.65 UNCONTROLLED TYPE 2 DIABETES MELLITUS WITH HYPERGLYCEMIA, WITH LONG-TERM CURRENT USE OF INSULIN: Primary | ICD-10-CM

## 2023-07-26 DIAGNOSIS — Z79.4 UNCONTROLLED TYPE 2 DIABETES MELLITUS WITH HYPERGLYCEMIA, WITH LONG-TERM CURRENT USE OF INSULIN: Primary | ICD-10-CM

## 2023-07-26 PROCEDURE — 3072F PR LOW RISK FOR RETINOPATHY: ICD-10-PCS | Mod: HCNC,CPTII,95, | Performed by: NURSE PRACTITIONER

## 2023-07-26 PROCEDURE — 1160F PR REVIEW ALL MEDS BY PRESCRIBER/CLIN PHARMACIST DOCUMENTED: ICD-10-PCS | Mod: HCNC,CPTII,95, | Performed by: NURSE PRACTITIONER

## 2023-07-26 PROCEDURE — 1160F RVW MEDS BY RX/DR IN RCRD: CPT | Mod: HCNC,CPTII,95, | Performed by: NURSE PRACTITIONER

## 2023-07-26 PROCEDURE — 1159F PR MEDICATION LIST DOCUMENTED IN MEDICAL RECORD: ICD-10-PCS | Mod: HCNC,CPTII,95, | Performed by: NURSE PRACTITIONER

## 2023-07-26 PROCEDURE — 3066F NEPHROPATHY DOC TX: CPT | Mod: HCNC,CPTII,95, | Performed by: NURSE PRACTITIONER

## 2023-07-26 PROCEDURE — 3044F PR MOST RECENT HEMOGLOBIN A1C LEVEL <7.0%: ICD-10-PCS | Mod: HCNC,CPTII,95, | Performed by: NURSE PRACTITIONER

## 2023-07-26 PROCEDURE — 3044F HG A1C LEVEL LT 7.0%: CPT | Mod: HCNC,CPTII,95, | Performed by: NURSE PRACTITIONER

## 2023-07-26 PROCEDURE — 99214 OFFICE O/P EST MOD 30 MIN: CPT | Mod: HCNC,95,, | Performed by: NURSE PRACTITIONER

## 2023-07-26 PROCEDURE — 99214 PR OFFICE/OUTPT VISIT, EST, LEVL IV, 30-39 MIN: ICD-10-PCS | Mod: HCNC,95,, | Performed by: NURSE PRACTITIONER

## 2023-07-26 PROCEDURE — 3061F NEG MICROALBUMINURIA REV: CPT | Mod: HCNC,CPTII,95, | Performed by: NURSE PRACTITIONER

## 2023-07-26 PROCEDURE — 3061F PR NEG MICROALBUMINURIA RESULT DOCUMENTED/REVIEW: ICD-10-PCS | Mod: HCNC,CPTII,95, | Performed by: NURSE PRACTITIONER

## 2023-07-26 PROCEDURE — 3072F LOW RISK FOR RETINOPATHY: CPT | Mod: HCNC,CPTII,95, | Performed by: NURSE PRACTITIONER

## 2023-07-26 PROCEDURE — 1159F MED LIST DOCD IN RCRD: CPT | Mod: HCNC,CPTII,95, | Performed by: NURSE PRACTITIONER

## 2023-07-26 PROCEDURE — 3066F PR DOCUMENTATION OF TREATMENT FOR NEPHROPATHY: ICD-10-PCS | Mod: HCNC,CPTII,95, | Performed by: NURSE PRACTITIONER

## 2023-07-26 RX ORDER — INSULIN DEGLUDEC 100 U/ML
15 INJECTION, SOLUTION SUBCUTANEOUS DAILY
Qty: 15 PEN | Refills: 3 | Status: SHIPPED | OUTPATIENT
Start: 2023-07-26

## 2023-07-26 NOTE — Clinical Note
She is going to come by on Mon to drop off records- pls scan to her chart but also leave me a copy on my desk. Needs a 6 week follow up visit with me. Notes: [facility]

## 2023-07-26 NOTE — PROGRESS NOTES
The patient location is: [place]  The chief complaint leading to consultation is: diabetes management follow up     Visit type: audiovisual    Face to Face time with patient: 12 minutes  20 minutes of total time spent on the encounter, which includes face to face time and non-face to face time preparing to see the patient (eg, review of tests), Obtaining and/or reviewing separately obtained history, Documenting clinical information in the electronic or other health record, Independently interpreting results (not separately reported) and communicating results to the patient/family/caregiver, or Care coordination (not separately reported).         Each patient to whom he or she provides medical services by telemedicine is:  (1) informed of the relationship between the physician and patient and the respective role of any other health care provider with respect to management of the patient; and (2) notified that he or she may decline to receive medical services by telemedicine and may withdraw from such care at any time.    Notes:      Subjective:         Patient ID: Jadyn Chance is a 52 y.o. female.  Patient's current PCP is Vikash Baig MD.     Chief Complaint: Diabetes Mellitus    HPI  Jadyn Chance is a 52 y.o. White female presenting for a follow up for diabetes. Patient has been diagnosed with type 2 diabetes for several years.  Received diabetes education: Yes-[facility], 2023    CURRENT DM MEDICATIONS:   Diabetes Medications               empagliflozin (JARDIANCE) 25 mg tablet Take 1 tablet (25 mg total) by mouth once daily.    metFORMIN (GLUCOPHAGE-XR) 500 MG ER 24hr tablet TAKE 2 TABLETS TWICE DAILY WITH MEALS    NOVOLOG FLEXPEN U-100 INSULIN 100 unit/mL (3 mL) InPn pen Inject 3 times per day per sliding scale - can use every 4 hours as needed to correct a high blood sugar.  : No units  131-180: 2 units  181-240: 4 units  241-300: 6 units  301-350: 8 units  350-400: 10 units     TRESIBA FLEXTOUCH U-100 100 unit/mL (3 mL) insulin pen Inject 15 Units into the skin once daily. Discontinue Levemir.           Past failed treatment include: Actos, has taken Regular insulin in the past. Levemir-having issues with pen due to limited manual dexterity.     Blood glucose testing is performed regularly. Patient is testing 2-3 times per day.  Meter: [facility] / Unfortunately, Dexcom G7 was too expensive  Preferred lab: [facility]    Any episodes of hypoglycemia? Denies    Complications related to diabetes: none    Her blood sugar in the clinic today was:   Lab Results   Component Value Date    POCGLU 360 (A) 04/25/2018     Jadyn Chance presents today for follow up visit to discuss diabetes management. Unfortunately, she experienced a fall and broke her dentures. She was without dentures for 3 weeks- recently obtained. She has been struggling with diet as a result, so did not take the recommended 4 units of Novolog before each main meal. Per [facility] records, high glycemic variability continues with overall range  mg/dL for July. She has labs set up for next month that will confirm T1 vs T2 diabetes. She is in agreement to try a set dose of Novolog before each main meal that contains carbs in an effort to prevent the high blood sugar following meals. She will keep me updated. Unfortunately, Dexcom was too expensive.    She reports she was in the hospital with acidosis - she will bring a copy of this paperwork on Monday. May need to d/c Jardiance.    Highest weight at 429 lbs. H/o gastric bypass January 2008. Has always been treated as a T2 diabetic but suspects she has had diabetes since childhood.    Current diet: Reviewed the diabetic diet   Activity Level:     Lab Results   Component Value Date    HGBA1C 6.3 (H) 01/18/2023    HGBA1C 7.5 (H) 08/22/2022    HGBA1C 8.6 (H) 04/19/2022     STANDARDS OF CARE  Diabetes Management Status    Statin: Taking  ACE/ARB: Not taking    Screening or  Prevention Patient's value Goal Complete/Controlled?   HgA1C Testing and Control   Lab Results   Component Value Date    HGBA1C 6.3 (H) 01/18/2023      Annually/Less than 8% Yes     Lipid profile : 01/18/2023 Annually Yes     LDL control Lab Results   Component Value Date    LDLCALC 41.8 (L) 01/18/2023    Annually/Less than 100 mg/dl  Yes     Nephropathy screening Lab Results   Component Value Date    LABMICR <5.0 01/18/2023     Lab Results   Component Value Date    PROTEINUA Negative 12/27/2022     No results found for: UTPCR   Annually Yes     Blood pressure BP Readings from Last 1 Encounters:   06/05/23 118/73    Less than 140/90 Yes     Dilated retinal exam : 08/22/2022 Annually Yes     Foot exam   : 01/20/2022 Annually No Deferred-video visit today.          Labs reviewed and are noted below.    Lab Results   Component Value Date    WBC 6.34 12/27/2022    HGB 9.7 (L) 12/27/2022    HCT 31.3 (L) 12/27/2022     12/27/2022    CHOL 155 01/18/2023    TRIG 236 (H) 01/18/2023    HDL 66 01/18/2023    LDLCALC 41.8 (L) 01/18/2023    ALT 80 (H) 01/18/2023    AST 57 (H) 01/18/2023     (L) 01/18/2023    K 4.2 01/18/2023     01/18/2023    ANIONGAP 12 01/18/2023    CREATININE 1.1 01/18/2023    ESTGFRAFRICA >60.0 01/13/2022    EGFRNONAA >60.0 01/13/2022    BUN 29 (H) 01/18/2023    CO2 22 (L) 01/18/2023    TSH 0.664 01/13/2022    INR 1.0 09/11/2020     (H) 01/18/2023     Lab Results   Component Value Date    TSH 0.664 01/13/2022    IRON 56 04/19/2022    TIBC 357 04/19/2022    FERRITIN 50 04/19/2022    KNHPGJVD79 381 01/18/2023    PTH 71.0 09/10/2013    CALCIUM 9.3 01/18/2023    PHOS 4.3 04/01/2015     No results found for: CPEPTIDE  No results found for: GLUTAMICACID  Glucose   Date Value Ref Range Status   01/18/2023 279 (H) 70 - 110 mg/dL Final     Anion Gap   Date Value Ref Range Status   01/18/2023 12 8 - 16 mmol/L Final     eGFR if    Date Value Ref Range Status   01/13/2022 >60.0  >60 mL/min/1.73 m^2 Final     eGFR if non    Date Value Ref Range Status   01/13/2022 >60.0 >60 mL/min/1.73 m^2 Final     Comment:     Calculation used to obtain the estimated glomerular filtration  rate (eGFR) is the CKD-EPI equation.          The following portions of the patient's history were reviewed and updated as appropriate: allergies, current medications, past family history, past medical history, past social history, past surgical history, and problem list.    Review of patient's allergies indicates:   Allergen Reactions    Demerol [meperidine] Hallucinations    Lamictal [lamotrigine] Rash     Rash to face in chart 2014 or 15     Penicillins Other (See Comments)     Patient cannot recall specific reaction, reports she has been listing this as an allergy since childhood.    Bactrim [sulfamethoxazole-trimethoprim]     Ciprofloxacin (bulk)     Codeine Nausea And Vomiting    Levetiracetam     Toradol [ketorolac]     Tramadol      seizures    Morpholine analogues Diarrhea, Itching and Nausea And Vomiting     Social History     Socioeconomic History    Marital status: Significant Other    Number of children: 4   Tobacco Use    Smoking status: Never    Smokeless tobacco: Never   Substance and Sexual Activity    Alcohol use: No    Drug use: No    Sexual activity: Not Currently     Partners: Male     Birth control/protection: Surgical     Comment: BTL; mut monog   Social History Narrative    ** Merged History Encounter **         Lives in [place]     Social Determinants of Health     Financial Resource Strain: Medium Risk    Difficulty of Paying Living Expenses: Somewhat hard   Food Insecurity: Unknown    Worried About Running Out of Food in the Last Year: Patient refused    Ran Out of Food in the Last Year: Never true   Transportation Needs: Unmet Transportation Needs    Lack of Transportation (Medical): Yes    Lack of Transportation (Non-Medical): Yes   Physical Activity: Inactive    Days of  Exercise per Week: 0 days    Minutes of Exercise per Session: 0 min   Social Connections: Unknown    Frequency of Communication with Friends and Family: Once a week    Frequency of Social Gatherings with Friends and Family: Never    Active Member of Clubs or Organizations: No    Attends Club or Organization Meetings: 1 to 4 times per year    Marital Status:    Housing Stability: High Risk    Unable to Pay for Housing in the Last Year: Yes    Number of Places Lived in the Last Year: 1    Unstable Housing in the Last Year: No     Past Medical History:   Diagnosis Date    Abnormal Pap smear 1991    bx was negative, per pt    Anemia     Anxiety     B12 deficiency     Blood transfusion     multiple     Chronic LBP     Degenerative disc disease     l spine    Diabetes mellitus     Diabetic neuropathy     General anesthetics causing adverse effect in therapeutic use     delayed emergence    Mixed hyperlipidemia 11/18/2013    PONV (postoperative nausea and vomiting)     RLS (restless legs syndrome)     Seizures     Sleep apnea     with CPAP    Vitamin D deficiency disease        REVIEW OF SYSTEMS:  Eyes No history of DR.  Cardiovascular: History of HLD.  GI: Denies nausea,vomiting,constipation,or diarrhea.  : No CKD.  Neuro: Positive neuropathy.  PSYCH: No tobacco use.  ENDO: See HPI.        Objective:      There were no vitals filed for this visit.  RESPIRATORY: No respiratory distress  NEUROLOGIC: not assessed-virtual visit.  PSYCHIATRIC: Alert & oriented x3. Normal mood and affect.  FOOT EXAMINATION: Deferred-virtual visit   Assessment:       1. Uncontrolled type 2 diabetes mellitus with hyperglycemia, with long-term current use of insulin    2. Hyperlipidemia associated with type 2 diabetes mellitus          Plan:   Jadyn was seen today for diabetes mellitus.    Diagnoses and all orders for this visit:    Uncontrolled type 2 diabetes mellitus with hyperglycemia, with long-term current use of insulin  -     " TRESIBA FLEXTOUCH U-100 100 unit/mL (3 mL) insulin pen; Inject 15 Units into the skin once daily. Discontinue Levemir.    Chronic-  Suspicion for T1. Labs set for Mon. She will bring a copy of hospital stay records with acidosis - may need to d/c Jardiance.     -- Medications adjusted for today's visit include:    Continue Tresiba at 15 units once daily.     Start Novolog 4 units before each main meal that you know will cause a glucose spike > 180 when blood sugar is checked after 1-2 hours. Do not take Novolog if you skip the meal or if the meal contains zero carbs. Can add extra insulin based on sliding scale as needed based on the pre-meal blood sugar.     150-200: 1 unit  201-250: 2 units  251-300: 3 units  301-350: 4 units  351-400: 5 units    Continue Jardiance and Metformin for now.    Hyperlipidemia associated with type 2 diabetes mellitus    Chronic-Continue current.     - Follow up: 6 weeks    Portions of this note may have been created with voice recognition software. Occasional "wrong-word" or "sound-a-like" substitutions may have occurred due to the inherent limitations of voice recognition software. Please, read the note carefully and recognize, using context, where substitutions have occurred.               Sheri Ammons,FNP-C [facility]         "

## 2023-08-03 ENCOUNTER — LAB VISIT (OUTPATIENT)
Dept: LAB | Facility: HOSPITAL | Age: 53
End: 2023-08-03
Attending: NURSE PRACTITIONER
Payer: MEDICARE

## 2023-08-03 ENCOUNTER — OFFICE VISIT (OUTPATIENT)
Dept: INTERNAL MEDICINE | Facility: CLINIC | Age: 53
End: 2023-08-03
Payer: MEDICARE

## 2023-08-03 VITALS
BODY MASS INDEX: 24.53 KG/M2 | TEMPERATURE: 97 F | WEIGHT: 138.44 LBS | DIASTOLIC BLOOD PRESSURE: 72 MMHG | HEART RATE: 114 BPM | OXYGEN SATURATION: 97 % | SYSTOLIC BLOOD PRESSURE: 116 MMHG

## 2023-08-03 DIAGNOSIS — R79.89 LOW VITAMIN D LEVEL: ICD-10-CM

## 2023-08-03 DIAGNOSIS — Z79.4 UNCONTROLLED TYPE 2 DIABETES MELLITUS WITH HYPERGLYCEMIA, WITH LONG-TERM CURRENT USE OF INSULIN: ICD-10-CM

## 2023-08-03 DIAGNOSIS — Z79.4 TYPE 2 DIABETES MELLITUS WITHOUT COMPLICATION, WITH LONG-TERM CURRENT USE OF INSULIN: Chronic | ICD-10-CM

## 2023-08-03 DIAGNOSIS — E53.8 VITAMIN B12 DEFICIENCY: ICD-10-CM

## 2023-08-03 DIAGNOSIS — E78.5 HYPERLIPIDEMIA ASSOCIATED WITH TYPE 2 DIABETES MELLITUS: ICD-10-CM

## 2023-08-03 DIAGNOSIS — R11.0 NAUSEA: ICD-10-CM

## 2023-08-03 DIAGNOSIS — E11.65 UNCONTROLLED TYPE 2 DIABETES MELLITUS WITH HYPERGLYCEMIA, WITH LONG-TERM CURRENT USE OF INSULIN: ICD-10-CM

## 2023-08-03 DIAGNOSIS — E55.9 VITAMIN D DEFICIENCY: ICD-10-CM

## 2023-08-03 DIAGNOSIS — E11.9 TYPE 2 DIABETES MELLITUS WITHOUT COMPLICATION, WITH LONG-TERM CURRENT USE OF INSULIN: Chronic | ICD-10-CM

## 2023-08-03 DIAGNOSIS — D53.9 MACROCYTIC ANEMIA: ICD-10-CM

## 2023-08-03 DIAGNOSIS — E11.69 HYPERLIPIDEMIA ASSOCIATED WITH TYPE 2 DIABETES MELLITUS: ICD-10-CM

## 2023-08-03 DIAGNOSIS — E11.69 HYPERLIPIDEMIA ASSOCIATED WITH TYPE 2 DIABETES MELLITUS: Primary | ICD-10-CM

## 2023-08-03 DIAGNOSIS — E78.5 HYPERLIPIDEMIA ASSOCIATED WITH TYPE 2 DIABETES MELLITUS: Primary | ICD-10-CM

## 2023-08-03 PROBLEM — R73.9 HYPERGLYCEMIA: Status: RESOLVED | Noted: 2018-04-25 | Resolved: 2023-08-03

## 2023-08-03 LAB
ALBUMIN SERPL BCP-MCNC: 3.9 G/DL (ref 3.5–5.2)
ALP SERPL-CCNC: 91 U/L (ref 55–135)
ALT SERPL W/O P-5'-P-CCNC: 47 U/L (ref 10–44)
ANION GAP SERPL CALC-SCNC: 13 MMOL/L (ref 8–16)
AST SERPL-CCNC: 47 U/L (ref 10–40)
BILIRUB DIRECT SERPL-MCNC: 0.5 MG/DL (ref 0.1–0.3)
BILIRUB SERPL-MCNC: 1.3 MG/DL (ref 0.1–1)
BUN SERPL-MCNC: 20 MG/DL (ref 6–20)
C PEPTIDE SERPL-MCNC: 0.87 NG/ML (ref 0.78–5.19)
CALCIUM SERPL-MCNC: 9.1 MG/DL (ref 8.7–10.5)
CHLORIDE SERPL-SCNC: 108 MMOL/L (ref 95–110)
CO2 SERPL-SCNC: 17 MMOL/L (ref 23–29)
CREAT SERPL-MCNC: 0.8 MG/DL (ref 0.5–1.4)
EST. GFR  (NO RACE VARIABLE): >60 ML/MIN/1.73 M^2
GLUCOSE SERPL-MCNC: 192 MG/DL (ref 70–110)
POTASSIUM SERPL-SCNC: 3.3 MMOL/L (ref 3.5–5.1)
PROT SERPL-MCNC: 6.9 G/DL (ref 6–8.4)
SODIUM SERPL-SCNC: 138 MMOL/L (ref 136–145)

## 2023-08-03 PROCEDURE — 3078F DIAST BP <80 MM HG: CPT | Mod: HCNC,CPTII,S$GLB, | Performed by: INTERNAL MEDICINE

## 2023-08-03 PROCEDURE — 86341 ISLET CELL ANTIBODY: CPT | Mod: HCNC | Performed by: NURSE PRACTITIONER

## 2023-08-03 PROCEDURE — 99214 OFFICE O/P EST MOD 30 MIN: CPT | Mod: HCNC,S$GLB,, | Performed by: INTERNAL MEDICINE

## 2023-08-03 PROCEDURE — 3078F PR MOST RECENT DIASTOLIC BLOOD PRESSURE < 80 MM HG: ICD-10-PCS | Mod: HCNC,CPTII,S$GLB, | Performed by: INTERNAL MEDICINE

## 2023-08-03 PROCEDURE — 99214 PR OFFICE/OUTPT VISIT, EST, LEVL IV, 30-39 MIN: ICD-10-PCS | Mod: HCNC,S$GLB,, | Performed by: INTERNAL MEDICINE

## 2023-08-03 PROCEDURE — 3074F PR MOST RECENT SYSTOLIC BLOOD PRESSURE < 130 MM HG: ICD-10-PCS | Mod: HCNC,CPTII,S$GLB, | Performed by: INTERNAL MEDICINE

## 2023-08-03 PROCEDURE — 1159F PR MEDICATION LIST DOCUMENTED IN MEDICAL RECORD: ICD-10-PCS | Mod: HCNC,CPTII,S$GLB, | Performed by: INTERNAL MEDICINE

## 2023-08-03 PROCEDURE — 3008F BODY MASS INDEX DOCD: CPT | Mod: HCNC,CPTII,S$GLB, | Performed by: INTERNAL MEDICINE

## 2023-08-03 PROCEDURE — 3044F PR MOST RECENT HEMOGLOBIN A1C LEVEL <7.0%: ICD-10-PCS | Mod: HCNC,CPTII,S$GLB, | Performed by: INTERNAL MEDICINE

## 2023-08-03 PROCEDURE — 99999 PR PBB SHADOW E&M-EST. PATIENT-LVL IV: ICD-10-PCS | Mod: PBBFAC,HCNC,, | Performed by: INTERNAL MEDICINE

## 2023-08-03 PROCEDURE — 80048 BASIC METABOLIC PNL TOTAL CA: CPT | Mod: HCNC | Performed by: NURSE PRACTITIONER

## 2023-08-03 PROCEDURE — 3061F PR NEG MICROALBUMINURIA RESULT DOCUMENTED/REVIEW: ICD-10-PCS | Mod: HCNC,CPTII,S$GLB, | Performed by: INTERNAL MEDICINE

## 2023-08-03 PROCEDURE — 3074F SYST BP LT 130 MM HG: CPT | Mod: HCNC,CPTII,S$GLB, | Performed by: INTERNAL MEDICINE

## 2023-08-03 PROCEDURE — 3044F HG A1C LEVEL LT 7.0%: CPT | Mod: HCNC,CPTII,S$GLB, | Performed by: INTERNAL MEDICINE

## 2023-08-03 PROCEDURE — 3072F LOW RISK FOR RETINOPATHY: CPT | Mod: HCNC,CPTII,S$GLB, | Performed by: INTERNAL MEDICINE

## 2023-08-03 PROCEDURE — 3008F PR BODY MASS INDEX (BMI) DOCUMENTED: ICD-10-PCS | Mod: HCNC,CPTII,S$GLB, | Performed by: INTERNAL MEDICINE

## 2023-08-03 PROCEDURE — 99999 PR PBB SHADOW E&M-EST. PATIENT-LVL IV: CPT | Mod: PBBFAC,HCNC,, | Performed by: INTERNAL MEDICINE

## 2023-08-03 PROCEDURE — 86337 INSULIN ANTIBODIES: CPT | Mod: HCNC | Performed by: NURSE PRACTITIONER

## 2023-08-03 PROCEDURE — 3072F PR LOW RISK FOR RETINOPATHY: ICD-10-PCS | Mod: HCNC,CPTII,S$GLB, | Performed by: INTERNAL MEDICINE

## 2023-08-03 PROCEDURE — 80076 HEPATIC FUNCTION PANEL: CPT | Mod: HCNC | Performed by: INTERNAL MEDICINE

## 2023-08-03 PROCEDURE — 3066F PR DOCUMENTATION OF TREATMENT FOR NEPHROPATHY: ICD-10-PCS | Mod: HCNC,CPTII,S$GLB, | Performed by: INTERNAL MEDICINE

## 2023-08-03 PROCEDURE — 84681 ASSAY OF C-PEPTIDE: CPT | Mod: HCNC | Performed by: NURSE PRACTITIONER

## 2023-08-03 PROCEDURE — 36415 COLL VENOUS BLD VENIPUNCTURE: CPT | Mod: HCNC | Performed by: INTERNAL MEDICINE

## 2023-08-03 PROCEDURE — 3061F NEG MICROALBUMINURIA REV: CPT | Mod: HCNC,CPTII,S$GLB, | Performed by: INTERNAL MEDICINE

## 2023-08-03 PROCEDURE — 1159F MED LIST DOCD IN RCRD: CPT | Mod: HCNC,CPTII,S$GLB, | Performed by: INTERNAL MEDICINE

## 2023-08-03 PROCEDURE — 3066F NEPHROPATHY DOC TX: CPT | Mod: HCNC,CPTII,S$GLB, | Performed by: INTERNAL MEDICINE

## 2023-08-03 RX ORDER — PRAVASTATIN SODIUM 20 MG/1
20 TABLET ORAL DAILY
Qty: 90 TABLET | Refills: 3 | Status: SHIPPED | OUTPATIENT
Start: 2023-08-03 | End: 2024-02-23 | Stop reason: SDUPTHER

## 2023-08-03 RX ORDER — PROMETHAZINE HYDROCHLORIDE 25 MG/1
TABLET ORAL
Qty: 40 TABLET | Refills: 0 | Status: SHIPPED | OUTPATIENT
Start: 2023-08-03 | End: 2023-08-28

## 2023-08-03 RX ORDER — CYANOCOBALAMIN 1000 UG/ML
1000 INJECTION, SOLUTION INTRAMUSCULAR; SUBCUTANEOUS
Qty: 10 ML | Refills: 11 | Status: SHIPPED | OUTPATIENT
Start: 2023-08-03

## 2023-08-03 RX ORDER — METFORMIN HYDROCHLORIDE 500 MG/1
TABLET, EXTENDED RELEASE ORAL
Qty: 360 TABLET | Refills: 1 | Status: SHIPPED | OUTPATIENT
Start: 2023-08-03 | End: 2024-02-23 | Stop reason: SDUPTHER
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
## 2023-08-06 LAB — GAD65 AB SER-SCNC: 0.01 NMOL/L

## 2023-08-11 LAB — INSULIN AB SER-SCNC: 0.02 NMOL/L (ref 0–0.02)

## 2023-08-22 ENCOUNTER — PATIENT MESSAGE (OUTPATIENT)
Dept: INTERNAL MEDICINE | Facility: CLINIC | Age: 53
End: 2023-08-22
Payer: MEDICARE

## 2023-08-22 DIAGNOSIS — Z29.9 PREVENTIVE MEASURE: ICD-10-CM

## 2023-08-22 DIAGNOSIS — E78.5 HYPERLIPIDEMIA ASSOCIATED WITH TYPE 2 DIABETES MELLITUS: ICD-10-CM

## 2023-08-22 DIAGNOSIS — E11.69 HYPERLIPIDEMIA ASSOCIATED WITH TYPE 2 DIABETES MELLITUS: ICD-10-CM

## 2023-08-22 RX ORDER — GABAPENTIN 300 MG/1
CAPSULE ORAL
Qty: 540 CAPSULE | Refills: 1 | OUTPATIENT
Start: 2023-08-22

## 2023-08-22 RX ORDER — GABAPENTIN 300 MG/1
CAPSULE ORAL
Qty: 540 CAPSULE | Refills: 1 | Status: SHIPPED | OUTPATIENT
Start: 2023-08-22 | End: 2024-01-08

## 2023-08-22 NOTE — TELEPHONE ENCOUNTER
No care due was identified.  [facility] Embedded Care Due Messages. Reference number: [id number].   8/22/2023 8:34:11 AM CDT

## 2023-08-22 NOTE — TELEPHONE ENCOUNTER
No care due was identified.  [facility] Embedded Care Due Messages. Reference number: [id number].   8/22/2023 9:33:11 AM CDT

## 2023-08-26 DIAGNOSIS — R11.0 NAUSEA: ICD-10-CM

## 2023-08-26 NOTE — TELEPHONE ENCOUNTER
Care Due:                  Date            Visit Type   Department     Provider  --------------------------------------------------------------------------------                                EP -                              PRIMARY      [facility] INTERNAL  Last Visit: 08-      CARE ([facility])   PARTH Baig                              EP -                              PRIMARY      [facility] INTERNAL  Next Visit: 02-      CARE ([facility])   PARTH Baig                                                            Last  Test          Frequency    Reason                     Performed    Due Date  --------------------------------------------------------------------------------    HBA1C.......  6 months...  empagliflozin, metFORMIN.  01- 07-    [facility] Embedded Care Due Messages. Reference number: [id number].   8/26/2023 12:35:50 PM CDT

## 2023-08-28 RX ORDER — PROMETHAZINE HYDROCHLORIDE 25 MG/1
TABLET ORAL
Qty: 40 TABLET | Refills: 0 | Status: SHIPPED | OUTPATIENT
Start: 2023-08-28 | End: 2023-10-09

## 2023-08-31 DIAGNOSIS — R11.0 NAUSEA: ICD-10-CM

## 2023-08-31 RX ORDER — PROMETHAZINE HYDROCHLORIDE 25 MG/1
TABLET ORAL
Qty: 40 TABLET | Refills: 0 | Status: CANCELLED | OUTPATIENT
Start: 2023-08-31

## 2023-08-31 NOTE — TELEPHONE ENCOUNTER
No care due was identified.  [facility] Embedded Care Due Messages. Reference number: [id number].   8/31/2023 9:54:58 AM CDT

## 2023-09-05 DIAGNOSIS — R11.0 NAUSEA: ICD-10-CM

## 2023-09-05 RX ORDER — PROMETHAZINE HYDROCHLORIDE 25 MG/1
TABLET ORAL
Qty: 40 TABLET | Refills: 0 | OUTPATIENT
Start: 2023-09-05

## 2023-09-05 NOTE — TELEPHONE ENCOUNTER
No care due was identified.  [facility] Embedded Care Due Messages. Reference number: [id number].   9/05/2023 10:30:56 AM CDT

## 2023-09-07 ENCOUNTER — TELEPHONE (OUTPATIENT)
Dept: DIABETES | Facility: CLINIC | Age: 53
End: 2023-09-07

## 2023-09-07 ENCOUNTER — LAB VISIT (OUTPATIENT)
Dept: LAB | Facility: HOSPITAL | Age: 53
End: 2023-09-07
Attending: NURSE PRACTITIONER
Payer: MEDICARE

## 2023-09-07 ENCOUNTER — OFFICE VISIT (OUTPATIENT)
Dept: DIABETES | Facility: CLINIC | Age: 53
End: 2023-09-07
Payer: MEDICARE

## 2023-09-07 VITALS
BODY MASS INDEX: 25.24 KG/M2 | HEIGHT: 63 IN | DIASTOLIC BLOOD PRESSURE: 59 MMHG | HEART RATE: 92 BPM | WEIGHT: 142.44 LBS | SYSTOLIC BLOOD PRESSURE: 101 MMHG

## 2023-09-07 DIAGNOSIS — E11.65 UNCONTROLLED TYPE 2 DIABETES MELLITUS WITH HYPERGLYCEMIA, WITH LONG-TERM CURRENT USE OF INSULIN: ICD-10-CM

## 2023-09-07 DIAGNOSIS — Z79.4 UNCONTROLLED TYPE 2 DIABETES MELLITUS WITH HYPERGLYCEMIA, WITH LONG-TERM CURRENT USE OF INSULIN: ICD-10-CM

## 2023-09-07 DIAGNOSIS — E55.9 VITAMIN D DEFICIENCY: ICD-10-CM

## 2023-09-07 DIAGNOSIS — E87.6 HYPOKALEMIA: ICD-10-CM

## 2023-09-07 DIAGNOSIS — E83.42 HYPOMAGNESEMIA: ICD-10-CM

## 2023-09-07 DIAGNOSIS — Z78.0 POSTMENOPAUSAL: ICD-10-CM

## 2023-09-07 DIAGNOSIS — E11.65 UNCONTROLLED TYPE 2 DIABETES MELLITUS WITH HYPERGLYCEMIA, WITH LONG-TERM CURRENT USE OF INSULIN: Primary | ICD-10-CM

## 2023-09-07 DIAGNOSIS — R30.0 DYSURIA: ICD-10-CM

## 2023-09-07 DIAGNOSIS — E78.5 HYPERLIPIDEMIA ASSOCIATED WITH TYPE 2 DIABETES MELLITUS: ICD-10-CM

## 2023-09-07 DIAGNOSIS — E11.69 HYPERLIPIDEMIA ASSOCIATED WITH TYPE 2 DIABETES MELLITUS: ICD-10-CM

## 2023-09-07 DIAGNOSIS — Z87.81 HISTORY OF FRACTURE OF LEFT HIP: ICD-10-CM

## 2023-09-07 DIAGNOSIS — Z79.4 UNCONTROLLED TYPE 2 DIABETES MELLITUS WITH HYPERGLYCEMIA, WITH LONG-TERM CURRENT USE OF INSULIN: Primary | ICD-10-CM

## 2023-09-07 LAB
BACTERIA #/AREA URNS HPF: NORMAL /HPF
BILIRUB UR QL STRIP: NEGATIVE
CLARITY UR: CLEAR
COLOR UR: YELLOW
ESTIMATED AVG GLUCOSE: 134 MG/DL (ref 68–131)
GLUCOSE SERPL-MCNC: 297 MG/DL (ref 70–110)
GLUCOSE UR QL STRIP: ABNORMAL
HBA1C MFR BLD: 6.3 % (ref 4–5.6)
HGB UR QL STRIP: NEGATIVE
KETONES UR QL STRIP: NEGATIVE
LEUKOCYTE ESTERASE UR QL STRIP: NEGATIVE
MAGNESIUM SERPL-MCNC: 1.6 MG/DL (ref 1.6–2.6)
MICROSCOPIC COMMENT: NORMAL
NITRITE UR QL STRIP: NEGATIVE
PH UR STRIP: 6 [PH] (ref 5–8)
POTASSIUM SERPL-SCNC: 3.6 MMOL/L (ref 3.5–5.1)
PROT UR QL STRIP: NEGATIVE
SP GR UR STRIP: <=1.005 (ref 1–1.03)
SQUAMOUS #/AREA URNS HPF: 2 /HPF
URN SPEC COLLECT METH UR: ABNORMAL
WBC #/AREA URNS HPF: 2 /HPF (ref 0–5)
YEAST URNS QL MICRO: NORMAL

## 2023-09-07 PROCEDURE — 3044F PR MOST RECENT HEMOGLOBIN A1C LEVEL <7.0%: ICD-10-PCS | Mod: HCNC,CPTII,S$GLB, | Performed by: NURSE PRACTITIONER

## 2023-09-07 PROCEDURE — 3061F PR NEG MICROALBUMINURIA RESULT DOCUMENTED/REVIEW: ICD-10-PCS | Mod: HCNC,CPTII,S$GLB, | Performed by: NURSE PRACTITIONER

## 2023-09-07 PROCEDURE — 84132 ASSAY OF SERUM POTASSIUM: CPT | Mod: HCNC | Performed by: NURSE PRACTITIONER

## 2023-09-07 PROCEDURE — 3066F PR DOCUMENTATION OF TREATMENT FOR NEPHROPATHY: ICD-10-PCS | Mod: HCNC,CPTII,S$GLB, | Performed by: NURSE PRACTITIONER

## 2023-09-07 PROCEDURE — 83735 ASSAY OF MAGNESIUM: CPT | Mod: HCNC | Performed by: NURSE PRACTITIONER

## 2023-09-07 PROCEDURE — 3074F SYST BP LT 130 MM HG: CPT | Mod: HCNC,CPTII,S$GLB, | Performed by: NURSE PRACTITIONER

## 2023-09-07 PROCEDURE — 3066F NEPHROPATHY DOC TX: CPT | Mod: HCNC,CPTII,S$GLB, | Performed by: NURSE PRACTITIONER

## 2023-09-07 PROCEDURE — 1159F MED LIST DOCD IN RCRD: CPT | Mod: HCNC,CPTII,S$GLB, | Performed by: NURSE PRACTITIONER

## 2023-09-07 PROCEDURE — 82962 POCT GLUCOSE, HAND-HELD DEVICE: ICD-10-PCS | Mod: HCNC,S$GLB,, | Performed by: NURSE PRACTITIONER

## 2023-09-07 PROCEDURE — 3008F BODY MASS INDEX DOCD: CPT | Mod: HCNC,CPTII,S$GLB, | Performed by: NURSE PRACTITIONER

## 2023-09-07 PROCEDURE — 81000 URINALYSIS NONAUTO W/SCOPE: CPT | Mod: HCNC | Performed by: NURSE PRACTITIONER

## 2023-09-07 PROCEDURE — 99999 PR PBB SHADOW E&M-EST. PATIENT-LVL IV: ICD-10-PCS | Mod: PBBFAC,HCNC,, | Performed by: NURSE PRACTITIONER

## 2023-09-07 PROCEDURE — 3074F PR MOST RECENT SYSTOLIC BLOOD PRESSURE < 130 MM HG: ICD-10-PCS | Mod: HCNC,CPTII,S$GLB, | Performed by: NURSE PRACTITIONER

## 2023-09-07 PROCEDURE — 3061F NEG MICROALBUMINURIA REV: CPT | Mod: HCNC,CPTII,S$GLB, | Performed by: NURSE PRACTITIONER

## 2023-09-07 PROCEDURE — 99214 OFFICE O/P EST MOD 30 MIN: CPT | Mod: HCNC,S$GLB,, | Performed by: NURSE PRACTITIONER

## 2023-09-07 PROCEDURE — 1160F RVW MEDS BY RX/DR IN RCRD: CPT | Mod: HCNC,CPTII,S$GLB, | Performed by: NURSE PRACTITIONER

## 2023-09-07 PROCEDURE — 3072F PR LOW RISK FOR RETINOPATHY: ICD-10-PCS | Mod: HCNC,CPTII,S$GLB, | Performed by: NURSE PRACTITIONER

## 2023-09-07 PROCEDURE — 84443 ASSAY THYROID STIM HORMONE: CPT | Mod: HCNC | Performed by: NURSE PRACTITIONER

## 2023-09-07 PROCEDURE — 1159F PR MEDICATION LIST DOCUMENTED IN MEDICAL RECORD: ICD-10-PCS | Mod: HCNC,CPTII,S$GLB, | Performed by: NURSE PRACTITIONER

## 2023-09-07 PROCEDURE — 3078F PR MOST RECENT DIASTOLIC BLOOD PRESSURE < 80 MM HG: ICD-10-PCS | Mod: HCNC,CPTII,S$GLB, | Performed by: NURSE PRACTITIONER

## 2023-09-07 PROCEDURE — 3072F LOW RISK FOR RETINOPATHY: CPT | Mod: HCNC,CPTII,S$GLB, | Performed by: NURSE PRACTITIONER

## 2023-09-07 PROCEDURE — 82962 GLUCOSE BLOOD TEST: CPT | Mod: HCNC,S$GLB,, | Performed by: NURSE PRACTITIONER

## 2023-09-07 PROCEDURE — 83036 HEMOGLOBIN GLYCOSYLATED A1C: CPT | Mod: HCNC | Performed by: NURSE PRACTITIONER

## 2023-09-07 PROCEDURE — 1160F PR REVIEW ALL MEDS BY PRESCRIBER/CLIN PHARMACIST DOCUMENTED: ICD-10-PCS | Mod: HCNC,CPTII,S$GLB, | Performed by: NURSE PRACTITIONER

## 2023-09-07 PROCEDURE — 3078F DIAST BP <80 MM HG: CPT | Mod: HCNC,CPTII,S$GLB, | Performed by: NURSE PRACTITIONER

## 2023-09-07 PROCEDURE — 3008F PR BODY MASS INDEX (BMI) DOCUMENTED: ICD-10-PCS | Mod: HCNC,CPTII,S$GLB, | Performed by: NURSE PRACTITIONER

## 2023-09-07 PROCEDURE — 99999 PR PBB SHADOW E&M-EST. PATIENT-LVL IV: CPT | Mod: PBBFAC,HCNC,, | Performed by: NURSE PRACTITIONER

## 2023-09-07 PROCEDURE — 3044F HG A1C LEVEL LT 7.0%: CPT | Mod: HCNC,CPTII,S$GLB, | Performed by: NURSE PRACTITIONER

## 2023-09-07 PROCEDURE — 36415 COLL VENOUS BLD VENIPUNCTURE: CPT | Mod: HCNC | Performed by: NURSE PRACTITIONER

## 2023-09-07 PROCEDURE — 99214 PR OFFICE/OUTPT VISIT, EST, LEVL IV, 30-39 MIN: ICD-10-PCS | Mod: HCNC,S$GLB,, | Performed by: NURSE PRACTITIONER

## 2023-09-07 RX ORDER — CHOLECALCIFEROL (VITAMIN D3) 1250 MCG
100000 TABLET ORAL
Qty: 24 TABLET | Refills: 3 | Status: SHIPPED | OUTPATIENT
Start: 2023-09-07

## 2023-09-07 NOTE — PROGRESS NOTES
Subjective:         Patient ID: Jadyn Chance is a 53 y.o. female.  Patient's current PCP is Vikash Baig MD.     Chief Complaint: Diabetes Mellitus    HPI  Jadyn Chance is a 53 y.o. White female presenting for a follow up for diabetes. Patient has been diagnosed with type 2 diabetes for several years.  Received diabetes education: Yes-[facility], 2023    CURRENT DM MEDICATIONS:   Diabetes Medications               empagliflozin (JARDIANCE) 25 mg tablet Take 1 tablet (25 mg total) by mouth once daily.    metFORMIN (GLUCOPHAGE-XR) 500 MG ER 24hr tablet TAKE 2 TABLETS TWICE DAILY WITH MEALS    NOVOLOG FLEXPEN U-100 INSULIN 100 unit/mL (3 mL) InPn pen Inject 3 times per day per sliding scale - can use every 4 hours as needed to correct a high blood sugar.  : No units  131-180: 2 units  181-240: 4 units  241-300: 6 units  301-350: 8 units  350-400: 10 units    TRESIBA FLEXTOUCH U-100 100 unit/mL (3 mL) insulin pen Inject 15 Units into the skin once daily.           Past failed treatment include: Actos, has taken Regular insulin in the past. Levemir-having issues with pen due to limited manual dexterity.     Blood glucose testing is performed regularly. Patient is testing 3-4 times per day.  Meter: [employer] / Unfortunately, Dexcom G7 was too expensive  Preferred lab: [facility]    Any episodes of hypoglycemia? Denies    Complications related to diabetes: none    Her blood sugar in the clinic today was:   Lab Results   Component Value Date    POCGLU 297 (A) 09/07/2023     Jadyn Chance presents today for follow up visit to discuss diabetes management. Blood sugar elevated today, but she left insulin at home and spent the night at a friend's house for this appt. Lab work between visits confirmed T2 diabetes - negative antibody screening, and C-peptide lower end of normal. She unfortunately had another fall while staying with her friend who was hospitalized- thankfully, she did  "not re-fracture her hip (she has had x3 surgeries on L hip already).  Will check to see if we can get a DEXA authorized.    Vitamin D deficiency - Taking 50,000 International Units once weekly. Vitamin D level remains low- we will increase to twice weekly. She verbalized understanding.    Blood sugars are overall better - she does adjust her Tresiba dose from anywhere between 6-15 units. Discussed MOA of Tresiba and discussed importance of finding the right dose that keeps her fasting glucose at goal. She will keep me updated.     Blood sugar range for the last 14 days per [facility] records:  mg/dL.     Highest weight at 429 lbs. H/o gastric bypass January 2008.     Current diet: Knowledgeable regarding the diabetic diet  Activity Level: Non-sedentary    Lab Results   Component Value Date    HGBA1C 6.3 (H) 01/18/2023    HGBA1C 7.5 (H) 08/22/2022    HGBA1C 8.6 (H) 04/19/2022     STANDARDS OF CARE  Diabetes Management Status    Statin: Taking  ACE/ARB: Not taking    Screening or Prevention Patient's value Goal Complete/Controlled?   HgA1C Testing and Control   Lab Results   Component Value Date    HGBA1C 6.3 (H) 01/18/2023      Annually/Less than 8% Yes     Lipid profile : 01/18/2023 Annually Yes     LDL control Lab Results   Component Value Date    LDLCALC 41.8 (L) 01/18/2023    Annually/Less than 100 mg/dl  Yes     Nephropathy screening Lab Results   Component Value Date    LABMICR <5.0 01/18/2023     Lab Results   Component Value Date    PROTEINUA Negative 12/27/2022     No results found for: "UTPCR"   Annually Yes     Blood pressure BP Readings from Last 1 Encounters:   09/07/23 (!) 101/59    Less than 140/90 Yes     Dilated retinal exam : 08/22/2022 Annually No-advised to schedule     Foot exam   : 09/07/2023 Annually Yes          Labs reviewed and are noted below.    Lab Results   Component Value Date    WBC 7.01 08/03/2023    HGB 13.3 08/03/2023    HCT 41.2 08/03/2023     08/03/2023    CHOL 155 " 01/18/2023    TRIG 236 (H) 01/18/2023    HDL 66 01/18/2023    LDLCALC 41.8 (L) 01/18/2023    ALT 47 (H) 08/03/2023    AST 47 (H) 08/03/2023     08/03/2023    K 3.3 (L) 08/03/2023     08/03/2023    ANIONGAP 13 08/03/2023    CREATININE 0.8 08/03/2023    ESTGFRAFRICA >60.0 01/13/2022    EGFRNONAA >60.0 01/13/2022    BUN 20 08/03/2023    CO2 17 (L) 08/03/2023    TSH 0.664 01/13/2022    INR 1.0 09/11/2020     (H) 08/03/2023     Lab Results   Component Value Date    GLUTAMICACID 0.01 08/03/2023    CPEPTIDE 0.87 08/03/2023    TSH 0.664 01/13/2022    IRON 56 04/19/2022    TIBC 357 04/19/2022    FERRITIN 50 04/19/2022    [id number] 358 08/03/2023    PTH 71.0 09/10/2013    CALCIUM 9.1 08/03/2023    PHOS 4.3 04/01/2015     Lab Results   Component Value Date    CPEPTIDE 0.87 08/03/2023     Lab Results   Component Value Date    GLUTAMICACID 0.01 08/03/2023     Glucose   Date Value Ref Range Status   08/03/2023 192 (H) 70 - 110 mg/dL Final     Anion Gap   Date Value Ref Range Status   08/03/2023 13 8 - 16 mmol/L Final     eGFR if    Date Value Ref Range Status   01/13/2022 >60.0 >60 mL/min/1.73 m^2 Final     eGFR if non    Date Value Ref Range Status   01/13/2022 >60.0 >60 mL/min/1.73 m^2 Final     Comment:     Calculation used to obtain the estimated glomerular filtration  rate (eGFR) is the CKD-EPI equation.          The following portions of the patient's history were reviewed and updated as appropriate: allergies, current medications, past family history, past medical history, past social history, past surgical history, and problem list.    Review of patient's allergies indicates:   Allergen Reactions    Demerol [meperidine] Hallucinations    Lamictal [lamotrigine] Rash     Rash to face in chart 2014 or 15     Penicillins Other (See Comments)     Patient cannot recall specific reaction, reports she has been listing this as an allergy since childhood.    Bactrim  [sulfamethoxazole-trimethoprim]     Ciprofloxacin (bulk)     Codeine Nausea And Vomiting    Levetiracetam     Toradol [ketorolac]     Tramadol      seizures    Morpholine analogues Diarrhea, Itching and Nausea And Vomiting     Social History     Socioeconomic History    Marital status: Significant Other    Number of children: 4   Tobacco Use    Smoking status: Never    Smokeless tobacco: Never   Substance and Sexual Activity    Alcohol use: No    Drug use: No    Sexual activity: Not Currently     Partners: Male     Birth control/protection: Surgical     Comment: BTL; mut monog   Social History Narrative    ** Merged History Encounter **         Lives in [place]     Social Determinants of Health     Financial Resource Strain: High Risk (8/3/2023)    Overall Financial Resource Strain (CARDIA)     Difficulty of Paying Living Expenses: Very hard   Food Insecurity: Food Insecurity Present (8/3/2023)    Hunger Vital Sign     Worried About Running Out of Food in the Last Year: Often true     Ran Out of Food in the Last Year: Often true   Transportation Needs: Unmet Transportation Needs (8/3/2023)    PRAPARE - Transportation     Lack of Transportation (Medical): Yes     Lack of Transportation (Non-Medical): Yes   Physical Activity: Insufficiently Active (8/3/2023)    Exercise Vital Sign     Days of Exercise per Week: 2 days     Minutes of Exercise per Session: 10 min   Stress: Stress Concern Present (8/3/2023)    [place] [employer] [employer] - Occupational Stress Questionnaire     Feeling of Stress : Very much   Social Connections: Unknown (8/3/2023)    Social Connection and Isolation Panel [NHANES]     Frequency of Communication with Friends and Family: Twice a week     Frequency of Social Gatherings with Friends and Family: Never     Active Member of Clubs or Organizations: No     Attends Club or Organization Meetings: Never     Marital Status:    Housing Stability: High Risk (8/3/2023)    Housing  Stability Vital Sign     Unable to Pay for Housing in the Last Year: Yes     Number of Places Lived in the Last Year: 1     Unstable Housing in the Last Year: No     Past Medical History:   Diagnosis Date    Abnormal Pap smear 1991    bx was negative, per pt    Acute renal failure (ARF) 2/16/2015    Anemia     Anxiety     B12 deficiency     Blood transfusion     multiple     Chronic LBP     Degenerative disc disease     l spine    Dehydration 2/16/2015    Diabetes mellitus     Diabetic neuropathy     General anesthetics causing adverse effect in therapeutic use     delayed emergence    Hyperglycemia 4/25/2018    Mixed hyperlipidemia 11/18/2013    PONV (postoperative nausea and vomiting)     RLS (restless legs syndrome)     Seizures     Sleep apnea     with CPAP    Vitamin D deficiency disease      REVIEW OF SYSTEMS:  Eyes No history of DR.  Cardiovascular: History of HLD.  GI: Denies nausea,vomiting,constipation,or diarrhea.  : No CKD.  Musculoskeletal: H/o multiple hip fractures  Neuro: Positive neuropathy.  PSYCH: No tobacco use.  ENDO: See HPI.        Objective:      Vitals:    09/07/23 1035   BP: (!) 101/59   Pulse: 92     RESPIRATORY: No respiratory distress  NEUROLOGIC: not assessed-virtual visit.  PSYCHIATRIC: Alert & oriented x3. Normal mood and affect.  FOOT EXAMINATION: Protective Sensation (w/ 10 gram monofilament):  Right: Intact  Left: Intact    Visual Inspection:  Normal -  Bilateral and Nails Intact - without Evidence of Foot Deformity- Bilateral    Pedal Pulses:   Right: Present  Left: Present    Posterior Tibialis Pulses:   Right:Present  Left: Present    Assessment:       1. Uncontrolled type 2 diabetes mellitus with hyperglycemia, with long-term current use of insulin    2. Hyperlipidemia associated with type 2 diabetes mellitus    3. Dysuria    4. Vitamin D deficiency    5. Hypokalemia    6. Hypomagnesemia          Plan:   Jadyn was seen today for diabetes mellitus.    Diagnoses and all  "orders for this visit:    Uncontrolled type 2 diabetes mellitus with hyperglycemia, with long-term current use of insulin  -     POCT Glucose, Hand-Held Device  -     Hemoglobin A1C; Future  -     TSH; Future    Chronic -     -- Medications adjusted for today's visit include:    Continue Tresiba at 15 units once daily.     Start Novolog 4 units before each main meal that you know will cause a glucose spike > 180 when blood sugar is checked after 1-2 hours. Do not take Novolog if you skip the meal or if the meal contains zero carbs. Can add extra insulin based on sliding scale as needed based on the pre-meal blood sugar.     150-200: 1 unit  201-250: 2 units  251-300: 3 units  301-350: 4 units  351-400: 5 units    Continue Jardiance and Metformin.    Hyperlipidemia associated with type 2 diabetes mellitus    Dysuria  -     Urinalysis, Reflex to Urine Culture Urine, Clean Catch; Future    R/O UTI.    Vitamin D deficiency  -     cholecalciferol, vitamin D3, 1,250 mcg (50,000 unit) Tab; Take 100,000 Units by mouth every 7 days.    Ideally, Vit D should be around 50. Increase Vitamin D to 50,000 International Units x2 tablets weekly - switch to D3.    Hypokalemia  -     POTASSIUM; Future    Hypomagnesemia  -     Magnesium; Future    History of fracture of left hip    Will get a DEXA to screen for osteoporosis.    - Follow up: 2 months    Portions of this note may have been created with voice recognition software. Occasional "wrong-word" or "sound-a-like" substitutions may have occurred due to the inherent limitations of voice recognition software. Please, read the note carefully and recognize, using context, where substitutions have occurred.             Sheri Ammons,FNP-C [facility]         "

## 2023-09-07 NOTE — TELEPHONE ENCOUNTER
----- Message from Anuja Mcmahan NP sent at 9/7/2023 12:48 PM CDT -----  Let patient know I am working on getting a DEXA scan ordered for her - find out if she is in menopause?

## 2023-09-07 NOTE — TELEPHONE ENCOUNTER
Patient called regarding labs. Patient didn't answer, lvm.  ----- Message from Anuja Mcmahan NP sent at 9/7/2023 12:22 PM CDT -----  Please let Ms. Chance know that I received her urinalysis results - no evidence of urinary tract infection.

## 2023-09-07 NOTE — PATIENT INSTRUCTIONS
-- Medications adjusted for today's visit include:    Continue Tresiba at 15 units once daily.     Start Novolog 4 units before each main meal that you know will cause a glucose spike > 180 when blood sugar is checked after 1-2 hours. Do not take Novolog if you skip the meal or if the meal contains zero carbs. Can add extra insulin based on sliding scale as needed based on the pre-meal blood sugar.     150-200: 1 unit  201-250: 2 units  251-300: 3 units  301-350: 4 units  351-400: 5 units    Continue Jardiance and Metformin.    -- Limit carbs to no more than 30-45 grams with each meal. Never eat carbs by themselves, always add protein. Make snacks low carb or non-carb only.    -- Continue checking blood sugar 3 times daily: Fasting blood sugar and vary your next 2 readings: Before lunch, before supper, 2 hours after any meal, or bedtime.   -Blood sugar goals should be a fasting blood sugar between , and no blood sugars throughout the day over 180 is good, less than 160 better, less than 140 perfect.    --Carry glucose tablets/soft peppermints/regular juice or Coke product with you at all times to treat a low blood sugar episode (less than 70). If your blood sugar is between 50-70, Chew 4 tablets or drink 1/2 cup of juice or regular Coke product. If your blood sugar is below 50, double the treatment. Re-check blood sugar in 15 minutes. If still low, repeat this. Always call the clinic to give an update for any low blood sugar episodes.    --Exercise as tolerated: Goal 30 minutes/day, 5 days/week. Start slowly and increase as tolerated.    --Follow-up for your next visit in 8 weeks.    --Please either drop off, fax, or MyChart your readings to me as needed.

## 2023-09-07 NOTE — PROGRESS NOTES
Please let Ms. Chance know that I received her urinalysis results - no evidence of urinary tract infection.

## 2023-09-08 LAB — TSH SERPL DL<=0.005 MIU/L-ACNC: 0.71 UIU/ML (ref 0.4–4)

## 2023-09-08 NOTE — PROGRESS NOTES
A1c remains at goal at 6.3%. Thyroid level is normal and Magnesium level is now lower end of normal. Is she taking a supplement? Potassium level has also normalized.

## 2023-09-15 ENCOUNTER — PATIENT MESSAGE (OUTPATIENT)
Dept: SLEEP MEDICINE | Facility: CLINIC | Age: 53
End: 2023-09-15
Payer: MEDICARE

## 2023-09-15 DIAGNOSIS — G47.00 INSOMNIA, UNSPECIFIED TYPE: ICD-10-CM

## 2023-09-15 RX ORDER — LEMBOREXANT 10 MG/1
TABLET, FILM COATED ORAL
Qty: 30 TABLET | Refills: 0 | Status: SHIPPED | OUTPATIENT
Start: 2023-09-15 | End: 2023-09-25 | Stop reason: SDUPTHER

## 2023-09-21 ENCOUNTER — TELEPHONE (OUTPATIENT)
Dept: SLEEP MEDICINE | Facility: CLINIC | Age: 53
End: 2023-09-21
Payer: MEDICARE

## 2023-09-25 ENCOUNTER — OFFICE VISIT (OUTPATIENT)
Dept: SLEEP MEDICINE | Facility: CLINIC | Age: 53
End: 2023-09-25
Payer: MEDICARE

## 2023-09-25 DIAGNOSIS — G47.33 OSA (OBSTRUCTIVE SLEEP APNEA): Primary | ICD-10-CM

## 2023-09-25 DIAGNOSIS — G47.00 INSOMNIA, UNSPECIFIED TYPE: ICD-10-CM

## 2023-09-25 PROCEDURE — 3044F PR MOST RECENT HEMOGLOBIN A1C LEVEL <7.0%: ICD-10-PCS | Mod: CPTII,95,, | Performed by: PSYCHIATRY & NEUROLOGY

## 2023-09-25 PROCEDURE — 99214 OFFICE O/P EST MOD 30 MIN: CPT | Mod: 95,,, | Performed by: PSYCHIATRY & NEUROLOGY

## 2023-09-25 PROCEDURE — 3066F NEPHROPATHY DOC TX: CPT | Mod: CPTII,95,, | Performed by: PSYCHIATRY & NEUROLOGY

## 2023-09-25 PROCEDURE — 3072F LOW RISK FOR RETINOPATHY: CPT | Mod: CPTII,95,, | Performed by: PSYCHIATRY & NEUROLOGY

## 2023-09-25 PROCEDURE — 3072F PR LOW RISK FOR RETINOPATHY: ICD-10-PCS | Mod: CPTII,95,, | Performed by: PSYCHIATRY & NEUROLOGY

## 2023-09-25 PROCEDURE — 3061F NEG MICROALBUMINURIA REV: CPT | Mod: CPTII,95,, | Performed by: PSYCHIATRY & NEUROLOGY

## 2023-09-25 PROCEDURE — 3044F HG A1C LEVEL LT 7.0%: CPT | Mod: CPTII,95,, | Performed by: PSYCHIATRY & NEUROLOGY

## 2023-09-25 PROCEDURE — 3061F PR NEG MICROALBUMINURIA RESULT DOCUMENTED/REVIEW: ICD-10-PCS | Mod: CPTII,95,, | Performed by: PSYCHIATRY & NEUROLOGY

## 2023-09-25 PROCEDURE — 99214 PR OFFICE/OUTPT VISIT, EST, LEVL IV, 30-39 MIN: ICD-10-PCS | Mod: 95,,, | Performed by: PSYCHIATRY & NEUROLOGY

## 2023-09-25 PROCEDURE — 3066F PR DOCUMENTATION OF TREATMENT FOR NEPHROPATHY: ICD-10-PCS | Mod: CPTII,95,, | Performed by: PSYCHIATRY & NEUROLOGY

## 2023-09-25 RX ORDER — LEMBOREXANT 10 MG/1
TABLET, FILM COATED ORAL
Qty: 30 TABLET | Refills: 5 | Status: SHIPPED | OUTPATIENT
Start: 2023-09-25 | End: 2024-01-18 | Stop reason: SDUPTHER

## 2023-09-25 RX ORDER — DOXEPIN HYDROCHLORIDE 50 MG/1
CAPSULE ORAL
Qty: 60 CAPSULE | Refills: 11 | Status: SHIPPED | OUTPATIENT
Start: 2023-09-25

## 2023-09-25 NOTE — PROGRESS NOTES
The patient location is: home  The chief complaint leading to consultation is: sleep disorder  Visit type: audiovisual  Each patient to whom he or she provides medical services by telemedicine is:  (1) informed of the relationship between the physician and patient and the respective role of any other health care provider with respect to management of the patient; and (2) notified that he or she may decline to receive medical services by telemedicine and may withdraw from such care at any time.  31 minutes of total time spent on the encounter, which includes face to face time and non-face to face time preparing to see the patient (eg, review of tests), Obtaining and/or reviewing separately obtained history, Documenting clinical information in the electronic or other health record, Independently interpreting results (not separately reported) and communicating results to the patient/family/caregiver, or Care coordination (not separately reported).     DAyvigo 10 mg, and g Bonnie case the office in you take it would go  100 Dr. annabelle sinclair preferred  Okay so I do take anything else becauseDo I seexep that the gabapentin I takingin 100 mg high home he all right and the house your machine doing do have any concerns with the pressure does it dry out okay and was see to have MRI of your neck done how your headaches there done be having a procedure or the use stop taking a medication that was invading them or you are not even sure how    Refill Dayvigo 10, Doxepin 100 mg    APAP - needs nasal refilled.    CAn not access her machine.            9/25/2023     8:17 AM 4/4/2022     1:41 PM 10/22/2020    12:49 PM 8/19/2020     4:03 PM 5/25/2018     2:00 PM   EPWORTH SLEEPINESS SCALE TOTAL SCORE    Total score 1 3 2 0 0       Jadyn Chance is a 53 y.o. female seen today for Insomnia and CPAP  follow up. Last seen on 4/4/2022.    The patient reports improved sleep continuity and daytime sleepiness on PAP. ESS today is  8/24.  Denies break through snoring.  No  dry mouth.  No aerophagia or air hunger. Denies occasional mask leaks and discomfort. Using a nasal  mask  I can not access her machine remotely; she believes that [place]   Most recent data is from 2021.    Stopped Xanax since last time  Insomnia is well controlled on Dayvigo 10 mg and Doxepin 100 mg   Headaches seem to get better.   Barely using Percocet anymore  Denied significant excessive daytime sleepiness     DM management has changed has been chanced - started Jardiance    Here is the most recent data from her machine - still 2021 though   APAP 5 to 18; 90% was 12  Compliance Report  Initial compliance period 07/24/2021 - 08/22/2021  Compliance met Yes  Compliance percentage 70%  Pay[employer] Standard  Usage 07/24/2021 - 08/22/2021  Usage days 29/30 days (97%)  >= 4 hours 21 days (70%)  < 4 hours 8 days (27%)  Usage hours 153 hours 31 minutes  Average usage (total days) 5 hours 7 minutes  Average usage (days used) 5 hours 18 minutes  Median usage (days used) 5 hours 49 minutes  Total used hours (value since last reset - 08/22/2021) 152 hours  AirSense 10 AutoSet  Serial number [id number]  Mode AutoSet  Min Pressure 5 cmH2O  Max Pressure 18 cmH2O  EPR Fulltime  EPR level 3  Response Standard  Therapy  Pressure - cmH2O Median: 6.5 95th percentile: 10.0 Maximum: 11.6  Leaks - L/min Median: 8.2 95th percentile: 46.2 Maximum: 108.9  Events per hour AI: 2.9 HI: 0.6 AHI: 3.5  Apnea Index Central: 1.1 Obstructive: 0.5 Unknown: 1.3  ____  Try to do relaxing activities and journaling.  Avoid going to bed when not sleepy; tries to exercise. No caffeine in the afternoon    Neuropathic pain is now well controlled on Gabapentin (prescribed by Dr. Ponce); stopped Lyrica.    Bedtime: 10:30  Sleep latency: 1 hr with meds  Nocturnal awakenings:3-4 Returns to sleep in 5 min  Wake up time: 7  Not napping  No ETOH  Not a  smoker      SLEEP STUDIES: HST4/21: Significant Obstructive sleep apnea (ALTAGRACIA) with AHI (apnea hypopnea Index) of 8 ; RDI of 15and SaO2 of 87.6%      Medications pertinent to sleep disorders: cutting down on oxycodone; never taking Oxycodone with Xanax.  Xanax  0.5 mg -  2-2.5 pills  And Doxepin 50 mg 30 min before bed. Lyrical helps with Neurolpathy  Previously tried medications:  PHYSICAL EXAM:    No Vital Signs were taken during this virtual visit.      Using [facility]: yes      ASSESSMENT:    1. Insomnia NEC. Multi-factorial -  Anxiety and depression likely play a role. Good control on combination of  Doxepin and SDayvigo  2. Small fiber neuropathy ; possible PLMD without infrequent  RLS symptoms. - stopped Clonazepam with great improvement in daily alertness. No significant urge to move her legs, however severe pain and paresthesias from small fiber neuropathy keep her up at night.  Now doing well on Gabapentin  3.ALTAGRACIA   Ongoing interrupted sleep up to 4-5 times per night despite good sleep hygiene, mild snoring. Concurrent diabetis which can be aggravated by untreated  sleep disordered breathing. Benefiting from CPAP use in terms of sleep continuity and daytime sleepiness. I could not download the machine      PLAN:  Continue APAP - she was asked to bring it to her next face to face visit.    Continue doxepin and Dayvigo.         More than 15 minutes of this 30 minutes visit was spent in counseling: and coordination of care.      Precautions: The patient was advised to abstain from driving should he feel sleepy or drowsy.     Follow up: MD in 12 months    Thank you for allowing me the opportunity to participate in the care of your patient.    This visit summary will be sent to referring provider via inbasket

## 2023-09-27 ENCOUNTER — HOSPITAL ENCOUNTER (EMERGENCY)
Facility: HOSPITAL | Age: 53
Discharge: HOME | End: 2023-09-27
Attending: EMERGENCY MEDICINE
Payer: MEDICARE

## 2023-09-27 VITALS
BODY MASS INDEX: 24.8 KG/M2 | DIASTOLIC BLOOD PRESSURE: 72 MMHG | OXYGEN SATURATION: 97 % | TEMPERATURE: 98 F | RESPIRATION RATE: 16 BRPM | SYSTOLIC BLOOD PRESSURE: 143 MMHG | WEIGHT: 140 LBS | HEART RATE: 109 BPM

## 2023-09-27 DIAGNOSIS — M54.50 ACUTE MIDLINE LOW BACK PAIN WITHOUT SCIATICA: Primary | ICD-10-CM

## 2023-09-27 LAB
ALBUMIN SERPL BCP-MCNC: 3.9 G/DL (ref 3.5–5.2)
ALP SERPL-CCNC: 111 U/L (ref 55–135)
ALT SERPL W/O P-5'-P-CCNC: 81 U/L (ref 10–44)
ANION GAP SERPL CALC-SCNC: 14 MMOL/L (ref 8–16)
AST SERPL-CCNC: 66 U/L (ref 10–40)
BACTERIA #/AREA URNS HPF: NORMAL /HPF
BASOPHILS # BLD AUTO: 0.06 K/UL (ref 0–0.2)
BASOPHILS NFR BLD: 0.8 % (ref 0–1.9)
BILIRUB SERPL-MCNC: 1.5 MG/DL (ref 0.1–1)
BILIRUB UR QL STRIP: NEGATIVE
BUN SERPL-MCNC: 18 MG/DL (ref 6–20)
CALCIUM SERPL-MCNC: 8.7 MG/DL (ref 8.7–10.5)
CHLORIDE SERPL-SCNC: 103 MMOL/L (ref 95–110)
CLARITY UR: CLEAR
CO2 SERPL-SCNC: 18 MMOL/L (ref 23–29)
COLOR UR: YELLOW
CREAT SERPL-MCNC: 1.2 MG/DL (ref 0.5–1.4)
DIFFERENTIAL METHOD: ABNORMAL
EOSINOPHIL # BLD AUTO: 0.1 K/UL (ref 0–0.5)
EOSINOPHIL NFR BLD: 0.8 % (ref 0–8)
ERYTHROCYTE [DISTWIDTH] IN BLOOD BY AUTOMATED COUNT: 14 % (ref 11.5–14.5)
EST. GFR  (NO RACE VARIABLE): 54 ML/MIN/1.73 M^2
GLUCOSE SERPL-MCNC: 440 MG/DL (ref 70–110)
GLUCOSE UR QL STRIP: ABNORMAL
HCT VFR BLD AUTO: 42.5 % (ref 37–48.5)
HCV AB SERPL QL IA: NEGATIVE
HEP C VIRUS HOLD SPECIMEN: NORMAL
HGB BLD-MCNC: 13.8 G/DL (ref 12–16)
HGB UR QL STRIP: NEGATIVE
HIV 1+2 AB+HIV1 P24 AG SERPL QL IA: NEGATIVE
IMM GRANULOCYTES # BLD AUTO: 0.02 K/UL (ref 0–0.04)
IMM GRANULOCYTES NFR BLD AUTO: 0.3 % (ref 0–0.5)
KETONES UR QL STRIP: NEGATIVE
LEUKOCYTE ESTERASE UR QL STRIP: NEGATIVE
LYMPHOCYTES # BLD AUTO: 0.9 K/UL (ref 1–4.8)
LYMPHOCYTES NFR BLD: 12.2 % (ref 18–48)
MCH RBC QN AUTO: 30.4 PG (ref 27–31)
MCHC RBC AUTO-ENTMCNC: 32.5 G/DL (ref 32–36)
MCV RBC AUTO: 94 FL (ref 82–98)
MICROSCOPIC COMMENT: NORMAL
MONOCYTES # BLD AUTO: 0.4 K/UL (ref 0.3–1)
MONOCYTES NFR BLD: 5.9 % (ref 4–15)
NEUTROPHILS # BLD AUTO: 5.7 K/UL (ref 1.8–7.7)
NEUTROPHILS NFR BLD: 80 % (ref 38–73)
NITRITE UR QL STRIP: NEGATIVE
NRBC BLD-RTO: 0 /100 WBC
PH UR STRIP: 6 [PH] (ref 5–8)
PLATELET # BLD AUTO: 258 K/UL (ref 150–450)
PMV BLD AUTO: 10.5 FL (ref 9.2–12.9)
POCT GLUCOSE: 167 MG/DL (ref 70–110)
POCT GLUCOSE: 256 MG/DL (ref 70–110)
POTASSIUM SERPL-SCNC: 4.6 MMOL/L (ref 3.5–5.1)
PROT SERPL-MCNC: 7.3 G/DL (ref 6–8.4)
PROT UR QL STRIP: NEGATIVE
RBC # BLD AUTO: 4.54 M/UL (ref 4–5.4)
SODIUM SERPL-SCNC: 135 MMOL/L (ref 136–145)
SP GR UR STRIP: >1.03 (ref 1–1.03)
URN SPEC COLLECT METH UR: ABNORMAL
UROBILINOGEN UR STRIP-ACNC: NEGATIVE EU/DL
WBC # BLD AUTO: 7.14 K/UL (ref 3.9–12.7)
YEAST URNS QL MICRO: NORMAL

## 2023-09-27 PROCEDURE — 82962 GLUCOSE BLOOD TEST: CPT | Mod: HCNC,91

## 2023-09-27 PROCEDURE — 25000003 PHARM REV CODE 250: Mod: HCNC | Performed by: NURSE PRACTITIONER

## 2023-09-27 PROCEDURE — 87389 HIV-1 AG W/HIV-1&-2 AB AG IA: CPT | Mod: HCNC | Performed by: EMERGENCY MEDICINE

## 2023-09-27 PROCEDURE — 99285 EMERGENCY DEPT VISIT HI MDM: CPT | Mod: 25,HCNC

## 2023-09-27 PROCEDURE — 63600175 PHARM REV CODE 636 W HCPCS: Mod: HCNC | Performed by: NURSE PRACTITIONER

## 2023-09-27 PROCEDURE — 80053 COMPREHEN METABOLIC PANEL: CPT | Mod: HCNC | Performed by: NURSE PRACTITIONER

## 2023-09-27 PROCEDURE — 96375 TX/PRO/DX INJ NEW DRUG ADDON: CPT | Mod: HCNC

## 2023-09-27 PROCEDURE — 96374 THER/PROPH/DIAG INJ IV PUSH: CPT | Mod: HCNC

## 2023-09-27 PROCEDURE — 86803 HEPATITIS C AB TEST: CPT | Mod: HCNC | Performed by: EMERGENCY MEDICINE

## 2023-09-27 PROCEDURE — 96361 HYDRATE IV INFUSION ADD-ON: CPT | Mod: HCNC

## 2023-09-27 PROCEDURE — 81000 URINALYSIS NONAUTO W/SCOPE: CPT | Mod: HCNC | Performed by: NURSE PRACTITIONER

## 2023-09-27 PROCEDURE — 85025 COMPLETE CBC W/AUTO DIFF WBC: CPT | Mod: HCNC | Performed by: NURSE PRACTITIONER

## 2023-09-27 RX ORDER — MORPHINE SULFATE 4 MG/ML
4 INJECTION, SOLUTION INTRAMUSCULAR; INTRAVENOUS
Status: COMPLETED | OUTPATIENT
Start: 2023-09-27 | End: 2023-09-27

## 2023-09-27 RX ORDER — HYDROMORPHONE HYDROCHLORIDE 2 MG/ML
0.5 INJECTION, SOLUTION INTRAMUSCULAR; INTRAVENOUS; SUBCUTANEOUS
Status: DISCONTINUED | OUTPATIENT
Start: 2023-09-27 | End: 2023-09-27

## 2023-09-27 RX ORDER — ONDANSETRON 2 MG/ML
4 INJECTION INTRAMUSCULAR; INTRAVENOUS
Status: COMPLETED | OUTPATIENT
Start: 2023-09-27 | End: 2023-09-27

## 2023-09-27 RX ADMIN — ONDANSETRON 4 MG: 2 INJECTION INTRAMUSCULAR; INTRAVENOUS at 03:09

## 2023-09-27 RX ADMIN — MORPHINE SULFATE 4 MG: 4 INJECTION INTRAVENOUS at 03:09

## 2023-09-27 RX ADMIN — SODIUM CHLORIDE 1000 ML: 9 INJECTION, SOLUTION INTRAVENOUS at 03:09

## 2023-09-27 NOTE — FIRST PROVIDER EVALUATION
Medical screening examination initiated.  I have conducted a focused provider triage encounter, findings are as follows:    Brief history of present illness:  right sided flank pain with dysuria     There were no vitals filed for this visit.    Pertinent physical exam:  nad    Brief workup plan:  labs, imaging, further eval    Preliminary workup initiated; this workup will be continued and followed by the physician or advanced practice provider that is assigned to the patient when roomed.

## 2023-09-30 NOTE — ED PROVIDER NOTES
Encounter Date: 9/27/2023       History     Chief Complaint   Patient presents with    Back Pain     Pt states she has been having lower back pain since Friday and radiates up her back. Pt also states she has a UTI.     Patient complains of lower back pain.  Denies loss of bowel or bladder control or saddle anesthesia        Review of patient's allergies indicates:   Allergen Reactions    Demerol [meperidine] Hallucinations    Lamictal [lamotrigine] Rash     Rash to face in chart 2014 or 15     Penicillins Other (See Comments)     Patient cannot recall specific reaction, reports she has been listing this as an allergy since childhood.    Bactrim [sulfamethoxazole-trimethoprim]     Ciprofloxacin (bulk)     Codeine Nausea And Vomiting    Levetiracetam     Toradol [ketorolac]     Tramadol      seizures    Morpholine analogues Diarrhea, Itching and Nausea And Vomiting     Past Medical History:   Diagnosis Date    Abnormal Pap smear 1991    bx was negative, per pt    Acute renal failure (ARF) 2/16/2015    Anemia     Anxiety     B12 deficiency     Blood transfusion     multiple     Chronic LBP     Degenerative disc disease     l spine    Dehydration 2/16/2015    Diabetes mellitus     Diabetic neuropathy     General anesthetics causing adverse effect in therapeutic use     delayed emergence    Hyperglycemia 4/25/2018    Mixed hyperlipidemia 11/18/2013    PONV (postoperative nausea and vomiting)     RLS (restless legs syndrome)     Seizures     Sleep apnea     with CPAP    Vitamin D deficiency disease      Past Surgical History:   Procedure Laterality Date    ANUS SURGERY      AUGMENTATION OF BREAST  2008    saline    BELT ABDOMINOPLASTY      tummy [facility]    BREAST SURGERY  2008    breast augmentation    CARPAL TUNNEL RELEASE Right 09/09/2021    Procedure: RELEASE, CARPAL TUNNEL;  Surgeon: Tyler Victor MD;  Location: [facility];  Service: Orthopedics;  Laterality: Right;  right carpal tunnel release and right cubital  tunnel release with possible anterior transposition ulnar nerve     SECTION      x2    CHOLECYSTECTOMY      COLONOSCOPY N/A 2023    Procedure: COLONOSCOPY;  Surgeon: María De La Torre MD;  Location: [facility];  Service: Endoscopy;  Laterality: N/A;    mikel      EXCISIONAL HEMORRHOIDECTOMY      GASTRIC BYPASS      HIP FRACTURE SURGERY      left hip ORIF    MOUTH SURGERY      revision of JJ anastomosis  2015    small bowel resection  2015    TOTAL REDUCTION MAMMOPLASTY      TUBAL LIGATION      ULNAR TUNNEL RELEASE Right 2021    Procedure: RELEASE, CUBITAL TUNNEL;  Surgeon: Tyler Victor MD;  Location: [facility] OR;  Service: Orthopedics;  Laterality: Right;  right carpal tunnel release and right cubital tunnel release with possible anterior transposition ulnar nerve     Family History   Problem Relation Age of Onset    Diabetes Mother     Cataracts Mother     Glaucoma Maternal Aunt     Blindness Maternal Aunt     Breast cancer Neg Hx     Colon cancer Neg Hx     Thrombophilia Neg Hx      Social History     Tobacco Use    Smoking status: Never    Smokeless tobacco: Never   Substance Use Topics    Alcohol use: No    Drug use: No     Review of Systems   Constitutional:  Negative for fever.   HENT:  Negative for sore throat.    Respiratory:  Negative for shortness of breath.    Cardiovascular:  Negative for chest pain.   Gastrointestinal:  Negative for nausea.   Genitourinary:  Negative for dysuria.   Musculoskeletal:  Negative for back pain.   Skin:  Negative for rash.   Neurological:  Negative for weakness.   Hematological:  Does not bruise/bleed easily.       Physical Exam     Initial Vitals [23 1255]   BP Pulse Resp Temp SpO2   (!) 143/72 109 16 98.2 °F (36.8 °C) 97 %      MAP       --         Physical Exam    Nursing note and vitals reviewed.  Constitutional: She appears well-developed and well-nourished. She is not diaphoretic. She is active.  Non-toxic appearance. No  distress.   HENT:   Head: Normocephalic and atraumatic.   Eyes: Conjunctivae are normal. Right eye exhibits no discharge. Left eye exhibits no discharge. No scleral icterus.   Neck:   Normal range of motion.  Cardiovascular:  Normal rate, regular rhythm and intact distal pulses.           No murmur heard.  Pulmonary/Chest: Breath sounds normal. No respiratory distress. She has no wheezes.   Abdominal: She exhibits no distension.   Musculoskeletal:         General: No tenderness. Normal range of motion.      Cervical back: Normal range of motion.     Neurological: She is alert and oriented to person, place, and time. No cranial nerve deficit. GCS score is 15. GCS eye subscore is 4. GCS verbal subscore is 5. GCS motor subscore is 6.   Skin: Skin is warm and dry. Capillary refill takes less than 2 seconds. No rash noted.   Psychiatric: She has a normal mood and affect. Her behavior is normal. Judgment and thought content normal.         ED Course   Procedures  Labs Reviewed   CBC W/ AUTO DIFFERENTIAL - Abnormal; Notable for the following components:       Result Value    Lymph # 0.9 (*)     Gran % 80.0 (*)     Lymph % 12.2 (*)     All other components within normal limits    Narrative:     Release to patient->Immediate   COMPREHENSIVE METABOLIC PANEL - Abnormal; Notable for the following components:    Sodium 135 (*)     CO2 18 (*)     Glucose 440 (*)     Total Bilirubin 1.5 (*)     AST 66 (*)     ALT 81 (*)     eGFR 54 (*)     All other components within normal limits    Narrative:     Release to patient->Immediate   URINALYSIS, REFLEX TO URINE CULTURE - Abnormal; Notable for the following components:    Specific Gravity, UA >1.030 (*)     Glucose, UA 4+ (*)     All other components within normal limits    Narrative:     Specimen Source->Urine   POCT GLUCOSE - Abnormal; Notable for the following components:    POCT Glucose 256 (*)     All other components within normal limits   POCT GLUCOSE - Abnormal; Notable for the  following components:    POCT Glucose 167 (*)     All other components within normal limits   HIV 1 / 2 ANTIBODY    Narrative:     Release to patient->Immediate   HEPATITIS C ANTIBODY    Narrative:     Release to patient->Immediate   HEP C VIRUS HOLD SPECIMEN    Narrative:     Release to patient->Immediate   URINALYSIS MICROSCOPIC    Narrative:     Specimen Source->Urine          Imaging Results              CT Renal Stone Study ABD Pelvis WO (Final result)  Result time 09/27/23 13:28:43      Final result by Abdirashid Varma III, MD (09/27/23 13:28:43)                   Impression:      No acute abnormality.      Electronically signed by: Russell Varma  Date:    09/27/2023  Time:    13:28               Narrative:    EXAMINATION:  CT RENAL STONE STUDY ABD PELVIS WO    CLINICAL HISTORY:  Flank pain, kidney stone suspected;    TECHNIQUE:  Low dose axial images, sagittal and coronal reformations were obtained from the lung bases to the pubic symphysis.  Contrast was not administered.    COMPARISON:  CT abdomen and pelvis 09/11/2020    FINDINGS:  The visualized lung bases are unremarkable.  Gastric bypass changes and cholecystectomy changes noted.  No bowel obstruction.  Moderate stool burden.    The liver, spleen, adrenal glands and kidneys are normal.  No obstructive renal calculi or hydronephrosis.  Pancreatic atrophy.    No aortic aneurysm, free air, free fluid or adenopathy.  Postsurgical changes involving the pelvis and left SI joint.  The uterus and bladder are partially obscured by artifact but appear grossly unremarkable.    No inflamed appendix identified.                                       Medications   ondansetron injection 4 mg (4 mg Intravenous Given 9/27/23 1511)   sodium chloride 0.9% bolus 1,000 mL 1,000 mL (0 mLs Intravenous Stopped 9/27/23 1630)   morphine injection 4 mg (4 mg Intravenous Given 9/27/23 1513)     Medical Decision Making  Risk  Prescription drug management.                                Clinical Impression:   Final diagnoses:  [M54.50] Acute midline low back pain without sciatica (Primary)        ED Disposition Condition    Discharge Stable          ED Prescriptions    None       Follow-up Information       Follow up With Specialties Details Why Contact Info    Vikash Baig MD Internal Medicine Schedule an appointment as soon as possible for a visit  As needed [id number]  [place] [address]  [phone]               Marck Green, MARIZA  09/30/23 1255

## 2023-10-02 ENCOUNTER — PATIENT MESSAGE (OUTPATIENT)
Dept: OBSTETRICS AND GYNECOLOGY | Facility: CLINIC | Age: 53
End: 2023-10-02
Payer: MEDICARE

## 2023-10-03 RX ORDER — FLUCONAZOLE 150 MG/1
TABLET ORAL
Qty: 2 TABLET | Refills: 1 | Status: SHIPPED | OUTPATIENT
Start: 2023-10-03 | End: 2023-11-03 | Stop reason: SDUPTHER

## 2023-10-06 DIAGNOSIS — R11.0 NAUSEA: ICD-10-CM

## 2023-10-06 NOTE — TELEPHONE ENCOUNTER
No care due was identified.  Health [facility] Embedded Care Due Messages. Reference number: [id number].   10/06/2023 5:13:05 PM CDT

## 2023-10-09 RX ORDER — PROMETHAZINE HYDROCHLORIDE 25 MG/1
TABLET ORAL
Qty: 40 TABLET | Refills: 0 | Status: SHIPPED | OUTPATIENT
Start: 2023-10-09 | End: 2023-11-16

## 2023-10-12 ENCOUNTER — PATIENT MESSAGE (OUTPATIENT)
Dept: OTHER | Facility: OTHER | Age: 53
End: 2023-10-12
Payer: MEDICARE

## 2023-10-18 ENCOUNTER — PATIENT MESSAGE (OUTPATIENT)
Dept: OTHER | Facility: OTHER | Age: 53
End: 2023-10-18
Payer: MEDICARE

## 2023-10-30 ENCOUNTER — HOSPITAL ENCOUNTER (EMERGENCY)
Facility: HOSPITAL | Age: 53
Discharge: HOME | End: 2023-10-30
Attending: EMERGENCY MEDICINE
Payer: MEDICARE

## 2023-10-30 VITALS
TEMPERATURE: 99 F | DIASTOLIC BLOOD PRESSURE: 70 MMHG | OXYGEN SATURATION: 97 % | WEIGHT: 137.56 LBS | RESPIRATION RATE: 20 BRPM | HEART RATE: 97 BPM | SYSTOLIC BLOOD PRESSURE: 115 MMHG | BODY MASS INDEX: 24.37 KG/M2

## 2023-10-30 DIAGNOSIS — S39.011A STRAIN OF MUSCLE OF RIGHT GROIN REGION: ICD-10-CM

## 2023-10-30 DIAGNOSIS — S16.1XXA STRAIN OF NECK MUSCLE, INITIAL ENCOUNTER: ICD-10-CM

## 2023-10-30 DIAGNOSIS — M25.551 RIGHT HIP PAIN: Primary | ICD-10-CM
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
## 2023-10-30 PROCEDURE — 99284 EMERGENCY DEPT VISIT MOD MDM: CPT | Mod: HCNC

## 2023-10-30 PROCEDURE — 25000003 PHARM REV CODE 250: Mod: HCNC | Performed by: NURSE PRACTITIONER

## 2023-10-30 RX ORDER — METHOCARBAMOL 750 MG/1
750 TABLET, FILM COATED ORAL 4 TIMES DAILY
Qty: 40 TABLET | Refills: 0 | Status: SHIPPED | OUTPATIENT
Start: 2023-10-30 | End: 2023-11-09

## 2023-10-30 RX ORDER — METHOCARBAMOL 750 MG/1
750 TABLET, FILM COATED ORAL
Status: DISCONTINUED | OUTPATIENT
Start: 2023-10-30 | End: 2023-10-30

## 2023-10-30 RX ORDER — METHOCARBAMOL 750 MG/1
750 TABLET, FILM COATED ORAL
Status: COMPLETED | OUTPATIENT
Start: 2023-10-30 | End: 2023-10-30

## 2023-10-30 RX ORDER — METHOCARBAMOL 500 MG/1
500 TABLET, FILM COATED ORAL
Status: DISCONTINUED | OUTPATIENT
Start: 2023-10-30 | End: 2023-10-30

## 2023-10-30 RX ADMIN — METHOCARBAMOL 750 MG: 750 TABLET ORAL at 02:10

## 2023-10-30 NOTE — FIRST PROVIDER EVALUATION
Medical screening examination initiated.  I have conducted a focused provider triage encounter, findings are as follows:    Brief history of present illness:  Fall yesterday after tripping over the dog, complaining of right pelvic pain and neck pain    Vitals:    10/30/23 1238   BP: 95/69   BP Location: Right arm   Patient Position: Sitting   Pulse: 105   Resp: 20   Temp: 98.7 °F (37.1 °C)   TempSrc: Oral   SpO2: 97%   Weight: 62.4 kg (137 lb 9.1 oz)       Pertinent physical exam:  No acute distress, vital signs stable    Brief workup plan:  X-ray    Preliminary workup initiated; this workup will be continued and followed by the physician or advanced practice provider that is assigned to the patient when roomed.

## 2023-10-30 NOTE — ED PROVIDER NOTES
Encounter Date: 10/30/2023       History     Chief Complaint   Patient presents with    Fall     Fell down 4 steps yesterday, denies hitting head or loss of consciousness, c/o right hip and pelvic pain. Pt ambulated to triage.     Patient is a 53-year-old female who presents with right groin and hip pain as well as neck pain after falling yesterday.  Patient reports getting knocked down by a dog while walking upstairs.  She denies any head injury or loss of consciousness.  Patient denies taking medication for relief of symptoms.  She shows no signs distress at this time.  Patient able to ambulate without assistance.      Review of patient's allergies indicates:   Allergen Reactions    Demerol [meperidine] Hallucinations    Lamictal [lamotrigine] Rash     Rash to face in chart 2014 or 15     Penicillins Other (See Comments)     Patient cannot recall specific reaction, reports she has been listing this as an allergy since childhood.    Bactrim [sulfamethoxazole-trimethoprim]     Ciprofloxacin (bulk)     Codeine Nausea And Vomiting    Levetiracetam     Toradol [ketorolac]     Tramadol      seizures    Morpholine analogues Diarrhea, Itching and Nausea And Vomiting     Past Medical History:   Diagnosis Date    Abnormal Pap smear 1991    bx was negative, per pt    Acute renal failure (ARF) 2/16/2015    Anemia     Anxiety     B12 deficiency     Blood transfusion     multiple     Chronic LBP     Degenerative disc disease     l spine    Dehydration 2/16/2015    Diabetes mellitus     Diabetic neuropathy     General anesthetics causing adverse effect in therapeutic use     delayed emergence    Hyperglycemia 4/25/2018    Mixed hyperlipidemia 11/18/2013    PONV (postoperative nausea and vomiting)     RLS (restless legs syndrome)     Seizures     Sleep apnea     with CPAP    Vitamin D deficiency disease      Past Surgical History:   Procedure Laterality Date    ANUS SURGERY      AUGMENTATION OF BREAST  2008    saline    BELT  ABDOMINOPLASTY      tummy [facility]    BREAST SURGERY  2008    breast augmentation    CARPAL TUNNEL RELEASE Right 2021    Procedure: RELEASE, CARPAL TUNNEL;  Surgeon: Tyler Victor MD;  Location: [facility] OR;  Service: Orthopedics;  Laterality: Right;  right carpal tunnel release and right cubital tunnel release with possible anterior transposition ulnar nerve     SECTION      x2    CHOLECYSTECTOMY      COLONOSCOPY N/A 2023    Procedure: COLONOSCOPY;  Surgeon: María De La Torre MD;  Location: [facility];  Service: Endoscopy;  Laterality: N/A;    mikel      EXCISIONAL HEMORRHOIDECTOMY      GASTRIC BYPASS      HIP FRACTURE SURGERY      left hip ORIF    MOUTH SURGERY      revision of JJ anastomosis  2015    small bowel resection  2015    TOTAL REDUCTION MAMMOPLASTY      TUBAL LIGATION      ULNAR TUNNEL RELEASE Right 2021    Procedure: RELEASE, CUBITAL TUNNEL;  Surgeon: Tyler Victor MD;  Location: [facility] OR;  Service: Orthopedics;  Laterality: Right;  right carpal tunnel release and right cubital tunnel release with possible anterior transposition ulnar nerve     Family History   Problem Relation Age of Onset    Diabetes Mother     Cataracts Mother     Glaucoma Maternal Aunt     Blindness Maternal Aunt     Breast cancer Neg Hx     Colon cancer Neg Hx     Thrombophilia Neg Hx      Social History     Tobacco Use    Smoking status: Never    Smokeless tobacco: Never   Substance Use Topics    Alcohol use: No    Drug use: No     Review of Systems   Constitutional:  Negative for fever.   HENT:  Negative for sore throat.    Respiratory:  Negative for shortness of breath.    Cardiovascular:  Negative for chest pain.   Gastrointestinal:  Negative for nausea.   Genitourinary:  Negative for dysuria.   Musculoskeletal:  Positive for arthralgias (right hip and neck). Negative for back pain.   Skin:  Negative for rash.   Neurological:  Negative for weakness.   Hematological:  Does not  bruise/bleed easily.       Physical Exam     Initial Vitals [10/30/23 1238]   BP Pulse Resp Temp SpO2   95/69 105 20 98.7 °F (37.1 °C) 97 %      MAP       --         Physical Exam    Nursing note and vitals reviewed.  Constitutional: She appears well-developed and well-nourished.   HENT:   Head: Normocephalic and atraumatic.   Eyes: EOM are normal. Pupils are equal, round, and reactive to light.   Neck: Neck supple.   Normal range of motion.  Cardiovascular:  Normal rate, regular rhythm, normal heart sounds and intact distal pulses.           Pulmonary/Chest: Breath sounds normal.   Abdominal: Abdomen is soft. Bowel sounds are normal.   Musculoskeletal:         General: Normal range of motion.      Cervical back: Normal range of motion and neck supple. Tenderness present. No swelling, deformity or bony tenderness. Normal range of motion.      Right hip: No deformity or bony tenderness. Normal range of motion.     Neurological: She is alert and oriented to person, place, and time. She has normal strength and normal reflexes.   Skin: Skin is warm and dry.         ED Course   Procedures  Labs Reviewed - No data to display       Imaging Results              X-Ray Hip 2 or 3 views Right (with Pelvis when performed) (Final result)  Result time 10/30/23 13:46:15   Procedure changed from X-Ray Pelvis Complete min 3 views     Final result by Tj Aleman MD (10/30/23 13:46:15)                   Impression:      No acute findings.  Posttraumatic and or degenerative changes.      Electronically signed by: Tj Aleman MD  Date:    10/30/2023  Time:    13:46               Narrative:    EXAMINATION:  XR HIP WITH PELVIS WHEN PERFORMED, 2 OR 3  VIEWS RIGHT    CLINICAL HISTORY:  fall ;- Unspecified fall, initial encounter.  Right hip pain    TECHNIQUE:  Right hip, three views    COMPARISON:  Abdominal radiograph on 09/22/2020    FINDINGS:  Mild right hip DJD with subchondral acetabular sclerosis.  Greater trochanteric and right  superior iliac wing enthesophytes.  Posttraumatic deformity of the left acetabulum and ischium with orthopedic hardware present.  Left sacroiliac screws.  Secondary left hip osteoarthritis with heterotopic bone formation along the lateral aspect of the acetabulum.  No significant change compared to the prior study.                                       X-Ray Cervical Spine AP And Lateral (Final result)  Result time 10/30/23 13:25:05      Final result by Harshad Mccullough MD (10/30/23 13:25:05)                   Impression:      Normal cervical spine radiographs.      Electronically signed by: Harshad Mccullough MD  Date:    10/30/2023  Time:    13:25               Narrative:    EXAMINATION:  XR CERVICAL SPINE AP LATERAL    CLINICAL HISTORY:  . Unspecified fall, initial encounter    TECHNIQUE:  AP, Lateral, and  open mouth views of the cervical spine were performed.    COMPARISON:  None    FINDINGS:  There is normal alignment to the cervical spine.  No fracture or dislocation is seen.  No significant degenerative changes are seen.                                       Medications   methocarbamoL tablet 750 mg (has no administration in time range)     Medical Decision Making  Patient informed of imaging results.  Instructed to take medications as prescribed and follow-up with primary care physician.  Patient to return to the emergency room with any worsening symptoms.  No distress noted at time of discharge.    Risk  Prescription drug management.                               Clinical Impression:   Final diagnoses:  [W19.XXXA] Fall  [M25.551] Right hip pain (Primary)  [S16.1XXA] Strain of neck muscle, initial encounter  [S39.011A] Strain of muscle of right groin region        ED Disposition Condition    Discharge Stable          ED Prescriptions       Medication Sig Dispense Start Date End Date Auth. Provider    methocarbamoL (ROBAXIN) 750 MG Tab Take 1 tablet (750 mg total) by mouth 4 (four) times daily. for 10 days 40  tablet 10/30/2023 11/9/2023 Pb Morales NP          Follow-up Information       Follow up With Specialties Details Why Contact Info    Vikash Baig MD Internal Medicine  As needed [id number]  [place] [address]  [phone]               Pb Morales NP  10/30/23 0616

## 2023-11-08 ENCOUNTER — PATIENT MESSAGE (OUTPATIENT)
Dept: OBSTETRICS AND GYNECOLOGY | Facility: CLINIC | Age: 53
End: 2023-11-08
Payer: MEDICARE

## 2023-11-08 RX ORDER — FLUCONAZOLE 150 MG/1
TABLET ORAL
Qty: 2 TABLET | Refills: 1 | Status: SHIPPED | OUTPATIENT
Start: 2023-11-08 | End: 2024-02-23

## 2023-11-15 DIAGNOSIS — R11.0 NAUSEA: ICD-10-CM

## 2023-11-15 NOTE — TELEPHONE ENCOUNTER
Care Due:                  Date            Visit Type   Department     Provider  --------------------------------------------------------------------------------                                EP -                              PRIMARY      [facility] INTERNAL  Last Visit: 08-      CARE ([facility])   PARTH Baig                              EP -                              PRIMARY      [facility] INTER[facility]  Next Visit: 02-      CARE ([facility])   PARTH Baig                                                            Last  Test          Frequency    Reason                     Performed    Due Date  --------------------------------------------------------------------------------    Lipid Panel.  12 months..  pravastatin..............  01- 01-    Health [facility] Embedded Care Due Messages. Reference number: [id number].   11/15/2023 5:11:33 PM CST

## 2023-11-16 RX ORDER — PROMETHAZINE HYDROCHLORIDE 25 MG/1
TABLET ORAL
Qty: 40 TABLET | Refills: 10 | Status: SHIPPED | OUTPATIENT
Start: 2023-11-16

## 2023-12-27 ENCOUNTER — PATIENT MESSAGE (OUTPATIENT)
Dept: OTHER | Facility: OTHER | Age: 53
End: 2023-12-27
Payer: MEDICARE

## 2023-12-28 ENCOUNTER — PATIENT MESSAGE (OUTPATIENT)
Dept: OTHER | Facility: OTHER | Age: 53
End: 2023-12-28
Payer: MEDICARE

## 2023-12-29 ENCOUNTER — TELEPHONE (OUTPATIENT)
Dept: OPHTHALMOLOGY | Facility: CLINIC | Age: 53
End: 2023-12-29
Payer: MEDICARE

## 2023-12-29 NOTE — TELEPHONE ENCOUNTER
Left a message that first available appt with DNL is not until 03/07/24 or she can go our ray office to see DKT for a sooner appt.

## 2024-01-06 DIAGNOSIS — E78.5 HYPERLIPIDEMIA ASSOCIATED WITH TYPE 2 DIABETES MELLITUS: ICD-10-CM

## 2024-01-06 DIAGNOSIS — E11.69 HYPERLIPIDEMIA ASSOCIATED WITH TYPE 2 DIABETES MELLITUS: ICD-10-CM

## 2024-01-06 DIAGNOSIS — Z29.9 PREVENTIVE MEASURE: ICD-10-CM

## 2024-01-06 NOTE — TELEPHONE ENCOUNTER
Care Due:                  Date            Visit Type   Department     Provider  --------------------------------------------------------------------------------                                EP -                              PRIMARY      [facility] INTER[facility]  Last Visit: 08-      CARE ([facility])   PARTH Baig                              EP -                              PRIMARY      [facility] INTER[facility]  Next Visit: 02-      CARE ([facility])   PARTH Baig                                                            Last  Test          Frequency    Reason                     Performed    Due Date  --------------------------------------------------------------------------------    HBA1C.......  6 months...  empagliflozin, metFORMIN.  09- 03-    [facility] Embedded Care Due Messages. Reference number: [id number].   1/06/2024 2:00:32 PM CST

## 2024-01-08 RX ORDER — GABAPENTIN 300 MG/1
CAPSULE ORAL
Qty: 540 CAPSULE | Refills: 0 | Status: SHIPPED | OUTPATIENT
Start: 2024-01-08

## 2024-01-09 ENCOUNTER — PATIENT MESSAGE (OUTPATIENT)
Dept: ADMINISTRATIVE | Facility: OTHER | Age: 54
End: 2024-01-09
Payer: MEDICARE

## 2024-01-17 ENCOUNTER — PATIENT MESSAGE (OUTPATIENT)
Dept: SLEEP MEDICINE | Facility: CLINIC | Age: 54
End: 2024-01-17
Payer: MEDICARE

## 2024-01-18 DIAGNOSIS — G47.00 INSOMNIA, UNSPECIFIED TYPE: ICD-10-CM

## 2024-01-18 RX ORDER — LEMBOREXANT 10 MG/1
TABLET, FILM COATED ORAL
Qty: 30 TABLET | Refills: 5 | Status: SHIPPED | OUTPATIENT
Start: 2024-01-18

## 2024-01-19 ENCOUNTER — PATIENT MESSAGE (OUTPATIENT)
Dept: SLEEP MEDICINE | Facility: CLINIC | Age: 54
End: 2024-01-19
Payer: MEDICARE

## 2024-01-21 DIAGNOSIS — Z79.4 TYPE 2 DIABETES MELLITUS WITHOUT COMPLICATION, WITH LONG-TERM CURRENT USE OF INSULIN: Chronic | ICD-10-CM

## 2024-01-21 DIAGNOSIS — E11.9 TYPE 2 DIABETES MELLITUS WITHOUT COMPLICATION, WITH LONG-TERM CURRENT USE OF INSULIN: Chronic | ICD-10-CM

## 2024-01-21 NOTE — TELEPHONE ENCOUNTER
Care Due:                  Date            Visit Type   Department     Provider  --------------------------------------------------------------------------------                                EP -                              PRIMARY      [facility] INTER[facility]  Last Visit: 08-      CARE ([facility])   PARTH Baig                              EP -                              PRIMARY      [facility] INTERNAL  Next Visit: 02-      CARE ([facility])   PARTH Baig                                                            Last  Test          Frequency    Reason                     Performed    Due Date  --------------------------------------------------------------------------------    Lipid Panel.  12 months..  pravastatin..............  01- 01-    Health [facility] Embedded Care Due Messages. Reference number: [id number].   1/21/2024 3:25:03 AM CST

## 2024-01-22 RX ORDER — EMPAGLIFLOZIN 25 MG/1
25 TABLET, FILM COATED ORAL
Qty: 90 TABLET | Refills: 0 | Status: SHIPPED | OUTPATIENT
Start: 2024-01-22 | End: 2024-02-23 | Stop reason: SDUPTHER

## 2024-01-23 ENCOUNTER — TELEPHONE (OUTPATIENT)
Dept: SLEEP MEDICINE | Facility: CLINIC | Age: 54
End: 2024-01-23
Payer: MEDICARE

## 2024-01-23 NOTE — TELEPHONE ENCOUNTER
Jadyn Chance (Key: [id number])  PA Case ID #: [id number]  Need Help? Call us at [phone]  Outcome  Approved on January 22  PA Case: [id number], Status: Approved, Coverage Starts on: 1/1/2024 12:00:00 AM, Coverage Ends on: 12/31/2024 12:00:00 AM. Questions? Contact [phone].  Authorization Expiration Date: 12/30/2024  Drug  DayVigo 10MG tablets

## 2024-02-08 DIAGNOSIS — R11.0 NAUSEA: ICD-10-CM

## 2024-02-08 RX ORDER — ONDANSETRON 4 MG/1
TABLET, FILM COATED ORAL
Qty: 120 TABLET | Refills: 0 | Status: SHIPPED | OUTPATIENT
Start: 2024-02-08 | End: 2024-04-01

## 2024-02-08 NOTE — TELEPHONE ENCOUNTER
No care due was identified.  Health [facility] Embedded Care Due Messages. Reference number: [id number].   2/08/2024 10:36:53 AM CST

## 2024-02-16 ENCOUNTER — APPOINTMENT (OUTPATIENT)
Dept: RADIOLOGY | Facility: HOSPITAL | Age: 54
End: 2024-02-16
Attending: NURSE PRACTITIONER
Payer: MEDICARE

## 2024-02-16 DIAGNOSIS — Z87.81 HISTORY OF FRACTURE OF LEFT HIP: ICD-10-CM

## 2024-02-16 DIAGNOSIS — Z78.0 POSTMENOPAUSAL: ICD-10-CM

## 2024-02-16 PROCEDURE — 77080 DXA BONE DENSITY AXIAL: CPT | Mod: TC,HCNC

## 2024-02-16 PROCEDURE — 77080 DXA BONE DENSITY AXIAL: CPT | Mod: 26,HCNC,, | Performed by: RADIOLOGY

## 2024-02-16 NOTE — PROGRESS NOTES
No evidence of osteoporosis on DEXA screening. She does have evidence of osteopenia (thinning bone) both in lumbar spine as well as L hip. With her history of fractures, she may need to consider treatment to prevent further bone loss and progressing to osteoporosis. I am happy to refer her to Rheumatology to discuss options if she would like?

## 2024-02-19 ENCOUNTER — TELEPHONE (OUTPATIENT)
Dept: DIABETES | Facility: CLINIC | Age: 54
End: 2024-02-19
Payer: MEDICARE

## 2024-02-19 DIAGNOSIS — M85.80 OSTEOPENIA, UNSPECIFIED LOCATION: ICD-10-CM

## 2024-02-19 DIAGNOSIS — Z87.81 HISTORY OF FRACTURE OF LEFT HIP: Primary | ICD-10-CM

## 2024-02-19 NOTE — TELEPHONE ENCOUNTER
Patient called reviewed bone scan results as well as discuss findings. Per patient referral to Rheumatology will ne great. Informed patient that I will send to Provider for referral.

## 2024-02-19 NOTE — TELEPHONE ENCOUNTER
Referral placed. Please send message to assist with scheduling NP appt for rheumatology: Osteopenia with history of fracture.

## 2024-02-21 RX ORDER — TAMSULOSIN HYDROCHLORIDE 0.4 MG/1
0.4 CAPSULE ORAL DAILY
Qty: 30 CAPSULE | Refills: 11 | OUTPATIENT
Start: 2024-02-21 | End: 2025-02-20

## 2024-02-23 ENCOUNTER — OFFICE VISIT (OUTPATIENT)
Dept: INTERNAL MEDICINE | Facility: CLINIC | Age: 54
End: 2024-02-23
Payer: MEDICARE

## 2024-02-23 VITALS
WEIGHT: 144.19 LBS | BODY MASS INDEX: 25.55 KG/M2 | SYSTOLIC BLOOD PRESSURE: 110 MMHG | TEMPERATURE: 98 F | HEART RATE: 110 BPM | DIASTOLIC BLOOD PRESSURE: 70 MMHG | HEIGHT: 63 IN | OXYGEN SATURATION: 93 %

## 2024-02-23 DIAGNOSIS — Z79.4 TYPE 2 DIABETES MELLITUS WITHOUT COMPLICATION, WITH LONG-TERM CURRENT USE OF INSULIN: Primary | Chronic | ICD-10-CM

## 2024-02-23 DIAGNOSIS — E11.69 HYPERLIPIDEMIA ASSOCIATED WITH TYPE 2 DIABETES MELLITUS: ICD-10-CM

## 2024-02-23 DIAGNOSIS — E78.5 HYPERLIPIDEMIA ASSOCIATED WITH TYPE 2 DIABETES MELLITUS: ICD-10-CM

## 2024-02-23 DIAGNOSIS — E11.9 TYPE 2 DIABETES MELLITUS WITHOUT COMPLICATION, WITH LONG-TERM CURRENT USE OF INSULIN: Primary | Chronic | ICD-10-CM

## 2024-02-23 DIAGNOSIS — Z12.31 ENCOUNTER FOR SCREENING MAMMOGRAM FOR BREAST CANCER: ICD-10-CM

## 2024-02-23 DIAGNOSIS — E53.8 VITAMIN B12 DEFICIENCY: ICD-10-CM

## 2024-02-23 DIAGNOSIS — E55.9 VITAMIN D DEFICIENCY: ICD-10-CM

## 2024-02-23 PROCEDURE — 1159F MED LIST DOCD IN RCRD: CPT | Mod: HCNC,CPTII,S$GLB, | Performed by: INTERNAL MEDICINE

## 2024-02-23 PROCEDURE — 99999 PR PBB SHADOW E&M-EST. PATIENT-LVL V: CPT | Mod: PBBFAC,HCNC,, | Performed by: INTERNAL MEDICINE

## 2024-02-23 PROCEDURE — 3078F DIAST BP <80 MM HG: CPT | Mod: HCNC,CPTII,S$GLB, | Performed by: INTERNAL MEDICINE

## 2024-02-23 PROCEDURE — 99214 OFFICE O/P EST MOD 30 MIN: CPT | Mod: HCNC,S$GLB,, | Performed by: INTERNAL MEDICINE

## 2024-02-23 PROCEDURE — 3074F SYST BP LT 130 MM HG: CPT | Mod: HCNC,CPTII,S$GLB, | Performed by: INTERNAL MEDICINE

## 2024-02-23 PROCEDURE — 3008F BODY MASS INDEX DOCD: CPT | Mod: HCNC,CPTII,S$GLB, | Performed by: INTERNAL MEDICINE

## 2024-02-23 RX ORDER — METFORMIN HYDROCHLORIDE 500 MG/1
TABLET, EXTENDED RELEASE ORAL
Qty: 360 TABLET | Refills: 1 | Status: SHIPPED | OUTPATIENT
Start: 2024-02-23

## 2024-02-23 RX ORDER — PRAVASTATIN SODIUM 20 MG/1
20 TABLET ORAL DAILY
Qty: 90 TABLET | Refills: 3 | Status: SHIPPED | OUTPATIENT
Start: 2024-02-23 | End: 2025-02-22

## 2024-02-23 RX ORDER — DULOXETIN HYDROCHLORIDE 30 MG/1
30 CAPSULE, DELAYED RELEASE ORAL 2 TIMES DAILY
Qty: 60 CAPSULE | Refills: 0 | Status: SHIPPED | OUTPATIENT
Start: 2024-02-23 | End: 2024-03-27

## 2024-02-23 RX ORDER — ONDANSETRON HYDROCHLORIDE 2 MG/ML
INJECTION, SOLUTION INTRAVENOUS
COMMUNITY
Start: 2023-09-27 | End: 2024-05-29 | Stop reason: SDUPTHER
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
## 2024-02-29 ENCOUNTER — TELEPHONE (OUTPATIENT)
Dept: PSYCHIATRY | Facility: CLINIC | Age: 54
End: 2024-02-29
Payer: MEDICARE

## 2024-03-04 ENCOUNTER — OFFICE VISIT (OUTPATIENT)
Dept: RHEUMATOLOGY | Facility: CLINIC | Age: 54
End: 2024-03-04
Payer: MEDICARE

## 2024-03-04 ENCOUNTER — OFFICE VISIT (OUTPATIENT)
Dept: PSYCHIATRY | Facility: CLINIC | Age: 54
End: 2024-03-04
Payer: MEDICARE

## 2024-03-04 ENCOUNTER — LAB VISIT (OUTPATIENT)
Dept: LAB | Facility: HOSPITAL | Age: 54
End: 2024-03-04
Attending: INTERNAL MEDICINE
Payer: MEDICARE

## 2024-03-04 VITALS
BODY MASS INDEX: 25.52 KG/M2 | DIASTOLIC BLOOD PRESSURE: 74 MMHG | HEIGHT: 63 IN | HEART RATE: 96 BPM | SYSTOLIC BLOOD PRESSURE: 114 MMHG | WEIGHT: 144 LBS

## 2024-03-04 VITALS
SYSTOLIC BLOOD PRESSURE: 114 MMHG | HEART RATE: 96 BPM | BODY MASS INDEX: 25.54 KG/M2 | WEIGHT: 144.19 LBS | DIASTOLIC BLOOD PRESSURE: 74 MMHG

## 2024-03-04 DIAGNOSIS — F39 UNSPECIFIED MOOD (AFFECTIVE) DISORDER: Primary | ICD-10-CM

## 2024-03-04 DIAGNOSIS — F41.9 ANXIETY: ICD-10-CM

## 2024-03-04 DIAGNOSIS — M85.89 OSTEOPENIA OF MULTIPLE SITES: ICD-10-CM

## 2024-03-04 DIAGNOSIS — Z87.81 HISTORY OF FRACTURE OF LEFT HIP: ICD-10-CM

## 2024-03-04 DIAGNOSIS — Z79.4 TYPE 2 DIABETES MELLITUS WITHOUT COMPLICATION, WITH LONG-TERM CURRENT USE OF INSULIN: Chronic | ICD-10-CM

## 2024-03-04 DIAGNOSIS — E11.9 TYPE 2 DIABETES MELLITUS WITHOUT COMPLICATION, WITH LONG-TERM CURRENT USE OF INSULIN: Chronic | ICD-10-CM

## 2024-03-04 LAB
ESTIMATED AVG GLUCOSE: 143 MG/DL (ref 68–131)
HBA1C MFR BLD: 6.6 % (ref 4–5.6)

## 2024-03-04 PROCEDURE — 3074F SYST BP LT 130 MM HG: CPT | Mod: HCNC,CPTII,S$GLB, | Performed by: STUDENT IN AN ORGANIZED HEALTH CARE EDUCATION/TRAINING PROGRAM

## 2024-03-04 PROCEDURE — 3066F NEPHROPATHY DOC TX: CPT | Mod: HCNC,CPTII,S$GLB, | Performed by: PSYCHIATRY & NEUROLOGY

## 2024-03-04 PROCEDURE — 3078F DIAST BP <80 MM HG: CPT | Mod: HCNC,CPTII,S$GLB, | Performed by: STUDENT IN AN ORGANIZED HEALTH CARE EDUCATION/TRAINING PROGRAM

## 2024-03-04 PROCEDURE — 99204 OFFICE O/P NEW MOD 45 MIN: CPT | Mod: HCNC,S$GLB,, | Performed by: STUDENT IN AN ORGANIZED HEALTH CARE EDUCATION/TRAINING PROGRAM

## 2024-03-04 PROCEDURE — 90792 PSYCH DIAG EVAL W/MED SRVCS: CPT | Mod: HCNC,S$GLB,, | Performed by: PSYCHIATRY & NEUROLOGY

## 2024-03-04 PROCEDURE — 99999 PR PBB SHADOW E&M-EST. PATIENT-LVL V: CPT | Mod: PBBFAC,HCNC,, | Performed by: STUDENT IN AN ORGANIZED HEALTH CARE EDUCATION/TRAINING PROGRAM

## 2024-03-04 PROCEDURE — 3061F NEG MICROALBUMINURIA REV: CPT | Mod: HCNC,CPTII,S$GLB, | Performed by: PSYCHIATRY & NEUROLOGY

## 2024-03-04 PROCEDURE — 3078F DIAST BP <80 MM HG: CPT | Mod: HCNC,CPTII,S$GLB, | Performed by: PSYCHIATRY & NEUROLOGY

## 2024-03-04 PROCEDURE — 1159F MED LIST DOCD IN RCRD: CPT | Mod: HCNC,CPTII,S$GLB, | Performed by: STUDENT IN AN ORGANIZED HEALTH CARE EDUCATION/TRAINING PROGRAM

## 2024-03-04 PROCEDURE — 3044F HG A1C LEVEL LT 7.0%: CPT | Mod: HCNC,CPTII,S$GLB, | Performed by: PSYCHIATRY & NEUROLOGY

## 2024-03-04 PROCEDURE — 99999 PR PBB SHADOW E&M-EST. PATIENT-LVL II: CPT | Mod: PBBFAC,HCNC,, | Performed by: PSYCHIATRY & NEUROLOGY

## 2024-03-04 PROCEDURE — 3074F SYST BP LT 130 MM HG: CPT | Mod: HCNC,CPTII,S$GLB, | Performed by: PSYCHIATRY & NEUROLOGY

## 2024-03-04 PROCEDURE — 3008F BODY MASS INDEX DOCD: CPT | Mod: HCNC,CPTII,S$GLB, | Performed by: STUDENT IN AN ORGANIZED HEALTH CARE EDUCATION/TRAINING PROGRAM

## 2024-03-04 PROCEDURE — 83036 HEMOGLOBIN GLYCOSYLATED A1C: CPT | Mod: HCNC | Performed by: INTERNAL MEDICINE

## 2024-03-04 RX ORDER — BUPROPION HYDROCHLORIDE 150 MG/1
TABLET ORAL
Qty: 60 TABLET | Refills: 2 | Status: SHIPPED | OUTPATIENT
Start: 2024-03-04 | End: 2024-03-27 | Stop reason: SINTOL
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
## 2024-03-04 NOTE — PATIENT INSTRUCTIONS
Continue vitamin D3 50,000 units twice a week.  Try vitamin D patches through [employer]  Take dietary calcium 1200 mg daily  Will follow up vitamin D lab which is pending. If it's >20, we can start IV Reclast and follow up in 1 year with DXA and possibly repeat Reclast. If vitamin D is <20, we will try to replace with the patches and then do Reclast.  Osteoporosis precautions:  - Regular exercise: aerobic, strength, flexibility, and balance  - Limitation of caffeine consumption to </= 2 servings per day  - Fall prevention, avoid high impact/twisting motion, do not flop into a chair

## 2024-03-05 ENCOUNTER — PATIENT MESSAGE (OUTPATIENT)
Dept: RHEUMATOLOGY | Facility: CLINIC | Age: 54
End: 2024-03-05
Payer: MEDICARE

## 2024-03-05 ENCOUNTER — PATIENT MESSAGE (OUTPATIENT)
Dept: PSYCHIATRY | Facility: CLINIC | Age: 54
End: 2024-03-05
Payer: MEDICARE

## 2024-03-05 DIAGNOSIS — M85.89 OSTEOPENIA OF MULTIPLE SITES: Primary | ICD-10-CM

## 2024-03-05 RX ORDER — SODIUM CHLORIDE 0.9 % (FLUSH) 0.9 %
10 SYRINGE (ML) INJECTION
Status: CANCELLED | OUTPATIENT
Start: 2024-03-12

## 2024-03-05 RX ORDER — BUSPIRONE HYDROCHLORIDE 30 MG/1
30 TABLET ORAL 2 TIMES DAILY
Qty: 60 TABLET | Refills: 3 | Status: SHIPPED | OUTPATIENT
Start: 2024-03-05 | End: 2025-03-05

## 2024-03-05 RX ORDER — HEPARIN 100 UNIT/ML
500 SYRINGE INTRAVENOUS
Status: CANCELLED | OUTPATIENT
Start: 2024-03-12

## 2024-03-05 RX ORDER — ZOLEDRONIC ACID 5 MG/100ML
5 INJECTION, SOLUTION INTRAVENOUS
Status: CANCELLED | OUTPATIENT
Start: 2024-03-12

## 2024-03-05 RX ORDER — ACETAMINOPHEN 325 MG/1
650 TABLET ORAL
Status: CANCELLED | OUTPATIENT
Start: 2024-03-12

## 2024-03-11 RX ORDER — TAMSULOSIN HYDROCHLORIDE 0.4 MG/1
0.4 CAPSULE ORAL DAILY
Qty: 30 CAPSULE | Refills: 11 | OUTPATIENT
Start: 2024-03-11 | End: 2025-03-11

## 2024-03-12 RX ORDER — TAMSULOSIN HYDROCHLORIDE 0.4 MG/1
0.4 CAPSULE ORAL DAILY
Qty: 30 CAPSULE | Refills: 11 | OUTPATIENT
Start: 2024-03-12 | End: 2025-03-12

## 2024-03-18 ENCOUNTER — TELEPHONE (OUTPATIENT)
Dept: RHEUMATOLOGY | Facility: CLINIC | Age: 54
End: 2024-03-18
Payer: MEDICARE

## 2024-03-19 RX ORDER — TAMSULOSIN HYDROCHLORIDE 0.4 MG/1
0.4 CAPSULE ORAL DAILY
Qty: 30 CAPSULE | Refills: 11 | OUTPATIENT
Start: 2024-03-19 | End: 2025-03-19

## 2024-03-20 ENCOUNTER — PATIENT MESSAGE (OUTPATIENT)
Dept: UROLOGY | Facility: CLINIC | Age: 54
End: 2024-03-20
Payer: MEDICARE

## 2024-03-21 ENCOUNTER — TELEPHONE (OUTPATIENT)
Dept: HEMATOLOGY/ONCOLOGY | Facility: CLINIC | Age: 54
End: 2024-03-21
Payer: MEDICARE

## 2024-03-21 RX ORDER — TAMSULOSIN HYDROCHLORIDE 0.4 MG/1
0.4 CAPSULE ORAL DAILY
Qty: 30 CAPSULE | Refills: 11 | Status: SHIPPED | OUTPATIENT
Start: 2024-03-21 | End: 2025-03-21

## 2024-03-27 ENCOUNTER — OFFICE VISIT (OUTPATIENT)
Dept: PRIMARY CARE CLINIC | Facility: CLINIC | Age: 54
End: 2024-03-27
Payer: MEDICARE

## 2024-03-27 ENCOUNTER — OFFICE VISIT (OUTPATIENT)
Dept: HEMATOLOGY/ONCOLOGY | Facility: CLINIC | Age: 54
End: 2024-03-27
Payer: MEDICARE

## 2024-03-27 VITALS
SYSTOLIC BLOOD PRESSURE: 114 MMHG | BODY MASS INDEX: 25.34 KG/M2 | HEART RATE: 96 BPM | DIASTOLIC BLOOD PRESSURE: 77 MMHG | WEIGHT: 143 LBS | HEIGHT: 63 IN | OXYGEN SATURATION: 98 %

## 2024-03-27 VITALS
OXYGEN SATURATION: 98 % | DIASTOLIC BLOOD PRESSURE: 71 MMHG | WEIGHT: 142.13 LBS | HEART RATE: 86 BPM | BODY MASS INDEX: 25.18 KG/M2 | SYSTOLIC BLOOD PRESSURE: 129 MMHG | RESPIRATION RATE: 17 BRPM | HEIGHT: 63 IN

## 2024-03-27 DIAGNOSIS — D59.9 ACQUIRED HEMOLYTIC ANEMIA: ICD-10-CM

## 2024-03-27 DIAGNOSIS — Z98.84 H/O GASTRIC BYPASS: ICD-10-CM

## 2024-03-27 DIAGNOSIS — D52.1 DRUG-INDUCED FOLATE DEFICIENCY ANEMIA: ICD-10-CM

## 2024-03-27 DIAGNOSIS — E11.9 TYPE 2 DIABETES MELLITUS WITHOUT COMPLICATION, WITH LONG-TERM CURRENT USE OF INSULIN: Chronic | ICD-10-CM

## 2024-03-27 DIAGNOSIS — F41.9 ANXIETY: ICD-10-CM

## 2024-03-27 DIAGNOSIS — Z79.4 TYPE 2 DIABETES MELLITUS WITHOUT COMPLICATION, WITH LONG-TERM CURRENT USE OF INSULIN: Chronic | ICD-10-CM

## 2024-03-27 DIAGNOSIS — Z79.4 TYPE 2 DIABETES MELLITUS WITH OTHER SPECIFIED COMPLICATION, WITH LONG-TERM CURRENT USE OF INSULIN: Primary | ICD-10-CM

## 2024-03-27 DIAGNOSIS — Z12.39 ENCOUNTER FOR SCREENING FOR MALIGNANT NEOPLASM OF BREAST, UNSPECIFIED SCREENING MODALITY: ICD-10-CM

## 2024-03-27 DIAGNOSIS — D64.9 ANEMIA, UNSPECIFIED TYPE: Primary | ICD-10-CM

## 2024-03-27 DIAGNOSIS — E53.8 VITAMIN B12 DEFICIENCY: ICD-10-CM

## 2024-03-27 DIAGNOSIS — Z12.31 ENCOUNTER FOR SCREENING MAMMOGRAM FOR MALIGNANT NEOPLASM OF BREAST: ICD-10-CM

## 2024-03-27 DIAGNOSIS — E11.69 TYPE 2 DIABETES MELLITUS WITH OTHER SPECIFIED COMPLICATION, WITH LONG-TERM CURRENT USE OF INSULIN: Primary | ICD-10-CM

## 2024-03-27 DIAGNOSIS — Z12.11 SCREENING FOR COLON CANCER: ICD-10-CM

## 2024-03-27 DIAGNOSIS — Z98.84 HISTORY OF ROUX-EN-Y GASTRIC BYPASS: ICD-10-CM

## 2024-03-27 LAB
BASOPHILS NFR BLD: 1 % (ref 0–3)
EOSINOPHIL NFR BLD: 1.5 % (ref 1–3)
ERYTHROCYTE [DISTWIDTH] IN BLOOD BY AUTOMATED COUNT: 17 % (ref 12.5–18)
FOLATE: 32.1 NG/ML (ref 3.1–17.5)
HCT VFR BLD AUTO: 34.9 % (ref 37–47)
HGB BLD-MCNC: 11.2 G/DL (ref 12–16)
LDH SERPL L TO P-CCNC: 380 U/L (ref 82–234)
LYMPHOCYTES NFR BLD: 19 % (ref 25–40)
MACROCYTES BLD QL SMEAR: NORMAL
MCH RBC QN AUTO: 37 PG (ref 27–31.2)
MCHC RBC AUTO-ENTMCNC: 32.1 G/DL (ref 31.8–35.4)
MCV RBC AUTO: 115.2 FL (ref 80–97)
MONOCYTES NFR BLD: 8.3 % (ref 1–15)
NEUTROPHILS # BLD AUTO: 2.88 10*3/UL (ref 1.8–7.7)
NEUTROPHILS NFR BLD: 70 % (ref 37–80)
NUCLEATED RED BLOOD CELLS: 0 %
PLATELETS: 370 10*3/UL (ref 142–424)
RBC # BLD AUTO: 3.03 10*6/UL (ref 4.2–5.4)
WBC # BLD: 4.1 10*3/UL (ref 4.6–10.2)

## 2024-03-27 PROCEDURE — 3066F NEPHROPATHY DOC TX: CPT | Mod: CPTII,S$GLB,, | Performed by: INTERNAL MEDICINE

## 2024-03-27 PROCEDURE — 99205 OFFICE O/P NEW HI 60 MIN: CPT | Mod: S$GLB,,, | Performed by: INTERNAL MEDICINE

## 2024-03-27 PROCEDURE — 1159F MED LIST DOCD IN RCRD: CPT | Mod: CPTII,S$GLB,, | Performed by: INTERNAL MEDICINE

## 2024-03-27 PROCEDURE — 3061F NEG MICROALBUMINURIA REV: CPT | Mod: CPTII,S$GLB,, | Performed by: INTERNAL MEDICINE

## 2024-03-27 PROCEDURE — 3008F BODY MASS INDEX DOCD: CPT | Mod: CPTII,S$GLB,, | Performed by: INTERNAL MEDICINE

## 2024-03-27 PROCEDURE — G2211 COMPLEX E/M VISIT ADD ON: HCPCS | Mod: S$GLB,,, | Performed by: INTERNAL MEDICINE

## 2024-03-27 PROCEDURE — 99214 OFFICE O/P EST MOD 30 MIN: CPT | Mod: S$GLB,,, | Performed by: INTERNAL MEDICINE

## 2024-03-27 PROCEDURE — 3074F SYST BP LT 130 MM HG: CPT | Mod: CPTII,S$GLB,, | Performed by: INTERNAL MEDICINE

## 2024-03-27 PROCEDURE — 3078F DIAST BP <80 MM HG: CPT | Mod: CPTII,S$GLB,, | Performed by: INTERNAL MEDICINE

## 2024-03-27 PROCEDURE — 3044F HG A1C LEVEL LT 7.0%: CPT | Mod: CPTII,S$GLB,, | Performed by: INTERNAL MEDICINE

## 2024-03-27 NOTE — PROGRESS NOTES
Subjective:      Patient ID: Jadyn Chance is a 53 y.o. female.    Chief Complaint: Establish Care and Follow-up ([facility]'s ER last Monday and was admitted and released on last Thurs. )    HPI    Past Medical History:   Diagnosis Date    Abnormal Pap smear     bx was negative, per pt    Acute renal failure (ARF) 2015    Anemia     Anxiety     B12 deficiency     Blood transfusion     multiple     Chronic LBP     Degenerative disc disease     l spine    Dehydration 2015    Diabetes mellitus     Diabetic neuropathy     General anesthetics causing adverse effect in therapeutic use     delayed emergence    Hyperglycemia 2018    Mixed hyperlipidemia 2013    PONV (postoperative nausea and vomiting)     RLS (restless legs syndrome)     Seizures     Sleep apnea     with CPAP    Vitamin D deficiency disease      Past Surgical History:   Procedure Laterality Date    ANUS SURGERY      AUGMENTATION OF BREAST      saline    BELT ABDOMINOPLASTY      tummy [facility]    BREAST SURGERY  2008    breast augmentation    CARPAL TUNNEL RELEASE Right 2021    Procedure: RELEASE, CARPAL TUNNEL;  Surgeon: Tyler Victor MD;  Location: [facility] OR;  Service: Orthopedics;  Laterality: Right;  right carpal tunnel release and right cubital tunnel release with possible anterior transposition ulnar nerve     SECTION      x2    CHOLECYSTECTOMY      COLONOSCOPY N/A 2023    Procedure: COLONOSCOPY;  Surgeon: María De La Torre MD;  Location: [facility];  Service: Endoscopy;  Laterality: N/A;    mikel      EXCISIONAL HEMORRHOIDECTOMY      GASTRIC BYPASS      HIP FRACTURE SURGERY      left hip ORIF    MOUTH SURGERY      revision of JJ anastomosis  2015    small bowel resection  2015    TOTAL REDUCTION MAMMOPLASTY      TUBAL LIGATION      ULNAR TUNNEL RELEASE Right 2021    Procedure: RELEASE, CUBITAL TUNNEL;  Surgeon: Tyler Victor MD;  Location: [facility] OR;  Service:  Orthopedics;  Laterality: Right;  right carpal tunnel release and right cubital tunnel release with possible anterior transposition ulnar nerve         Family History   Problem Relation Age of Onset    Diabetes Mother     Cataracts Mother     Glaucoma Maternal Aunt     Blindness Maternal Aunt     Breast cancer Neg Hx     Colon cancer Neg Hx     Thrombophilia Neg Hx    ,    Social History     Socioeconomic History    Marital status:     Number of children: 4   Tobacco Use    Smoking status: Never    Smokeless tobacco: Never   Substance and Sexual Activity    Alcohol use: No    Drug use: No    Sexual activity: Not Currently     Partners: Male     Birth control/protection: Surgical     Comment: BTL; mut monog   Social History Narrative    ** Merged History Encounter **         Lives in [place]     Social Determinants of Health     Financial Resource Strain: High Risk (3/27/2024)    Overall Financial Resource Strain (CARDIA)     Difficulty of Paying Living Expenses: Hard   Food Insecurity: Food Insecurity Present (3/27/2024)    Hunger Vital Sign     Worried About Running Out of Food in the Last Year: Sometimes true     Ran Out of Food in the Last Year: Sometimes true   Transportation Needs: No Transportation Needs (3/27/2024)    PRAPARE - Transportation     Lack of Transportation (Medical): No     Lack of Transportation (Non-Medical): No   Physical Activity: Inactive (3/27/2024)    Exercise Vital Sign     Days of Exercise per Week: 0 days     Minutes of Exercise per Session: 10 min   Stress: Stress Concern Present (3/27/2024)    [employer] [place] - Occupational Stress Questionnaire     Feeling of Stress : Very much   Social Connections: Unknown (3/27/2024)    Social Connection and Isolation Panel [NHANES]     Frequency of Communication with Friends and Family: More than three times a week     Frequency of Social Gatherings with Friends and Family: Once a week     Active Member of  Clubs or Organizations: No     Attends Club or Organization Meetings: Never     Marital Status:    Housing Stability: High Risk (3/27/2024)    Housing Stability Vital Sign     Unable to Pay for Housing in the Last Year: Yes     Number of Places Lived in the Last Year: 1     Unstable Housing in the Last Year: No           Hospital Course  53-year-old pleasant female history of diabetes on Tresiba, CAD, seizures, chronic anemia, B12 deficiency, gastric bypass surgery in 2008, history of fatty liver, presented to emergency room with yellowing of skin, dyspnea on exertion for the past 2 days.  On March 6, 2024 patient was diagnosed with depression and started on Wellbutrin, the following day patient broke into hives associated with severe skin rashes and itchiness.  She stopped Wellbutrin, and started taking Benadryl which she completed 8 days ago.   About 4 days ago she experienced urinary tract infection symptoms associated with dysuria and frequency, nausea, poor appetite generalized weakness and actually had a fall.  She visited [facility] 4 days ago and bought over-the-counter IV phenazopyridine hydrochloride 99.5 mg tablets which she took 4 times a day, last dose this morning.  Blood work shows WBC 17.9, RBC 2.1, hemoglobin 7.6, .7.  Total bili of 4.7, INR 1.1, AST 66, but normal ALT and alk phos.  UA unremarkable.  Tox screen unremarkable except positive for opioids.  Viral respiratory panel negative.  Negative viral hepatitis  panel.see rest of H&P.      Admitted for sepsis, jaundice from presumed hemolytic anemia in setting of drug reaction from phenazopyridine.  Abdominal imaging with no evidence of pancreatic duct dilatation or lesions.  Hyperbilirubinemia trended down, skin jaundice markedly improved.  Elevated LDH, haptoglobin low indicating hemolytic picture.  Hematology consulted agrees with above.  Patient received 1 PRBC transfusion with H&H stabilized and improved.  Peripheral smear showed  leukocytosis, normal platelet, no blasts  Abdominal pain resolved.  Was on Rocephin and Flagyl on admission, discharged on Flagyl to complete the course.  Advised to follow-up with hematology      Patient states she started having seizures and was referred to Dr Cárdenas and states she may have pseudoseizures  She has pain management in . She has recently moved here. She had seen Dr Pritchett before     15 units of Tresiba, 25 of Jardiance and 1,000 mg metformin BID. Her most recent A1c was 6.3. She states her blood glucose was  relatively controlled but states it has been high lately  She gives herself monthly B12 injections and takes some by mouth as well        Review of Systems   Constitutional:  Negative for chills and fever.   HENT:  Negative for hearing loss.    Eyes:  Negative for blurred vision.   Respiratory:  Negative for cough, shortness of breath and wheezing.    Cardiovascular:  Negative for chest pain, palpitations and leg swelling.   Gastrointestinal:  Negative for abdominal pain, blood in stool, constipation, diarrhea, melena, nausea and vomiting.   Genitourinary:  Negative for dysuria, frequency and urgency.   Musculoskeletal:  Positive for back pain and myalgias. Negative for falls.   Skin:  Negative for rash.   Neurological:  Negative for dizziness and headaches.   Endo/Heme/Allergies:  Does not bruise/bleed easily.   Psychiatric/Behavioral:  Positive for depression. Negative for substance abuse. The patient is nervous/anxious.      Objective:     Physical Exam  Vitals reviewed.   Constitutional:       Appearance: Normal appearance.   HENT:      Head: Normocephalic.      Mouth/Throat:      Mouth: Mucous membranes are moist.      Pharynx: Oropharynx is clear.   Eyes:      Extraocular Movements: Extraocular movements intact.      Conjunctiva/sclera: Conjunctivae normal.      Pupils: Pupils are equal, round, and reactive to light.   Cardiovascular:      Rate and Rhythm: Normal rate and regular  "rhythm.   Pulmonary:      Effort: Pulmonary effort is normal.      Breath sounds: Normal breath sounds.   Abdominal:      General: Bowel sounds are normal.   Musculoskeletal:      Right lower leg: No edema.      Left lower leg: No edema.   Skin:     General: Skin is warm.      Capillary Refill: Capillary refill takes less than 2 seconds.   Neurological:      General: No focal deficit present.      Mental Status: She is alert.   Psychiatric:         Mood and Affect: Mood normal.       /77 (BP Location: Right arm, Patient Position: Sitting, BP Method: Medium (Automatic))   Pulse 96   Ht 5' 3" (1.6 m)   Wt 64.9 kg (143 lb)   LMP 11/01/2014 (Approximate)   SpO2 98%   BMI 25.33 kg/m²     Assessment:       ICD-10-CM ICD-9-CM   1. Screening for colon cancer  Z12.11 V76.51   2. Anxiety  F41.9 300.00   3. Type 2 diabetes mellitus without complication, with long-term current use of insulin  E11.9 250.00    Z79.4 V58.67   4. History of Kayleen-en-Y gastric bypass  Z98.84 V45.86   5. Vitamin B12 deficiency  E53.8 266.2   6. Acquired hemolytic anemia  D59.9 283.9   7. Encounter for screening for malignant neoplasm of breast, unspecified screening modality  Z12.39 V76.10   8. Encounter for screening mammogram for malignant neoplasm of breast  Z12.31 V76.12       Plan:     Medication List with Changes/Refills   Current Medications    ACETAMINOPHEN-CODEINE 300-30MG (TYLENOL #3) 300-30 MG TAB    Take 1 tablet by mouth three times a day    BD ALCOHOL SWABS PADM    Apply topically 3 (three) times daily.    BLOOD SUGAR DIAGNOSTIC STRP    To check BG 3 times daily, to use with insurance preferred meter    DxE11.9    BLOOD-GLUCOSE METER MISC    Use to check blood glucose 3 times daily    BUSPIRONE (BUSPAR) 30 MG TAB    Take 1 tablet (30 mg total) by mouth 2 (two) times daily.    CHOLECALCIFEROL, VITAMIN D3, 1,250 MCG (50,000 UNIT) TAB    Take 100,000 Units by mouth every 7 days.    CYANOCOBALAMIN 1,000 MCG/ML INJECTION    Inject " "1 mL (1,000 mcg total) into the skin every 28 days.    CYCLOBENZAPRINE (FLEXERIL) 10 MG TABLET    TAKE 1 TABLET THREE TIMES DAILY AS NEEDED    DOXEPIN (SINEQUAN) 50 MG CAPSULE    Take 1 capsule by mouth at bedtime as needed for insomnia. If 1 pill is not effective, increase the dose to 2 pills.    EMPAGLIFLOZIN (JARDIANCE) 25 MG TABLET    Take 1 tablet (25 mg total) by mouth once daily.    GABAPENTIN (NEURONTIN) 300 MG CAPSULE    TAKE 2 CAPSULES THREE TIMES DAILY    LANCETS 30 GAUGE MISC    To check blood glucose 3 times daily    LEMBOREXANT (DAYVIGO) 10 MG TAB    Take 1 tablet by mouth nightly as needed for insomnia    MELOXICAM (MOBIC) 7.5 MG TABLET    TAKE 1 TABLET TWICE A DAY AS NEEDED FOR PAIN. TAKE WITH LARGEST MEAL OF THE DAY FOR ARTHRITIS AND JOINT PAIN    METFORMIN (GLUCOPHAGE-XR) 500 MG ER 24HR TABLET    TAKE 2 TABLETS TWICE DAILY WITH MEALS    NALOXONE (NARCAN) 4 MG/ACTUATION SPRY    Use 1 spray into one nostril as directed as needed. Another spray may be given into the other nostril in 2 to 3 minutes until the patient responds. Use only in emergency if person is unconscious. Call 911    NOVOLOG FLEXPEN U-100 INSULIN 100 UNIT/ML (3 ML) INPN PEN    Inject 3 times per day per sliding scale - can use every 4 hours as needed to correct a high blood sugar.    ONDANSETRON (ZOFRAN) 4 MG TABLET    TAKE 1 TABLET EVERY 6 HOURS    ONDANSETRON 4 MG/2 ML SOLN        PEN NEEDLE, DIABETIC (DROPLET PEN NEEDLE) 32 GAUGE X 5/32" NDLE    Use with insulin 4 times daily as directed    PRAVASTATIN (PRAVACHOL) 20 MG TABLET    Take 1 tablet (20 mg total) by mouth once daily.    PROMETHAZINE (PHENERGAN) 25 MG TABLET    TAKE 1 TABLET TWICE DAILY AS NEEDED FOR NAUSEA    SYRINGE WITH NEEDLE (SYRINGE 3CC/25GX1") 3 ML 25 GAUGE X 1" SYRG    For vitamin B12 injection    TAMSULOSIN (FLOMAX) 0.4 MG CAP    Take 1 capsule (0.4 mg total) by mouth once daily.    TRESIBA FLEXTOUCH U-100 100 UNIT/ML (3 ML) INSULIN PEN    Inject 15 Units into " the skin once daily. Discontinue Levemir.   Discontinued Medications    BUPROPION (WELLBUTRIN XL) 150 MG TB24 TABLET    Take 1 tablet daily for 7 days then 2 tablets daily thereafter    DULOXETINE (CYMBALTA) 30 MG CAPSULE    Take 1 capsule (30 mg total) by mouth 2 (two) times daily.        1. Screening for colon cancer  -     Ambulatory referral/consult to Gastroenterology; Future; Expected date: 04/03/2024    2. Anxiety    3. Type 2 diabetes mellitus without complication, with long-term current use of insulin  Overview:  Controlled.  Continue current medications.      4. History of Kayleen-en-Y gastric bypass    5. Vitamin B12 deficiency  Overview:  Stable. Cont b12      6. Acquired hemolytic anemia    7. Encounter for screening for malignant neoplasm of breast, unspecified screening modality  -     Mammo Digital Screening Bilat; Future; Expected date: 03/27/2024    8. Encounter for screening mammogram for malignant neoplasm of breast  -     Mammo Digital Screening Bilat; Future; Expected date: 03/27/2024       She is with [facility] and they receive her levels     She seems to be running very high especially after meals so I advised she increase her Tresiba to 20 units and take it in the daytime   She is also on the novolog as needed. She has never been on the GLP 1 agonists    She states the sukhwinder/CGM requires a copay of $200 from her insurance     She has an appointment with ophthalmologist Dr Almendarez in          Future Appointments   Date Time Provider Department Center   3/27/2024 11:20 AM [facility]eras [facility]   4/11/2024  9:00 AM Arleth Schneider [facility]   4/23/2024  9:00 AM Arleth Schneider [facility]   5/24/2024  9:40 AM Luis Delacruz OD [facility]   6/10/2024  1:00 PM Tyrel Carlos MD [facility]   8/21/2024  9:35 AM LABORATORY, [facility] HG LAB [facility]   8/27/2024  8:40 AM Vikash Baig MD [facility]  [place]

## 2024-03-27 NOTE — PROGRESS NOTES
HEMATOLOGY INITIAL CONSULTATION NOTE    Patient ID: Jadyn Chance is a 53 y.o. female.    Chief Complaint: Hemolytic anemia    HPI:  Patient is a 53-year-old female with past medical history of diabetes, coronary artery disease, seizures, chronic anemia, B12 deficiency, gastric bypass surgery in 2008, history of fatty liver who presented to ER with complaints of yellowness of skin and dyspnea on exertion.  She reported being recently placed on Wellbutrin for depression and also phenazopyridine for UTI before the onset of these symptoms.  She was admitted for sepsis, jaundice from presumed hemolytic anemia in setting of drug reaction from phenazopyridine.  Abdominal imaging with no evidence of pancreatic duct dilatation or lesions.  Hyperbilirubinemia trended down, skin jaundice markedly improved.  Elevated LDH, haptoglobin low indicating hemolytic picture.  Hematology consulted agrees with above. Patient received 1 PRBC transfusion with H&H stabilized and improved.  Peripheral smear showed leukocytosis, normal platelet, no blasts. Abdominal pain resolved.  Was on Rocephin and Flagyl on admission, discharged on Flagyl to complete the course.    She presents to our clinic today for further evaluation.  Voices no acute complaints reports doing well after hospital stay.          Past Medical History:   Diagnosis Date    Abnormal Pap smear 1991    bx was negative, per pt    Acute renal failure (ARF) 2/16/2015    Anemia     Anxiety     B12 deficiency     Blood transfusion     multiple     Chronic LBP     Degenerative disc disease     l spine    Dehydration 2/16/2015    Diabetes mellitus     Diabetic neuropathy     General anesthetics causing adverse effect in therapeutic use     delayed emergence    Hyperglycemia 4/25/2018    Mixed hyperlipidemia 11/18/2013    PONV (postoperative nausea and vomiting)     RLS (restless legs syndrome)     Seizures     Sleep apnea     with CPAP    Vitamin D deficiency disease         Family History   Problem Relation Age of Onset    Diabetes Mother     Cataracts Mother     Glaucoma Maternal Aunt     Blindness Maternal Aunt     Breast cancer Neg Hx     Colon cancer Neg Hx     Thrombophilia Neg Hx        Social History     Socioeconomic History    Marital status:     Number of children: 4   Tobacco Use    Smoking status: Never    Smokeless tobacco: Never   Substance and Sexual Activity    Alcohol use: Never    Drug use: Never    Sexual activity: Not Currently     Partners: Male     Birth control/protection: Surgical     Comment: BTL; mut monog   Social History Narrative    ** Merged History Encounter **         Lives in [place]     Social Determinants of Health     Financial Resource Strain: High Risk (3/27/2024)    Overall Financial Resource Strain (CARDIA)     Difficulty of Paying Living Expenses: Hard   Food Insecurity: Food Insecurity Present (3/27/2024)    Hunger Vital Sign     Worried About Running Out of Food in the Last Year: Sometimes true     Ran Out of Food in the Last Year: Sometimes true   Transportation Needs: No Transportation Needs (3/27/2024)    PRAPARE - Transportation     Lack of Transportation (Medical): No     Lack of Transportation (Non-Medical): No   Physical Activity: Inactive (3/27/2024)    Exercise Vital Sign     Days of Exercise per Week: 0 days     Minutes of Exercise per Session: 10 min   Stress: Stress Concern Present (3/27/2024)    [employer] - Occupational Stress Questionnaire     Feeling of Stress : Very much   Social Connections: Unknown (3/27/2024)    Social Connection and Isolation Panel [NHANES]     Frequency of Communication with Friends and Family: More than three times a week     Frequency of Social Gatherings with Friends and Family: Once a week     Active Member of Clubs or Organizations: No     Attends Club or Organization Meetings: Never     Marital Status:    Housing Stability: High Risk  (3/27/2024)    Housing Stability Vital Sign     Unable to Pay for Housing in the Last Year: Yes     Number of Places Lived in the Last Year: 1     Unstable Housing in the Last Year: No         Past Surgical History:   Procedure Laterality Date    ANUS SURGERY      AUGMENTATION OF BREAST  2008    saline    BELT ABDOMINOPLASTY      tummy tuck    BREAST SURGERY  2008    breast augmentation    CARPAL TUNNEL RELEASE Right 2021    Procedure: RELEASE, CARPAL TUNNEL;  Surgeon: Tyler Victor MD;  Location: [facility] OR;  Service: Orthopedics;  Laterality: Right;  right carpal tunnel release and right cubital tunnel release with possible anterior transposition ulnar nerve     SECTION      x2    CHOLECYSTECTOMY      COLONOSCOPY N/A 2023    Procedure: COLONOSCOPY;  Surgeon: María De La Torre MD;  Location: [facility];  Service: Endoscopy;  Laterality: N/A;    mikel      EXCISIONAL HEMORRHOIDECTOMY      GASTRIC BYPASS      HIP FRACTURE SURGERY      left hip ORIF    MOUTH SURGERY      revision of JJ anastomosis  2015    small bowel resection  2015    TOTAL REDUCTION MAMMOPLASTY      TUBAL LIGATION      ULNAR TUNNEL RELEASE Right 2021    Procedure: RELEASE, CUBITAL TUNNEL;  Surgeon: Tyler Victor MD;  Location: [facility] OR;  Service: Orthopedics;  Laterality: Right;  right carpal tunnel release and right cubital tunnel release with possible anterior transposition ulnar nerve                 Review of systems:  Review of Systems   Constitutional:  Negative for activity change, appetite change, chills, diaphoresis, fatigue and unexpected weight change.   HENT:  Negative for congestion, facial swelling, hearing loss, mouth sores, trouble swallowing and voice change.    Eyes:  Negative for photophobia, pain, discharge and itching.   Respiratory:  Negative for apnea, cough, choking, chest tightness and shortness of breath.    Cardiovascular:  Negative for chest pain, palpitations and leg  swelling.   Gastrointestinal:  Negative for abdominal distention, abdominal pain, anal bleeding and blood in stool.   Endocrine: Negative for cold intolerance, heat intolerance, polydipsia and polyphagia.   Genitourinary:  Negative for difficulty urinating, dysuria, flank pain and hematuria.   Musculoskeletal:  Negative for arthralgias, back pain, joint swelling, myalgias, neck pain and neck stiffness.   Skin:  Negative for color change, pallor and wound.   Allergic/Immunologic: Negative for environmental allergies, food allergies and immunocompromised state.   Neurological:  Negative for dizziness, seizures, facial asymmetry, speech difficulty, light-headedness, numbness and headaches.   Hematological:  Negative for adenopathy. Does not bruise/bleed easily.   Psychiatric/Behavioral:  Negative for agitation, behavioral problems, confusion, decreased concentration and sleep disturbance.                                Physical Exam  Vitals and nursing note reviewed.   Constitutional:       General: She is not in acute distress.     Appearance: Normal appearance. She is not ill-appearing.   HENT:      Head: Normocephalic and atraumatic.      Nose: No congestion or rhinorrhea.   Eyes:      General: No scleral icterus.     Extraocular Movements: Extraocular movements intact.      Pupils: Pupils are equal, round, and reactive to light.   Cardiovascular:      Rate and Rhythm: Normal rate and regular rhythm.      Pulses: Normal pulses.      Heart sounds: Normal heart sounds. No murmur heard.     No gallop.   Pulmonary:      Effort: Pulmonary effort is normal. No respiratory distress.      Breath sounds: Normal breath sounds. No stridor. No wheezing or rhonchi.   Abdominal:      General: Bowel sounds are normal. There is no distension.      Palpations: There is no mass.      Tenderness: There is no abdominal tenderness. There is no guarding.   Musculoskeletal:         General: No swelling, tenderness, deformity or signs of  injury. Normal range of motion.      Cervical back: Normal range of motion and neck supple. No rigidity. No muscular tenderness.      Right lower leg: No edema.      Left lower leg: No edema.   Skin:     General: Skin is warm.      Coloration: Skin is not jaundiced or pale.      Findings: No bruising or lesion.   Neurological:      General: No focal deficit present.      Mental Status: She is alert and oriented to person, place, and time.      Cranial Nerves: No cranial nerve deficit.      Sensory: No sensory deficit.      Motor: No weakness.      Gait: Gait normal.   Psychiatric:         Mood and Affect: Mood normal.         Behavior: Behavior normal.         Thought Content: Thought content normal.       Vitals:    03/27/24 1119   BP: 129/71   Pulse: 86   Resp: 17   Body surface area is 1.69 meters squared.    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  She did not receive any steroids while in the hospital and her symptoms generally improved with PRBC transfusion done at the time.   I will obtain her CBC with diff to see if improvement in her blood count.  Is noted in her counts then I will initiate prednisone.  I discussed with her that considering half-life of the drug and the fact that it has been a week since she last took it I do believe that it is out of her system.  I advised her to avoid taking this drug in the future.  Sh she also has history of gastric bypass surgery and I will check her for serum copper and zinc levels at this time which could also be contributing to her anemia.       Plan:    CBC with diff  LDH  Iron profile      Return to clinic in 4 weeks for MD visit with CBC with diff prior      Pt is instructed to RTC with labs for continued monitoring of treatment as instructed.     Total time spent in counseling and discussion about further management options  including relevant lab work, treatment,  prognosis, medications and intended side effects was more than 60 minutes. More than 50 % of the time was spent in counseling and coordination of care.  This includes face to face time and non-face to face time preparing to see the patient (eg, review of tests), Obtaining and/or reviewing separately obtained history, Documenting clinical information in the electronic or other health record, Independently interpreting resultsand communicating results to the patient/family/caregiver, or Care coordination.

## 2024-04-01 ENCOUNTER — LAB VISIT (OUTPATIENT)
Dept: LAB | Facility: HOSPITAL | Age: 54
End: 2024-04-01
Attending: INTERNAL MEDICINE
Payer: MEDICARE

## 2024-04-01 DIAGNOSIS — R11.0 NAUSEA: ICD-10-CM

## 2024-04-01 DIAGNOSIS — Z98.84 PERSONAL HISTORY OF GASTRIC BYPASS: ICD-10-CM

## 2024-04-01 DIAGNOSIS — D52.1 DRUG-INDUCED FOLATE DEFICIENCY ANEMIA: ICD-10-CM

## 2024-04-01 DIAGNOSIS — D64.9 ANEMIA, UNSPECIFIED: Primary | ICD-10-CM

## 2024-04-01 DIAGNOSIS — E11.69 HYPERLIPIDEMIA ASSOCIATED WITH TYPE 2 DIABETES MELLITUS: ICD-10-CM

## 2024-04-01 DIAGNOSIS — D64.9 ANEMIA, UNSPECIFIED: ICD-10-CM

## 2024-04-01 DIAGNOSIS — Z29.9 PREVENTIVE MEASURE: ICD-10-CM

## 2024-04-01 DIAGNOSIS — E78.5 HYPERLIPIDEMIA ASSOCIATED WITH TYPE 2 DIABETES MELLITUS: ICD-10-CM

## 2024-04-01 PROCEDURE — 84630 ASSAY OF ZINC: CPT | Mod: HCNC | Performed by: INTERNAL MEDICINE

## 2024-04-01 PROCEDURE — 36415 COLL VENOUS BLD VENIPUNCTURE: CPT | Mod: HCNC | Performed by: INTERNAL MEDICINE

## 2024-04-01 PROCEDURE — 82525 ASSAY OF COPPER: CPT | Mod: HCNC | Performed by: INTERNAL MEDICINE

## 2024-04-01 RX ORDER — GABAPENTIN 300 MG/1
CAPSULE ORAL
Qty: 540 CAPSULE | Refills: 3 | Status: SHIPPED | OUTPATIENT
Start: 2024-04-01

## 2024-04-01 RX ORDER — ONDANSETRON 4 MG/1
TABLET, FILM COATED ORAL
Qty: 120 TABLET | Refills: 11 | Status: SHIPPED | OUTPATIENT
Start: 2024-04-01

## 2024-04-02 DIAGNOSIS — M85.89 OSTEOPENIA OF MULTIPLE SITES: Primary | ICD-10-CM

## 2024-04-04 LAB
COPPER SERPL-MCNC: 1212 UG/L (ref 810–1990)
ZINC SERPL-MCNC: 86 UG/DL (ref 60–130)

## 2024-04-06 NOTE — TELEPHONE ENCOUNTER
Refill Routing Note   Medication(s) are not appropriate for processing by [facility] for the following reason(s):        Non-participating provider    [facility] action(s):  Route               Appointments  past 12m or future 3m with PCP    Date Provider   Last Visit   3/27/2024 Miriam Morales MD   Next Visit   6/28/2024 Mriiam Morales MD   ED visits in past 90 days: 0        Note composed:11:58 PM 04/05/2024

## 2024-04-08 RX ORDER — NARATRIPTAN 2.5 MG/1
TABLET ORAL
Qty: 27 TABLET | Refills: 3 | Status: SHIPPED | OUTPATIENT
Start: 2024-04-08

## 2024-04-22 ENCOUNTER — TELEPHONE (OUTPATIENT)
Dept: RHEUMATOLOGY | Facility: CLINIC | Age: 54
End: 2024-04-22
Payer: MEDICARE

## 2024-04-22 ENCOUNTER — LAB VISIT (OUTPATIENT)
Dept: LAB | Facility: HOSPITAL | Age: 54
End: 2024-04-22
Attending: STUDENT IN AN ORGANIZED HEALTH CARE EDUCATION/TRAINING PROGRAM
Payer: MEDICARE

## 2024-04-22 DIAGNOSIS — D64.9 ANEMIA, UNSPECIFIED TYPE: Primary | ICD-10-CM

## 2024-04-22 DIAGNOSIS — M85.89 OSTEOPENIA OF MULTIPLE SITES: ICD-10-CM

## 2024-04-22 LAB
ALBUMIN SERPL BCP-MCNC: 3.6 G/DL (ref 3.5–5.2)
ALP SERPL-CCNC: 92 U/L (ref 55–135)
ALT SERPL W/O P-5'-P-CCNC: 42 U/L (ref 10–44)
ANION GAP SERPL CALC-SCNC: 12 MMOL/L (ref 8–16)
AST SERPL-CCNC: 36 U/L (ref 10–40)
BILIRUB SERPL-MCNC: 0.6 MG/DL (ref 0.1–1)
BUN SERPL-MCNC: 16 MG/DL (ref 6–20)
CALCIUM SERPL-MCNC: 8.5 MG/DL (ref 8.7–10.5)
CHLORIDE SERPL-SCNC: 106 MMOL/L (ref 95–110)
CO2 SERPL-SCNC: 21 MMOL/L (ref 23–29)
CREAT SERPL-MCNC: 0.8 MG/DL (ref 0.5–1.4)
EST. GFR  (NO RACE VARIABLE): >60 ML/MIN/1.73 M^2
GLUCOSE SERPL-MCNC: 284 MG/DL (ref 70–110)
POTASSIUM SERPL-SCNC: 3.9 MMOL/L (ref 3.5–5.1)
PROT SERPL-MCNC: 6.6 G/DL (ref 6–8.4)
SODIUM SERPL-SCNC: 139 MMOL/L (ref 136–145)

## 2024-04-22 PROCEDURE — 80053 COMPREHEN METABOLIC PANEL: CPT | Mod: HCNC | Performed by: STUDENT IN AN ORGANIZED HEALTH CARE EDUCATION/TRAINING PROGRAM

## 2024-04-22 PROCEDURE — 36415 COLL VENOUS BLD VENIPUNCTURE: CPT | Mod: HCNC | Performed by: STUDENT IN AN ORGANIZED HEALTH CARE EDUCATION/TRAINING PROGRAM

## 2024-04-22 NOTE — TELEPHONE ENCOUNTER
----- Message from Kaya Hastings MD sent at 4/22/2024  2:45 PM CDT -----  Regarding: RE: lab work  Please see my result note comments.  ----- Message -----  From: Mikayla Perdomo LPN  Sent: 4/22/2024   2:25 PM CDT  To: Kaya Hastings MD  Subject: lab work                                            Patient had labs done today. E said someone called her and said she needed to take them today.  ----- Message -----  From: Kaya Hastings MD  Sent: 4/22/2024   1:20 PM CDT  To: Venkata Kirkpatrick Staff    Please have her get repeat CMP on 5/1 prior to Reclast    I called patient about the labs she had done today.There was no  answer. I told her Dr. Hastings said her calcium is low. She wants her to take 2tums 1 day for 3 days and repeat her labs on 5-2 before the reclast. Told her she could check her my chart.

## 2024-04-22 NOTE — TELEPHONE ENCOUNTER
----- Message from Kaya Hastings MD sent at 4/22/2024  1:20 PM CDT -----  Please have her get repeat CMP on 5/1 prior to Reclast         I called patient about lab work Dr. Hastings wants her to do before the new Reclasp. She said someone called her and told her to come in today fir the lab . She said she just had the lab work done.   DONE BUT NOT FOR WOUND CARE SINCE I DID NOT SEE HER FOR THAT

## 2024-04-30 ENCOUNTER — OFFICE VISIT (OUTPATIENT)
Dept: HEMATOLOGY/ONCOLOGY | Facility: CLINIC | Age: 54
End: 2024-04-30
Payer: MEDICARE

## 2024-04-30 VITALS
HEART RATE: 96 BPM | DIASTOLIC BLOOD PRESSURE: 72 MMHG | HEIGHT: 64 IN | OXYGEN SATURATION: 95 % | BODY MASS INDEX: 25.41 KG/M2 | SYSTOLIC BLOOD PRESSURE: 109 MMHG | WEIGHT: 148.81 LBS | RESPIRATION RATE: 18 BRPM

## 2024-04-30 DIAGNOSIS — E11.69 HYPERLIPIDEMIA ASSOCIATED WITH TYPE 2 DIABETES MELLITUS: ICD-10-CM

## 2024-04-30 DIAGNOSIS — D21.9 FIBROIDS: ICD-10-CM

## 2024-04-30 DIAGNOSIS — E78.5 HYPERLIPIDEMIA ASSOCIATED WITH TYPE 2 DIABETES MELLITUS: ICD-10-CM

## 2024-04-30 DIAGNOSIS — F41.9 ANXIETY: Primary | ICD-10-CM

## 2024-04-30 DIAGNOSIS — G56.01 CARPAL TUNNEL SYNDROME OF RIGHT WRIST: ICD-10-CM

## 2024-04-30 DIAGNOSIS — E55.9 VITAMIN D DEFICIENCY: ICD-10-CM

## 2024-04-30 PROCEDURE — 3074F SYST BP LT 130 MM HG: CPT | Mod: CPTII,S$GLB,, | Performed by: INTERNAL MEDICINE

## 2024-04-30 PROCEDURE — 1159F MED LIST DOCD IN RCRD: CPT | Mod: CPTII,S$GLB,, | Performed by: INTERNAL MEDICINE

## 2024-04-30 PROCEDURE — 3044F HG A1C LEVEL LT 7.0%: CPT | Mod: CPTII,S$GLB,, | Performed by: INTERNAL MEDICINE

## 2024-04-30 PROCEDURE — G2211 COMPLEX E/M VISIT ADD ON: HCPCS | Mod: S$GLB,,, | Performed by: INTERNAL MEDICINE

## 2024-04-30 PROCEDURE — 3066F NEPHROPATHY DOC TX: CPT | Mod: CPTII,S$GLB,, | Performed by: INTERNAL MEDICINE

## 2024-04-30 PROCEDURE — 99214 OFFICE O/P EST MOD 30 MIN: CPT | Mod: S$GLB,,, | Performed by: INTERNAL MEDICINE

## 2024-04-30 PROCEDURE — 3061F NEG MICROALBUMINURIA REV: CPT | Mod: CPTII,S$GLB,, | Performed by: INTERNAL MEDICINE

## 2024-04-30 PROCEDURE — 3078F DIAST BP <80 MM HG: CPT | Mod: CPTII,S$GLB,, | Performed by: INTERNAL MEDICINE

## 2024-04-30 PROCEDURE — 3008F BODY MASS INDEX DOCD: CPT | Mod: CPTII,S$GLB,, | Performed by: INTERNAL MEDICINE

## 2024-04-30 NOTE — PROGRESS NOTES
HEMATOLOGY INITIAL CONSULTATION NOTE    Patient ID: Jadyn Chance is a 53 y.o. female.    Chief Complaint: Hemolytic anemia    HPI:  Patient is a 53-year-old female with past medical history of diabetes, coronary artery disease, seizures, chronic anemia, B12 deficiency, gastric bypass surgery in 2008, history of fatty liver who presented to ER with complaints of yellowness of skin and dyspnea on exertion.  She reported being recently placed on Wellbutrin for depression and also phenazopyridine for UTI before the onset of these symptoms.  She was admitted for sepsis, jaundice from presumed hemolytic anemia in setting of drug reaction from phenazopyridine.  Abdominal imaging with no evidence of pancreatic duct dilatation or lesions.  Hyperbilirubinemia trended down, skin jaundice markedly improved.  Elevated LDH, haptoglobin low indicating hemolytic picture. Patient received 1 PRBC transfusion with H&H stabilized and improved.  Peripheral smear showed leukocytosis, normal platelet, no blasts. Abdominal pain resolved.  Was on Rocephin and Flagyl on admission, discharged on Flagyl to complete the course.    She presents to our clinic today for further evaluation.  Voices no acute complaints reports doing well after hospital stay.          Past Medical History:   Diagnosis Date    Abnormal Pap smear 1991    bx was negative, per pt    Acute renal failure (ARF) 2/16/2015    Anemia     Anxiety     B12 deficiency     Blood transfusion     multiple     Chronic LBP     Degenerative disc disease     l spine    Dehydration 2/16/2015    Diabetes mellitus     Diabetic neuropathy     General anesthetics causing adverse effect in therapeutic use     delayed emergence    Hyperglycemia 4/25/2018    Mixed hyperlipidemia 11/18/2013    PONV (postoperative nausea and vomiting)     RLS (restless legs syndrome)     Seizures     Sleep apnea     with CPAP    Vitamin D deficiency disease        Family History   Problem Relation  Name Age of Onset    Diabetes Mother      Cataracts Mother      Glaucoma Maternal Aunt      Blindness Maternal Aunt      Breast cancer Neg Hx      Colon cancer Neg Hx      Thrombophilia Neg Hx         Social History     Socioeconomic History    Marital status:     Number of children: 4   Tobacco Use    Smoking status: Never    Smokeless tobacco: Never   Substance and Sexual Activity    Alcohol use: Never    Drug use: Never    Sexual activity: Not Currently     Partners: Male     Birth control/protection: Surgical     Comment: BTL; mut monog   Social History Narrative    ** Merged History Encounter **         Lives in [place]     Social Determinants of Health     Financial Resource Strain: High Risk (3/27/2024)    Overall Financial Resource Strain (CARDIA)     Difficulty of Paying Living Expenses: Hard   Food Insecurity: Food Insecurity Present (3/27/2024)    Hunger Vital Sign     Worried About Running Out of Food in the Last Year: Sometimes true     Ran Out of Food in the Last Year: Sometimes true   Transportation Needs: No Transportation Needs (3/27/2024)    PRAPARE - Transportation     Lack of Transportation (Medical): No     Lack of Transportation (Non-Medical): No   Physical Activity: Inactive (3/27/2024)    Exercise Vital Sign     Days of Exercise per Week: 0 days     Minutes of Exercise per Session: 10 min   Stress: Stress Concern Present (3/27/2024)    [employer] - Occupational Stress Questionnaire     Feeling of Stress : Very much   Housing Stability: High Risk (3/27/2024)    Housing Stability Vital Sign     Unable to Pay for Housing in the Last Year: Yes     Number of Places Lived in the Last Year: 1     Unstable Housing in the Last Year: No         Past Surgical History:   Procedure Laterality Date    ANUS SURGERY      AUGMENTATION OF BREAST  2008    saline    BELT ABDOMINOPLASTY      tummy tuck    BREAST SURGERY  2008    breast augmentation    CARPAL TUNNEL  RELEASE Right 2021    Procedure: RELEASE, CARPAL TUNNEL;  Surgeon: Tyler Victor MD;  Location: [facility] OR;  Service: Orthopedics;  Laterality: Right;  right carpal tunnel release and right cubital tunnel release with possible anterior transposition ulnar nerve     SECTION      x2    CHOLECYSTECTOMY      COLONOSCOPY N/A 2023    Procedure: COLONOSCOPY;  Surgeon: María De La Torre MD;  Location: [facility];  Service: Endoscopy;  Laterality: N/A;    mikel      EXCISIONAL HEMORRHOIDECTOMY      GASTRIC BYPASS      HIP FRACTURE SURGERY      left hip ORIF    MOUTH SURGERY      revision of JJ anastomosis  2015    small bowel resection  2015    TOTAL REDUCTION MAMMOPLASTY      TUBAL LIGATION      ULNAR TUNNEL RELEASE Right 2021    Procedure: RELEASE, CUBITAL TUNNEL;  Surgeon: Tyler Victor MD;  Location: [facility] OR;  Service: Orthopedics;  Laterality: Right;  right carpal tunnel release and right cubital tunnel release with possible anterior transposition ulnar nerve                 Review of systems:  Review of Systems   Constitutional:  Negative for activity change, appetite change, chills, diaphoresis, fatigue and unexpected weight change.   HENT:  Negative for congestion, facial swelling, hearing loss, mouth sores, trouble swallowing and voice change.    Eyes:  Negative for photophobia, pain, discharge and itching.   Respiratory:  Negative for apnea, cough, choking, chest tightness and shortness of breath.    Cardiovascular:  Negative for chest pain, palpitations and leg swelling.   Gastrointestinal:  Negative for abdominal distention, abdominal pain, anal bleeding and blood in stool.   Endocrine: Negative for cold intolerance, heat intolerance, polydipsia and polyphagia.   Genitourinary:  Negative for difficulty urinating, dysuria, flank pain and hematuria.   Musculoskeletal:  Negative for arthralgias, back pain, joint swelling, myalgias, neck pain and neck stiffness.    Skin:  Negative for color change, pallor and wound.   Allergic/Immunologic: Negative for environmental allergies, food allergies and immunocompromised state.   Neurological:  Negative for dizziness, seizures, facial asymmetry, speech difficulty, light-headedness, numbness and headaches.   Hematological:  Negative for adenopathy. Does not bruise/bleed easily.   Psychiatric/Behavioral:  Negative for agitation, behavioral problems, confusion, decreased concentration and sleep disturbance.                                Physical Exam  Vitals and nursing note reviewed.   Constitutional:       General: She is not in acute distress.     Appearance: Normal appearance. She is not ill-appearing.   HENT:      Head: Normocephalic and atraumatic.      Nose: No congestion or rhinorrhea.   Eyes:      General: No scleral icterus.     Extraocular Movements: Extraocular movements intact.      Pupils: Pupils are equal, round, and reactive to light.   Cardiovascular:      Rate and Rhythm: Normal rate and regular rhythm.      Pulses: Normal pulses.      Heart sounds: Normal heart sounds. No murmur heard.     No gallop.   Pulmonary:      Effort: Pulmonary effort is normal. No respiratory distress.      Breath sounds: Normal breath sounds. No stridor. No wheezing or rhonchi.   Abdominal:      General: Bowel sounds are normal. There is no distension.      Palpations: There is no mass.      Tenderness: There is no abdominal tenderness. There is no guarding.   Musculoskeletal:         General: No swelling, tenderness, deformity or signs of injury. Normal range of motion.      Cervical back: Normal range of motion and neck supple. No rigidity. No muscular tenderness.      Right lower leg: No edema.      Left lower leg: No edema.   Skin:     General: Skin is warm.      Coloration: Skin is not jaundiced or pale.      Findings: No bruising or lesion.   Neurological:      General: No focal deficit present.      Mental Status: She is alert and  oriented to person, place, and time.      Cranial Nerves: No cranial nerve deficit.      Sensory: No sensory deficit.      Motor: No weakness.      Gait: Gait normal.   Psychiatric:         Mood and Affect: Mood normal.         Behavior: Behavior normal.         Thought Content: Thought content normal.       Vitals:    04/30/24 1357   BP: 109/72   Pulse: 96   Resp: 18   Body surface area is 1.75 meters squared.    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  She did not receive any steroids while in the hospital and her symptoms generally improved with PRBC transfusion done at the time.   I will obtain her CBC with diff to see if improvement in her blood count.  Is noted in her counts then I will initiate prednisone.  I discussed with her that considering half-life of the drug and the fact that it has been a week since she last took it I do believe that it is out of her system.  I advised her to avoid taking this drug in the future.  She also has history of gastric bypass surgery and I will check her for serum copper and zinc levels at this time which could also be contributing to her anemia.   == 4/30/24:  Normal copper and zinc levels.  Hemoglobin 11.2 with hematocrit 34.9.  She has not required any prednisone at this time and my recommendation would be to continue with observation      Plan:  Continue observation    Return to clinic in 6 weeks for MD visit with CBC with diff prior      Pt is instructed to RTC with labs for continued monitoring of treatment as instructed.     Total time spent in counseling and discussion about further management options including relevant lab work, treatment,  prognosis, medications and intended side effects was more than 30 minutes. More than 50 % of the time was spent in counseling and coordination of care.  This includes face to face time and  non-face to face time preparing to see the patient (eg, review of tests), Obtaining and/or reviewing separately obtained history, Documenting clinical information in the electronic or other health record, Independently interpreting resultsand communicating results to the patient/family/caregiver, or Care coordination.

## 2024-05-01 ENCOUNTER — INFUSION (OUTPATIENT)
Dept: INFUSION THERAPY | Facility: HOSPITAL | Age: 54
End: 2024-05-01
Attending: STUDENT IN AN ORGANIZED HEALTH CARE EDUCATION/TRAINING PROGRAM
Payer: MEDICARE

## 2024-05-01 ENCOUNTER — HOSPITAL ENCOUNTER (OUTPATIENT)
Dept: RADIOLOGY | Facility: HOSPITAL | Age: 54
Discharge: HOME | End: 2024-05-01
Attending: INTERNAL MEDICINE
Payer: MEDICARE

## 2024-05-01 VITALS
RESPIRATION RATE: 18 BRPM | BODY MASS INDEX: 25.7 KG/M2 | HEIGHT: 63 IN | TEMPERATURE: 97 F | SYSTOLIC BLOOD PRESSURE: 112 MMHG | HEART RATE: 88 BPM | WEIGHT: 145.06 LBS | DIASTOLIC BLOOD PRESSURE: 77 MMHG | OXYGEN SATURATION: 99 %

## 2024-05-01 VITALS — HEIGHT: 63 IN | WEIGHT: 145.06 LBS | BODY MASS INDEX: 25.7 KG/M2

## 2024-05-01 DIAGNOSIS — M85.89 OSTEOPENIA OF MULTIPLE SITES: Primary | ICD-10-CM

## 2024-05-01 DIAGNOSIS — Z12.31 ENCOUNTER FOR SCREENING MAMMOGRAM FOR BREAST CANCER: ICD-10-CM

## 2024-05-01 PROCEDURE — 77063 BREAST TOMOSYNTHESIS BI: CPT | Mod: 26,HCNC,, | Performed by: RADIOLOGY

## 2024-05-01 PROCEDURE — 77067 SCR MAMMO BI INCL CAD: CPT | Mod: 26,HCNC,, | Performed by: RADIOLOGY

## 2024-05-01 PROCEDURE — 25000003 PHARM REV CODE 250: Mod: HCNC | Performed by: STUDENT IN AN ORGANIZED HEALTH CARE EDUCATION/TRAINING PROGRAM

## 2024-05-01 PROCEDURE — 96365 THER/PROPH/DIAG IV INF INIT: CPT | Mod: HCNC

## 2024-05-01 PROCEDURE — 63600175 PHARM REV CODE 636 W HCPCS: Mod: HCNC | Performed by: STUDENT IN AN ORGANIZED HEALTH CARE EDUCATION/TRAINING PROGRAM

## 2024-05-01 PROCEDURE — 77063 BREAST TOMOSYNTHESIS BI: CPT | Mod: TC,HCNC

## 2024-05-01 RX ORDER — ACETAMINOPHEN 325 MG/1
650 TABLET ORAL
Status: COMPLETED | OUTPATIENT
Start: 2024-05-01 | End: 2024-05-01

## 2024-05-01 RX ORDER — ZOLEDRONIC ACID 5 MG/100ML
5 INJECTION, SOLUTION INTRAVENOUS
Status: COMPLETED | OUTPATIENT
Start: 2024-05-01 | End: 2024-05-01

## 2024-05-01 RX ORDER — SODIUM CHLORIDE 0.9 % (FLUSH) 0.9 %
10 SYRINGE (ML) INJECTION
Status: DISCONTINUED | OUTPATIENT
Start: 2024-05-01 | End: 2024-05-01 | Stop reason: HOSPADM

## 2024-05-01 RX ORDER — ZOLEDRONIC ACID 5 MG/100ML
5 INJECTION, SOLUTION INTRAVENOUS
OUTPATIENT
Start: 2024-05-01

## 2024-05-01 RX ORDER — SODIUM CHLORIDE 0.9 % (FLUSH) 0.9 %
10 SYRINGE (ML) INJECTION
OUTPATIENT
Start: 2024-05-01

## 2024-05-01 RX ORDER — HEPARIN 100 UNIT/ML
500 SYRINGE INTRAVENOUS
OUTPATIENT
Start: 2024-05-01

## 2024-05-01 RX ORDER — ACETAMINOPHEN 325 MG/1
650 TABLET ORAL
OUTPATIENT
Start: 2024-05-01

## 2024-05-01 RX ADMIN — ZOLEDRONIC ACID 5 MG: 5 INJECTION, SOLUTION INTRAVENOUS at 08:05

## 2024-05-01 RX ADMIN — ACETAMINOPHEN 650 MG: 325 TABLET ORAL at 08:05

## 2024-05-01 NOTE — PLAN OF CARE
Problem: Adult Inpatient Plan of Care  Goal: Plan of Care Review  Outcome: Progressing  Flowsheets (Taken 5/1/2024 0846)  Plan of Care Reviewed With: patient  Goal: Patient-Specific Goal (Individualized)  Outcome: Progressing  Flowsheets (Taken 5/1/2024 0846)  Individualized Care Needs: denies  Anxieties, Fears or Concerns: denies  Goal: Absence of Hospital-Acquired Illness or Injury  Outcome: Progressing  Intervention: Identify and Manage Fall Risk  Flowsheets (Taken 5/1/2024 0846)  Safety Promotion/Fall Prevention:   assistive device/personal item within reach   Fall Risk reviewed with patient/family   in recliner, wheels locked   medications reviewed   instructed to call staff for mobility  Intervention: Prevent Infection  Flowsheets (Taken 5/1/2024 0846)  Infection Prevention:   environmental surveillance performed   equipment surfaces disinfected   hand hygiene promoted   personal protective equipment utilized  Goal: Optimal Comfort and Wellbeing  Outcome: Progressing  Intervention: Monitor Pain and Promote Comfort  Flowsheets (Taken 5/1/2024 0846)  Pain Management Interventions:   relaxation techniques promoted   quiet environment facilitated  Intervention: Provide Person-Centered Care  Flowsheets (Taken 5/1/2024 0846)  Trust Relationship/Rapport:   care explained   questions encouraged   choices provided   reassurance provided   emotional support provided   thoughts/feelings acknowledged   empathic listening provided   questions answered     Problem: Fall Injury Risk  Goal: Absence of Fall and Fall-Related Injury  Outcome: Progressing  Intervention: Identify and Manage Contributors  Flowsheets (Taken 5/1/2024 0846)  Self-Care Promotion:   independence encouraged   BADL personal objects within reach  Medication Review/Management: medications reviewed  Intervention: Promote Injury-Free Environment  Flowsheets (Taken 5/1/2024 0846)  Safety Promotion/Fall Prevention:   assistive device/personal item within  reach   Fall Risk reviewed with patient/family   in recliner, wheels locked   medications reviewed   instructed to call staff for mobility

## 2024-05-28 ENCOUNTER — TELEPHONE (OUTPATIENT)
Dept: GASTROENTEROLOGY | Facility: CLINIC | Age: 54
End: 2024-05-28
Payer: MEDICARE

## 2024-05-28 DIAGNOSIS — Z12.11 COLON CANCER SCREENING: Primary | ICD-10-CM

## 2024-05-29 ENCOUNTER — TELEPHONE (OUTPATIENT)
Dept: GASTROENTEROLOGY | Facility: CLINIC | Age: 54
End: 2024-05-29
Payer: MEDICARE

## 2024-05-29 VITALS — BODY MASS INDEX: 24.75 KG/M2 | HEIGHT: 64 IN | WEIGHT: 145 LBS

## 2024-05-29 RX ORDER — METRONIDAZOLE 500 MG/1
500 TABLET ORAL 3 TIMES DAILY
COMMUNITY
Start: 2024-03-21

## 2024-05-29 NOTE — TELEPHONE ENCOUNTER
Called patient and got her direct mami. 5/29/24 LRA       ----- Message from Zuly Paulson sent at 5/29/2024  8:36 AM CDT -----  Contact: self  Type:  Patient Returning Call    Who Called:Jadyn Chance  Who Left Message for Patient:Kash  Does the patient know what this is regarding?:scheduling  Would the patient rather a call back or a response via [employer]? Call back  Best Call Back Number:[phone]  Additional Information: n/a

## 2024-05-29 NOTE — TELEPHONE ENCOUNTER
Patient denied having any current problems or issues. Patient also denied having any hx of kidney disease. Patient does have sleep apnea but does not currently use a machine. Patient has a history of seizures. Her last seizure was in 2023. Her seizures are usually caused by stress. Patient does vomit and is nauseous when waking up from anesthesia. Patient did have a previous colonoscopy on 6/5/23 from [facility] in [place]. She is unsure which dr completed it. Patient has also had a previous small bowel resection in 2/27/2015. Patient also had hemorrhoids previously and they were removed. Patient does have upper and lower dentures. Patients mother and maternal mother both have a history of diabetes. Patients mother also has a history of seizures. Chart was reviewed and updated with patient. Prep instructions were reviewed and sent to patients email at [email or web address].    Colonoscopy scheduled for October 9, 2024 by Dr. Mcgregor.-Reviewed by VICENTE

## 2024-06-06 DIAGNOSIS — E11.65 UNCONTROLLED TYPE 2 DIABETES MELLITUS WITH HYPERGLYCEMIA, WITH LONG-TERM CURRENT USE OF INSULIN: ICD-10-CM

## 2024-06-06 DIAGNOSIS — Z79.4 UNCONTROLLED TYPE 2 DIABETES MELLITUS WITH HYPERGLYCEMIA, WITH LONG-TERM CURRENT USE OF INSULIN: ICD-10-CM

## 2024-06-06 RX ORDER — INSULIN DEGLUDEC 100 U/ML
15 INJECTION, SOLUTION SUBCUTANEOUS DAILY
Qty: 15 ML | Refills: 2 | Status: SHIPPED | OUTPATIENT
Start: 2024-06-06

## 2024-06-07 RX ORDER — POLYETHYLENE GLYCOL 3350, SODIUM SULFATE ANHYDROUS, SODIUM BICARBONATE, SODIUM CHLORIDE, POTASSIUM CHLORIDE 236; 22.74; 6.74; 5.86; 2.97 G/4L; G/4L; G/4L; G/4L; G/4L
4 POWDER, FOR SOLUTION ORAL ONCE
Qty: 4000 ML | Refills: 0 | Status: SHIPPED | OUTPATIENT
Start: 2024-06-07 | End: 2024-06-07

## 2024-06-07 NOTE — TELEPHONE ENCOUNTER
Orders signed. Records reviewed. She has had 2 colonoscopies with poor preps. Will need to start Miralax TID 1 capful week before procedure.      Colonoscopy 6/5/2023 with Dr. De La Torre: fair prep- repeat in 1 year due to prep.  Colonoscopy 5/18/2017 with Dr. Vega- fair prep- repeat in 5 years.  EGD 5/2015 w/ Dr. Costa: Kayleen-en-y, very small pouch, No ulcer.  EGD 3/2015 w/ Dr. Costa: Kayleen-en-y, jejunal ulcer  EGD/Colon 6/2013 w/ Dr. Schmid:  Kayleen-en-y , GrIII IH

## 2024-06-11 NOTE — TELEPHONE ENCOUNTER
Called patient and made her aware to take one capful of  Mirlax three times a day one week pror to procedure. 6/11/24 LRA

## 2024-06-19 ENCOUNTER — PATIENT MESSAGE (OUTPATIENT)
Dept: OTHER | Facility: OTHER | Age: 54
End: 2024-06-19
Payer: MEDICARE

## 2024-06-24 ENCOUNTER — PATIENT OUTREACH (OUTPATIENT)
Dept: ADMINISTRATIVE | Facility: HOSPITAL | Age: 54
End: 2024-06-24
Payer: MEDICARE

## 2024-06-24 NOTE — PROGRESS NOTES
Population Health Chart Review & Patient Outreach Details      Additional [facility] Health Notes:    [facility] MEDICARE ADVANTAGE NON-COMPLIANT REPORT.    Pravastatin 20 mg 90 tab with 3 refills sent to pharmacy 2/23/2024         Updates Requested / Reviewed:      Care Everywhere and          Health Maintenance Topics Overdue:      [facility] Score: 2     Colon Cancer Screening  Eye Exam                       Health Maintenance Topic(s) Outreach Outcomes & Actions Taken:

## 2024-06-28 ENCOUNTER — OFFICE VISIT (OUTPATIENT)
Dept: PRIMARY CARE CLINIC | Facility: CLINIC | Age: 54
End: 2024-06-28
Payer: MEDICARE

## 2024-06-28 VITALS
HEIGHT: 64 IN | HEART RATE: 111 BPM | BODY MASS INDEX: 22.47 KG/M2 | SYSTOLIC BLOOD PRESSURE: 102 MMHG | OXYGEN SATURATION: 97 % | WEIGHT: 131.63 LBS | DIASTOLIC BLOOD PRESSURE: 70 MMHG

## 2024-06-28 DIAGNOSIS — E11.9 TYPE 2 DIABETES MELLITUS WITHOUT COMPLICATION, WITH LONG-TERM CURRENT USE OF INSULIN: ICD-10-CM

## 2024-06-28 DIAGNOSIS — Z79.4 TYPE 2 DIABETES MELLITUS WITHOUT COMPLICATION, WITH LONG-TERM CURRENT USE OF INSULIN: ICD-10-CM

## 2024-06-28 DIAGNOSIS — Z87.898 HISTORY OF SEIZURE: Primary | ICD-10-CM

## 2024-06-28 DIAGNOSIS — B37.41 YEAST CYSTITIS: ICD-10-CM

## 2024-06-28 LAB — HBA1C MFR BLD: 6.1 % (ref 4–6)

## 2024-06-28 PROCEDURE — 3078F DIAST BP <80 MM HG: CPT | Mod: CPTII,S$GLB,, | Performed by: INTERNAL MEDICINE

## 2024-06-28 PROCEDURE — 3061F NEG MICROALBUMINURIA REV: CPT | Mod: CPTII,S$GLB,, | Performed by: INTERNAL MEDICINE

## 2024-06-28 PROCEDURE — 3044F HG A1C LEVEL LT 7.0%: CPT | Mod: CPTII,S$GLB,, | Performed by: INTERNAL MEDICINE

## 2024-06-28 PROCEDURE — 99213 OFFICE O/P EST LOW 20 MIN: CPT | Mod: S$GLB,,, | Performed by: INTERNAL MEDICINE

## 2024-06-28 PROCEDURE — 3066F NEPHROPATHY DOC TX: CPT | Mod: CPTII,S$GLB,, | Performed by: INTERNAL MEDICINE

## 2024-06-28 PROCEDURE — 3008F BODY MASS INDEX DOCD: CPT | Mod: CPTII,S$GLB,, | Performed by: INTERNAL MEDICINE

## 2024-06-28 PROCEDURE — 1159F MED LIST DOCD IN RCRD: CPT | Mod: CPTII,S$GLB,, | Performed by: INTERNAL MEDICINE

## 2024-06-28 PROCEDURE — 3074F SYST BP LT 130 MM HG: CPT | Mod: CPTII,S$GLB,, | Performed by: INTERNAL MEDICINE

## 2024-06-28 RX ORDER — FLUCONAZOLE 150 MG/1
150 TABLET ORAL DAILY
Qty: 3 TABLET | Refills: 0 | Status: SHIPPED | OUTPATIENT
Start: 2024-06-28 | End: 2024-07-01

## 2024-06-28 NOTE — PROGRESS NOTES
"Subjective:      Patient ID: Jadyn Chance is a 53 y.o. female.    Chief Complaint: Fall (Fell twice and the last fall was on one week ago today. June 11th. She had bruises to her head/face. She does have a walker in the home but states she falls backwards. She did not go to ER. She states she does not get lightheaded or dizzy. "I just fall." )    HPI    Past Medical History:   Diagnosis Date    Abnormal Pap smear 1991    bx was negative, per pt    Acute renal failure (ARF) 02/16/2015    Anxiety     B12 deficiency     Blood transfusion     multiple     BMI 24.9     Chronic LBP     Degenerative disc disease     l spine    Dehydration 02/16/2015    Diabetes mellitus     Diabetic neuropathy     General anesthetics causing adverse effect in therapeutic use     delayed emergence    Hyperglycemia 04/25/2018    Iron deficiency anemia, unspecified     Mixed hyperlipidemia 11/18/2013    RLS (restless legs syndrome)     Seizures     Sleep apnea     with CPAP    Vitamin D deficiency disease        Patient states she started having seizures and was referred to Dr Cárdenas and states she may have pseudoseizures. She states she hasn't seen him in some time. She states when she walks she freezes up and becomes unaware of her surroundings and falls    She has pain management in [place]/Dr Mcgregor.  She had seen Dr Pritchett before      15 units of Tresiba, 25 of Jardiance and 1,000 mg metformin BID. Her most recent A1c was 6.1 in clinic.   She states her blood glucose was relatively controlled. I had recommended previously she switch her insulin to during the day instead of ay night which she states has helped   She gives herself monthly B12 injections and takes some by mouth as well           Review of Systems   Constitutional:  Negative for chills and fever.   HENT:  Negative for hearing loss.    Eyes:  Negative for blurred vision.   Respiratory:  Negative for cough, shortness of breath and wheezing.    Cardiovascular:  " "Negative for chest pain, palpitations and leg swelling.   Gastrointestinal:  Negative for abdominal pain, blood in stool, constipation, diarrhea, melena, nausea and vomiting.   Genitourinary:  Negative for dysuria, frequency and urgency.   Musculoskeletal:  Positive for falls.   Skin:  Negative for rash.   Neurological:  Negative for dizziness and headaches.   Endo/Heme/Allergies:  Does not bruise/bleed easily.   Psychiatric/Behavioral:  Negative for depression. The patient is not nervous/anxious.      Objective:     Physical Exam  Vitals reviewed.   Constitutional:       Appearance: Normal appearance.   HENT:      Head: Normocephalic.      Mouth/Throat:      Mouth: Mucous membranes are moist.      Pharynx: Oropharynx is clear.   Eyes:      Extraocular Movements: Extraocular movements intact.      Conjunctiva/sclera: Conjunctivae normal.      Pupils: Pupils are equal, round, and reactive to light.   Cardiovascular:      Rate and Rhythm: Normal rate and regular rhythm.   Pulmonary:      Effort: Pulmonary effort is normal.      Breath sounds: Normal breath sounds.   Abdominal:      General: Bowel sounds are normal.   Musculoskeletal:      Right lower leg: No edema.      Left lower leg: No edema.   Skin:     General: Skin is warm.      Capillary Refill: Capillary refill takes less than 2 seconds.   Neurological:      Mental Status: She is alert and oriented to person, place, and time.   Psychiatric:         Mood and Affect: Mood normal.       /70 (BP Location: Right arm, Patient Position: Standing, BP Method: Medium (Automatic))   Pulse (!) 111   Ht 5' 4" (1.626 m)   Wt 59.7 kg (131 lb 9.6 oz)   LMP 11/01/2014 (Approximate)   SpO2 97%   BMI 22.59 kg/m²     Assessment:       ICD-10-CM ICD-9-CM   1. History of seizure  Z87.898 V13.89   2. Yeast cystitis  B37.41 112.2   3. Type 2 diabetes mellitus without complication, with long-term current use of insulin  E11.9 250.00    Z79.4 V58.67       Plan: "     Medication List with Changes/Refills   Current Medications    ACETAMINOPHEN-CODEINE 300-30MG (TYLENOL #3) 300-30 MG TAB    take 1 tablet by mouth every 6 hours as needed    BD ALCOHOL SWABS PADM    Apply topically 3 (three) times daily.    BLOOD SUGAR DIAGNOSTIC STRP    To check BG 3 times daily, to use with insurance preferred meter    DxE11.9    BLOOD-GLUCOSE METER MISC    Use to check blood glucose 3 times daily    BUSPIRONE (BUSPAR) 30 MG TAB    Take 1 tablet (30 mg total) by mouth 2 (two) times daily.    CHOLECALCIFEROL, VITAMIN D3, 1,250 MCG (50,000 UNIT) TAB    Take 100,000 Units by mouth every 7 days.    CYANOCOBALAMIN 1,000 MCG/ML INJECTION    Inject 1 mL (1,000 mcg total) into the skin every 28 days.    CYCLOBENZAPRINE (FLEXERIL) 10 MG TABLET    TAKE 1 TABLET THREE TIMES DAILY AS NEEDED    DOXEPIN (SINEQUAN) 50 MG CAPSULE    Take 1 capsule by mouth at bedtime as needed for insomnia. If 1 pill is not effective, increase the dose to 2 pills.    EMPAGLIFLOZIN (JARDIANCE) 25 MG TABLET    Take 1 tablet (25 mg total) by mouth once daily.    GABAPENTIN (NEURONTIN) 300 MG CAPSULE    TAKE 2 CAPSULES THREE TIMES DAILY    LANCETS 30 GAUGE MISC    To check blood glucose 3 times daily    LEMBOREXANT (DAYVIGO) 10 MG TAB    Take 1 tablet by mouth nightly as needed for insomnia    MELOXICAM (MOBIC) 7.5 MG TABLET    TAKE 1 TABLET TWICE A DAY AS NEEDED FOR PAIN. TAKE WITH LARGEST MEAL OF THE DAY FOR ARTHRITIS AND JOINT PAIN    METFORMIN (GLUCOPHAGE-XR) 500 MG ER 24HR TABLET    TAKE 2 TABLETS TWICE DAILY WITH MEALS    METRONIDAZOLE (FLAGYL) 500 MG TABLET    Take 500 mg by mouth 3 (three) times daily.    NALOXONE (NARCAN) 4 MG/ACTUATION SPRY    Use 1 spray into one nostril as directed as needed. Another spray may be given into the other nostril in 2 to 3 minutes until the patient responds. Use only in emergency if person is unconscious. Call 911    NARATRIPTAN (AMERGE) 2.5 MG TABLET    TAKE 1 TABLET (2.5 MG) AT ONSET OF  "HEADACHE, MAY REPEAT IN 4 HOURS IF NEEDED    NOVOLOG FLEXPEN U-100 INSULIN 100 UNIT/ML (3 ML) INPN PEN    Inject 3 times per day per sliding scale - can use every 4 hours as needed to correct a high blood sugar.    ONDANSETRON (ZOFRAN) 4 MG TABLET    TAKE 1 TABLET EVERY 6 HOURS    PEN NEEDLE, DIABETIC (DROPLET PEN NEEDLE) 32 GAUGE X 5/32" NDLE    Use with insulin 4 times daily as directed    PRAVASTATIN (PRAVACHOL) 20 MG TABLET    Take 1 tablet (20 mg total) by mouth once daily.    PROMETHAZINE (PHENERGAN) 25 MG TABLET    TAKE 1 TABLET TWICE DAILY AS NEEDED FOR NAUSEA    SYRINGE WITH NEEDLE (SYRINGE 3CC/25GX1") 3 ML 25 GAUGE X 1" SYRG    For vitamin B12 injection    TAMSULOSIN (FLOMAX) 0.4 MG CAP    Take 1 capsule (0.4 mg total) by mouth once daily.    TRESIBA FLEXTOUCH U-100 100 UNIT/ML (3 ML) INSULIN PEN    INJECT 15 UNITS INTO THE SKIN ONCE DAILY. DISCONTINUE LEVEMIR.        1. History of seizure  -     Ambulatory referral/consult to Neurology; Future; Expected date: 07/09/2024    2. Yeast cystitis  -     fluconazole (DIFLUCAN) 150 MG Tab; Take 1 tablet (150 mg total) by mouth once daily. for 3 doses  Dispense: 3 tablet; Refill: 0    3. Type 2 diabetes mellitus without complication, with long-term current use of insulin  Overview:  Controlled.  Continue current medications.    Orders:  -     Ambulatory referral/consult to Ophthalmology; Future; Expected date: 07/05/2024                 Future Appointments   Date Time Provider Department Center   7/29/2024 10:40 AM LAB TESTING, [facility] HEMONC LT HEMONC LC [place]    7/30/2024  1:40 PM Floyd Deras MD [facility] HEMONC LC [place]    8/7/2024  9:30 AM Narendra Mackey MD [facility] INT [facility]ARD [facility]   8/21/2024  9:35 AM LABORATORY, [facility] HGV[facility] LAB [facility]   9/3/2024  3:00 PM Anuja Mcmahan NP [facility] DIABETE [facility]   10/9/2024  7:00 AM [facility], [facility] GASTRO [facility] GASTRO  [place]   10/29/2024  8:00 AM Miriam Morales MD [facility] PRICG5 [facility]LO[facility] [place]            "

## 2024-07-15 RX ORDER — BUSPIRONE HYDROCHLORIDE 30 MG/1
30 TABLET ORAL 2 TIMES DAILY
Qty: 60 TABLET | Refills: 3 | OUTPATIENT
Start: 2024-07-15 | End: 2025-07-15

## 2024-07-25 PROBLEM — D59.9 ACQUIRED HEMOLYTIC ANEMIA: Status: ACTIVE | Noted: 2024-07-25

## 2024-07-25 PROBLEM — F11.20 UNCOMPLICATED OPIOID DEPENDENCE: Status: ACTIVE | Noted: 2024-07-25

## 2024-07-26 RX ORDER — BUSPIRONE HYDROCHLORIDE 30 MG/1
30 TABLET ORAL 2 TIMES DAILY
Qty: 60 TABLET | Refills: 1 | Status: SHIPPED | OUTPATIENT
Start: 2024-07-26 | End: 2025-07-26

## 2024-07-26 RX ORDER — BUSPIRONE HYDROCHLORIDE 30 MG/1
30 TABLET ORAL 2 TIMES DAILY
Qty: 60 TABLET | Refills: 3 | OUTPATIENT
Start: 2024-07-26 | End: 2025-07-26

## 2024-08-12 DIAGNOSIS — G47.00 INSOMNIA, UNSPECIFIED TYPE: ICD-10-CM

## 2024-08-12 RX ORDER — LEMBOREXANT 10 MG/1
TABLET, FILM COATED ORAL
Qty: 30 TABLET | Refills: 5 | Status: SHIPPED | OUTPATIENT
Start: 2024-08-12

## 2024-08-13 ENCOUNTER — OFFICE VISIT (OUTPATIENT)
Dept: HEMATOLOGY/ONCOLOGY | Facility: CLINIC | Age: 54
End: 2024-08-13
Payer: MEDICARE

## 2024-08-13 VITALS
WEIGHT: 126.19 LBS | SYSTOLIC BLOOD PRESSURE: 107 MMHG | BODY MASS INDEX: 21.54 KG/M2 | OXYGEN SATURATION: 95 % | RESPIRATION RATE: 16 BRPM | DIASTOLIC BLOOD PRESSURE: 71 MMHG | HEART RATE: 104 BPM | HEIGHT: 64 IN

## 2024-08-13 DIAGNOSIS — D64.9 ANEMIA, UNSPECIFIED TYPE: Primary | ICD-10-CM

## 2024-08-13 LAB
BASOPHILS NFR BLD: 1.1 % (ref 0–3)
EOSINOPHIL NFR BLD: 0.8 % (ref 1–3)
ERYTHROCYTE [DISTWIDTH] IN BLOOD BY AUTOMATED COUNT: 14 % (ref 12.5–18)
HCT VFR BLD AUTO: 39.7 % (ref 37–47)
HGB BLD-MCNC: 12.8 G/DL (ref 12–16)
LYMPHOCYTES NFR BLD: 18.5 % (ref 25–40)
MCH RBC QN AUTO: 31.6 PG (ref 27–31.2)
MCHC RBC AUTO-ENTMCNC: 32.2 G/DL (ref 31.8–35.4)
MCV RBC AUTO: 98 FL (ref 80–97)
MONOCYTES NFR BLD: 6.1 % (ref 1–15)
NEUTROPHILS # BLD AUTO: 3.81 10*3/UL (ref 1.8–7.7)
NEUTROPHILS NFR BLD: 72.9 % (ref 37–80)
NUCLEATED RED BLOOD CELLS: 0 %
PLATELETS: 245 10*3/UL (ref 142–424)
RBC # BLD AUTO: 4.05 10*6/UL (ref 4.2–5.4)
WBC # BLD: 5.2 10*3/UL (ref 4.6–10.2)

## 2024-08-13 NOTE — PROGRESS NOTES
HEMATOLOGY FOLLOW UP CONSULTATION NOTE    Patient ID: Jadyn Chance is a 54 y.o. female.    Chief Complaint: Hemolytic anemia    HPI:  Patient is a 53-year-old female with past medical history of diabetes, coronary artery disease, seizures, chronic anemia, B12 deficiency, gastric bypass surgery in 2008, history of fatty liver who presented to ER with complaints of yellowness of skin and dyspnea on exertion.  She reported being recently placed on Wellbutrin for depression and also phenazopyridine for UTI before the onset of these symptoms.  She was admitted for sepsis, jaundice from presumed hemolytic anemia in setting of drug reaction from phenazopyridine.  Abdominal imaging with no evidence of pancreatic duct dilatation or lesions.  Hyperbilirubinemia trended down, skin jaundice markedly improved.  Elevated LDH, haptoglobin low indicating hemolytic picture. Patient received 1 PRBC transfusion with H&H stabilized and improved.  Peripheral smear showed leukocytosis, normal platelet, no blasts. Abdominal pain resolved.  Was on Rocephin and Flagyl on admission, discharged on Flagyl to complete the course.    She presents to our clinic today for further evaluation.  Voices no acute complaints reports doing well after hospital stay.          Past Medical History:   Diagnosis Date    Abnormal Pap smear 1991    bx was negative, per pt    Acute renal failure (ARF) 02/16/2015    Anxiety     B12 deficiency     Blood transfusion     multiple     BMI 24.9     Chronic LBP     Degenerative disc disease     l spine    Dehydration 02/16/2015    Diabetes mellitus     Diabetic neuropathy     General anesthetics causing adverse effect in therapeutic use     delayed emergence    Hyperglycemia 04/25/2018    Iron deficiency anemia, unspecified     Mixed hyperlipidemia 11/18/2013    RLS (restless legs syndrome)     Seizures     Sleep apnea     with CPAP    Vitamin D deficiency disease        Family History   Problem Relation  Name Age of Onset    Diabetes Mother      Cataracts Mother      Seizures Mother      No Known Problems Father      Diabetes Maternal Grandmother      No Known Problems Maternal Grandfather      No Known Problems Paternal Grandmother      No Known Problems Paternal Grandfather      Glaucoma Maternal Aunt      Blindness Maternal Aunt      Breast cancer Neg Hx      Colon cancer Neg Hx      Thrombophilia Neg Hx         Social History     Socioeconomic History    Marital status:     Number of children: 4   Tobacco Use    Smoking status: Never    Smokeless tobacco: Never   Substance and Sexual Activity    Alcohol use: Never    Drug use: Never    Sexual activity: Not Currently     Partners: Male     Birth control/protection: Surgical     Comment: BTL; mut monog   Social History Narrative    ** Merged History Encounter **         Lives in [place]     Social Determinants of Health     Financial Resource Strain: High Risk (3/27/2024)    Overall Financial Resource Strain (CARDIA)     Difficulty of Paying Living Expenses: Hard   Food Insecurity: Food Insecurity Present (3/27/2024)    Hunger Vital Sign     Worried About Running Out of Food in the Last Year: Sometimes true     Ran Out of Food in the Last Year: Sometimes true   Transportation Needs: No Transportation Needs (3/27/2024)    PRAPARE - Transportation     Lack of Transportation (Medical): No     Lack of Transportation (Non-Medical): No   Physical Activity: Inactive (3/27/2024)    Exercise Vital Sign     Days of Exercise per Week: 0 days     Minutes of Exercise per Session: 10 min   Stress: Stress Concern Present (3/27/2024)    [employer] [place] [employer] - Occupational Stress Questionnaire     Feeling of Stress : Very much   Housing Stability: High Risk (3/27/2024)    Housing Stability Vital Sign     Unable to Pay for Housing in the Last Year: Yes     Number of Places Lived in the Last Year: 1     Unstable Housing in the Last Year: No          Past Surgical History:   Procedure Laterality Date    ANUS SURGERY      AUGMENTATION OF BREAST  2008    saline    BELT ABDOMINOPLASTY      tummy [facility]    BREAST SURGERY  2008    breast augmentation    CARPAL TUNNEL RELEASE Right 2021    Procedure: RELEASE, CARPAL TUNNEL;  Surgeon: Tyler Victor MD;  Location: [facility] OR;  Service: Orthopedics;  Laterality: Right;  right carpal tunnel release and right cubital tunnel release with possible anterior transposition ulnar nerve     SECTION      x2    CHOLECYSTECTOMY      COLONOSCOPY N/A 2023    Procedure: COLONOSCOPY;  Surgeon: María De La Torre MD;  Location: [facility] ENDO;  Service: Endoscopy;  Laterality: N/A;    mikel      EXCISIONAL HEMORRHOIDECTOMY      HIP FRACTURE SURGERY      left hip ORIF    MOUTH SURGERY      revision of JJ anastomosis  2015    MITCH-EN-Y PROCEDURE      small bowel resection  2015    due to SBO    TOTAL REDUCTION MAMMOPLASTY      TUBAL LIGATION      ULNAR TUNNEL RELEASE Right 2021    Procedure: RELEASE, CUBITAL TUNNEL;  Surgeon: Tyler Victor MD;  Location: [facility] OR;  Service: Orthopedics;  Laterality: Right;  right carpal tunnel release and right cubital tunnel release with possible anterior transposition ulnar nerve                 Review of systems:  Review of Systems   Constitutional:  Negative for activity change, appetite change, chills, diaphoresis, fatigue and unexpected weight change.   HENT:  Negative for congestion, facial swelling, hearing loss, mouth sores, trouble swallowing and voice change.    Eyes:  Negative for photophobia, pain, discharge and itching.   Respiratory:  Negative for apnea, cough, choking, chest tightness and shortness of breath.    Cardiovascular:  Negative for chest pain, palpitations and leg swelling.   Gastrointestinal:  Negative for abdominal distention, abdominal pain, anal bleeding and blood in stool.   Endocrine: Negative for cold intolerance, heat  intolerance, polydipsia and polyphagia.   Genitourinary:  Negative for difficulty urinating, dysuria, flank pain and hematuria.   Musculoskeletal:  Negative for arthralgias, back pain, joint swelling, myalgias, neck pain and neck stiffness.   Skin:  Negative for color change, pallor and wound.   Allergic/Immunologic: Negative for environmental allergies, food allergies and immunocompromised state.   Neurological:  Negative for dizziness, seizures, facial asymmetry, speech difficulty, light-headedness, numbness and headaches.   Hematological:  Negative for adenopathy. Does not bruise/bleed easily.   Psychiatric/Behavioral:  Negative for agitation, behavioral problems, confusion, decreased concentration and sleep disturbance.                                Physical Exam  Vitals and nursing note reviewed.   Constitutional:       General: She is not in acute distress.     Appearance: Normal appearance. She is not ill-appearing.   HENT:      Head: Normocephalic and atraumatic.      Nose: No congestion or rhinorrhea.   Eyes:      General: No scleral icterus.     Extraocular Movements: Extraocular movements intact.      Pupils: Pupils are equal, round, and reactive to light.   Cardiovascular:      Rate and Rhythm: Normal rate and regular rhythm.      Pulses: Normal pulses.      Heart sounds: Normal heart sounds. No murmur heard.     No gallop.   Pulmonary:      Effort: Pulmonary effort is normal. No respiratory distress.      Breath sounds: Normal breath sounds. No stridor. No wheezing or rhonchi.   Abdominal:      General: Bowel sounds are normal. There is no distension.      Palpations: There is no mass.      Tenderness: There is no abdominal tenderness. There is no guarding.   Musculoskeletal:         General: No swelling, tenderness, deformity or signs of injury. Normal range of motion.      Cervical back: Normal range of motion and neck supple. No rigidity. No muscular tenderness.      Right lower leg: No edema.       Left lower leg: No edema.   Skin:     General: Skin is warm.      Coloration: Skin is not jaundiced or pale.      Findings: No bruising or lesion.   Neurological:      General: No focal deficit present.      Mental Status: She is alert and oriented to person, place, and time.      Cranial Nerves: No cranial nerve deficit.      Sensory: No sensory deficit.      Motor: No weakness.      Gait: Gait normal.   Psychiatric:         Mood and Affect: Mood normal.         Behavior: Behavior normal.         Thought Content: Thought content normal.       There were no vitals filed for this visit.  There is no height or weight on file to calculate BSA.    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  She did not receive any steroids while in the hospital and her symptoms generally improved with PRBC transfusion done at the time.   I will obtain her CBC with diff to see if improvement in her blood count.  Is noted in her counts then I will initiate prednisone.  I discussed with her that considering half-life of the drug and the fact that it has been a week since she last took it I do believe that it is out of her system.  I advised her to avoid taking this drug in the future.  She also has history of gastric bypass surgery and I will check her for serum copper and zinc levels at this time which could also be contributing to her anemia.   == 4/30/24:  Normal copper and zinc levels.  Hemoglobin 11.2 with hematocrit 34.9.  She has not required any prednisone at this time and my recommendation would be to continue with observation.  == 8/13/24:  Stable hemoglobin and hematocrit with no evidence of anemia at this time.  Would continue with observation.  Cause of hemolytic anemia likely drug-induced which is resolved now      Plan:  Continue observation    Return to clinic prn      Pt is instructed to  RTC with labs for continued monitoring of treatment as instructed.     Total time spent in counseling and discussion about further management options including relevant lab work, treatment,  prognosis, medications and intended side effects was more than 30 minutes. More than 50 % of the time was spent in counseling and coordination of care.  This includes face to face time and non-face to face time preparing to see the patient (eg, review of tests), Obtaining and/or reviewing separately obtained history, Documenting clinical information in the electronic or other health record, Independently interpreting resultsand communicating results to the patient/family/caregiver, or Care coordination.

## 2024-09-06 DIAGNOSIS — E55.9 VITAMIN D DEFICIENCY: ICD-10-CM

## 2024-09-06 RX ORDER — ASPIRIN 325 MG
TABLET, DELAYED RELEASE (ENTERIC COATED) ORAL
Qty: 24 CAPSULE | Refills: 0 | Status: SHIPPED | OUTPATIENT
Start: 2024-09-06

## 2024-09-23 RX ORDER — BUSPIRONE HYDROCHLORIDE 30 MG/1
30 TABLET ORAL 2 TIMES DAILY
Qty: 60 TABLET | Refills: 1 | Status: SHIPPED | OUTPATIENT
Start: 2024-09-23 | End: 2025-09-23

## 2024-09-24 ENCOUNTER — PATIENT MESSAGE (OUTPATIENT)
Dept: OTHER | Facility: OTHER | Age: 54
End: 2024-09-24
Payer: MEDICARE

## 2024-09-25 DIAGNOSIS — E53.8 VITAMIN B12 DEFICIENCY: ICD-10-CM

## 2024-09-25 RX ORDER — CYANOCOBALAMIN 1000 UG/ML
INJECTION, SOLUTION INTRAMUSCULAR; SUBCUTANEOUS
Qty: 3 ML | Refills: 3 | Status: SHIPPED | OUTPATIENT
Start: 2024-09-25

## 2024-10-01 ENCOUNTER — TELEPHONE (OUTPATIENT)
Dept: GASTROENTEROLOGY | Facility: CLINIC | Age: 54
End: 2024-10-01
Payer: MEDICARE

## 2024-10-01 VITALS — WEIGHT: 126 LBS | HEIGHT: 64 IN | BODY MASS INDEX: 21.51 KG/M2

## 2024-10-01 DIAGNOSIS — Z12.11 COLON CANCER SCREENING: Primary | ICD-10-CM

## 2024-10-01 NOTE — TELEPHONE ENCOUNTER
Called and left a detailed message that I was calling as a courtesy regarding up coming Colon with NBP on 10/9/24, Wed and wanted to verify that she has her paper prep instructions and meds. I also mentioned that [facility] will call the day before (TUES) with the arrival time, GI Lab is located on the third floor, and to pre-register before next Tues. almas

## 2024-10-01 NOTE — TELEPHONE ENCOUNTER
"[facility]  [address] [place] [address]  Phone: [phone]  Fax: [phone]    History & Physical         Provider: Dr. Bernarda Mcgregor    Patient Name: Jadyn MANCERA (age):1970  54 y.o.           Gender: female   Phone: [phone]     Referring Physician: Miriam Morales     Vital Signs:   Height - 5' 4"  Weight - 126 lb  BMI -  21.63    Plan: Colonoscopy @ [facility]    Encounter Diagnosis   Name Primary?    Colon cancer screening Yes           History:      Past Medical History:   Diagnosis Date    Abnormal Pap smear     bx was negative, per pt    Acute renal failure (ARF) 2015    Anxiety     B12 deficiency     Blood transfusion     multiple     BMI 24.9     Chronic LBP     Degenerative disc disease     l spine    Dehydration 2015    Diabetes mellitus     Diabetic neuropathy     General anesthetics causing adverse effect in therapeutic use     delayed emergence    Hyperglycemia 2018    Iron deficiency anemia, unspecified     Mixed hyperlipidemia 2013    RLS (restless legs syndrome)     Seizures     Sleep apnea     with CPAP    Vitamin D deficiency disease       Past Surgical History:   Procedure Laterality Date    ANUS SURGERY      AUGMENTATION OF BREAST  2008    saline    BELT ABDOMINOPLASTY      tummy [facility]    BREAST SURGERY  2008    breast augmentation    CARPAL TUNNEL RELEASE Right 2021    Procedure: RELEASE, CARPAL TUNNEL;  Surgeon: Tyler Victor MD;  Location: [facility] OR;  Service: Orthopedics;  Laterality: Right;  right carpal tunnel release and right cubital tunnel release with possible anterior transposition ulnar nerve     SECTION      x2    CHOLECYSTECTOMY      COLONOSCOPY N/A 2023    Procedure: COLONOSCOPY;  Surgeon: María De La Torre MD;  Location: [facility];  Service: Endoscopy;  Laterality: N/A;    mikel      " EXCISIONAL HEMORRHOIDECTOMY      HIP FRACTURE SURGERY      left hip ORIF    MOUTH SURGERY      revision of JJ anastomosis  02/27/2015    MITCH-EN-Y PROCEDURE      small bowel resection  02/27/2015    due to SBO    TOTAL REDUCTION MAMMOPLASTY      TUBAL LIGATION      ULNAR TUNNEL RELEASE Right 09/09/2021    Procedure: RELEASE, CUBITAL TUNNEL;  Surgeon: Tyler Victor MD;  Location: [facility];  Service: Orthopedics;  Laterality: Right;  right carpal tunnel release and right cubital tunnel release with possible anterior transposition ulnar nerve      Medication List with Changes/Refills   Current Medications    ACETAMINOPHEN-CODEINE 300-30MG (TYLENOL #3) 300-30 MG TAB    Take 1 tablet by mouth every 6 hours as needed    BD ALCOHOL SWABS PADM    Apply topically 3 (three) times daily.    BLOOD SUGAR DIAGNOSTIC STRP    To check BG 3 times daily, to use with insurance preferred meter    DxE11.9    BLOOD-GLUCOSE METER MISC    Use to check blood glucose 3 times daily    BUSPIRONE (BUSPAR) 30 MG TAB    Take 1 tablet (30 mg total) by mouth 2 (two) times daily.    CHOLECALCIFEROL, VITAMIN D3, 1,250 MCG (50,000 UNIT) CAPSULE    TAKE 2 CAPSULES (100,000 UNITS) EVERY 7 DAYS    CYANOCOBALAMIN 1,000 MCG/ML INJECTION    INJECT 1 ML UNDER THE SKIN EVERY 28 DAYS    CYCLOBENZAPRINE (FLEXERIL) 10 MG TABLET    TAKE 1 TABLET THREE TIMES DAILY AS NEEDED    DOXEPIN (SINEQUAN) 50 MG CAPSULE    Take 1 capsule by mouth at bedtime as needed for insomnia. If 1 capsule is not effective, increase the dose to 2 capsules daily    EMPAGLIFLOZIN (JARDIANCE) 25 MG TABLET    Take 1 tablet (25 mg total) by mouth once daily.    GABAPENTIN (NEURONTIN) 300 MG CAPSULE    TAKE 2 CAPSULES THREE TIMES DAILY    LANCETS 30 GAUGE MISC    To check blood glucose 3 times daily    LEMBOREXANT (DAYVIGO) 10 MG TAB    Take 1 tablet by mouth nightly as needed for insomnia    MELOXICAM (MOBIC) 7.5 MG TABLET    TAKE 1 TABLET TWICE A DAY AS NEEDED FOR PAIN. TAKE WITH  "LARGEST MEAL OF THE DAY FOR ARTHRITIS AND JOINT PAIN    METFORMIN (GLUCOPHAGE-XR) 500 MG ER 24HR TABLET    TAKE 2 TABLETS TWICE DAILY WITH MEALS    METRONIDAZOLE (FLAGYL) 500 MG TABLET    Take 500 mg by mouth 3 (three) times daily.    NALOXONE (NARCAN) 4 MG/ACTUATION SPRY    Use 1 spray into one nostril as directed as needed. Another spray may be given into the other nostril in 2 to 3 minutes until the patient responds. Use only in emergency if person is unconscious. Call 911    NARATRIPTAN (AMERGE) 2.5 MG TABLET    TAKE 1 TABLET (2.5 MG) AT ONSET OF HEADACHE, MAY REPEAT IN 4 HOURS IF NEEDED    NOVOLOG FLEXPEN U-100 INSULIN 100 UNIT/ML (3 ML) INPN PEN    Inject 3 times per day per sliding scale - can use every 4 hours as needed to correct a high blood sugar.    ONDANSETRON (ZOFRAN) 4 MG TABLET    TAKE 1 TABLET EVERY 6 HOURS    PEN NEEDLE, DIABETIC (DROPLET PEN NEEDLE) 32 GAUGE X 5/32" NDLE    Use with insulin 4 times daily as directed    PRAVASTATIN (PRAVACHOL) 20 MG TABLET    Take 1 tablet (20 mg total) by mouth once daily.    PROMETHAZINE (PHENERGAN) 25 MG TABLET    TAKE 1 TABLET TWICE DAILY AS NEEDED FOR NAUSEA    SYRINGE WITH NEEDLE (SYRINGE 3CC/25GX1") 3 ML 25 GAUGE X 1" SYRG    For vitamin B12 injection    TAMSULOSIN (FLOMAX) 0.4 MG CAP    Take 1 capsule (0.4 mg total) by mouth once daily.      Review of patient's allergies indicates:   Allergen Reactions    Demerol [meperidine] Hallucinations    Bupropion hcl Rash    Lamictal [lamotrigine] Rash     Rash to face in chart 2014 or 15     Penicillins Other (See Comments)     Patient cannot recall specific reaction, reports she has been listing this as an allergy since childhood.    Bactrim [sulfamethoxazole-trimethoprim]     Ciprofloxacin (bulk)     Codeine Nausea And Vomiting    Levetiracetam     Toradol [ketorolac]     Tramadol      seizures    Morpholine analogues Diarrhea, Itching and Nausea And Vomiting      Family History   Problem Relation Name Age of " Onset    Diabetes Mother      Cataracts Mother      Seizures Mother      No Known Problems Father      Diabetes Maternal Grandmother      No Known Problems Maternal Grandfather      No Known Problems Paternal Grandmother      No Known Problems Paternal Grandfather      Glaucoma Maternal Aunt      Blindness Maternal Aunt      Breast cancer Neg Hx      Colon cancer Neg Hx      Thrombophilia Neg Hx        Social History     Tobacco Use    Smoking status: Never    Smokeless tobacco: Never   Substance Use Topics    Alcohol use: Never    Drug use: Never        Physical Examination:     General Appearance:___________________________  HEENT: _____________________________________  Abdomen:____________________________________  Heart:________________________________________  Lungs:_______________________________________  Extremities:___________________________________  Skin:_________________________________________  Endocrine:____________________________________  Genitourinary:_________________________________  Neurological:__________________________________      Patient has been evaluated immediately prior to sedation and is medically cleared for endoscopy with IVCS as an ASA class: ______      Physician Signature: _________________________       Date: ________  Time: ________

## 2024-10-05 DIAGNOSIS — G47.00 INSOMNIA, UNSPECIFIED TYPE: ICD-10-CM

## 2024-10-07 RX ORDER — DOXEPIN HYDROCHLORIDE 50 MG/1
CAPSULE ORAL
Qty: 60 CAPSULE | Refills: 11 | Status: SHIPPED | OUTPATIENT
Start: 2024-10-07

## 2024-10-08 ENCOUNTER — OFFICE VISIT (OUTPATIENT)
Dept: PRIMARY CARE CLINIC | Facility: CLINIC | Age: 54
End: 2024-10-08
Payer: MEDICARE

## 2024-10-08 VITALS
HEART RATE: 96 BPM | DIASTOLIC BLOOD PRESSURE: 72 MMHG | BODY MASS INDEX: 21.15 KG/M2 | HEIGHT: 64 IN | SYSTOLIC BLOOD PRESSURE: 122 MMHG | WEIGHT: 123.88 LBS | OXYGEN SATURATION: 96 %

## 2024-10-08 DIAGNOSIS — E11.9 TYPE 2 DIABETES MELLITUS WITHOUT COMPLICATION, WITH LONG-TERM CURRENT USE OF INSULIN: Chronic | ICD-10-CM

## 2024-10-08 DIAGNOSIS — Z09 HOSPITAL DISCHARGE FOLLOW-UP: Primary | ICD-10-CM

## 2024-10-08 DIAGNOSIS — S72.90XS CLOSED FRACTURE OF FEMUR, UNSPECIFIED FRACTURE MORPHOLOGY, UNSPECIFIED LATERALITY, UNSPECIFIED PORTION OF FEMUR, SEQUELA: ICD-10-CM

## 2024-10-08 DIAGNOSIS — Z79.4 TYPE 2 DIABETES MELLITUS WITHOUT COMPLICATION, WITH LONG-TERM CURRENT USE OF INSULIN: Chronic | ICD-10-CM

## 2024-10-08 DIAGNOSIS — M85.89 OSTEOPENIA OF MULTIPLE SITES: ICD-10-CM

## 2024-10-08 PROCEDURE — 3078F DIAST BP <80 MM HG: CPT | Mod: CPTII,,, | Performed by: INTERNAL MEDICINE

## 2024-10-08 PROCEDURE — 3074F SYST BP LT 130 MM HG: CPT | Mod: CPTII,,, | Performed by: INTERNAL MEDICINE

## 2024-10-08 PROCEDURE — 3008F BODY MASS INDEX DOCD: CPT | Mod: CPTII,,, | Performed by: INTERNAL MEDICINE

## 2024-10-08 PROCEDURE — 3061F NEG MICROALBUMINURIA REV: CPT | Mod: CPTII,,, | Performed by: INTERNAL MEDICINE

## 2024-10-08 PROCEDURE — 3044F HG A1C LEVEL LT 7.0%: CPT | Mod: CPTII,,, | Performed by: INTERNAL MEDICINE

## 2024-10-08 PROCEDURE — 3066F NEPHROPATHY DOC TX: CPT | Mod: CPTII,,, | Performed by: INTERNAL MEDICINE

## 2024-10-08 PROCEDURE — 1159F MED LIST DOCD IN RCRD: CPT | Mod: CPTII,,, | Performed by: INTERNAL MEDICINE

## 2024-10-08 PROCEDURE — 99214 OFFICE O/P EST MOD 30 MIN: CPT | Mod: S$PBB,,, | Performed by: INTERNAL MEDICINE

## 2024-10-08 NOTE — PROGRESS NOTES
Subjective:      Patient ID: Jadyn Chance is a 54 y.o. female.    Chief Complaint: Hospital Follow Up ([facility] due to a fall on 08/30/24 and then to [place]. )    HPI    Past Medical History:   Diagnosis Date    Abnormal Pap smear 1991    bx was negative, per pt    Acute renal failure (ARF) 02/16/2015    Anxiety     B12 deficiency     Blood transfusion     multiple     BMI 24.9     Chronic LBP     Closed patellar sleeve fracture of left knee     Degenerative disc disease     l spine    Dehydration 02/16/2015    Diabetes mellitus     Diabetic neuropathy     General anesthetics causing adverse effect in therapeutic use     delayed emergence    Hyperglycemia 04/25/2018    Iron deficiency anemia, unspecified     Mixed hyperlipidemia 11/18/2013    RLS (restless legs syndrome)     Seizures     Sleep apnea     with CPAP    Vitamin D deficiency disease        Hospital Course  54-year-old female past medical history of diabetes, depression, admitted from orthopedic clinic after she failed conservative management of her left knee fracture to be operated tomorrow.  N.p.o. from midnight till then 1800 ADA diet  Acetaminophen 500 mg 1 tablet every 6 hourly as needed for mild pain and fever, Norco 7.5 mg 1 tablet every 4-6 hourly as needed for moderate pain, Dilaudid 0.5 mg IV every 4-6 hourly as needed for severe pain  She underwent ORIF left supracondylar femur fracture with intercondylar extension on 9/13/2024, has uncomplicated postoperative stay pain medications were adjusted and now has been approved for [facility] and will be discharged on 9/18/2024.    Follow-up with Dr. Charanjit Tian as scheduled    Patient was supposed to schedule with Neurology due to h/o falls. States she stopped her gabapentin, she felt she was on a high dose and stopped falling. All meds had been refilled according to patients preference     She is going to get HH but doesn't know the name     Review of Systems  "  Constitutional:  Negative for chills and fever.   HENT:  Negative for hearing loss.    Eyes:  Negative for blurred vision.   Respiratory:  Negative for cough, shortness of breath and wheezing.    Cardiovascular:  Negative for chest pain, palpitations and leg swelling.   Gastrointestinal:  Negative for abdominal pain, blood in stool, constipation, diarrhea, melena, nausea and vomiting.   Genitourinary:  Negative for dysuria, frequency and urgency.   Musculoskeletal:  Negative for falls.   Skin:  Negative for rash.   Neurological:  Negative for dizziness and headaches.   Endo/Heme/Allergies:  Does not bruise/bleed easily.   Psychiatric/Behavioral:  Negative for depression. The patient is not nervous/anxious.      Objective:     Physical Exam  Vitals reviewed.   Constitutional:       Appearance: Normal appearance.   HENT:      Head: Normocephalic.      Mouth/Throat:      Mouth: Mucous membranes are moist.      Pharynx: Oropharynx is clear.   Eyes:      Extraocular Movements: Extraocular movements intact.      Conjunctiva/sclera: Conjunctivae normal.      Pupils: Pupils are equal, round, and reactive to light.   Cardiovascular:      Rate and Rhythm: Normal rate and regular rhythm.   Pulmonary:      Effort: Pulmonary effort is normal.      Breath sounds: Normal breath sounds.   Abdominal:      General: Bowel sounds are normal.   Musculoskeletal:      Right lower leg: No edema.      Left lower leg: No edema.   Skin:     General: Skin is warm.      Capillary Refill: Capillary refill takes less than 2 seconds.   Neurological:      General: No focal deficit present.      Mental Status: She is alert.   Psychiatric:         Mood and Affect: Mood normal.       /72 (BP Location: Left arm, Patient Position: Sitting)   Pulse 96   Ht 5' 4" (1.626 m)   Wt 56.2 kg (123 lb 14.4 oz)   LMP 11/01/2014 (Approximate)   SpO2 96%   BMI 21.27 kg/m²     Assessment:       ICD-10-CM ICD-9-CM   1. Hospital discharge follow-up  Z09 " V67.59   2. Type 2 diabetes mellitus without complication, with long-term current use of insulin  E11.9 250.00    Z79.4 V58.67   3. Closed fracture of femur, unspecified fracture morphology, unspecified laterality, unspecified portion of femur, sequela  S72.90XS 905.4   4. Osteopenia of multiple sites  M85.89 733.90       Plan:     Medication List with Changes/Refills   Current Medications    ACETAMINOPHEN-CODEINE 300-30MG (TYLENOL #3) 300-30 MG TAB    Take 1 tablet by mouth every 6 hours as needed    BD ALCOHOL SWABS PADM    Apply topically 3 (three) times daily.    BLOOD SUGAR DIAGNOSTIC STRP    To check BG 3 times daily, to use with insurance preferred meter    DxE11.9    BLOOD-GLUCOSE METER MISC    Use to check blood glucose 3 times daily    BUSPIRONE (BUSPAR) 30 MG TAB    Take 1 tablet (30 mg total) by mouth 2 (two) times daily.    CHOLECALCIFEROL, VITAMIN D3, 1,250 MCG (50,000 UNIT) CAPSULE    TAKE 2 CAPSULES (100,000 UNITS) EVERY 7 DAYS    CYANOCOBALAMIN 1,000 MCG/ML INJECTION    INJECT 1 ML UNDER THE SKIN EVERY 28 DAYS    CYCLOBENZAPRINE (FLEXERIL) 10 MG TABLET    TAKE 1 TABLET THREE TIMES DAILY AS NEEDED    DOXEPIN (SINEQUAN) 50 MG CAPSULE    Take 1 capsule by mouth at bedtime as needed for insomnia. If 1 capsule is not effective, increase the dose to 2 capsules daily    EMPAGLIFLOZIN (JARDIANCE) 25 MG TABLET    Take 1 tablet (25 mg total) by mouth once daily.    GABAPENTIN (NEURONTIN) 300 MG CAPSULE    TAKE 2 CAPSULES THREE TIMES DAILY    LANCETS 30 GAUGE MISC    To check blood glucose 3 times daily    LEMBOREXANT (DAYVIGO) 10 MG TAB    Take 1 tablet by mouth nightly as needed for insomnia    MELOXICAM (MOBIC) 7.5 MG TABLET    TAKE 1 TABLET TWICE A DAY AS NEEDED FOR PAIN. TAKE WITH LARGEST MEAL OF THE DAY FOR ARTHRITIS AND JOINT PAIN    METFORMIN (GLUCOPHAGE-XR) 500 MG ER 24HR TABLET    TAKE 2 TABLETS TWICE DAILY WITH MEALS    NALOXONE (NARCAN) 4 MG/ACTUATION SPRY    Use 1 spray into one nostril as  "directed as needed. Another spray may be given into the other nostril in 2 to 3 minutes until the patient responds. Use only in emergency if person is unconscious. Call 911    NARATRIPTAN (AMERGE) 2.5 MG TABLET    TAKE 1 TABLET (2.5 MG) AT ONSET OF HEADACHE, MAY REPEAT IN 4 HOURS IF NEEDED    NOVOLOG FLEXPEN U-100 INSULIN 100 UNIT/ML (3 ML) INPN PEN    Inject 3 times per day per sliding scale - can use every 4 hours as needed to correct a high blood sugar.    ONDANSETRON (ZOFRAN) 4 MG TABLET    TAKE 1 TABLET EVERY 6 HOURS    PEN NEEDLE, DIABETIC (DROPLET PEN NEEDLE) 32 GAUGE X 5/32" NDLE    Use with insulin 4 times daily as directed    PRAVASTATIN (PRAVACHOL) 20 MG TABLET    Take 1 tablet (20 mg total) by mouth once daily.    PROMETHAZINE (PHENERGAN) 25 MG TABLET    TAKE 1 TABLET TWICE DAILY AS NEEDED FOR NAUSEA    SYRINGE WITH NEEDLE (SYRINGE 3CC/25GX1") 3 ML 25 GAUGE X 1" SYRG    For vitamin B12 injection    TAMSULOSIN (FLOMAX) 0.4 MG CAP    Take 1 capsule (0.4 mg total) by mouth once daily.   Discontinued Medications    METRONIDAZOLE (FLAGYL) 500 MG TABLET    Take 500 mg by mouth 3 (three) times daily.        1. Hospital discharge follow-up    2. Type 2 diabetes mellitus without complication, with long-term current use of insulin  Overview:  Controlled.  Continue current medications.      3. Closed fracture of femur, unspecified fracture morphology, unspecified laterality, unspecified portion of femur, sequela    4. Osteopenia of multiple sites       Follow up with Orthopedics on the 15th       Future Appointments   Date Time Provider Department Center   10/29/2024  8:00 AM Miriam Morales MD [facility] [place]   3/5/2025  7:00 AM [facility]  [place]   3/5/2025  1:00 PM Kaya Hastings MD [facility]                    "

## 2024-10-28 LAB
LEFT EYE DM RETINOPATHY: NEGATIVE
RIGHT EYE DM RETINOPATHY: NEGATIVE

## 2024-10-29 ENCOUNTER — OFFICE VISIT (OUTPATIENT)
Dept: PRIMARY CARE CLINIC | Facility: CLINIC | Age: 54
End: 2024-10-29
Payer: MEDICARE

## 2024-10-29 VITALS
DIASTOLIC BLOOD PRESSURE: 62 MMHG | WEIGHT: 129.38 LBS | RESPIRATION RATE: 18 BRPM | TEMPERATURE: 98 F | HEIGHT: 64 IN | OXYGEN SATURATION: 98 % | SYSTOLIC BLOOD PRESSURE: 113 MMHG | HEART RATE: 101 BPM | BODY MASS INDEX: 22.09 KG/M2

## 2024-10-29 DIAGNOSIS — E11.69 TYPE 2 DIABETES MELLITUS WITH OTHER SPECIFIED COMPLICATION, WITH LONG-TERM CURRENT USE OF INSULIN: Primary | ICD-10-CM

## 2024-10-29 DIAGNOSIS — F41.1 GAD (GENERALIZED ANXIETY DISORDER): ICD-10-CM

## 2024-10-29 DIAGNOSIS — R11.0 NAUSEA: ICD-10-CM

## 2024-10-29 DIAGNOSIS — Z79.4 TYPE 2 DIABETES MELLITUS WITH OTHER SPECIFIED COMPLICATION, WITH LONG-TERM CURRENT USE OF INSULIN: Primary | ICD-10-CM

## 2024-10-29 DIAGNOSIS — Z98.84 HISTORY OF ROUX-EN-Y GASTRIC BYPASS: ICD-10-CM

## 2024-10-29 DIAGNOSIS — E78.5 HYPERLIPIDEMIA, UNSPECIFIED HYPERLIPIDEMIA TYPE: ICD-10-CM

## 2024-10-29 LAB — HBA1C MFR BLD: 6.7 % (ref 4–6)

## 2024-10-29 PROCEDURE — 3066F NEPHROPATHY DOC TX: CPT | Mod: CPTII,,, | Performed by: INTERNAL MEDICINE

## 2024-10-29 PROCEDURE — 3044F HG A1C LEVEL LT 7.0%: CPT | Mod: CPTII,,, | Performed by: INTERNAL MEDICINE

## 2024-10-29 PROCEDURE — 3078F DIAST BP <80 MM HG: CPT | Mod: CPTII,,, | Performed by: INTERNAL MEDICINE

## 2024-10-29 PROCEDURE — 2023F DILAT RTA XM W/O RTNOPTHY: CPT | Mod: CPTII,,, | Performed by: INTERNAL MEDICINE

## 2024-10-29 PROCEDURE — 99214 OFFICE O/P EST MOD 30 MIN: CPT | Mod: S$PBB,,, | Performed by: INTERNAL MEDICINE

## 2024-10-29 PROCEDURE — 1159F MED LIST DOCD IN RCRD: CPT | Mod: CPTII,,, | Performed by: INTERNAL MEDICINE

## 2024-10-29 PROCEDURE — 3061F NEG MICROALBUMINURIA REV: CPT | Mod: CPTII,,, | Performed by: INTERNAL MEDICINE

## 2024-10-29 PROCEDURE — 3008F BODY MASS INDEX DOCD: CPT | Mod: CPTII,,, | Performed by: INTERNAL MEDICINE

## 2024-10-29 PROCEDURE — 3074F SYST BP LT 130 MM HG: CPT | Mod: CPTII,,, | Performed by: INTERNAL MEDICINE

## 2024-10-29 RX ORDER — PROMETHAZINE HYDROCHLORIDE 25 MG/1
TABLET ORAL
Qty: 40 TABLET | Refills: 10 | Status: SHIPPED | OUTPATIENT
Start: 2024-10-29

## 2024-12-13 ENCOUNTER — TELEPHONE (OUTPATIENT)
Dept: GASTROENTEROLOGY | Facility: CLINIC | Age: 54
End: 2024-12-13
Payer: MEDICARE

## 2024-12-13 NOTE — TELEPHONE ENCOUNTER
Called and left v/m that NBP is not in the office on 3/5/25, and I need to r/s her Colon. I offered 3/20/25 - Thurs.  I told pt that I will moved her to that date 3/20/25 and call back to confirm if it does or doesn't work for her. almas

## 2024-12-31 DIAGNOSIS — G47.00 INSOMNIA, UNSPECIFIED TYPE: Primary | ICD-10-CM

## 2024-12-31 RX ORDER — ZOLPIDEM TARTRATE 10 MG/1
10 TABLET ORAL NIGHTLY PRN
Qty: 30 TABLET | Refills: 1 | Status: SHIPPED | OUTPATIENT
Start: 2024-12-31 | End: 2025-07-01

## 2024-12-31 NOTE — PROGRESS NOTES
Im reaching out to you, my Insurance will no longer cover the cost of the Dayvigo.      Can you please change my prescription to Ambien 10mg.      Thank you     Jadyn Chance   [phone]

## 2025-01-27 ENCOUNTER — PATIENT MESSAGE (OUTPATIENT)
Dept: PRIMARY CARE CLINIC | Facility: CLINIC | Age: 55
End: 2025-01-27
Payer: MEDICARE

## 2025-01-27 DIAGNOSIS — E11.9 TYPE 2 DIABETES MELLITUS WITHOUT COMPLICATION, WITH LONG-TERM CURRENT USE OF INSULIN: Chronic | ICD-10-CM

## 2025-01-27 DIAGNOSIS — Z79.4 TYPE 2 DIABETES MELLITUS WITHOUT COMPLICATION, WITH LONG-TERM CURRENT USE OF INSULIN: Chronic | ICD-10-CM

## 2025-01-27 DIAGNOSIS — B37.41 YEAST CYSTITIS: ICD-10-CM

## 2025-01-27 NOTE — TELEPHONE ENCOUNTER
Jadyn Chance Staff  Phone Number: [phone]     Dr. Morales    Im reaching out because Im out of my Jardiance 25mg. Please send this prescription to [facility] pharmacy. I apologize for any inconvenience. I have an appointment at the end of February.    Thank you    Jadyn  [phone]

## 2025-01-28 RX ORDER — FLUCONAZOLE 150 MG/1
150 TABLET ORAL
Qty: 3 TABLET | Refills: 0 | Status: SHIPPED | OUTPATIENT
Start: 2025-01-28

## 2025-01-28 RX ORDER — NARATRIPTAN 2.5 MG/1
TABLET ORAL
Qty: 27 TABLET | Refills: 3 | Status: SHIPPED | OUTPATIENT
Start: 2025-01-28

## 2025-02-19 DIAGNOSIS — G47.00 INSOMNIA, UNSPECIFIED TYPE: ICD-10-CM

## 2025-02-19 DIAGNOSIS — R11.0 NAUSEA: ICD-10-CM

## 2025-02-19 RX ORDER — PROMETHAZINE HYDROCHLORIDE 25 MG/1
TABLET ORAL
Qty: 40 TABLET | Refills: 10 | Status: SHIPPED | OUTPATIENT
Start: 2025-02-19

## 2025-02-19 RX ORDER — ZOLPIDEM TARTRATE 10 MG/1
10 TABLET ORAL NIGHTLY PRN
Qty: 30 TABLET | Refills: 1 | Status: SHIPPED | OUTPATIENT
Start: 2025-02-19 | End: 2025-08-20

## 2025-02-20 RX ORDER — ONDANSETRON 4 MG/1
4 TABLET, FILM COATED ORAL EVERY 6 HOURS
Qty: 360 TABLET | Refills: 3 | Status: SHIPPED | OUTPATIENT
Start: 2025-02-20

## 2025-02-26 DIAGNOSIS — E11.69 HYPERLIPIDEMIA ASSOCIATED WITH TYPE 2 DIABETES MELLITUS: ICD-10-CM

## 2025-02-26 DIAGNOSIS — E78.5 HYPERLIPIDEMIA ASSOCIATED WITH TYPE 2 DIABETES MELLITUS: ICD-10-CM

## 2025-02-26 DIAGNOSIS — Z79.4 TYPE 2 DIABETES MELLITUS WITHOUT COMPLICATION, WITH LONG-TERM CURRENT USE OF INSULIN: Chronic | ICD-10-CM

## 2025-02-26 DIAGNOSIS — E11.9 TYPE 2 DIABETES MELLITUS WITHOUT COMPLICATION, WITH LONG-TERM CURRENT USE OF INSULIN: Chronic | ICD-10-CM

## 2025-02-27 RX ORDER — METFORMIN HYDROCHLORIDE 500 MG/1
TABLET, EXTENDED RELEASE ORAL
Qty: 360 TABLET | Refills: 1 | Status: SHIPPED | OUTPATIENT
Start: 2025-02-27

## 2025-02-27 RX ORDER — PRAVASTATIN SODIUM 20 MG/1
20 TABLET ORAL DAILY
Qty: 90 TABLET | Refills: 3 | Status: SHIPPED | OUTPATIENT
Start: 2025-02-27 | End: 2026-02-27

## 2025-02-27 NOTE — TELEPHONE ENCOUNTER
Refill Routing Note   Medication(s) are not appropriate for processing by [facility] for the following reason(s):        Non-participating provider    [facility] action(s):  Route               Appointments  past 12m or future 3m with PCP    Date Provider   Last Visit   10/29/2024 Miriam Morales MD   Next Visit   Visit date not found Miriam Morales MD   ED visits in past 90 days: 0        Note composed:8:59 PM 02/26/2025

## 2025-03-13 ENCOUNTER — TELEPHONE (OUTPATIENT)
Dept: GASTROENTEROLOGY | Facility: CLINIC | Age: 55
End: 2025-03-13
Payer: MEDICARE

## 2025-03-13 ENCOUNTER — PATIENT MESSAGE (OUTPATIENT)
Dept: RHEUMATOLOGY | Facility: CLINIC | Age: 55
End: 2025-03-13
Payer: MEDICARE

## 2025-03-13 VITALS — HEIGHT: 64 IN | WEIGHT: 129 LBS | BODY MASS INDEX: 22.02 KG/M2

## 2025-03-13 DIAGNOSIS — Z12.11 COLON CANCER SCREENING: Primary | ICD-10-CM

## 2025-03-13 NOTE — TELEPHONE ENCOUNTER
Called and left a detailed message that I was calling as a courtesy regarding up coming Colon with NBP on 3/20/25, Thurs and wanted to verify that she has her paper prep instructions and meds. I also mentioned that [facility] will call the day before (WED) with the arrival time, GI Lab is located on the third floor, and to pre-register before next Wed. almas

## 2025-03-13 NOTE — TELEPHONE ENCOUNTER
" [facility]        Provider: Bernarda Mcgregor MD    Patient Name: Jadyn MANCERA (age):1970  54 y.o.           Gender: female   Phone: [phone]     Referring Physician: Miriam Morales     Vital Signs:   Height - 5' 4"  Weight - 129 lb  BMI -  22.14    Plan: Colonoscopy @ [facility]    Encounter Diagnosis   Name Primary?    Colon cancer screening Yes           History:      Past Medical History:   Diagnosis Date    Abnormal Pap smear     bx was negative, per pt    Acute renal failure (ARF) 2015    Anxiety     B12 deficiency     Blood transfusion     multiple     BMI 24.9     Chronic LBP     Closed patellar sleeve fracture of left knee     Degenerative disc disease     l spine    Dehydration 2015    Diabetes mellitus     Diabetic neuropathy     General anesthetics causing adverse effect in therapeutic use     delayed emergence    Hyperglycemia 2018    Iron deficiency anemia, unspecified     Mixed hyperlipidemia 2013    RLS (restless legs syndrome)     Seizures     Sleep apnea     with CPAP    Vitamin D deficiency disease       Past Surgical History:   Procedure Laterality Date    ANUS SURGERY      AUGMENTATION OF BREAST      saline    BELT ABDOMINOPLASTY      tummy [facility]    BREAST SURGERY  2008    breast augmentation    CARPAL TUNNEL RELEASE Right 2021    Procedure: RELEASE, CARPAL TUNNEL;  Surgeon: Tyler Victor MD;  Location: [facility] OR;  Service: Orthopedics;  Laterality: Right;  right carpal tunnel release and right cubital tunnel release with possible anterior transposition ulnar nerve     SECTION      x2    CHOLECYSTECTOMY      COLONOSCOPY N/A 2023    Procedure: COLONOSCOPY;  Surgeon: María De La Torre MD;  Location: [facility];  Service: Endoscopy;  Laterality: N/A;    mikel      EXCISIONAL HEMORRHOIDECTOMY      HIP FRACTURE SURGERY      left hip ORIF    " MOUTH SURGERY      revision of JJ anastomosis  02/27/2015    MITCH-EN-Y PROCEDURE      small bowel resection  02/27/2015    due to SBO    TOTAL REDUCTION MAMMOPLASTY      TUBAL LIGATION      ULNAR TUNNEL RELEASE Right 09/09/2021    Procedure: RELEASE, CUBITAL TUNNEL;  Surgeon: Tyler Victor MD;  Location: [facility];  Service: Orthopedics;  Laterality: Right;  right carpal tunnel release and right cubital tunnel release with possible anterior transposition ulnar nerve      Medication List with Changes/Refills   Current Medications    ACETAMINOPHEN-CODEINE 300-30MG (TYLENOL #3) 300-30 MG TAB    Take 1 tablet by mouth every 6 hours as needed    BD ALCOHOL SWABS PADM    Apply topically 3 (three) times daily.    BLOOD SUGAR DIAGNOSTIC STRP    To check BG 3 times daily, to use with insurance preferred meter    DxE11.9    BLOOD-GLUCOSE METER MISC    Use to check blood glucose 3 times daily    BUSPIRONE (BUSPAR) 30 MG TAB    Take 1 tablet (30 mg total) by mouth 2 (two) times daily.    CHOLECALCIFEROL, VITAMIN D3, 1,250 MCG (50,000 UNIT) CAPSULE    TAKE 2 CAPSULES (100,000 UNITS) EVERY 7 DAYS    CYANOCOBALAMIN 1,000 MCG/ML INJECTION    INJECT 1 ML UNDER THE SKIN EVERY 28 DAYS    CYCLOBENZAPRINE (FLEXERIL) 10 MG TABLET    TAKE 1 TABLET THREE TIMES DAILY AS NEEDED    CYCLOBENZAPRINE (FLEXERIL) 10 MG TABLET    as needed; Take 1 tablet by mouth every 8 hours    DOXEPIN (SINEQUAN) 50 MG CAPSULE    Take 1 capsule by mouth at bedtime as needed for insomnia. If 1 capsule is not effective, increase the dose to 2 capsules daily    EMPAGLIFLOZIN (JARDIANCE) 25 MG TABLET    Take 1 tablet (25 mg total) by mouth once daily.    FLUCONAZOLE (DIFLUCAN) 150 MG TAB    TAKE 1 TABLET BY MOUTH DAILY FOR 3 DAYS    GABAPENTIN (NEURONTIN) 300 MG CAPSULE    TAKE 2 CAPSULES THREE TIMES DAILY    LANCETS 30 GAUGE MISC    To check blood glucose 3 times daily    LEMBOREXANT (DAYVIGO) 10 MG TAB    Take 1 tablet by mouth nightly as needed for insomnia  "   MELOXICAM (MOBIC) 7.5 MG TABLET    TAKE 1 TABLET TWICE A DAY AS NEEDED FOR PAIN. TAKE WITH LARGEST MEAL OF THE DAY FOR ARTHRITIS AND JOINT PAIN    METFORMIN (GLUCOPHAGE-XR) 500 MG ER 24HR TABLET    TAKE 2 TABLETS TWICE DAILY WITH MEALS    NALOXONE (NARCAN) 4 MG/ACTUATION SPRY    Use 1 spray into one nostril as directed as needed. Another spray may be given into the other nostril in 2 to 3 minutes until the patient responds. Use only in emergency if person is unconscious. Call 911    NARATRIPTAN (AMERGE) 2.5 MG TABLET    TAKE 1 TABLET (2.5 MG) AT ONSET OF HEADACHE, MAY REPEAT IN 4 HOURS IF NEEDED    NOVOLOG FLEXPEN U-100 INSULIN 100 UNIT/ML (3 ML) INPN PEN    Inject 3 times per day per sliding scale - can use every 4 hours as needed to correct a high blood sugar.    ONDANSETRON (ZOFRAN) 4 MG TABLET    TAKE 1 TABLET EVERY 6 HOURS    OXYCODONE-ACETAMINOPHEN (PERCOCET)  MG PER TABLET    Take 1 tablet by mouth every 8 hours as needed    OXYCODONE-ACETAMINOPHEN (PERCOCET)  MG PER TABLET    as needed; Take 1 tablet by mouth every 8 hours    OXYCODONE-ACETAMINOPHEN (PERCOCET)  MG PER TABLET    Take 1 tablet by mouth every 8 hours as needed    OXYCODONE-ACETAMINOPHEN (PERCOCET)  MG PER TABLET    as needed; Take 1 tablet by mouth every 8 hours    PEN NEEDLE, DIABETIC (DROPLET PEN NEEDLE) 32 GAUGE X 5/32" NDLE    Use with insulin 4 times daily as directed    PRAVASTATIN (PRAVACHOL) 20 MG TABLET    Take 1 tablet (20 mg total) by mouth once daily.    PROMETHAZINE (PHENERGAN) 25 MG TABLET    TAKE 1 TABLET TWICE DAILY AS NEEDED FOR NAUSEA    SYRINGE WITH NEEDLE (SYRINGE 3CC/25GX1") 3 ML 25 GAUGE X 1" SYRG    For vitamin B12 injection    TAMSULOSIN (FLOMAX) 0.4 MG CAP    Take 1 capsule (0.4 mg total) by mouth once daily.    ZOLPIDEM (AMBIEN) 10 MG TAB    Take 1 tablet (10 mg total) by mouth nightly as needed.      Review of patient's allergies indicates:   Allergen Reactions    Demerol [meperidine] " Hallucinations    Bupropion hcl Rash    Lamictal [lamotrigine] Rash     Rash to face in chart 2014 or 15     Penicillins Other (See Comments)     Patient cannot recall specific reaction, reports she has been listing this as an allergy since childhood.    Bactrim [sulfamethoxazole-trimethoprim]     Ciprofloxacin (bulk)     Codeine Nausea And Vomiting    Levetiracetam     Toradol [ketorolac]     Tramadol      seizures    Morpholine analogues Diarrhea, Itching and Nausea And Vomiting      Family History   Problem Relation Name Age of Onset    Diabetes Mother      Cataracts Mother      Seizures Mother      No Known Problems Father      Diabetes Maternal Grandmother      No Known Problems Maternal Grandfather      No Known Problems Paternal Grandmother      No Known Problems Paternal Grandfather      Glaucoma Maternal Aunt      Blindness Maternal Aunt      Breast cancer Neg Hx      Colon cancer Neg Hx      Thrombophilia Neg Hx        Social History[1]                          [1]   Social History  Tobacco Use    Smoking status: Never    Smokeless tobacco: Never   Substance Use Topics    Alcohol use: Never    Drug use: Never

## 2025-03-18 ENCOUNTER — OFFICE VISIT (OUTPATIENT)
Dept: PRIMARY CARE CLINIC | Facility: CLINIC | Age: 55
End: 2025-03-18
Payer: MEDICARE

## 2025-03-18 VITALS
OXYGEN SATURATION: 97 % | SYSTOLIC BLOOD PRESSURE: 113 MMHG | DIASTOLIC BLOOD PRESSURE: 76 MMHG | HEART RATE: 78 BPM | WEIGHT: 131 LBS | BODY MASS INDEX: 22.36 KG/M2 | HEIGHT: 64 IN

## 2025-03-18 DIAGNOSIS — T14.90XA INJURY: ICD-10-CM

## 2025-03-18 DIAGNOSIS — Z12.39 ENCOUNTER FOR SCREENING FOR MALIGNANT NEOPLASM OF BREAST, UNSPECIFIED SCREENING MODALITY: ICD-10-CM

## 2025-03-18 DIAGNOSIS — F41.9 ANXIETY: Primary | ICD-10-CM

## 2025-03-18 DIAGNOSIS — Z12.31 ENCOUNTER FOR SCREENING MAMMOGRAM FOR MALIGNANT NEOPLASM OF BREAST: ICD-10-CM

## 2025-03-18 DIAGNOSIS — E11.69 TYPE 2 DIABETES MELLITUS WITH OTHER SPECIFIED COMPLICATION, WITH LONG-TERM CURRENT USE OF INSULIN: ICD-10-CM

## 2025-03-18 DIAGNOSIS — Z01.419 WELL WOMAN EXAM WITH ROUTINE GYNECOLOGICAL EXAM: ICD-10-CM

## 2025-03-18 DIAGNOSIS — Z09 HOSPITAL DISCHARGE FOLLOW-UP: ICD-10-CM

## 2025-03-18 DIAGNOSIS — Z79.4 TYPE 2 DIABETES MELLITUS WITH OTHER SPECIFIED COMPLICATION, WITH LONG-TERM CURRENT USE OF INSULIN: ICD-10-CM

## 2025-03-18 LAB — HBA1C MFR BLD: 7.3 % (ref 4–6)

## 2025-03-18 PROCEDURE — 3008F BODY MASS INDEX DOCD: CPT | Mod: CPTII,,, | Performed by: INTERNAL MEDICINE

## 2025-03-18 PROCEDURE — 3051F HG A1C>EQUAL 7.0%<8.0%: CPT | Mod: CPTII,,, | Performed by: INTERNAL MEDICINE

## 2025-03-18 PROCEDURE — 3078F DIAST BP <80 MM HG: CPT | Mod: CPTII,,, | Performed by: INTERNAL MEDICINE

## 2025-03-18 PROCEDURE — G2211 COMPLEX E/M VISIT ADD ON: HCPCS | Mod: S$PBB,,, | Performed by: INTERNAL MEDICINE

## 2025-03-18 PROCEDURE — 99214 OFFICE O/P EST MOD 30 MIN: CPT | Mod: S$PBB,,, | Performed by: INTERNAL MEDICINE

## 2025-03-18 PROCEDURE — 3074F SYST BP LT 130 MM HG: CPT | Mod: CPTII,,, | Performed by: INTERNAL MEDICINE

## 2025-03-18 RX ORDER — POTASSIUM CHLORIDE 1500 MG/1
TABLET, EXTENDED RELEASE ORAL
COMMUNITY
Start: 2025-02-27

## 2025-03-18 RX ORDER — INSULIN DEGLUDEC 100 U/ML
INJECTION, SOLUTION SUBCUTANEOUS
COMMUNITY

## 2025-03-18 RX ORDER — BUSPIRONE HYDROCHLORIDE 30 MG/1
TABLET ORAL 2 TIMES DAILY
COMMUNITY

## 2025-03-18 RX ORDER — DOXEPIN HYDROCHLORIDE 100 MG/1
100 CAPSULE ORAL
COMMUNITY

## 2025-03-18 RX ORDER — MUPIROCIN 20 MG/G
OINTMENT TOPICAL 3 TIMES DAILY
Qty: 60 G | Refills: 3 | Status: SHIPPED | OUTPATIENT
Start: 2025-03-18

## 2025-03-18 RX ORDER — PANTOPRAZOLE SODIUM 40 MG/1
TABLET, DELAYED RELEASE ORAL
COMMUNITY
Start: 2025-02-27

## 2025-03-18 RX ORDER — PRAVASTATIN SODIUM 20 MG/1
20 TABLET ORAL
COMMUNITY

## 2025-03-18 NOTE — PROGRESS NOTES
"Subjective:      Patient ID: Jadyn Chance is a 54 y.o. female.    Chief Complaint: Hospital Follow Up ([facility] for stomach pain; Monday-thurs. ), Fall (Had a fall on last Thurs into a barbed wire fence. She has sores on the right arm and hand, she thinks it is "infected." ), and Referral (Counseling; has not been on buspar due to not making appointment.  )    HPI    Past Medical History:   Diagnosis Date    Abnormal Pap smear 1991    bx was negative, per pt    Acute renal failure (ARF) 02/16/2015    Anxiety     B12 deficiency     Blood transfusion     multiple     BMI 24.9     Chronic LBP     Closed patellar sleeve fracture of left knee     Degenerative disc disease     l spine    Dehydration 02/16/2015    Diabetes mellitus     Diabetic neuropathy     General anesthetics causing adverse effect in therapeutic use     delayed emergence    Hyperglycemia 04/25/2018    Iron deficiency anemia, unspecified     Mixed hyperlipidemia 11/18/2013    RLS (restless legs syndrome)     Seizures     Sleep apnea     with CPAP    Vitamin D deficiency disease        Patient with above medical problems here after recent hospitalizations for possible SBO      Patient states she started having seizures and was referred to Dr Cárdenas and states she may have pseudoseizures. She states she hasn't seen him in some time. She states when she walks she freezes up and becomes unaware of her surroundings and falls     She has pain management in [place]/Dr Mcgregor.  She had seen Dr Pritchett before      15 units of Tresiba, 25 of Jardiance and 1,000 mg metformin BID. (Her medications of metformin and jardiance were held during the hospital so she is now on the Tresiba and metformin 500 mg BID)  She has novolog in her chart but hasn't had to take it     She states her blood glucose was relatively controlled. I had recommended previously she switch her insulin to during the day instead of ay night which she states has helped   She " gives herself monthly B12 injections and takes some by mouth as well     She gets Reclast injections in [place] for osteopenia         Review of Systems   Constitutional:  Negative for chills and fever.   HENT:  Negative for hearing loss.    Eyes:  Negative for blurred vision.   Respiratory:  Negative for cough, shortness of breath and wheezing.    Cardiovascular:  Negative for chest pain, palpitations and leg swelling.   Gastrointestinal:  Negative for abdominal pain, blood in stool, constipation, diarrhea, melena, nausea and vomiting.   Genitourinary:  Negative for dysuria, frequency and urgency.   Musculoskeletal:  Negative for falls.   Skin:  Negative for rash.   Neurological:  Negative for dizziness and headaches.   Endo/Heme/Allergies:  Does not bruise/bleed easily.   Psychiatric/Behavioral:  Positive for depression. Negative for suicidal ideas. The patient is nervous/anxious and has insomnia.      Objective:     Physical Exam  Vitals reviewed.   Constitutional:       Appearance: Normal appearance.   HENT:      Head: Normocephalic.      Mouth/Throat:      Mouth: Mucous membranes are moist.      Pharynx: Oropharynx is clear.   Eyes:      Extraocular Movements: Extraocular movements intact.      Conjunctiva/sclera: Conjunctivae normal.      Pupils: Pupils are equal, round, and reactive to light.   Cardiovascular:      Rate and Rhythm: Normal rate and regular rhythm.   Pulmonary:      Effort: Pulmonary effort is normal.      Breath sounds: Normal breath sounds.   Abdominal:      General: Bowel sounds are normal.   Musculoskeletal:      Right lower leg: No edema.      Left lower leg: No edema.   Skin:     General: Skin is warm.      Capillary Refill: Capillary refill takes less than 2 seconds.      Findings: Lesion present.   Neurological:      Mental Status: She is alert and oriented to person, place, and time.   Psychiatric:         Mood and Affect: Mood normal.       Assessment:       ICD-10-CM ICD-9-CM   1. Anxiety   F41.9 300.00   2. Well woman exam with routine gynecological exam  Z01.419 V72.31   3. Encounter for screening for malignant neoplasm of breast, unspecified screening modality  Z12.39 V76.10   4. Encounter for screening mammogram for malignant neoplasm of breast  Z12.31 V76.12   5. Injury  T14.90XA 959.9   6. Type 2 diabetes mellitus with other specified complication, with long-term current use of insulin  E11.69 250.80    Z79.4 V58.67   7. Hospital discharge follow-up  Z09 V67.59       Plan:     Medication List with Changes/Refills   New Medications    MUPIROCIN (BACTROBAN) 2 % OINTMENT    Apply topically 3 (three) times daily.   Current Medications    ACETAMINOPHEN-CODEINE 300-30MG (TYLENOL #3) 300-30 MG TAB    Take 1 tablet by mouth every 6 hours as needed    BD ALCOHOL SWABS PADM    Apply topically 3 (three) times daily.    BLOOD SUGAR DIAGNOSTIC STRP    To check BG 3 times daily, to use with insurance preferred meter    DxE11.9    BLOOD-GLUCOSE METER MISC    Use to check blood glucose 3 times daily    BUSPIRONE (BUSPAR) 30 MG TAB    Take 1 tablet (30 mg total) by mouth 2 (two) times daily.    BUSPIRONE (BUSPAR) 30 MG TAB    2 (two) times daily.    CHOLECALCIFEROL, VITAMIN D3, 1,250 MCG (50,000 UNIT) CAPSULE    TAKE 2 CAPSULES (100,000 UNITS) EVERY 7 DAYS    CYANOCOBALAMIN 1,000 MCG/ML INJECTION    INJECT 1 ML UNDER THE SKIN EVERY 28 DAYS    CYCLOBENZAPRINE (FLEXERIL) 10 MG TABLET    TAKE 1 TABLET THREE TIMES DAILY AS NEEDED    CYCLOBENZAPRINE (FLEXERIL) 10 MG TABLET    as needed; Take 1 tablet by mouth every 8 hours    DOXEPIN (SINEQUAN) 100 MG CAPSULE    100 mg.    DOXEPIN (SINEQUAN) 50 MG CAPSULE    Take 1 capsule by mouth at bedtime as needed for insomnia. If 1 capsule is not effective, increase the dose to 2 capsules daily    EMPAGLIFLOZIN (JARDIANCE) 25 MG TABLET    Take 1 tablet (25 mg total) by mouth once daily.    FLUCONAZOLE (DIFLUCAN) 150 MG TAB    TAKE 1 TABLET BY MOUTH DAILY FOR 3 DAYS     "GABAPENTIN (NEURONTIN) 300 MG CAPSULE    TAKE 2 CAPSULES THREE TIMES DAILY    INSULIN DEGLUDEC 100 UNIT/ML INJECTION    Every Evening    KLOR-CON M20 20 MEQ TABLET        LANCETS 30 GAUGE MISC    To check blood glucose 3 times daily    LEMBOREXANT (DAYVIGO) 10 MG TAB    Take 1 tablet by mouth nightly as needed for insomnia    MELOXICAM (MOBIC) 7.5 MG TABLET    TAKE 1 TABLET TWICE A DAY AS NEEDED FOR PAIN. TAKE WITH LARGEST MEAL OF THE DAY FOR ARTHRITIS AND JOINT PAIN    METFORMIN (GLUCOPHAGE-XR) 500 MG ER 24HR TABLET    TAKE 2 TABLETS TWICE DAILY WITH MEALS    NALOXONE (NARCAN) 4 MG/ACTUATION SPRY    Use 1 spray into one nostril as directed as needed. Another spray may be given into the other nostril in 2 to 3 minutes until the patient responds. Use only in emergency if person is unconscious. Call 911    NARATRIPTAN (AMERGE) 2.5 MG TABLET    TAKE 1 TABLET (2.5 MG) AT ONSET OF HEADACHE, MAY REPEAT IN 4 HOURS IF NEEDED    NOVOLOG FLEXPEN U-100 INSULIN 100 UNIT/ML (3 ML) INPN PEN    Inject 3 times per day per sliding scale - can use every 4 hours as needed to correct a high blood sugar.    ONDANSETRON (ZOFRAN) 4 MG TABLET    TAKE 1 TABLET EVERY 6 HOURS    OXYCODONE-ACETAMINOPHEN (PERCOCET)  MG PER TABLET    Take 1 tablet by mouth every 8 hours as needed    OXYCODONE-ACETAMINOPHEN (PERCOCET)  MG PER TABLET    as needed; Take 1 tablet by mouth every 8 hours    OXYCODONE-ACETAMINOPHEN (PERCOCET)  MG PER TABLET    Take 1 tablet by mouth every 8 hours as needed    OXYCODONE-ACETAMINOPHEN (PERCOCET)  MG PER TABLET    as needed; Take 1 tablet by mouth every 8 hours    PANTOPRAZOLE (PROTONIX) 40 MG TABLET        PEN NEEDLE, DIABETIC (DROPLET PEN NEEDLE) 32 GAUGE X 5/32" NDLE    Use with insulin 4 times daily as directed    PRAVASTATIN (PRAVACHOL) 20 MG TABLET    Take 1 tablet (20 mg total) by mouth once daily.    PRAVASTATIN (PRAVACHOL) 20 MG TABLET    20 mg.    PROMETHAZINE (PHENERGAN) 25 MG TABLET    " "TAKE 1 TABLET TWICE DAILY AS NEEDED FOR NAUSEA    SYRINGE WITH NEEDLE (SYRINGE 3CC/25GX1") 3 ML 25 GAUGE X 1" SYRG    For vitamin B12 injection    TAMSULOSIN (FLOMAX) 0.4 MG CAP    Take 1 capsule (0.4 mg total) by mouth once daily.    ZOLPIDEM (AMBIEN) 10 MG TAB    Take 1 tablet (10 mg total) by mouth nightly as needed.        1. Anxiety  -     Ambulatory referral/consult to Psychiatry; Future; Expected date: 03/31/2025    2. Well woman exam with routine gynecological exam  -     Ambulatory referral/consult to Obstetrics / Gynecology; Future; Expected date: 03/25/2025    3. Encounter for screening for malignant neoplasm of breast, unspecified screening modality    4. Encounter for screening mammogram for malignant neoplasm of breast    5. Injury  -     mupirocin (BACTROBAN) 2 % ointment; Apply topically 3 (three) times daily.  Dispense: 60 g; Refill: 3    6. Type 2 diabetes mellitus with other specified complication, with long-term current use of insulin    7. Hospital discharge follow-up     Continue current medications for diabetes    She is due for mammogram in May, will order at her next visit    She was instructed to follow up with Dr Sena, she may be due for her colonoscopy           Future Appointments   Date Time Provider Department Center   4/7/2025  8:30 AM Monalisa Poe NP [id number]   6/18/2025 11:30 AM Miriam Morales MD [facility] [id number]                      "

## 2025-03-20 ENCOUNTER — TELEPHONE (OUTPATIENT)
Dept: OBSTETRICS AND GYNECOLOGY | Facility: CLINIC | Age: 55
End: 2025-03-20
Payer: MEDICARE

## 2025-03-21 ENCOUNTER — TELEPHONE (OUTPATIENT)
Dept: OBSTETRICS AND GYNECOLOGY | Facility: CLINIC | Age: 55
End: 2025-03-21
Payer: MEDICARE

## 2025-03-24 PROBLEM — D59.9 ACQUIRED HEMOLYTIC ANEMIA: Status: RESOLVED | Noted: 2024-07-25 | Resolved: 2025-03-24

## 2025-03-27 ENCOUNTER — PATIENT MESSAGE (OUTPATIENT)
Dept: SLEEP MEDICINE | Facility: CLINIC | Age: 55
End: 2025-03-27
Payer: MEDICARE

## 2025-03-27 DIAGNOSIS — G47.00 INSOMNIA, UNSPECIFIED TYPE: Primary | ICD-10-CM

## 2025-03-27 RX ORDER — LEMBOREXANT 10 MG/1
TABLET, FILM COATED ORAL
Qty: 30 TABLET | Refills: 5 | Status: SHIPPED | OUTPATIENT
Start: 2025-03-27

## 2025-04-24 ENCOUNTER — E-VISIT (OUTPATIENT)
Dept: PRIMARY CARE CLINIC | Facility: CLINIC | Age: 55
End: 2025-04-24
Payer: MEDICARE

## 2025-04-24 DIAGNOSIS — R30.0 DYSURIA: Primary | ICD-10-CM

## 2025-04-24 PROCEDURE — 99421 OL DIG E/M SVC 5-10 MIN: CPT | Mod: ,,, | Performed by: INTERNAL MEDICINE

## 2025-04-29 RX ORDER — CEPHALEXIN 500 MG/1
500 CAPSULE ORAL EVERY 6 HOURS
Qty: 20 CAPSULE | Refills: 0 | Status: SHIPPED | OUTPATIENT
Start: 2025-04-29 | End: 2025-05-04

## 2025-04-29 NOTE — PROGRESS NOTES
Past Medical History:   Diagnosis Date    Abnormal Pap smear 1991    bx was negative, per pt    Acute renal failure (ARF) 02/16/2015    Anxiety     B12 deficiency     Blood transfusion     multiple     BMI 24.9     Chronic LBP     Closed patellar sleeve fracture of left knee     Degenerative disc disease     l spine    Dehydration 02/16/2015    Diabetes mellitus     Diabetic neuropathy     General anesthetics causing adverse effect in therapeutic use     delayed emergence    Hyperglycemia 04/25/2018    Iron deficiency anemia, unspecified     Mixed hyperlipidemia 11/18/2013    RLS (restless legs syndrome)     Seizures     Sleep apnea     with CPAP    Vitamin D deficiency disease          Patient with above medical problems scheduled an E visit for dysuria. She has some allergies to antibiotics but chart shows cefdinir has been prescribed in the past. UA with micro ordered to assess for sensitivities         Future Appointments   Date Time Provider Department Center   6/18/2025 11:30 AM Miriam Morales MD [id number] [place]

## 2025-04-30 ENCOUNTER — TELEPHONE (OUTPATIENT)
Dept: PRIMARY CARE CLINIC | Facility: CLINIC | Age: 55
End: 2025-04-30
Payer: MEDICARE

## 2025-04-30 ENCOUNTER — PATIENT MESSAGE (OUTPATIENT)
Dept: PRIMARY CARE CLINIC | Facility: CLINIC | Age: 55
End: 2025-04-30
Payer: MEDICARE

## 2025-04-30 DIAGNOSIS — T14.90XA INJURY: ICD-10-CM

## 2025-04-30 DIAGNOSIS — E11.9 TYPE 2 DIABETES MELLITUS WITHOUT COMPLICATION, WITH LONG-TERM CURRENT USE OF INSULIN: Chronic | ICD-10-CM

## 2025-04-30 DIAGNOSIS — Z79.4 UNCONTROLLED TYPE 2 DIABETES MELLITUS WITH HYPERGLYCEMIA, WITH LONG-TERM CURRENT USE OF INSULIN: ICD-10-CM

## 2025-04-30 DIAGNOSIS — E11.69 HYPERLIPIDEMIA ASSOCIATED WITH TYPE 2 DIABETES MELLITUS: ICD-10-CM

## 2025-04-30 DIAGNOSIS — E11.65 UNCONTROLLED TYPE 2 DIABETES MELLITUS WITH HYPERGLYCEMIA, WITH LONG-TERM CURRENT USE OF INSULIN: ICD-10-CM

## 2025-04-30 DIAGNOSIS — E78.5 HYPERLIPIDEMIA ASSOCIATED WITH TYPE 2 DIABETES MELLITUS: ICD-10-CM

## 2025-04-30 DIAGNOSIS — Z79.4 TYPE 2 DIABETES MELLITUS WITHOUT COMPLICATION, WITH LONG-TERM CURRENT USE OF INSULIN: Chronic | ICD-10-CM

## 2025-04-30 RX ORDER — TAMSULOSIN HYDROCHLORIDE 0.4 MG/1
0.4 CAPSULE ORAL DAILY
Qty: 30 CAPSULE | Refills: 11 | Status: SHIPPED | OUTPATIENT
Start: 2025-04-30 | End: 2026-04-30

## 2025-04-30 RX ORDER — PRAVASTATIN SODIUM 20 MG/1
20 TABLET ORAL DAILY
Qty: 90 TABLET | Refills: 3 | Status: SHIPPED | OUTPATIENT
Start: 2025-04-30 | End: 2026-04-30

## 2025-04-30 RX ORDER — MUPIROCIN 20 MG/G
OINTMENT TOPICAL 3 TIMES DAILY
Qty: 60 G | Refills: 3 | Status: SHIPPED | OUTPATIENT
Start: 2025-04-30

## 2025-04-30 RX ORDER — INSULIN ASPART 100 [IU]/ML
INJECTION, SOLUTION INTRAVENOUS; SUBCUTANEOUS
Qty: 15 ML | Refills: 5 | Status: SHIPPED | OUTPATIENT
Start: 2025-04-30

## 2025-04-30 RX ORDER — METFORMIN HYDROCHLORIDE 500 MG/1
500 TABLET, EXTENDED RELEASE ORAL 2 TIMES DAILY WITH MEALS
Qty: 90 TABLET | Refills: 3 | Status: SHIPPED | OUTPATIENT
Start: 2025-04-30

## 2025-04-30 RX ORDER — PANTOPRAZOLE SODIUM 40 MG/1
40 TABLET, DELAYED RELEASE ORAL DAILY
Qty: 90 TABLET | Refills: 3 | Status: SHIPPED | OUTPATIENT
Start: 2025-04-30

## 2025-04-30 NOTE — TELEPHONE ENCOUNTER
----- Message from Blessing sent at 4/30/2025 11:21 AM CDT -----  Contact: ADRIANNA ANGELES [[id number]]  ..TYPE: Patient Requesting Refill Who Called:  ADRIANNA ANGELES [[id number]]Refill or New Rx: refillRX Name and Strength: metFORMIN (GLUCOPHAGE-XR) 500 MG ER 24hr tablet, mupirocin (BACTROBAN) 2 % ointment, pantoprazole (PROTONIX) 40 MG tablet, pravastatin (PRAVACHOL) 20 MG tablet, tamsulosin (FLOMAX) 0.4 mg Cap, NOVOLOG FLEXPEN U-100 INSULIN 100 unit/mL (3 mL) InPn pen, and tresiba, not on file. Insulin pen.How is the patient currently taking it? (ex. 1XDay): Is this a 30 day or 90 day RX: as prescribed Preferred Pharmacy with phone number: [facility] - [facility] [address] [facility] [address]Phtoy: [phone] Fax: [phone] or Mail Order: mailOrdering Provider: Brian the patient rather a call back or a response via [facility]?  [facility] [employer] Call Back Number: [phone] (home) Additional Information:

## 2025-04-30 NOTE — TELEPHONE ENCOUNTER
Jadyn alanis [facility] Clinical Support Staff (supporting You)        4/30/25 12:20 PM  Casper Del Cid the Tresiba U100 Flextouch     Inject 15 units into the skin once daily.      Thank you

## 2025-05-01 DIAGNOSIS — E11.9 TYPE 2 DIABETES MELLITUS WITHOUT COMPLICATION, WITH LONG-TERM CURRENT USE OF INSULIN: Primary | ICD-10-CM

## 2025-05-01 DIAGNOSIS — Z79.4 TYPE 2 DIABETES MELLITUS WITHOUT COMPLICATION, WITH LONG-TERM CURRENT USE OF INSULIN: Primary | ICD-10-CM

## 2025-05-01 RX ORDER — INSULIN DEGLUDEC 100 U/ML
15 INJECTION, SOLUTION SUBCUTANEOUS DAILY
Qty: 60 ML | Refills: 1 | Status: SHIPPED | OUTPATIENT
Start: 2025-05-01

## 2025-05-01 RX ORDER — INSULIN DEGLUDEC 100 U/ML
15 INJECTION, SOLUTION SUBCUTANEOUS DAILY
Qty: 60 ML | Refills: 1 | Status: SHIPPED | OUTPATIENT
Start: 2025-05-01 | End: 2025-05-01

## 2025-05-08 DIAGNOSIS — E11.9 TYPE 2 DIABETES MELLITUS WITHOUT COMPLICATION, WITH LONG-TERM CURRENT USE OF INSULIN: Chronic | ICD-10-CM

## 2025-05-08 DIAGNOSIS — Z79.4 TYPE 2 DIABETES MELLITUS WITHOUT COMPLICATION, WITH LONG-TERM CURRENT USE OF INSULIN: Chronic | ICD-10-CM

## 2025-05-08 RX ORDER — INSULIN DEGLUDEC 100 U/ML
INJECTION, SOLUTION SUBCUTANEOUS
COMMUNITY
Start: 2025-05-02

## 2025-05-08 NOTE — TELEPHONE ENCOUNTER
----- Message from Altagracia sent at 5/8/2025  1:58 PM CDT -----  Contact: self  Type:  Call BackWho Called:benDoes the patient know what this is regarding?:metFORMIN (GLUCOPHAGE-XR) 500 MG ER 24hr tablet/ clarity on directionsWould the patient rather a call back or a response via [facility]? [facility] Call Back Number:[phone] Information: [id number] ref#

## 2025-05-10 RX ORDER — METFORMIN HYDROCHLORIDE 500 MG/1
500 TABLET, EXTENDED RELEASE ORAL 2 TIMES DAILY WITH MEALS
Qty: 90 TABLET | Refills: 3 | Status: SHIPPED | OUTPATIENT
Start: 2025-05-10

## 2025-05-13 ENCOUNTER — PATIENT MESSAGE (OUTPATIENT)
Dept: ADMINISTRATIVE | Facility: OTHER | Age: 55
End: 2025-05-13
Payer: MEDICARE

## 2025-05-16 ENCOUNTER — PATIENT MESSAGE (OUTPATIENT)
Dept: SLEEP MEDICINE | Facility: CLINIC | Age: 55
End: 2025-05-16
Payer: MEDICARE

## 2025-05-16 DIAGNOSIS — G47.00 INSOMNIA, UNSPECIFIED TYPE: ICD-10-CM

## 2025-05-16 RX ORDER — DOXEPIN HYDROCHLORIDE 50 MG/1
CAPSULE ORAL
Qty: 60 CAPSULE | Refills: 11 | Status: SHIPPED | OUTPATIENT
Start: 2025-05-16

## 2025-05-27 ENCOUNTER — HOSPITAL ENCOUNTER (OUTPATIENT)
Dept: RADIOLOGY | Facility: HOSPITAL | Age: 55
Discharge: HOME | End: 2025-05-27
Attending: INTERNAL MEDICINE
Payer: MEDICARE

## 2025-05-27 VITALS — BODY MASS INDEX: 22.35 KG/M2 | WEIGHT: 130.94 LBS | HEIGHT: 64 IN

## 2025-05-27 DIAGNOSIS — Z12.31 ENCOUNTER FOR SCREENING MAMMOGRAM FOR BREAST CANCER: ICD-10-CM

## 2025-05-27 PROCEDURE — 77067 SCR MAMMO BI INCL CAD: CPT | Mod: 26,,, | Performed by: RADIOLOGY

## 2025-05-27 PROCEDURE — 77063 BREAST TOMOSYNTHESIS BI: CPT | Mod: 26,,, | Performed by: RADIOLOGY

## 2025-05-27 PROCEDURE — 77067 SCR MAMMO BI INCL CAD: CPT | Mod: TC

## 2025-06-18 ENCOUNTER — OFFICE VISIT (OUTPATIENT)
Dept: PRIMARY CARE CLINIC | Facility: CLINIC | Age: 55
End: 2025-06-18
Payer: MEDICARE

## 2025-06-18 ENCOUNTER — CLINICAL SUPPORT (OUTPATIENT)
Dept: OBSTETRICS AND GYNECOLOGY | Facility: CLINIC | Age: 55
End: 2025-06-18
Payer: MEDICARE

## 2025-06-18 VITALS
HEART RATE: 96 BPM | OXYGEN SATURATION: 96 % | HEIGHT: 64 IN | DIASTOLIC BLOOD PRESSURE: 68 MMHG | SYSTOLIC BLOOD PRESSURE: 118 MMHG | WEIGHT: 131.63 LBS | BODY MASS INDEX: 22.47 KG/M2

## 2025-06-18 DIAGNOSIS — Z79.4 TYPE 2 DIABETES MELLITUS WITHOUT COMPLICATION, WITH LONG-TERM CURRENT USE OF INSULIN: Primary | Chronic | ICD-10-CM

## 2025-06-18 DIAGNOSIS — M85.89 OSTEOPENIA OF MULTIPLE SITES: ICD-10-CM

## 2025-06-18 DIAGNOSIS — F44.5 PSYCHOGENIC NONEPILEPTIC SEIZURE: ICD-10-CM

## 2025-06-18 DIAGNOSIS — Z98.84 HISTORY OF ROUX-EN-Y GASTRIC BYPASS: ICD-10-CM

## 2025-06-18 DIAGNOSIS — E11.9 TYPE 2 DIABETES MELLITUS WITHOUT COMPLICATION, WITH LONG-TERM CURRENT USE OF INSULIN: Primary | Chronic | ICD-10-CM

## 2025-06-18 PROBLEM — F11.20 UNCOMPLICATED OPIOID DEPENDENCE: Status: RESOLVED | Noted: 2024-07-25 | Resolved: 2025-06-18

## 2025-06-18 PROBLEM — R56.9 CONVULSIONS: Status: RESOLVED | Noted: 2021-01-11 | Resolved: 2025-06-18

## 2025-06-18 LAB — HBA1C MFR BLD: 7 % (ref 4–6)

## 2025-06-18 RX ORDER — GABAPENTIN 600 MG/1
600 TABLET ORAL 3 TIMES DAILY
Qty: 270 TABLET | Refills: 3 | Status: SHIPPED | OUTPATIENT
Start: 2025-06-18 | End: 2026-06-18

## 2025-06-18 RX ORDER — METFORMIN HYDROCHLORIDE 500 MG/1
500 TABLET, EXTENDED RELEASE ORAL 2 TIMES DAILY WITH MEALS
Qty: 90 TABLET | Refills: 3 | Status: SHIPPED | OUTPATIENT
Start: 2025-06-18

## 2025-06-18 RX ORDER — METFORMIN HYDROCHLORIDE 500 MG/1
500 TABLET, EXTENDED RELEASE ORAL 2 TIMES DAILY WITH MEALS
Qty: 180 TABLET | Refills: 3 | Status: SHIPPED | OUTPATIENT
Start: 2025-06-18

## 2025-06-18 RX ORDER — METFORMIN HYDROCHLORIDE 500 MG/1
500 TABLET, EXTENDED RELEASE ORAL 2 TIMES DAILY WITH MEALS
Qty: 90 TABLET | Refills: 3 | Status: SHIPPED | OUTPATIENT
Start: 2025-06-18 | End: 2025-06-18 | Stop reason: SDUPTHER

## 2025-06-18 NOTE — PROGRESS NOTES
Subjective:      Patient ID: Jadyn Chance is a 54 y.o. female.    Chief Complaint: Follow-up    HPI    Past Medical History:   Diagnosis Date    Abnormal Pap smear 1991    bx was negative, per pt    Acute renal failure (ARF) 02/16/2015    Anxiety     B12 deficiency     Blood transfusion     multiple     BMI 24.9     Chronic LBP     Closed patellar sleeve fracture of left knee     Degenerative disc disease     l spine    Dehydration 02/16/2015    Diabetes mellitus     Diabetic neuropathy     General anesthetics causing adverse effect in therapeutic use     delayed emergence    Hyperglycemia 04/25/2018    Iron deficiency anemia, unspecified     Mixed hyperlipidemia 11/18/2013    RLS (restless legs syndrome)     Seizures     Sleep apnea     with CPAP    Vitamin D deficiency disease        Patient with above medical problems here for follow up        Patient states she started having seizures and was referred to Dr Cárdenas and states she may have pseudoseizures. She states she hasn't seen him in some time. She states when she walks she freezes up and becomes unaware of her surroundings and falls     She has pain management in [place]/Dr Mcgregor.  She had seen Dr Pritchett before      15 units of Tresiba, 25 of Jardiance and 1,000 mg metformin BID. (Her medications of metformin and jardiance were held during the hospital so she is now on the Tresiba and metformin 500 mg BID)  She has novolog in her chart but hasn't had to take it     She states her blood glucose was relatively controlled. I had recommended previously she switch her insulin to during the day instead of ay night which she states has helped   She gives herself monthly B12 injections and takes some by mouth as well     She gets Reclast injections in [place] for osteopenia               Review of Systems   Constitutional:  Negative for chills and fever.   HENT:  Negative for hearing loss.    Eyes:  Negative for blurred vision.   Respiratory:  Negative for  cough, shortness of breath and wheezing.    Cardiovascular:  Negative for chest pain, palpitations and leg swelling.   Gastrointestinal:  Negative for abdominal pain, blood in stool, constipation, diarrhea, melena, nausea and vomiting.   Genitourinary:  Negative for dysuria, frequency and urgency.   Musculoskeletal:  Positive for myalgias. Negative for falls.        Chronic   Skin:  Negative for rash.   Neurological:  Negative for dizziness and headaches.        Neuropathy   Endo/Heme/Allergies:  Does not bruise/bleed easily.   Psychiatric/Behavioral:  Positive for depression. Negative for suicidal ideas. The patient is nervous/anxious.      Objective:     Physical Exam  Vitals reviewed.   Constitutional:       Appearance: Normal appearance.   HENT:      Head: Normocephalic.      Mouth/Throat:      Mouth: Mucous membranes are moist.      Pharynx: Oropharynx is clear.   Eyes:      Extraocular Movements: Extraocular movements intact.      Conjunctiva/sclera: Conjunctivae normal.      Pupils: Pupils are equal, round, and reactive to light.   Cardiovascular:      Rate and Rhythm: Normal rate and regular rhythm.   Pulmonary:      Effort: Pulmonary effort is normal.      Breath sounds: Normal breath sounds.   Abdominal:      General: Bowel sounds are normal.   Musculoskeletal:      Right lower leg: No edema.      Left lower leg: No edema.   Skin:     General: Skin is warm.      Capillary Refill: Capillary refill takes less than 2 seconds.   Neurological:      Mental Status: She is alert and oriented to person, place, and time.   Psychiatric:         Mood and Affect: Mood normal.       Assessment:       ICD-10-CM ICD-9-CM   1. Type 2 diabetes mellitus without complication, with long-term current use of insulin  E11.9 250.00    Z79.4 V58.67   2. History of Kayleen-en-Y gastric bypass  Z98.84 V45.86   3. Pseudoseizure  F44.5 300.11   4. Osteopenia of multiple sites  M85.89 733.90       Plan:     Medication List with  Changes/Refills   New Medications    ACETAMINOPHEN-CODEINE 300-30MG (TYLENOL #3) 300-30 MG TAB    Take 1 tablet by mouth every 6 hours as needed (PRN)    GABAPENTIN (NEURONTIN) 600 MG TABLET    Take 1 tablet (600 mg total) by mouth 3 (three) times daily.    METHOCARBAMOL (ROBAXIN) 750 MG TAB    Take 1 tablet by mouth every 8 hours as needed (PRN)   Current Medications    ACETAMINOPHEN-CODEINE 300-30MG (TYLENOL #3) 300-30 MG TAB    Take 1 tablet by mouth every 6 hours as needed    BD ALCOHOL SWABS PADM    Apply topically 3 (three) times daily.    BLOOD SUGAR DIAGNOSTIC STRP    To check BG 3 times daily, to use with insurance preferred meter    DxE11.9    BLOOD-GLUCOSE METER MISC    Use to check blood glucose 3 times daily    BUSPIRONE (BUSPAR) 30 MG TAB    Take 1 tablet (30 mg total) by mouth 2 (two) times daily.    BUSPIRONE (BUSPAR) 30 MG TAB    2 (two) times daily.    CHOLECALCIFEROL, VITAMIN D3, 1,250 MCG (50,000 UNIT) CAPSULE    TAKE 2 CAPSULES (100,000 UNITS) EVERY 7 DAYS    CYANOCOBALAMIN 1,000 MCG/ML INJECTION    INJECT 1 ML UNDER THE SKIN EVERY 28 DAYS    CYCLOBENZAPRINE (FLEXERIL) 10 MG TABLET    TAKE 1 TABLET THREE TIMES DAILY AS NEEDED    CYCLOBENZAPRINE (FLEXERIL) 10 MG TABLET    as needed; Take 1 tablet by mouth every 8 hours    DOXEPIN (SINEQUAN) 100 MG CAPSULE    100 mg.    DOXEPIN (SINEQUAN) 50 MG CAPSULE    Take 1 capsule by mouth at bedtime as needed for insomnia. If 1 capsule is not effective, increase the dose to 2 capsules daily    EMPAGLIFLOZIN (JARDIANCE) 25 MG TABLET    Take 1 tablet (25 mg total) by mouth once daily.    FLUCONAZOLE (DIFLUCAN) 150 MG TAB    TAKE 1 TABLET BY MOUTH DAILY FOR 3 DAYS    INSULIN DEGLUDEC 100 UNIT/ML INJECTION    Inject 0.15 mLs (15 Units total) into the skin once daily.    KLOR-CON M20 20 MEQ TABLET        LANCETS 30 GAUGE MISC    To check blood glucose 3 times daily    LEMBOREXANT (DAYVIGO) 10 MG TAB    Take 1 tablet by mouth nightly at bedtime    MELOXICAM  "(MOBIC) 7.5 MG TABLET    TAKE 1 TABLET TWICE A DAY AS NEEDED FOR PAIN. TAKE WITH LARGEST MEAL OF THE DAY FOR ARTHRITIS AND JOINT PAIN    METHOCARBAMOL (ROBAXIN) 750 MG TAB    Take 1 tablet by mouth every 8 hours as needed (PRN)    MUPIROCIN (BACTROBAN) 2 % OINTMENT    Apply topically 3 (three) times daily.    NALOXONE (NARCAN) 4 MG/ACTUATION SPRY    Use 1 spray into one nostril as directed as needed. Another spray may be given into the other nostril in 2 to 3 minutes until the patient responds. Use only in emergency if person is unconscious. Call 911    NARATRIPTAN (AMERGE) 2.5 MG TABLET    TAKE 1 TABLET (2.5 MG) AT ONSET OF HEADACHE, MAY REPEAT IN 4 HOURS IF NEEDED    NOVOLOG FLEXPEN U-100 INSULIN 100 UNIT/ML (3 ML) INPN PEN    Inject 3 times per day per sliding scale - can use every 4 hours as needed to correct a high blood sugar.    ONDANSETRON (ZOFRAN) 4 MG TABLET    TAKE 1 TABLET EVERY 6 HOURS    OXYCODONE-ACETAMINOPHEN (PERCOCET)  MG PER TABLET    Take 1 tablet by mouth every 8 hours as needed    OXYCODONE-ACETAMINOPHEN (PERCOCET)  MG PER TABLET    as needed; Take 1 tablet by mouth every 8 hours    OXYCODONE-ACETAMINOPHEN (PERCOCET)  MG PER TABLET    Take 1 tablet by mouth every 8 hours as needed    OXYCODONE-ACETAMINOPHEN (PERCOCET)  MG PER TABLET    as needed; Take 1 tablet by mouth every 8 hours    OXYCODONE-ACETAMINOPHEN (PERCOCET)  MG PER TABLET    Take 1 tablet by mouth every 8 (eight) hours as needed    OXYCODONE-ACETAMINOPHEN (PERCOCET)  MG PER TABLET    Take 1 tablet by mouth every 8 hours as needed    PANTOPRAZOLE (PROTONIX) 40 MG TABLET    Take 1 tablet (40 mg total) by mouth once daily.    PEN NEEDLE, DIABETIC (DROPLET PEN NEEDLE) 32 GAUGE X 5/32" NDLE    Use with insulin 4 times daily as directed    PRAVASTATIN (PRAVACHOL) 20 MG TABLET    20 mg.    PRAVASTATIN (PRAVACHOL) 20 MG TABLET    Take 1 tablet (20 mg total) by mouth once daily.    PROMETHAZINE " "(PHENERGAN) 25 MG TABLET    TAKE 1 TABLET TWICE DAILY AS NEEDED FOR NAUSEA    SYRINGE WITH NEEDLE (SYRINGE 3CC/25GX1") 3 ML 25 GAUGE X 1" SYRG    For vitamin B12 injection    TAMSULOSIN (FLOMAX) 0.4 MG CAP    Take 1 capsule (0.4 mg total) by mouth once daily.    TRESIBA FLEXTOUCH U-100 100 UNIT/ML (3 ML) INSULIN PEN    Inject into the skin.    ZOLPIDEM (AMBIEN) 10 MG TAB    Take 1 tablet (10 mg total) by mouth nightly as needed.   Changed and/or Refilled Medications    Modified Medication Previous Medication    METFORMIN (GLUCOPHAGE-XR) 500 MG ER 24HR TABLET metFORMIN (GLUCOPHAGE-XR) 500 MG ER 24hr tablet       Take 1 tablet (500 mg total) by mouth 2 (two) times daily with meals. TAKE 2 TABLETS TWICE DAILY WITH MEALS    Take 1 tablet (500 mg total) by mouth 2 (two) times daily with meals. TAKE 2 TABLETS TWICE DAILY WITH MEALS    METFORMIN (GLUCOPHAGE-XR) 500 MG ER 24HR TABLET metFORMIN (GLUCOPHAGE-XR) 500 MG ER 24hr tablet       Take 1 tablet (500 mg total) by mouth 2 (two) times daily with meals. TAKE 2 TABLETS TWICE DAILY WITH MEALS    Take 1 tablet (500 mg total) by mouth 2 (two) times daily with meals. TAKE 2 TABLETS TWICE DAILY WITH MEALS   Discontinued Medications    GABAPENTIN (NEURONTIN) 300 MG CAPSULE    TAKE 2 CAPSULES THREE TIMES DAILY        1. Type 2 diabetes mellitus without complication, with long-term current use of insulin  Overview:  Controlled.  Continue current medications.    Orders:  -     Discontinue: metFORMIN (GLUCOPHAGE-XR) 500 MG ER 24hr tablet; Take 1 tablet (500 mg total) by mouth 2 (two) times daily with meals. TAKE 2 TABLETS TWICE DAILY WITH MEALS  Dispense: 90 tablet; Refill: 3  -     Microalbumin/creatinine urine ratio  -     Lipid Panel; Future; Expected date: 06/18/2025  -     Comprehensive Metabolic Panel; Future; Expected date: 06/18/2025  -     gabapentin (NEURONTIN) 600 MG tablet; Take 1 tablet (600 mg total) by mouth 3 (three) times daily.  Dispense: 270 tablet; Refill: 3  -   "   metFORMIN (GLUCOPHAGE-XR) 500 MG ER 24hr tablet; Take 1 tablet (500 mg total) by mouth 2 (two) times daily with meals. TAKE 2 TABLETS TWICE DAILY WITH MEALS  Dispense: 90 tablet; Refill: 3  -     metFORMIN (GLUCOPHAGE-XR) 500 MG ER 24hr tablet; Take 1 tablet (500 mg total) by mouth 2 (two) times daily with meals. TAKE 2 TABLETS TWICE DAILY WITH MEALS  Dispense: 180 tablet; Refill: 3    2. History of Kayleen-en-Y gastric bypass    3. Pseudoseizure  Overview:  IMO Regualtory Update 4/1/23      4. Osteopenia of multiple sites    Other orders  -     Microalbumin/Creatinine Ratio, Urine           Continue current medications      Future Appointments   Date Time Provider Department Center   7/29/2025  1:40 PM Yaquelin Thurston NP [facility] SLEEP [facility]   9/23/2025 10:45 AM Miriam Morales MD [id number]   10/20/2025  9:20 AM Ebony Tinoco NP [place] [id number] [address]

## 2025-06-19 LAB
ALBUMIN SERPL-MCNC: 4.1 G/DL (ref 3.5–5.2)
ALBUMIN/GLOB SERPL ELPH: 1.8 {RATIO} (ref 1–2.7)
ALP ISOS SERPL LEV INH-CCNC: 108 U/L (ref 35–105)
ALT (SGPT): 62 U/L (ref 0–33)
ANION GAP SERPL CALC-SCNC: 14 MMOL/L (ref 8–17)
AST SERPL-CCNC: 36 U/L (ref 0–32)
BILIRUBIN, TOTAL: 0.64 MG/DL (ref 0–1.2)
BUN/CREAT SERPL: 22.5 (ref 6–20)
CALCIUM SERPL-MCNC: 8.2 MG/DL (ref 8.6–10.2)
CARBON DIOXIDE, CO2: 19 MMOL/L (ref 22–29)
CHLORIDE: 99 MMOL/L (ref 98–107)
CHOLEST SERPL-MCNC: 287 MG/DL (ref 0–150)
CHOLEST SERPL-MSCNC: 129 MG/DL (ref 100–200)
CREAT SERPL-MCNC: 0.97 MG/DL (ref 0.5–0.9)
CREAT UR-MCNC: 46.1 MG/DL (ref 28–217)
GFR ESTIMATION: 69.01 ML/MIN/1.73M2
GLOBULIN: 2.3 G/DL (ref 1.5–4.5)
GLUCOSE: 340 MG/DL (ref 74–106)
HDLC SERPL-MCNC: 65 MG/DL
LDL/HDL RATIO: 0.1 (ref 1–3)
LDLC SERPL CALC-MCNC: 6.6 MG/DL (ref 0–100)
MICROALBUMIN QUANT: <1.2 MG/DL (ref 0–2)
MICROALBUMIN/CREATININE RATIO: NORMAL UG/MG (ref 0–30)
POTASSIUM: 4.5 MMOL/L (ref 3.5–5.1)
PROT SNV-MCNC: 6.4 G/DL (ref 6.4–8.3)
SODIUM: 132 MMOL/L (ref 136–145)
UREA NITROGEN (BUN): 21.8 MG/DL (ref 6–20)

## 2025-06-21 ENCOUNTER — RESULTS FOLLOW-UP (OUTPATIENT)
Dept: PRIMARY CARE CLINIC | Facility: CLINIC | Age: 55
End: 2025-06-21

## 2025-06-26 ENCOUNTER — PATIENT MESSAGE (OUTPATIENT)
Dept: PRIMARY CARE CLINIC | Facility: CLINIC | Age: 55
End: 2025-06-26
Payer: MEDICARE

## 2025-07-25 DIAGNOSIS — E11.9 TYPE 2 DIABETES MELLITUS WITHOUT COMPLICATION, WITH LONG-TERM CURRENT USE OF INSULIN: Chronic | ICD-10-CM

## 2025-07-25 DIAGNOSIS — Z79.4 TYPE 2 DIABETES MELLITUS WITHOUT COMPLICATION, WITH LONG-TERM CURRENT USE OF INSULIN: Chronic | ICD-10-CM

## 2025-07-25 DIAGNOSIS — E53.8 VITAMIN B12 DEFICIENCY: ICD-10-CM

## 2025-07-25 RX ORDER — EMPAGLIFLOZIN 25 MG/1
25 TABLET, FILM COATED ORAL
Qty: 100 TABLET | Refills: 3 | Status: SHIPPED | OUTPATIENT
Start: 2025-07-25

## 2025-07-25 RX ORDER — CYANOCOBALAMIN 1000 UG/ML
INJECTION, SOLUTION INTRAMUSCULAR; SUBCUTANEOUS
Qty: 3 ML | Refills: 3 | Status: SHIPPED | OUTPATIENT
Start: 2025-07-25

## 2025-07-28 ENCOUNTER — PATIENT MESSAGE (OUTPATIENT)
Dept: SLEEP MEDICINE | Facility: CLINIC | Age: 55
End: 2025-07-28
Payer: MEDICARE

## 2025-08-20 ENCOUNTER — TELEPHONE (OUTPATIENT)
Dept: PRIMARY CARE CLINIC | Facility: CLINIC | Age: 55
End: 2025-08-20
Payer: MEDICARE

## (undated) DEVICE — PAD CAST SPECIALIST STRL 3

## (undated) DEVICE — APPLICATOR CHLORAPREP ORN 26ML

## (undated) DEVICE — UNDERGLOVES BIOGEL PI SIZE 8.5

## (undated) DEVICE — NDL SAFETY 25G X 1.5 ECLIPSE

## (undated) DEVICE — DEV-O-LOOPS MAXI RED

## (undated) DEVICE — PAD CAST SPECIALIST STRL 4

## (undated) DEVICE — IMMOBILIZER SHOULDER LARGE

## (undated) DEVICE — ALCOHOL 70% ISOP RUBBING 4OZ

## (undated) DEVICE — DRAPE PLASTIC U 60X72

## (undated) DEVICE — SYR 10CC LUER LOCK

## (undated) DEVICE — SUT 4-0 ETHILON 18 PS-2

## (undated) DEVICE — SEE MEDLINE ITEM 157027

## (undated) DEVICE — SEE MEDLINE ITEM 152522

## (undated) DEVICE — COVER CAMERA OPERATING ROOM

## (undated) DEVICE — GOWN SURG 2XL DISP TIE BACK

## (undated) DEVICE — SUT VICRYL 2-0 CT-2 VCP269H

## (undated) DEVICE — SUT CTD VICRYL 3-0 PS-2 UND

## (undated) DEVICE — GAUZE SPONGE 4X4 12PLY

## (undated) DEVICE — GLOVE BIOGEL SZ 8 1/2

## (undated) DEVICE — SCRUB HIBICLENS 4% CHG 4OZ

## (undated) DEVICE — SEE MEDLINE ITEM 157131

## (undated) DEVICE — SEE MEDLINE ITEM 152622

## (undated) DEVICE — SOL 9P NACL IRR PIC IL

## (undated) DEVICE — PAD ABD 8X10 STERILE

## (undated) DEVICE — SEE MEDLINE ITEM 157117

## (undated) DEVICE — SUPPORT ULNA NERVE PROTECTOR

## (undated) DEVICE — COVER LIGHT HANDLE 80/CA

## (undated) DEVICE — SEE MEDLINE ITEM 157173

## (undated) DEVICE — POSITIONER HEAD DONUT 9IN FOAM

## (undated) DEVICE — DRESSING XEROFORM FOIL PK 1X8

## (undated) DEVICE — BANDAGE ELASTIC 3X5 VELCRO ST

## (undated) DEVICE — UNDERGLOVES BIOGEL PI SIZE 7.5